# Patient Record
Sex: FEMALE | Race: OTHER | NOT HISPANIC OR LATINO
[De-identification: names, ages, dates, MRNs, and addresses within clinical notes are randomized per-mention and may not be internally consistent; named-entity substitution may affect disease eponyms.]

---

## 2015-07-17 RX ORDER — DULOXETINE HYDROCHLORIDE 30 MG/1
220 CAPSULE, DELAYED RELEASE ORAL
Qty: 0 | Refills: 0 | DISCHARGE
Start: 2015-07-17

## 2017-02-06 ENCOUNTER — APPOINTMENT (OUTPATIENT)
Dept: PULMONOLOGY | Facility: CLINIC | Age: 32
End: 2017-02-06

## 2017-02-07 ENCOUNTER — RESULT CHARGE (OUTPATIENT)
Age: 32
End: 2017-02-07

## 2017-02-08 ENCOUNTER — APPOINTMENT (OUTPATIENT)
Dept: FAMILY MEDICINE | Facility: CLINIC | Age: 32
End: 2017-02-08

## 2017-02-08 ENCOUNTER — NON-APPOINTMENT (OUTPATIENT)
Age: 32
End: 2017-02-08

## 2017-02-08 VITALS
BODY MASS INDEX: 26.14 KG/M2 | HEART RATE: 90 BPM | DIASTOLIC BLOOD PRESSURE: 72 MMHG | HEIGHT: 72 IN | SYSTOLIC BLOOD PRESSURE: 95 MMHG | WEIGHT: 193 LBS | OXYGEN SATURATION: 98 % | TEMPERATURE: 97.6 F

## 2017-02-08 DIAGNOSIS — I77.810 THORACIC AORTIC ECTASIA: ICD-10-CM

## 2017-02-08 LAB — INR PPP: 2.7 RATIO

## 2017-02-10 LAB
25(OH)D3 SERPL-MCNC: 12.8 NG/ML
ALBUMIN SERPL ELPH-MCNC: 4.5 G/DL
ALP BLD-CCNC: 116 U/L
ALT SERPL-CCNC: 16 U/L
ANION GAP SERPL CALC-SCNC: 18 MMOL/L
APPEARANCE: ABNORMAL
AST SERPL-CCNC: 35 U/L
BACTERIA: ABNORMAL
BASOPHILS # BLD AUTO: 0.03 K/UL
BASOPHILS NFR BLD AUTO: 0.2 %
BILIRUB SERPL-MCNC: 0.6 MG/DL
BILIRUBIN URINE: NEGATIVE
BLOOD URINE: NEGATIVE
BUN SERPL-MCNC: 10 MG/DL
CALCIUM SERPL-MCNC: 9.5 MG/DL
CHLORIDE SERPL-SCNC: 100 MMOL/L
CHOLEST SERPL-MCNC: 203 MG/DL
CHOLEST/HDLC SERPL: 5.1 RATIO
CO2 SERPL-SCNC: 21 MMOL/L
COLOR: ABNORMAL
CREAT SERPL-MCNC: 0.74 MG/DL
CREAT SPEC-SCNC: 330 MG/DL
EOSINOPHIL # BLD AUTO: 0.17 K/UL
EOSINOPHIL NFR BLD AUTO: 1.2 %
GGT SERPL-CCNC: 52 U/L
GLUCOSE QUALITATIVE U: NORMAL MG/DL
GLUCOSE SERPL-MCNC: 195 MG/DL
HBA1C MFR BLD HPLC: 9.8 %
HCT VFR BLD CALC: 43.2 %
HDLC SERPL-MCNC: 40 MG/DL
HGB BLD-MCNC: 13.7 G/DL
HYALINE CASTS: 0 /LPF
IMM GRANULOCYTES NFR BLD AUTO: 0.5 %
KETONES URINE: NEGATIVE
LDLC SERPL CALC-MCNC: 107 MG/DL
LEUKOCYTE ESTERASE URINE: ABNORMAL
LYMPHOCYTES # BLD AUTO: 2.04 K/UL
LYMPHOCYTES NFR BLD AUTO: 14.9 %
MAN DIFF?: NORMAL
MCHC RBC-ENTMCNC: 27.3 PG
MCHC RBC-ENTMCNC: 31.7 GM/DL
MCV RBC AUTO: 86.2 FL
MICROALBUMIN 24H UR DL<=1MG/L-MCNC: 2.4 MG/DL
MICROALBUMIN/CREAT 24H UR-RTO: 7 UG/MG
MICROSCOPIC-UA: NORMAL
MONOCYTES # BLD AUTO: 0.76 K/UL
MONOCYTES NFR BLD AUTO: 5.5 %
NEUTROPHILS # BLD AUTO: 10.63 K/UL
NEUTROPHILS NFR BLD AUTO: 77.7 %
NITRITE URINE: NEGATIVE
PH URINE: 5.5
PLATELET # BLD AUTO: 361 K/UL
POTASSIUM SERPL-SCNC: 4 MMOL/L
PROT SERPL-MCNC: 7.8 G/DL
PROTEIN URINE: ABNORMAL MG/DL
RBC # BLD: 5.01 M/UL
RBC # FLD: 15 %
RED BLOOD CELLS URINE: 3 /HPF
SODIUM SERPL-SCNC: 139 MMOL/L
SPECIFIC GRAVITY URINE: 1.03
SQUAMOUS EPITHELIAL CELLS: 14 /HPF
T3 SERPL-MCNC: 106 NG/DL
T4 SERPL-MCNC: 8.7 UG/DL
TRIGL SERPL-MCNC: 280 MG/DL
TSH SERPL-ACNC: 2.56 UIU/ML
URATE SERPL-MCNC: 5.2 MG/DL
URINE COMMENTS: NORMAL
UROBILINOGEN URINE: NORMAL MG/DL
WBC # FLD AUTO: 13.7 K/UL
WHITE BLOOD CELLS URINE: 6 /HPF

## 2017-02-21 ENCOUNTER — OUTPATIENT (OUTPATIENT)
Dept: OUTPATIENT SERVICES | Facility: HOSPITAL | Age: 32
LOS: 1 days | End: 2017-02-21
Payer: COMMERCIAL

## 2017-02-21 ENCOUNTER — MEDICATION RENEWAL (OUTPATIENT)
Age: 32
End: 2017-02-21

## 2017-02-21 DIAGNOSIS — M54.5 LOW BACK PAIN: ICD-10-CM

## 2017-02-21 DIAGNOSIS — M54.2 CERVICALGIA: ICD-10-CM

## 2017-02-21 DIAGNOSIS — Z98.1 ARTHRODESIS STATUS: Chronic | ICD-10-CM

## 2017-02-21 DIAGNOSIS — Z95.4 PRESENCE OF OTHER HEART-VALVE REPLACEMENT: Chronic | ICD-10-CM

## 2017-02-21 DIAGNOSIS — Z96.1 PRESENCE OF INTRAOCULAR LENS: Chronic | ICD-10-CM

## 2017-02-21 DIAGNOSIS — Z51.89 ENCOUNTER FOR OTHER SPECIFIED AFTERCARE: ICD-10-CM

## 2017-02-22 ENCOUNTER — RX RENEWAL (OUTPATIENT)
Age: 32
End: 2017-02-22

## 2017-03-08 ENCOUNTER — LABORATORY RESULT (OUTPATIENT)
Age: 32
End: 2017-03-08

## 2017-03-08 ENCOUNTER — APPOINTMENT (OUTPATIENT)
Dept: FAMILY MEDICINE | Facility: CLINIC | Age: 32
End: 2017-03-08

## 2017-03-08 VITALS
DIASTOLIC BLOOD PRESSURE: 70 MMHG | BODY MASS INDEX: 26.55 KG/M2 | HEART RATE: 100 BPM | HEIGHT: 72 IN | WEIGHT: 196 LBS | SYSTOLIC BLOOD PRESSURE: 110 MMHG

## 2017-03-08 VITALS — SYSTOLIC BLOOD PRESSURE: 92 MMHG | DIASTOLIC BLOOD PRESSURE: 65 MMHG | HEART RATE: 90 BPM

## 2017-03-08 DIAGNOSIS — Z87.898 PERSONAL HISTORY OF OTHER SPECIFIED CONDITIONS: ICD-10-CM

## 2017-03-08 DIAGNOSIS — R82.71 BACTERIURIA: ICD-10-CM

## 2017-03-09 ENCOUNTER — MEDICATION RENEWAL (OUTPATIENT)
Age: 32
End: 2017-03-09

## 2017-03-09 LAB
25(OH)D3 SERPL-MCNC: 15.8 NG/ML
APPEARANCE: CLEAR
BACTERIA: NEGATIVE
BASOPHILS # BLD AUTO: 0.38 K/UL
BASOPHILS NFR BLD AUTO: 2.8 %
BILIRUBIN URINE: NEGATIVE
BLOOD URINE: ABNORMAL
COLOR: YELLOW
EOSINOPHIL # BLD AUTO: 0 K/UL
EOSINOPHIL NFR BLD AUTO: 0 %
FERRITIN SERPL-MCNC: 191.8 NG/ML
FOLATE SERPL-MCNC: 8.6 NG/ML
GLUCOSE QUALITATIVE U: NORMAL MG/DL
HCT VFR BLD CALC: 39.4 %
HGB BLD-MCNC: 12.7 G/DL
HYALINE CASTS: 1 /LPF
IRON SATN MFR SERPL: 18 %
IRON SERPL-MCNC: 53 UG/DL
KETONES URINE: ABNORMAL
LEUKOCYTE ESTERASE URINE: NEGATIVE
LYMPHOCYTES # BLD AUTO: 3 K/UL
LYMPHOCYTES NFR BLD AUTO: 22.2 %
MAN DIFF?: NORMAL
MCHC RBC-ENTMCNC: 27.5 PG
MCHC RBC-ENTMCNC: 32.2 GM/DL
MCV RBC AUTO: 85.5 FL
MICROSCOPIC-UA: NORMAL
MONOCYTES # BLD AUTO: 0.38 K/UL
MONOCYTES NFR BLD AUTO: 2.8 %
NEUTROPHILS # BLD AUTO: 9.37 K/UL
NEUTROPHILS NFR BLD AUTO: 69.4 %
NITRITE URINE: NEGATIVE
PH URINE: 5.5
PLATELET # BLD AUTO: 341 K/UL
PROTEIN URINE: NEGATIVE MG/DL
RBC # BLD: 4.61 M/UL
RBC # FLD: 15.5 %
RED BLOOD CELLS URINE: 5 /HPF
SPECIFIC GRAVITY URINE: 1.03
SQUAMOUS EPITHELIAL CELLS: 4 /HPF
TIBC SERPL-MCNC: 288 UG/DL
TRANSFERRIN SERPL-MCNC: 227 MG/DL
UIBC SERPL-MCNC: 235 UG/DL
UROBILINOGEN URINE: NORMAL MG/DL
VIT B12 SERPL-MCNC: 291 PG/ML
WBC # FLD AUTO: 13.5 K/UL
WHITE BLOOD CELLS URINE: 1 /HPF

## 2017-03-10 LAB
BACTERIA UR CULT: NORMAL
HBA1C MFR BLD HPLC: 9.5 %

## 2017-03-17 ENCOUNTER — APPOINTMENT (OUTPATIENT)
Dept: FAMILY MEDICINE | Facility: CLINIC | Age: 32
End: 2017-03-17

## 2017-03-17 VITALS — DIASTOLIC BLOOD PRESSURE: 70 MMHG | SYSTOLIC BLOOD PRESSURE: 100 MMHG | HEIGHT: 72 IN | HEART RATE: 90 BPM

## 2017-03-21 LAB — INR PPP: 4 RATIO

## 2017-03-24 ENCOUNTER — APPOINTMENT (OUTPATIENT)
Dept: FAMILY MEDICINE | Facility: CLINIC | Age: 32
End: 2017-03-24

## 2017-03-24 VITALS
WEIGHT: 195 LBS | SYSTOLIC BLOOD PRESSURE: 108 MMHG | BODY MASS INDEX: 26.41 KG/M2 | DIASTOLIC BLOOD PRESSURE: 60 MMHG | HEIGHT: 72 IN

## 2017-03-31 ENCOUNTER — APPOINTMENT (OUTPATIENT)
Dept: FAMILY MEDICINE | Facility: CLINIC | Age: 32
End: 2017-03-31

## 2017-03-31 VITALS
SYSTOLIC BLOOD PRESSURE: 110 MMHG | BODY MASS INDEX: 26.41 KG/M2 | HEIGHT: 72 IN | DIASTOLIC BLOOD PRESSURE: 60 MMHG | WEIGHT: 195 LBS

## 2017-04-07 ENCOUNTER — APPOINTMENT (OUTPATIENT)
Dept: FAMILY MEDICINE | Facility: CLINIC | Age: 32
End: 2017-04-07

## 2017-04-07 LAB — INR PPP: 3.4 RATIO

## 2017-04-14 ENCOUNTER — APPOINTMENT (OUTPATIENT)
Dept: FAMILY MEDICINE | Facility: CLINIC | Age: 32
End: 2017-04-14

## 2017-04-14 VITALS
HEART RATE: 96 BPM | WEIGHT: 197 LBS | OXYGEN SATURATION: 98 % | BODY MASS INDEX: 26.68 KG/M2 | HEIGHT: 72 IN | DIASTOLIC BLOOD PRESSURE: 72 MMHG | SYSTOLIC BLOOD PRESSURE: 114 MMHG

## 2017-04-14 LAB
INR PPP: 1.7 RATIO
INR PPP: 2.7 RATIO
INR PPP: 3 RATIO

## 2017-04-20 ENCOUNTER — APPOINTMENT (OUTPATIENT)
Dept: PULMONOLOGY | Facility: CLINIC | Age: 32
End: 2017-04-20

## 2017-04-20 VITALS
HEART RATE: 107 BPM | DIASTOLIC BLOOD PRESSURE: 66 MMHG | WEIGHT: 193 LBS | SYSTOLIC BLOOD PRESSURE: 118 MMHG | OXYGEN SATURATION: 99 % | BODY MASS INDEX: 24.78 KG/M2

## 2017-04-20 VITALS — RESPIRATION RATE: 16 BRPM

## 2017-04-20 RX ORDER — DULOXETINE HYDROCHLORIDE 30 MG/1
30 CAPSULE, DELAYED RELEASE PELLETS ORAL
Qty: 30 | Refills: 0 | Status: DISCONTINUED | COMMUNITY
Start: 2016-05-18 | End: 2017-04-20

## 2017-04-20 RX ORDER — WARFARIN 1 MG/1
1 TABLET ORAL
Qty: 20 | Refills: 0 | Status: DISCONTINUED | COMMUNITY
Start: 2017-03-09 | End: 2017-04-20

## 2017-04-20 RX ORDER — GABAPENTIN 400 MG/1
400 CAPSULE ORAL
Qty: 90 | Refills: 0 | Status: DISCONTINUED | COMMUNITY
Start: 2017-01-25 | End: 2017-04-20

## 2017-04-20 RX ORDER — TRAZODONE HYDROCHLORIDE 50 MG/1
50 TABLET ORAL
Qty: 30 | Refills: 0 | Status: DISCONTINUED | COMMUNITY
Start: 2017-02-27 | End: 2017-04-20

## 2017-04-28 ENCOUNTER — APPOINTMENT (OUTPATIENT)
Dept: FAMILY MEDICINE | Facility: CLINIC | Age: 32
End: 2017-04-28

## 2017-04-28 VITALS
HEART RATE: 101 BPM | DIASTOLIC BLOOD PRESSURE: 68 MMHG | HEIGHT: 72 IN | BODY MASS INDEX: 27.09 KG/M2 | SYSTOLIC BLOOD PRESSURE: 110 MMHG | WEIGHT: 200 LBS

## 2017-04-28 LAB — INR PPP: 2.9 RATIO

## 2017-05-19 ENCOUNTER — APPOINTMENT (OUTPATIENT)
Dept: FAMILY MEDICINE | Facility: CLINIC | Age: 32
End: 2017-05-19

## 2017-06-06 ENCOUNTER — APPOINTMENT (OUTPATIENT)
Dept: FAMILY MEDICINE | Facility: CLINIC | Age: 32
End: 2017-06-06

## 2017-06-14 ENCOUNTER — RX RENEWAL (OUTPATIENT)
Age: 32
End: 2017-06-14

## 2017-06-14 ENCOUNTER — APPOINTMENT (OUTPATIENT)
Dept: FAMILY MEDICINE | Facility: CLINIC | Age: 32
End: 2017-06-14

## 2017-06-14 VITALS
HEIGHT: 72 IN | SYSTOLIC BLOOD PRESSURE: 110 MMHG | DIASTOLIC BLOOD PRESSURE: 70 MMHG | BODY MASS INDEX: 26.55 KG/M2 | HEART RATE: 106 BPM | WEIGHT: 196 LBS

## 2017-06-15 LAB
25(OH)D3 SERPL-MCNC: 39.9 NG/ML
ALBUMIN SERPL ELPH-MCNC: 4.2 G/DL
ALP BLD-CCNC: 95 U/L
ALT SERPL-CCNC: 18 U/L
ANION GAP SERPL CALC-SCNC: 17 MMOL/L
AST SERPL-CCNC: 30 U/L
BASOPHILS # BLD AUTO: 0.02 K/UL
BASOPHILS NFR BLD AUTO: 0.1 %
BILIRUB SERPL-MCNC: 0.3 MG/DL
BUN SERPL-MCNC: 12 MG/DL
CALCIUM SERPL-MCNC: 9.4 MG/DL
CHLORIDE SERPL-SCNC: 102 MMOL/L
CHOLEST SERPL-MCNC: 167 MG/DL
CHOLEST/HDLC SERPL: 4.4 RATIO
CO2 SERPL-SCNC: 17 MMOL/L
CREAT SERPL-MCNC: 0.69 MG/DL
EOSINOPHIL # BLD AUTO: 0.12 K/UL
EOSINOPHIL NFR BLD AUTO: 0.8 %
GGT SERPL-CCNC: 42 U/L
GLUCOSE SERPL-MCNC: 199 MG/DL
HBA1C MFR BLD HPLC: 7.8 %
HCT VFR BLD CALC: 40 %
HDLC SERPL-MCNC: 38 MG/DL
HGB BLD-MCNC: 12.7 G/DL
IMM GRANULOCYTES NFR BLD AUTO: 0.6 %
LDLC SERPL CALC-MCNC: 90 MG/DL
LYMPHOCYTES # BLD AUTO: 2.23 K/UL
LYMPHOCYTES NFR BLD AUTO: 14.4 %
MAN DIFF?: NORMAL
MCHC RBC-ENTMCNC: 27.4 PG
MCHC RBC-ENTMCNC: 31.8 GM/DL
MCV RBC AUTO: 86.2 FL
MONOCYTES # BLD AUTO: 0.85 K/UL
MONOCYTES NFR BLD AUTO: 5.5 %
NEUTROPHILS # BLD AUTO: 12.15 K/UL
NEUTROPHILS NFR BLD AUTO: 78.6 %
PLATELET # BLD AUTO: 376 K/UL
POTASSIUM SERPL-SCNC: 3.9 MMOL/L
PROT SERPL-MCNC: 7.4 G/DL
RBC # BLD: 4.64 M/UL
RBC # FLD: 15.9 %
SODIUM SERPL-SCNC: 136 MMOL/L
T3 SERPL-MCNC: 140 NG/DL
T4 SERPL-MCNC: 9 UG/DL
TRIGL SERPL-MCNC: 194 MG/DL
TSH SERPL-ACNC: 5.86 UIU/ML
URATE SERPL-MCNC: 6.8 MG/DL
WBC # FLD AUTO: 15.46 K/UL

## 2017-06-20 ENCOUNTER — OUTPATIENT (OUTPATIENT)
Dept: OUTPATIENT SERVICES | Facility: HOSPITAL | Age: 32
LOS: 1 days | End: 2017-06-20
Payer: MEDICAID

## 2017-06-20 DIAGNOSIS — G47.33 OBSTRUCTIVE SLEEP APNEA (ADULT) (PEDIATRIC): ICD-10-CM

## 2017-06-20 DIAGNOSIS — Z98.1 ARTHRODESIS STATUS: Chronic | ICD-10-CM

## 2017-06-20 DIAGNOSIS — Z96.1 PRESENCE OF INTRAOCULAR LENS: Chronic | ICD-10-CM

## 2017-06-20 DIAGNOSIS — Z95.4 PRESENCE OF OTHER HEART-VALVE REPLACEMENT: Chronic | ICD-10-CM

## 2017-06-20 PROCEDURE — 95806 SLEEP STUDY UNATT&RESP EFFT: CPT

## 2017-06-20 PROCEDURE — 95806 SLEEP STUDY UNATT&RESP EFFT: CPT | Mod: 26

## 2017-07-17 ENCOUNTER — APPOINTMENT (OUTPATIENT)
Dept: FAMILY MEDICINE | Facility: CLINIC | Age: 32
End: 2017-07-17

## 2017-07-31 PROCEDURE — 97140 MANUAL THERAPY 1/> REGIONS: CPT

## 2017-07-31 PROCEDURE — 97010 HOT OR COLD PACKS THERAPY: CPT

## 2017-07-31 PROCEDURE — 97110 THERAPEUTIC EXERCISES: CPT

## 2017-08-04 ENCOUNTER — INPATIENT (INPATIENT)
Facility: HOSPITAL | Age: 32
LOS: 0 days | Discharge: HOME | DRG: 552 | End: 2017-08-05
Attending: SURGERY | Admitting: SURGERY
Payer: COMMERCIAL

## 2017-08-04 ENCOUNTER — OUTPATIENT (OUTPATIENT)
Dept: OUTPATIENT SERVICES | Facility: HOSPITAL | Age: 32
LOS: 1 days | End: 2017-08-04
Payer: COMMERCIAL

## 2017-08-04 VITALS — HEIGHT: 72 IN | WEIGHT: 214.95 LBS

## 2017-08-04 DIAGNOSIS — Z96.1 PRESENCE OF INTRAOCULAR LENS: Chronic | ICD-10-CM

## 2017-08-04 DIAGNOSIS — Z95.4 PRESENCE OF OTHER HEART-VALVE REPLACEMENT: Chronic | ICD-10-CM

## 2017-08-04 DIAGNOSIS — Z98.1 ARTHRODESIS STATUS: Chronic | ICD-10-CM

## 2017-08-04 DIAGNOSIS — M54.2 CERVICALGIA: ICD-10-CM

## 2017-08-04 LAB
ALBUMIN SERPL ELPH-MCNC: 4.3 G/DL — SIGNIFICANT CHANGE UP (ref 3.3–5.2)
ALP SERPL-CCNC: 96 U/L — SIGNIFICANT CHANGE UP (ref 40–120)
ALT FLD-CCNC: 14 U/L — SIGNIFICANT CHANGE UP
AMYLASE P1 CFR SERPL: 83 U/L — SIGNIFICANT CHANGE UP (ref 36–128)
ANION GAP SERPL CALC-SCNC: 16 MMOL/L — SIGNIFICANT CHANGE UP (ref 5–17)
APTT BLD: 70.7 SEC — HIGH (ref 27.5–37.4)
AST SERPL-CCNC: 27 U/L — SIGNIFICANT CHANGE UP
BASOPHILS # BLD AUTO: 0 K/UL — SIGNIFICANT CHANGE UP (ref 0–0.2)
BASOPHILS NFR BLD AUTO: 0.1 % — SIGNIFICANT CHANGE UP (ref 0–2)
BILIRUB SERPL-MCNC: 0.4 MG/DL — SIGNIFICANT CHANGE UP (ref 0.4–2)
BUN SERPL-MCNC: 14 MG/DL — SIGNIFICANT CHANGE UP (ref 8–20)
CALCIUM SERPL-MCNC: 9.4 MG/DL — SIGNIFICANT CHANGE UP (ref 8.6–10.2)
CHLORIDE SERPL-SCNC: 100 MMOL/L — SIGNIFICANT CHANGE UP (ref 98–107)
CO2 SERPL-SCNC: 21 MMOL/L — LOW (ref 22–29)
CREAT SERPL-MCNC: 0.56 MG/DL — SIGNIFICANT CHANGE UP (ref 0.5–1.3)
EOSINOPHIL # BLD AUTO: 0.2 K/UL — SIGNIFICANT CHANGE UP (ref 0–0.5)
EOSINOPHIL NFR BLD AUTO: 1.1 % — SIGNIFICANT CHANGE UP (ref 0–6)
ETHANOL SERPL-MCNC: <10 MG/DL — SIGNIFICANT CHANGE UP
GLUCOSE SERPL-MCNC: 194 MG/DL — HIGH (ref 70–115)
HCT VFR BLD CALC: 40.5 % — SIGNIFICANT CHANGE UP (ref 37–47)
HGB BLD-MCNC: 13.3 G/DL — SIGNIFICANT CHANGE UP (ref 12–16)
INR BLD: 2.73 RATIO — HIGH (ref 0.88–1.16)
LIDOCAIN IGE QN: 36 U/L — SIGNIFICANT CHANGE UP (ref 22–51)
LYMPHOCYTES # BLD AUTO: 15.3 % — LOW (ref 20–55)
LYMPHOCYTES # BLD AUTO: 2 K/UL — SIGNIFICANT CHANGE UP (ref 1–4.8)
MCHC RBC-ENTMCNC: 27.3 PG — SIGNIFICANT CHANGE UP (ref 27–31)
MCHC RBC-ENTMCNC: 32.8 G/DL — SIGNIFICANT CHANGE UP (ref 32–36)
MCV RBC AUTO: 83 FL — SIGNIFICANT CHANGE UP (ref 81–99)
MONOCYTES # BLD AUTO: 0.9 K/UL — HIGH (ref 0–0.8)
MONOCYTES NFR BLD AUTO: 6.4 % — SIGNIFICANT CHANGE UP (ref 3–10)
NEUTROPHILS # BLD AUTO: 10.2 K/UL — HIGH (ref 1.8–8)
NEUTROPHILS NFR BLD AUTO: 76.5 % — HIGH (ref 37–73)
PLATELET # BLD AUTO: 355 K/UL — SIGNIFICANT CHANGE UP (ref 150–400)
POTASSIUM SERPL-MCNC: 4.6 MMOL/L — SIGNIFICANT CHANGE UP (ref 3.5–5.3)
POTASSIUM SERPL-SCNC: 4.6 MMOL/L — SIGNIFICANT CHANGE UP (ref 3.5–5.3)
PROT SERPL-MCNC: 8.1 G/DL — SIGNIFICANT CHANGE UP (ref 6.6–8.7)
PROTHROM AB SERPL-ACNC: 30.7 SEC — HIGH (ref 9.8–12.7)
RBC # BLD: 4.88 M/UL — SIGNIFICANT CHANGE UP (ref 4.4–5.2)
RBC # FLD: 15.5 % — SIGNIFICANT CHANGE UP (ref 11–15.6)
SODIUM SERPL-SCNC: 137 MMOL/L — SIGNIFICANT CHANGE UP (ref 135–145)
WBC # BLD: 13.4 K/UL — HIGH (ref 4.8–10.8)
WBC # FLD AUTO: 13.4 K/UL — HIGH (ref 4.8–10.8)

## 2017-08-04 PROCEDURE — 71010: CPT | Mod: 26

## 2017-08-04 PROCEDURE — 99285 EMERGENCY DEPT VISIT HI MDM: CPT

## 2017-08-04 PROCEDURE — 93010 ELECTROCARDIOGRAM REPORT: CPT

## 2017-08-04 PROCEDURE — 70450 CT HEAD/BRAIN W/O DYE: CPT | Mod: 26

## 2017-08-04 PROCEDURE — 72125 CT NECK SPINE W/O DYE: CPT | Mod: 26

## 2017-08-04 RX ORDER — FENTANYL CITRATE 50 UG/ML
25 INJECTION INTRAVENOUS ONCE
Qty: 0 | Refills: 0 | Status: DISCONTINUED | OUTPATIENT
Start: 2017-08-04 | End: 2017-08-04

## 2017-08-04 RX ORDER — SODIUM CHLORIDE 9 MG/ML
1000 INJECTION INTRAMUSCULAR; INTRAVENOUS; SUBCUTANEOUS ONCE
Qty: 0 | Refills: 0 | Status: COMPLETED | OUTPATIENT
Start: 2017-08-04 | End: 2017-08-04

## 2017-08-04 RX ORDER — KETOROLAC TROMETHAMINE 30 MG/ML
15 SYRINGE (ML) INJECTION ONCE
Qty: 0 | Refills: 0 | Status: DISCONTINUED | OUTPATIENT
Start: 2017-08-04 | End: 2017-08-04

## 2017-08-04 RX ORDER — HYDROMORPHONE HYDROCHLORIDE 2 MG/ML
0.5 INJECTION INTRAMUSCULAR; INTRAVENOUS; SUBCUTANEOUS ONCE
Qty: 0 | Refills: 0 | Status: DISCONTINUED | OUTPATIENT
Start: 2017-08-04 | End: 2017-08-04

## 2017-08-04 RX ORDER — ENOXAPARIN SODIUM 100 MG/ML
40 INJECTION SUBCUTANEOUS EVERY 12 HOURS
Qty: 0 | Refills: 0 | Status: DISCONTINUED | OUTPATIENT
Start: 2017-08-04 | End: 2017-08-05

## 2017-08-04 RX ORDER — HYDROMORPHONE HYDROCHLORIDE 2 MG/ML
0.5 INJECTION INTRAMUSCULAR; INTRAVENOUS; SUBCUTANEOUS EVERY 4 HOURS
Qty: 0 | Refills: 0 | Status: DISCONTINUED | OUTPATIENT
Start: 2017-08-04 | End: 2017-08-05

## 2017-08-04 RX ORDER — ACETAMINOPHEN 500 MG
650 TABLET ORAL EVERY 6 HOURS
Qty: 0 | Refills: 0 | Status: DISCONTINUED | OUTPATIENT
Start: 2017-08-04 | End: 2017-08-05

## 2017-08-04 RX ORDER — OXYCODONE AND ACETAMINOPHEN 5; 325 MG/1; MG/1
1 TABLET ORAL EVERY 6 HOURS
Qty: 0 | Refills: 0 | Status: DISCONTINUED | OUTPATIENT
Start: 2017-08-04 | End: 2017-08-05

## 2017-08-04 RX ADMIN — FENTANYL CITRATE 25 MICROGRAM(S): 50 INJECTION INTRAVENOUS at 15:55

## 2017-08-04 RX ADMIN — Medication 15 MILLIGRAM(S): at 16:58

## 2017-08-04 RX ADMIN — SODIUM CHLORIDE 1000 MILLILITER(S): 9 INJECTION INTRAMUSCULAR; INTRAVENOUS; SUBCUTANEOUS at 15:57

## 2017-08-04 RX ADMIN — HYDROMORPHONE HYDROCHLORIDE 0.5 MILLIGRAM(S): 2 INJECTION INTRAMUSCULAR; INTRAVENOUS; SUBCUTANEOUS at 18:27

## 2017-08-04 RX ADMIN — HYDROMORPHONE HYDROCHLORIDE 0.5 MILLIGRAM(S): 2 INJECTION INTRAMUSCULAR; INTRAVENOUS; SUBCUTANEOUS at 20:27

## 2017-08-04 RX ADMIN — HYDROMORPHONE HYDROCHLORIDE 0.5 MILLIGRAM(S): 2 INJECTION INTRAMUSCULAR; INTRAVENOUS; SUBCUTANEOUS at 18:35

## 2017-08-04 RX ADMIN — FENTANYL CITRATE 25 MICROGRAM(S): 50 INJECTION INTRAVENOUS at 15:53

## 2017-08-04 RX ADMIN — Medication 15 MILLIGRAM(S): at 17:30

## 2017-08-04 RX ADMIN — HYDROMORPHONE HYDROCHLORIDE 0.5 MILLIGRAM(S): 2 INJECTION INTRAMUSCULAR; INTRAVENOUS; SUBCUTANEOUS at 23:03

## 2017-08-04 RX ADMIN — HYDROMORPHONE HYDROCHLORIDE 0.5 MILLIGRAM(S): 2 INJECTION INTRAMUSCULAR; INTRAVENOUS; SUBCUTANEOUS at 23:33
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
## 2017-08-04 NOTE — H&P ADULT - PMH
Anxiety    Depression    Diabetes    Dilated aortic root    Fibromyalgia    Marfan syndrome    Mitral valve prolapse    S/P CABG (coronary artery bypass graft)
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
## 2017-08-04 NOTE — H&P ADULT - NSHPPHYSICALEXAM_GEN_ALL_CORE
Secondary Survey  Constitutional: middle age female in no acute distress  HEENT: No blood from nares or ears. No maxillary or mandibular crepitus  Pulm: Clear to auscultation bilaterally  CV:S1/S2  Chest: No chest tenderness  Abd: Soft, nontender, nondistended  Pelvic: Stable  Vascular: Radial Pulse 2+, bilateral  Extremities: Abrasion to medial aspect of right elbow  Neuro: Moves all extremities. Strength 5/5. Sensation intact

## 2017-08-05 ENCOUNTER — TRANSCRIPTION ENCOUNTER (OUTPATIENT)
Age: 32
End: 2017-08-05

## 2017-08-05 VITALS
RESPIRATION RATE: 18 BRPM | HEART RATE: 84 BPM | DIASTOLIC BLOOD PRESSURE: 78 MMHG | OXYGEN SATURATION: 98 % | SYSTOLIC BLOOD PRESSURE: 121 MMHG | TEMPERATURE: 98 F

## 2017-08-05 LAB
APPEARANCE UR: CLEAR — SIGNIFICANT CHANGE UP
BACTERIA # UR AUTO: ABNORMAL
BILIRUB UR-MCNC: ABNORMAL
COLOR SPEC: YELLOW — SIGNIFICANT CHANGE UP
COMMENT - URINE: SIGNIFICANT CHANGE UP
DIFF PNL FLD: NEGATIVE — SIGNIFICANT CHANGE UP
EPI CELLS # UR: SIGNIFICANT CHANGE UP
GLUCOSE UR QL: 50 MG/DL
KETONES UR-MCNC: ABNORMAL
LEUKOCYTE ESTERASE UR-ACNC: ABNORMAL
NITRITE UR-MCNC: NEGATIVE — SIGNIFICANT CHANGE UP
PH UR: 6 — SIGNIFICANT CHANGE UP (ref 5–8)
PROT UR-MCNC: 30 MG/DL
RBC CASTS # UR COMP ASSIST: ABNORMAL /HPF (ref 0–4)
SP GR SPEC: 1.02 — SIGNIFICANT CHANGE UP (ref 1.01–1.02)
UROBILINOGEN FLD QL: NEGATIVE MG/DL — SIGNIFICANT CHANGE UP
WBC UR QL: SIGNIFICANT CHANGE UP

## 2017-08-05 PROCEDURE — 96376 TX/PRO/DX INJ SAME DRUG ADON: CPT

## 2017-08-05 PROCEDURE — 85027 COMPLETE CBC AUTOMATED: CPT

## 2017-08-05 PROCEDURE — 96374 THER/PROPH/DIAG INJ IV PUSH: CPT

## 2017-08-05 PROCEDURE — 80053 COMPREHEN METABOLIC PANEL: CPT

## 2017-08-05 PROCEDURE — 72125 CT NECK SPINE W/O DYE: CPT

## 2017-08-05 PROCEDURE — 83605 ASSAY OF LACTIC ACID: CPT

## 2017-08-05 PROCEDURE — 70450 CT HEAD/BRAIN W/O DYE: CPT

## 2017-08-05 PROCEDURE — 81001 URINALYSIS AUTO W/SCOPE: CPT

## 2017-08-05 PROCEDURE — 80307 DRUG TEST PRSMV CHEM ANLYZR: CPT

## 2017-08-05 PROCEDURE — 85610 PROTHROMBIN TIME: CPT

## 2017-08-05 PROCEDURE — 93005 ELECTROCARDIOGRAM TRACING: CPT

## 2017-08-05 PROCEDURE — 71045 X-RAY EXAM CHEST 1 VIEW: CPT

## 2017-08-05 PROCEDURE — 82150 ASSAY OF AMYLASE: CPT

## 2017-08-05 PROCEDURE — 85730 THROMBOPLASTIN TIME PARTIAL: CPT

## 2017-08-05 PROCEDURE — 83690 ASSAY OF LIPASE: CPT

## 2017-08-05 PROCEDURE — 36415 COLL VENOUS BLD VENIPUNCTURE: CPT

## 2017-08-05 PROCEDURE — 96375 TX/PRO/DX INJ NEW DRUG ADDON: CPT

## 2017-08-05 PROCEDURE — 99284 EMERGENCY DEPT VISIT MOD MDM: CPT | Mod: 25

## 2017-08-05 RX ORDER — INSULIN GLARGINE 100 [IU]/ML
24 INJECTION, SOLUTION SUBCUTANEOUS EVERY MORNING
Qty: 0 | Refills: 0 | Status: DISCONTINUED | OUTPATIENT
Start: 2017-08-05 | End: 2017-08-05

## 2017-08-05 RX ORDER — METOPROLOL TARTRATE 50 MG
25 TABLET ORAL
Qty: 0 | Refills: 0 | Status: DISCONTINUED | OUTPATIENT
Start: 2017-08-05 | End: 2017-08-05

## 2017-08-05 RX ORDER — DEXTROSE 50 % IN WATER 50 %
1 SYRINGE (ML) INTRAVENOUS ONCE
Qty: 0 | Refills: 0 | Status: DISCONTINUED | OUTPATIENT
Start: 2017-08-05 | End: 2017-08-05

## 2017-08-05 RX ORDER — OXYCODONE HYDROCHLORIDE 5 MG/1
10 TABLET ORAL EVERY 4 HOURS
Qty: 0 | Refills: 0 | Status: DISCONTINUED | OUTPATIENT
Start: 2017-08-05 | End: 2017-08-05

## 2017-08-05 RX ORDER — DEXTROSE 50 % IN WATER 50 %
25 SYRINGE (ML) INTRAVENOUS ONCE
Qty: 0 | Refills: 0 | Status: DISCONTINUED | OUTPATIENT
Start: 2017-08-05 | End: 2017-08-05

## 2017-08-05 RX ORDER — OXYCODONE HYDROCHLORIDE 5 MG/1
1 TABLET ORAL
Qty: 0 | Refills: 0 | DISCHARGE
Start: 2017-08-05

## 2017-08-05 RX ORDER — INSULIN LISPRO 100/ML
8 VIAL (ML) SUBCUTANEOUS
Qty: 0 | Refills: 0 | Status: DISCONTINUED | OUTPATIENT
Start: 2017-08-05 | End: 2017-08-05

## 2017-08-05 RX ORDER — SODIUM CHLORIDE 9 MG/ML
1000 INJECTION, SOLUTION INTRAVENOUS
Qty: 0 | Refills: 0 | Status: DISCONTINUED | OUTPATIENT
Start: 2017-08-05 | End: 2017-08-05

## 2017-08-05 RX ORDER — GLUCAGON INJECTION, SOLUTION 0.5 MG/.1ML
1 INJECTION, SOLUTION SUBCUTANEOUS ONCE
Qty: 0 | Refills: 0 | Status: DISCONTINUED | OUTPATIENT
Start: 2017-08-05 | End: 2017-08-05

## 2017-08-05 RX ORDER — DEXTROSE 50 % IN WATER 50 %
12.5 SYRINGE (ML) INTRAVENOUS ONCE
Qty: 0 | Refills: 0 | Status: DISCONTINUED | OUTPATIENT
Start: 2017-08-05 | End: 2017-08-05

## 2017-08-05 RX ORDER — DULOXETINE HYDROCHLORIDE 30 MG/1
120 CAPSULE, DELAYED RELEASE ORAL DAILY
Qty: 0 | Refills: 0 | Status: DISCONTINUED | OUTPATIENT
Start: 2017-08-05 | End: 2017-08-05

## 2017-08-05 RX ORDER — ATORVASTATIN CALCIUM 80 MG/1
10 TABLET, FILM COATED ORAL AT BEDTIME
Qty: 0 | Refills: 0 | Status: DISCONTINUED | OUTPATIENT
Start: 2017-08-05 | End: 2017-08-05

## 2017-08-05 RX ADMIN — DULOXETINE HYDROCHLORIDE 120 MILLIGRAM(S): 30 CAPSULE, DELAYED RELEASE ORAL at 11:59

## 2017-08-05 RX ADMIN — Medication 15 MILLIGRAM(S): at 05:48

## 2017-08-05 RX ADMIN — Medication 650 MILLIGRAM(S): at 04:47

## 2017-08-05 RX ADMIN — OXYCODONE HYDROCHLORIDE 10 MILLIGRAM(S): 5 TABLET ORAL at 11:15

## 2017-08-05 RX ADMIN — Medication 25 MILLIGRAM(S): at 05:48

## 2017-08-05 RX ADMIN — Medication 650 MILLIGRAM(S): at 05:17

## 2017-08-05 RX ADMIN — OXYCODONE HYDROCHLORIDE 10 MILLIGRAM(S): 5 TABLET ORAL at 06:31

## 2017-08-05 RX ADMIN — OXYCODONE HYDROCHLORIDE 10 MILLIGRAM(S): 5 TABLET ORAL at 02:10

## 2017-08-05 RX ADMIN — OXYCODONE HYDROCHLORIDE 10 MILLIGRAM(S): 5 TABLET ORAL at 10:33

## 2017-08-05 RX ADMIN — Medication 650 MILLIGRAM(S): at 15:00

## 2017-08-05 RX ADMIN — Medication 650 MILLIGRAM(S): at 15:24

## 2017-08-05 RX ADMIN — OXYCODONE HYDROCHLORIDE 10 MILLIGRAM(S): 5 TABLET ORAL at 02:40

## 2017-08-05 RX ADMIN — INSULIN GLARGINE 24 UNIT(S): 100 INJECTION, SOLUTION SUBCUTANEOUS at 07:31

## 2017-08-05 RX ADMIN — OXYCODONE AND ACETAMINOPHEN 1 TABLET(S): 5; 325 TABLET ORAL at 00:01

## 2017-08-05 RX ADMIN — OXYCODONE AND ACETAMINOPHEN 1 TABLET(S): 5; 325 TABLET ORAL at 00:31

## 2017-08-05 NOTE — DISCHARGE NOTE ADULT - PROVIDER TOKENS
FREE:[LAST:[ACS],FIRST:[ACS],PHONE:[phone]],FAX:[(   )    -],ADDRESS:[[facility] [place] [address] [place] [address]]]

## 2017-08-05 NOTE — DISCHARGE NOTE ADULT - PLAN OF CARE
Return to normal activity Follow up: Please call and make an appointment with [phone] for 2 weeks after discharge. Also, please call and make an appointment with your primary care physician as per your usual schedule.   Activity: Limit activity until follow up appointment. Continue wearing c-collar  Diet: May continue regular diet.  Medications: Please take all home medications as prescribed by your primary care doctor. Pain medication has been prescribed for you. Please, take it as it has been prescribed, do not drive or operate heavy machinery while taking narcotics.   If confusion, altered mental status, fever, chest pain, shortness of breath, new or worsening neck pain, vomiting, change or worsening of medical status, please come back to the emergency room, and in case of emergency call [phone]. Follow up: Please call and make an appointment with [phone] for 2 weeks after discharge. Also, please call and make an appointment with your primary care physician as per your usual schedule.   Activity: Limit activity until follow up appointment. Continue wearing c-collar until f/u appointment with spine specialist, Dr. Marvin, of [facility]. f/u next week.  Diet: May continue regular diet.  Medications: Please take all home medications as prescribed by your primary care doctor. Pain medication has been prescribed for you. Please, take it as it has been prescribed, do not drive or operate heavy machinery while taking narcotics.   If confusion, altered mental status, fever, chest pain, shortness of breath, new or worsening neck pain, vomiting, change or worsening of medical status, please come back to the emergency room, and in case of emergency call [phone].

## 2017-08-05 NOTE — DISCHARGE NOTE ADULT - ADDITIONAL INSTRUCTIONS
do not drive or operate machinery while taking narcotics. Do not drive or operate machinery while taking narcotics.  F/u with Dr. Marvin, of [facility] [phone]) in one week to remove c-collar

## 2017-08-05 NOTE — DISCHARGE NOTE ADULT - CARE PLAN
Goal:	Return to normal activity  Instructions for follow-up, activity and diet:	Follow up: Please call and make an appointment with [phone] for 2 weeks after discharge. Also, please call and make an appointment with your primary care physician as per your usual schedule.   Activity: Limit activity until follow up appointment. Continue wearing c-collar  Diet: May continue regular diet.  Medications: Please take all home medications as prescribed by your primary care doctor. Pain medication has been prescribed for you. Please, take it as it has been prescribed, do not drive or operate heavy machinery while taking narcotics.   If confusion, altered mental status, fever, chest pain, shortness of breath, new or worsening neck pain, vomiting, change or worsening of medical status, please come back to the emergency room, and in case of emergency call [phone]. Principal Discharge DX:	Strain of neck muscle, initial encounter  Goal:	Return to normal activity  Instructions for follow-up, activity and diet:	Follow up: Please call and make an appointment with [phone] for 2 weeks after discharge. Also, please call and make an appointment with your primary care physician as per your usual schedule.   Activity: Limit activity until follow up appointment. Continue wearing c-collar  Diet: May continue regular diet.  Medications: Please take all home medications as prescribed by your primary care doctor. Pain medication has been prescribed for you. Please, take it as it has been prescribed, do not drive or operate heavy machinery while taking narcotics.   If confusion, altered mental status, fever, chest pain, shortness of breath, new or worsening neck pain, vomiting, change or worsening of medical status, please come back to the emergency room, and in case of emergency call [phone]. Principal Discharge DX:	Strain of neck muscle, initial encounter  Goal:	Return to normal activity  Instructions for follow-up, activity and diet:	Follow up: Please call and make an appointment with [phone] for 2 weeks after discharge. Also, please call and make an appointment with your primary care physician as per your usual schedule.   Activity: Limit activity until follow up appointment. Continue wearing c-collar  Diet: May continue regular diet.  Medications: Please take all home medications as prescribed by your primary care doctor. Pain medication has been prescribed for you. Please, take it as it has been prescribed, do not drive or operate heavy machinery while taking narcotics.   If confusion, altered mental status, fever, chest pain, shortness of breath, new or worsening neck pain, vomiting, change or worsening of medical status, please come back to the emergency room, and in case of emergency call [phone]. Principal Discharge DX:	Strain of neck muscle, initial encounter  Goal:	Return to normal activity  Instructions for follow-up, activity and diet:	Follow up: Please call and make an appointment with [phone] for 2 weeks after discharge. Also, please call and make an appointment with your primary care physician as per your usual schedule.   Activity: Limit activity until follow up appointment. Continue wearing c-collar until f/u appointment with spine specialist, Dr. Marvin, of [facility]". f/u next week.  Diet: May continue regular diet.  Medications: Please take all home medications as prescribed by your primary care doctor. Pain medication has been prescribed for you. Please, take it as it has been prescribed, do not drive or operate heavy machinery while taking narcotics.   If confusion, altered mental status, fever, chest pain, shortness of breath, new or worsening neck pain, vomiting, change or worsening of medical status, please come back to the emergency room, and in case of emergency call [phone].

## 2017-08-05 NOTE — DISCHARGE NOTE ADULT - MEDICATION SUMMARY - MEDICATIONS TO TAKE
I will START or STAY ON the medications listed below when I get home from the hospital:    ibuprofen 400 mg oral tablet  -- 1 tab(s) by mouth 3 times a day, As needed, msk pain  -- Indication: For pain    methadone 10 mg oral tablet  -- 1 tab(s) by mouth 3 times a day  -- Indication: For as per pmd    oxyCODONE 10 mg oral tablet  -- 1 tab(s) by mouth every 4 hours, As needed, Moderate Pain (4 - 6)  -- Indication: For as per pmd    Coumadin  -- 12 milligram(s) by mouth once a day  -- Indication: For as per pmd    DULoxetine 60 mg oral delayed release capsule  -- 1 cap(s) by mouth once a day  -- Indication: For as per pmd    metFORMIN 1000 mg oral tablet  -- 1 tab(s) by mouth 2 times a day  -- Indication: For as per pmd    ALPRAZolam 1 mg oral tablet  -- 1 tab(s) by mouth 3 times a day, As needed, anxiety  -- Indication: For as per pmd    busPIRone 15 mg oral tablet  -- 1 tab(s) by mouth every 12 hours  -- Indication: For as per pmd    metoprolol tartrate 25 mg oral tablet  -- 1 tab(s) by mouth 2 times a day  -- Indication: For as per pmd    Advair Diskus  --  inhaled   -- Indication: For as per pmd

## 2017-08-05 NOTE — DISCHARGE NOTE ADULT - CARE PROVIDER_API CALL
ACS, ACS  [address] [facility], [address] [place] [id number]  Phone: [phone]  Fax: (   )    -
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
## 2017-08-05 NOTE — DISCHARGE NOTE ADULT - PATIENT PORTAL LINK FT
“You can access the FollowHealth Patient Portal, offered by [facility], by registering with the following website: [email or web address]”
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
## 2017-08-08 ENCOUNTER — APPOINTMENT (OUTPATIENT)
Dept: FAMILY MEDICINE | Facility: CLINIC | Age: 32
End: 2017-08-08
Payer: COMMERCIAL

## 2017-08-08 VITALS
HEIGHT: 72 IN | BODY MASS INDEX: 26.82 KG/M2 | DIASTOLIC BLOOD PRESSURE: 70 MMHG | SYSTOLIC BLOOD PRESSURE: 114 MMHG | WEIGHT: 198 LBS

## 2017-08-08 DIAGNOSIS — M79.89 PAIN IN RIGHT FOREARM: ICD-10-CM

## 2017-08-08 DIAGNOSIS — R51 HEADACHE: ICD-10-CM

## 2017-08-08 DIAGNOSIS — M25.521 PAIN IN RIGHT ELBOW: ICD-10-CM

## 2017-08-08 DIAGNOSIS — M25.421 PAIN IN RIGHT ELBOW: ICD-10-CM

## 2017-08-08 DIAGNOSIS — M79.631 PAIN IN RIGHT FOREARM: ICD-10-CM

## 2017-08-08 PROCEDURE — 99204 OFFICE O/P NEW MOD 45 MIN: CPT

## 2017-08-08 RX ORDER — AMOXICILLIN 500 MG/1
500 CAPSULE ORAL
Qty: 28 | Refills: 0 | Status: COMPLETED | COMMUNITY
Start: 2017-07-27

## 2017-08-09 ENCOUNTER — APPOINTMENT (OUTPATIENT)
Dept: FAMILY MEDICINE | Facility: CLINIC | Age: 32
End: 2017-08-09
Payer: COMMERCIAL

## 2017-08-09 VITALS
BODY MASS INDEX: 26.82 KG/M2 | HEIGHT: 72 IN | SYSTOLIC BLOOD PRESSURE: 110 MMHG | DIASTOLIC BLOOD PRESSURE: 68 MMHG | WEIGHT: 198 LBS

## 2017-08-09 DIAGNOSIS — T14.8 OTHER INJURY OF UNSPECIFIED BODY REGION: ICD-10-CM

## 2017-08-09 PROCEDURE — 85610 PROTHROMBIN TIME: CPT | Mod: QW

## 2017-08-09 PROCEDURE — 99214 OFFICE O/P EST MOD 30 MIN: CPT | Mod: 25

## 2017-08-17 ENCOUNTER — APPOINTMENT (OUTPATIENT)
Dept: FAMILY MEDICINE | Facility: CLINIC | Age: 32
End: 2017-08-17

## 2017-08-17 DIAGNOSIS — M54.5 LOW BACK PAIN: ICD-10-CM

## 2017-08-17 DIAGNOSIS — M54.2 CERVICALGIA: ICD-10-CM

## 2017-08-24 PROCEDURE — 97110 THERAPEUTIC EXERCISES: CPT

## 2017-08-24 PROCEDURE — 97140 MANUAL THERAPY 1/> REGIONS: CPT

## 2017-08-24 PROCEDURE — 97010 HOT OR COLD PACKS THERAPY: CPT

## 2017-09-11 ENCOUNTER — OUTPATIENT (OUTPATIENT)
Dept: OUTPATIENT SERVICES | Facility: HOSPITAL | Age: 32
LOS: 1 days | End: 2017-09-11
Payer: COMMERCIAL

## 2017-09-11 DIAGNOSIS — M54.12 RADICULOPATHY, CERVICAL REGION: ICD-10-CM

## 2017-09-11 DIAGNOSIS — M54.15 RADICULOPATHY, THORACOLUMBAR REGION: ICD-10-CM

## 2017-09-11 DIAGNOSIS — S23.9XXA SPRAIN OF UNSPECIFIED PARTS OF THORAX, INITIAL ENCOUNTER: ICD-10-CM

## 2017-09-11 DIAGNOSIS — M50.222 OTHER CERVICAL DISC DISPLACEMENT AT C5-C6 LEVEL: ICD-10-CM

## 2017-09-11 DIAGNOSIS — Z51.89 ENCOUNTER FOR OTHER SPECIFIED AFTERCARE: ICD-10-CM

## 2017-09-11 DIAGNOSIS — Z96.1 PRESENCE OF INTRAOCULAR LENS: Chronic | ICD-10-CM

## 2017-09-11 DIAGNOSIS — Z95.4 PRESENCE OF OTHER HEART-VALVE REPLACEMENT: Chronic | ICD-10-CM

## 2017-09-11 DIAGNOSIS — M51.26 OTHER INTERVERTEBRAL DISC DISPLACEMENT, LUMBAR REGION: ICD-10-CM

## 2017-09-11 DIAGNOSIS — S33.5XXA SPRAIN OF LIGAMENTS OF LUMBAR SPINE, INITIAL ENCOUNTER: ICD-10-CM

## 2017-09-11 DIAGNOSIS — Z98.1 ARTHRODESIS STATUS: Chronic | ICD-10-CM

## 2017-09-19 ENCOUNTER — APPOINTMENT (OUTPATIENT)
Dept: FAMILY MEDICINE | Facility: CLINIC | Age: 32
End: 2017-09-19
Payer: MEDICAID

## 2017-09-19 VITALS
OXYGEN SATURATION: 99 % | DIASTOLIC BLOOD PRESSURE: 70 MMHG | SYSTOLIC BLOOD PRESSURE: 108 MMHG | HEIGHT: 72 IN | WEIGHT: 198 LBS | HEART RATE: 100 BPM | BODY MASS INDEX: 26.82 KG/M2

## 2017-09-19 DIAGNOSIS — Z87.09 PERSONAL HISTORY OF OTHER DISEASES OF THE RESPIRATORY SYSTEM: ICD-10-CM

## 2017-09-19 PROCEDURE — 99214 OFFICE O/P EST MOD 30 MIN: CPT | Mod: 25

## 2017-09-19 PROCEDURE — 36415 COLL VENOUS BLD VENIPUNCTURE: CPT

## 2017-09-19 RX ORDER — DICLOFENAC SODIUM 16.05 MG/ML
1.5 SOLUTION TOPICAL
Qty: 150 | Refills: 0 | Status: COMPLETED | COMMUNITY
Start: 2016-12-14 | End: 2017-09-19

## 2017-09-19 RX ORDER — TRAMADOL HYDROCHLORIDE 50 MG/1
50 TABLET, COATED ORAL
Qty: 60 | Refills: 0 | Status: COMPLETED | COMMUNITY
Start: 2017-05-09 | End: 2017-09-19

## 2017-09-19 RX ORDER — FLUTICASONE PROPIONATE 50 UG/1
50 SPRAY, METERED NASAL DAILY
Qty: 1 | Refills: 3 | Status: COMPLETED | COMMUNITY
Start: 2017-04-07 | End: 2017-09-19

## 2017-09-20 LAB
ALBUMIN SERPL ELPH-MCNC: 4.2 G/DL
ALP BLD-CCNC: 90 U/L
ALT SERPL-CCNC: 19 U/L
ANION GAP SERPL CALC-SCNC: 16 MMOL/L
AST SERPL-CCNC: 32 U/L
BASOPHILS # BLD AUTO: 0.03 K/UL
BASOPHILS NFR BLD AUTO: 0.2 %
BILIRUB SERPL-MCNC: 0.3 MG/DL
BUN SERPL-MCNC: 14 MG/DL
CALCIUM SERPL-MCNC: 9.8 MG/DL
CHLORIDE SERPL-SCNC: 103 MMOL/L
CO2 SERPL-SCNC: 22 MMOL/L
CREAT SERPL-MCNC: 0.75 MG/DL
EOSINOPHIL # BLD AUTO: 0.16 K/UL
EOSINOPHIL NFR BLD AUTO: 1.1 %
GLUCOSE SERPL-MCNC: 154 MG/DL
HCT VFR BLD CALC: 37.6 %
HGB BLD-MCNC: 11.9 G/DL
IMM GRANULOCYTES NFR BLD AUTO: 0.3 %
LYMPHOCYTES # BLD AUTO: 2.31 K/UL
LYMPHOCYTES NFR BLD AUTO: 16 %
MAN DIFF?: NORMAL
MCHC RBC-ENTMCNC: 26.2 PG
MCHC RBC-ENTMCNC: 31.6 GM/DL
MCV RBC AUTO: 82.8 FL
MONOCYTES # BLD AUTO: 0.8 K/UL
MONOCYTES NFR BLD AUTO: 5.5 %
NEUTROPHILS # BLD AUTO: 11.12 K/UL
NEUTROPHILS NFR BLD AUTO: 76.9 %
PLATELET # BLD AUTO: 424 K/UL
POTASSIUM SERPL-SCNC: 4.6 MMOL/L
PROT SERPL-MCNC: 7 G/DL
RBC # BLD: 4.54 M/UL
RBC # FLD: 16.1 %
SODIUM SERPL-SCNC: 141 MMOL/L
WBC # FLD AUTO: 14.47 K/UL

## 2017-09-27 ENCOUNTER — APPOINTMENT (OUTPATIENT)
Dept: FAMILY MEDICINE | Facility: CLINIC | Age: 32
End: 2017-09-27
Payer: MEDICAID

## 2017-09-27 VITALS
HEIGHT: 72 IN | OXYGEN SATURATION: 100 % | BODY MASS INDEX: 26.41 KG/M2 | WEIGHT: 195 LBS | HEART RATE: 98 BPM | SYSTOLIC BLOOD PRESSURE: 110 MMHG | DIASTOLIC BLOOD PRESSURE: 70 MMHG | TEMPERATURE: 98 F

## 2017-09-27 LAB — INR PPP: 2.8 RATIO

## 2017-09-27 PROCEDURE — 99214 OFFICE O/P EST MOD 30 MIN: CPT | Mod: 25

## 2017-09-27 PROCEDURE — 85610 PROTHROMBIN TIME: CPT | Mod: QW

## 2017-09-27 RX ORDER — AZITHROMYCIN 250 MG/1
250 TABLET, FILM COATED ORAL
Qty: 1 | Refills: 0 | Status: COMPLETED | COMMUNITY
Start: 2017-09-19 | End: 2017-09-27

## 2017-10-03 ENCOUNTER — FORM ENCOUNTER (OUTPATIENT)
Age: 32
End: 2017-10-03

## 2017-10-04 ENCOUNTER — APPOINTMENT (OUTPATIENT)
Dept: FAMILY MEDICINE | Facility: CLINIC | Age: 32
End: 2017-10-04
Payer: MEDICAID

## 2017-10-04 ENCOUNTER — OUTPATIENT (OUTPATIENT)
Dept: OUTPATIENT SERVICES | Facility: HOSPITAL | Age: 32
LOS: 1 days | End: 2017-10-04
Payer: COMMERCIAL

## 2017-10-04 ENCOUNTER — MEDICATION RENEWAL (OUTPATIENT)
Age: 32
End: 2017-10-04

## 2017-10-04 ENCOUNTER — APPOINTMENT (OUTPATIENT)
Dept: RADIOLOGY | Facility: CLINIC | Age: 32
End: 2017-10-04
Payer: COMMERCIAL

## 2017-10-04 VITALS
HEIGHT: 72 IN | HEART RATE: 98 BPM | SYSTOLIC BLOOD PRESSURE: 112 MMHG | TEMPERATURE: 97.7 F | BODY MASS INDEX: 26.55 KG/M2 | DIASTOLIC BLOOD PRESSURE: 70 MMHG | WEIGHT: 196 LBS | OXYGEN SATURATION: 98 %

## 2017-10-04 DIAGNOSIS — Z98.1 ARTHRODESIS STATUS: Chronic | ICD-10-CM

## 2017-10-04 DIAGNOSIS — Z95.4 PRESENCE OF OTHER HEART-VALVE REPLACEMENT: Chronic | ICD-10-CM

## 2017-10-04 DIAGNOSIS — R05 COUGH: ICD-10-CM

## 2017-10-04 DIAGNOSIS — Z00.8 ENCOUNTER FOR OTHER GENERAL EXAMINATION: ICD-10-CM

## 2017-10-04 DIAGNOSIS — Z96.1 PRESENCE OF INTRAOCULAR LENS: Chronic | ICD-10-CM

## 2017-10-04 PROBLEM — Z00.00 ENCOUNTER FOR PREVENTIVE HEALTH EXAMINATION: Noted: 2017-10-04

## 2017-10-04 PROCEDURE — 71020: CPT | Mod: 26

## 2017-10-04 PROCEDURE — 36415 COLL VENOUS BLD VENIPUNCTURE: CPT

## 2017-10-04 PROCEDURE — 99214 OFFICE O/P EST MOD 30 MIN: CPT | Mod: 25

## 2017-10-04 PROCEDURE — 85610 PROTHROMBIN TIME: CPT | Mod: QW

## 2017-10-04 PROCEDURE — 71046 X-RAY EXAM CHEST 2 VIEWS: CPT

## 2017-10-05 LAB
ALBUMIN SERPL ELPH-MCNC: 4.1 G/DL
ALP BLD-CCNC: 84 U/L
ALT SERPL-CCNC: 11 U/L
ANION GAP SERPL CALC-SCNC: 15 MMOL/L
AST SERPL-CCNC: 16 U/L
BASOPHILS # BLD AUTO: 0.03 K/UL
BASOPHILS NFR BLD AUTO: 0.2 %
BILIRUB SERPL-MCNC: 0.3 MG/DL
BUN SERPL-MCNC: 9 MG/DL
CALCIUM SERPL-MCNC: 8.9 MG/DL
CHLORIDE SERPL-SCNC: 104 MMOL/L
CO2 SERPL-SCNC: 20 MMOL/L
CREAT SERPL-MCNC: 0.64 MG/DL
EOSINOPHIL # BLD AUTO: 0.11 K/UL
EOSINOPHIL NFR BLD AUTO: 0.7 %
GLUCOSE SERPL-MCNC: 186 MG/DL
HCT VFR BLD CALC: 38.3 %
HGB BLD-MCNC: 11.9 G/DL
IMM GRANULOCYTES NFR BLD AUTO: 0.4 %
LYMPHOCYTES # BLD AUTO: 2.36 K/UL
LYMPHOCYTES NFR BLD AUTO: 14.9 %
MAN DIFF?: NORMAL
MCHC RBC-ENTMCNC: 25.9 PG
MCHC RBC-ENTMCNC: 31.1 GM/DL
MCV RBC AUTO: 83.4 FL
MONOCYTES # BLD AUTO: 1.01 K/UL
MONOCYTES NFR BLD AUTO: 6.4 %
NEUTROPHILS # BLD AUTO: 12.28 K/UL
NEUTROPHILS NFR BLD AUTO: 77.4 %
PLATELET # BLD AUTO: 365 K/UL
POTASSIUM SERPL-SCNC: 4.3 MMOL/L
PROT SERPL-MCNC: 7.1 G/DL
RBC # BLD: 4.59 M/UL
RBC # FLD: 16.3 %
SODIUM SERPL-SCNC: 139 MMOL/L
WBC # FLD AUTO: 15.86 K/UL

## 2017-10-10 ENCOUNTER — APPOINTMENT (OUTPATIENT)
Dept: FAMILY MEDICINE | Facility: CLINIC | Age: 32
End: 2017-10-10
Payer: MEDICAID

## 2017-10-10 VITALS
WEIGHT: 193 LBS | SYSTOLIC BLOOD PRESSURE: 110 MMHG | OXYGEN SATURATION: 99 % | DIASTOLIC BLOOD PRESSURE: 70 MMHG | HEIGHT: 72 IN | BODY MASS INDEX: 26.14 KG/M2 | HEART RATE: 106 BPM

## 2017-10-10 LAB — INR PPP: 2.6 RATIO

## 2017-10-10 PROCEDURE — 85610 PROTHROMBIN TIME: CPT | Mod: QW

## 2017-10-10 PROCEDURE — 99214 OFFICE O/P EST MOD 30 MIN: CPT | Mod: 25

## 2017-10-10 RX ORDER — METHYLPREDNISOLONE 4 MG/1
4 TABLET ORAL
Qty: 21 | Refills: 0 | Status: COMPLETED | COMMUNITY
Start: 2017-10-04 | End: 2017-10-10

## 2017-10-10 RX ORDER — AMOXICILLIN 875 MG/1
875 TABLET, FILM COATED ORAL
Qty: 14 | Refills: 0 | Status: COMPLETED | COMMUNITY
Start: 2017-09-27 | End: 2017-10-10

## 2017-10-19 ENCOUNTER — MEDICATION RENEWAL (OUTPATIENT)
Age: 32
End: 2017-10-19

## 2017-11-20 ENCOUNTER — APPOINTMENT (OUTPATIENT)
Dept: FAMILY MEDICINE | Facility: CLINIC | Age: 32
End: 2017-11-20
Payer: MEDICAID

## 2017-11-20 VITALS
WEIGHT: 198 LBS | HEART RATE: 103 BPM | HEIGHT: 72 IN | SYSTOLIC BLOOD PRESSURE: 110 MMHG | DIASTOLIC BLOOD PRESSURE: 70 MMHG | BODY MASS INDEX: 26.82 KG/M2 | OXYGEN SATURATION: 100 %

## 2017-11-20 LAB
INR PPP: 2.2 RATIO
INR PPP: 2.8 RATIO
INR PPP: 3.5 RATIO

## 2017-11-20 PROCEDURE — 99212 OFFICE O/P EST SF 10 MIN: CPT | Mod: 25

## 2017-11-20 PROCEDURE — 85610 PROTHROMBIN TIME: CPT | Mod: QW

## 2017-11-20 RX ORDER — AZELASTINE HYDROCHLORIDE 205.5 UG/1
0.15 SPRAY, METERED NASAL DAILY
Qty: 1 | Refills: 3 | Status: DISCONTINUED | COMMUNITY
Start: 2017-09-27 | End: 2017-11-20

## 2017-11-21 LAB — INR PPP: 3.8 RATIO

## 2017-11-27 ENCOUNTER — APPOINTMENT (OUTPATIENT)
Dept: FAMILY MEDICINE | Facility: CLINIC | Age: 32
End: 2017-11-27
Payer: MEDICAID

## 2017-11-27 VITALS
SYSTOLIC BLOOD PRESSURE: 118 MMHG | HEIGHT: 72 IN | OXYGEN SATURATION: 99 % | HEART RATE: 121 BPM | DIASTOLIC BLOOD PRESSURE: 66 MMHG | TEMPERATURE: 97.7 F | BODY MASS INDEX: 26.68 KG/M2 | WEIGHT: 197 LBS

## 2017-11-27 LAB — INR PPP: 2.9 RATIO

## 2017-11-27 PROCEDURE — 85610 PROTHROMBIN TIME: CPT | Mod: QW

## 2017-11-27 PROCEDURE — 99212 OFFICE O/P EST SF 10 MIN: CPT | Mod: 25

## 2017-11-29 ENCOUNTER — RX RENEWAL (OUTPATIENT)
Age: 32
End: 2017-11-29

## 2018-01-22 ENCOUNTER — RX RENEWAL (OUTPATIENT)
Age: 33
End: 2018-01-22

## 2018-01-28 ENCOUNTER — RX RENEWAL (OUTPATIENT)
Age: 33
End: 2018-01-28

## 2018-01-31 ENCOUNTER — APPOINTMENT (OUTPATIENT)
Dept: PULMONOLOGY | Facility: CLINIC | Age: 33
End: 2018-01-31
Payer: MEDICAID

## 2018-01-31 VITALS — DIASTOLIC BLOOD PRESSURE: 70 MMHG | SYSTOLIC BLOOD PRESSURE: 120 MMHG | OXYGEN SATURATION: 98 % | HEART RATE: 84 BPM

## 2018-01-31 VITALS — RESPIRATION RATE: 16 BRPM

## 2018-01-31 PROCEDURE — 99214 OFFICE O/P EST MOD 30 MIN: CPT

## 2018-02-11 ENCOUNTER — RX RENEWAL (OUTPATIENT)
Age: 33
End: 2018-02-11

## 2018-02-13 ENCOUNTER — APPOINTMENT (OUTPATIENT)
Dept: FAMILY MEDICINE | Facility: CLINIC | Age: 33
End: 2018-02-13
Payer: MEDICAID

## 2018-02-13 VITALS
HEART RATE: 90 BPM | WEIGHT: 197 LBS | TEMPERATURE: 97.9 F | HEIGHT: 72 IN | DIASTOLIC BLOOD PRESSURE: 68 MMHG | OXYGEN SATURATION: 99 % | SYSTOLIC BLOOD PRESSURE: 118 MMHG | BODY MASS INDEX: 26.68 KG/M2

## 2018-02-13 DIAGNOSIS — R68.89 OTHER GENERAL SYMPTOMS AND SIGNS: ICD-10-CM

## 2018-02-13 LAB — INR PPP: 3.3

## 2018-02-13 PROCEDURE — 85610 PROTHROMBIN TIME: CPT | Mod: QW

## 2018-02-13 PROCEDURE — 99214 OFFICE O/P EST MOD 30 MIN: CPT | Mod: 25

## 2018-02-20 ENCOUNTER — MEDICATION RENEWAL (OUTPATIENT)
Age: 33
End: 2018-02-20

## 2018-02-23 ENCOUNTER — OTHER (OUTPATIENT)
Age: 33
End: 2018-02-23

## 2018-02-23 DIAGNOSIS — G89.29 OTHER CHRONIC PAIN: ICD-10-CM

## 2018-02-27 ENCOUNTER — APPOINTMENT (OUTPATIENT)
Dept: FAMILY MEDICINE | Facility: CLINIC | Age: 33
End: 2018-02-27
Payer: MEDICAID

## 2018-02-27 ENCOUNTER — LABORATORY RESULT (OUTPATIENT)
Age: 33
End: 2018-02-27

## 2018-02-27 VITALS
WEIGHT: 197 LBS | OXYGEN SATURATION: 99 % | BODY MASS INDEX: 26.68 KG/M2 | SYSTOLIC BLOOD PRESSURE: 118 MMHG | HEART RATE: 118 BPM | HEIGHT: 72 IN | DIASTOLIC BLOOD PRESSURE: 72 MMHG

## 2018-02-27 VITALS — DIASTOLIC BLOOD PRESSURE: 65 MMHG | SYSTOLIC BLOOD PRESSURE: 112 MMHG | HEART RATE: 108 BPM

## 2018-02-27 PROCEDURE — 96127 BRIEF EMOTIONAL/BEHAV ASSMT: CPT

## 2018-02-27 PROCEDURE — 36415 COLL VENOUS BLD VENIPUNCTURE: CPT

## 2018-02-27 PROCEDURE — 99214 OFFICE O/P EST MOD 30 MIN: CPT | Mod: 25

## 2018-02-27 PROCEDURE — 85610 PROTHROMBIN TIME: CPT | Mod: QW

## 2018-02-27 RX ORDER — METOPROLOL TARTRATE 25 MG/1
25 TABLET, FILM COATED ORAL DAILY
Qty: 90 | Refills: 0 | Status: COMPLETED | COMMUNITY
Start: 2017-10-19 | End: 2018-02-27

## 2018-02-28 ENCOUNTER — OUTPATIENT (OUTPATIENT)
Dept: OUTPATIENT SERVICES | Facility: HOSPITAL | Age: 33
LOS: 1 days | End: 2018-02-28
Payer: COMMERCIAL

## 2018-02-28 DIAGNOSIS — Z96.1 PRESENCE OF INTRAOCULAR LENS: Chronic | ICD-10-CM

## 2018-02-28 DIAGNOSIS — Z98.1 ARTHRODESIS STATUS: Chronic | ICD-10-CM

## 2018-02-28 DIAGNOSIS — Z95.4 PRESENCE OF OTHER HEART-VALVE REPLACEMENT: Chronic | ICD-10-CM

## 2018-02-28 LAB
25(OH)D3 SERPL-MCNC: 59.9 NG/ML
ALBUMIN SERPL ELPH-MCNC: 4.2 G/DL
ALP BLD-CCNC: 85 U/L
ALT SERPL-CCNC: 13 U/L
ANION GAP SERPL CALC-SCNC: 17 MMOL/L
APPEARANCE: ABNORMAL
AST SERPL-CCNC: 23 U/L
BACTERIA: NEGATIVE
BASOPHILS # BLD AUTO: 0.03 K/UL
BASOPHILS NFR BLD AUTO: 0.2 %
BILIRUB SERPL-MCNC: 0.4 MG/DL
BILIRUBIN URINE: NEGATIVE
BLOOD URINE: NEGATIVE
BUN SERPL-MCNC: 8 MG/DL
CALCIUM SERPL-MCNC: 9.8 MG/DL
CHLORIDE SERPL-SCNC: 102 MMOL/L
CHOLEST SERPL-MCNC: 176 MG/DL
CHOLEST/HDLC SERPL: 3.8 RATIO
CK SERPL-CCNC: 87 U/L
CO2 SERPL-SCNC: 20 MMOL/L
COLOR: ABNORMAL
CREAT SERPL-MCNC: 0.71 MG/DL
EOSINOPHIL # BLD AUTO: 0.09 K/UL
EOSINOPHIL NFR BLD AUTO: 0.6 %
GGT SERPL-CCNC: 34 U/L
GLUCOSE QUALITATIVE U: NEGATIVE MG/DL
GLUCOSE SERPL-MCNC: 178 MG/DL
HBA1C MFR BLD HPLC: 8.2 %
HCT VFR BLD CALC: 38.3 %
HDLC SERPL-MCNC: 46 MG/DL
HGB BLD-MCNC: 11.7 G/DL
HYALINE CASTS: 3 /LPF
IMM GRANULOCYTES NFR BLD AUTO: 0.5 %
KETONES URINE: NEGATIVE
LDLC SERPL CALC-MCNC: 109 MG/DL
LEUKOCYTE ESTERASE URINE: NEGATIVE
LYMPHOCYTES # BLD AUTO: 2.26 K/UL
LYMPHOCYTES NFR BLD AUTO: 15 %
MAN DIFF?: NORMAL
MCHC RBC-ENTMCNC: 25.8 PG
MCHC RBC-ENTMCNC: 30.5 GM/DL
MCV RBC AUTO: 84.5 FL
MICROSCOPIC-UA: NORMAL
MONOCYTES # BLD AUTO: 0.76 K/UL
MONOCYTES NFR BLD AUTO: 5 %
NEUTROPHILS # BLD AUTO: 11.87 K/UL
NEUTROPHILS NFR BLD AUTO: 78.7 %
NITRITE URINE: NEGATIVE
PH URINE: 5.5
PLATELET # BLD AUTO: 387 K/UL
POTASSIUM SERPL-SCNC: 4.4 MMOL/L
PROT SERPL-MCNC: 7.3 G/DL
PROTEIN URINE: ABNORMAL MG/DL
RBC # BLD: 4.53 M/UL
RBC # FLD: 17.4 %
RED BLOOD CELLS URINE: 5 /HPF
SODIUM SERPL-SCNC: 139 MMOL/L
SPECIFIC GRAVITY URINE: 1.03
SQUAMOUS EPITHELIAL CELLS: 4 /HPF
T3 SERPL-MCNC: 121 NG/DL
T4 SERPL-MCNC: 10.9 UG/DL
TRIGL SERPL-MCNC: 107 MG/DL
TSH SERPL-ACNC: 2.36 UIU/ML
URATE SERPL-MCNC: 5 MG/DL
UROBILINOGEN URINE: NEGATIVE MG/DL
WBC # FLD AUTO: 15.09 K/UL
WHITE BLOOD CELLS URINE: 4 /HPF

## 2018-03-02 LAB — INR PPP: 2.6 RATIO

## 2018-03-12 PROCEDURE — 97110 THERAPEUTIC EXERCISES: CPT

## 2018-03-12 PROCEDURE — 97163 PT EVAL HIGH COMPLEX 45 MIN: CPT

## 2018-03-12 PROCEDURE — 97140 MANUAL THERAPY 1/> REGIONS: CPT

## 2018-03-12 PROCEDURE — 97010 HOT OR COLD PACKS THERAPY: CPT

## 2018-03-27 ENCOUNTER — APPOINTMENT (OUTPATIENT)
Dept: FAMILY MEDICINE | Facility: CLINIC | Age: 33
End: 2018-03-27
Payer: MEDICAID

## 2018-03-27 VITALS
OXYGEN SATURATION: 99 % | HEART RATE: 99 BPM | SYSTOLIC BLOOD PRESSURE: 110 MMHG | DIASTOLIC BLOOD PRESSURE: 70 MMHG | WEIGHT: 197 LBS | HEIGHT: 72 IN | BODY MASS INDEX: 26.68 KG/M2

## 2018-03-27 LAB
INR PPP: 2.6 RATIO
POCT-PROTHROMBIN TIME: 31.6 SECS

## 2018-03-27 PROCEDURE — 36415 COLL VENOUS BLD VENIPUNCTURE: CPT

## 2018-03-27 PROCEDURE — 85610 PROTHROMBIN TIME: CPT | Mod: QW

## 2018-03-27 PROCEDURE — 99214 OFFICE O/P EST MOD 30 MIN: CPT | Mod: 25

## 2018-03-28 ENCOUNTER — OUTPATIENT (OUTPATIENT)
Dept: OUTPATIENT SERVICES | Facility: HOSPITAL | Age: 33
LOS: 1 days | End: 2018-03-28
Payer: COMMERCIAL

## 2018-03-28 DIAGNOSIS — Z95.4 PRESENCE OF OTHER HEART-VALVE REPLACEMENT: Chronic | ICD-10-CM

## 2018-03-28 DIAGNOSIS — M51.26 OTHER INTERVERTEBRAL DISC DISPLACEMENT, LUMBAR REGION: ICD-10-CM

## 2018-03-28 DIAGNOSIS — Z96.1 PRESENCE OF INTRAOCULAR LENS: Chronic | ICD-10-CM

## 2018-03-28 DIAGNOSIS — S23.9XXA SPRAIN OF UNSPECIFIED PARTS OF THORAX, INITIAL ENCOUNTER: ICD-10-CM

## 2018-03-28 DIAGNOSIS — Z98.1 ARTHRODESIS STATUS: Chronic | ICD-10-CM

## 2018-03-28 DIAGNOSIS — M54.15 RADICULOPATHY, THORACOLUMBAR REGION: ICD-10-CM

## 2018-03-28 DIAGNOSIS — M50.222 OTHER CERVICAL DISC DISPLACEMENT AT C5-C6 LEVEL: ICD-10-CM

## 2018-03-28 DIAGNOSIS — M54.12 RADICULOPATHY, CERVICAL REGION: ICD-10-CM

## 2018-03-28 DIAGNOSIS — S33.5XXA SPRAIN OF LIGAMENTS OF LUMBAR SPINE, INITIAL ENCOUNTER: ICD-10-CM

## 2018-03-29 ENCOUNTER — RESULT REVIEW (OUTPATIENT)
Age: 33
End: 2018-03-29

## 2018-03-29 LAB
INR PPP: 2.42 RATIO
PT BLD: 27.9 SEC

## 2018-04-05 ENCOUNTER — APPOINTMENT (OUTPATIENT)
Dept: FAMILY MEDICINE | Facility: CLINIC | Age: 33
End: 2018-04-05
Payer: MEDICAID

## 2018-04-05 VITALS
WEIGHT: 197 LBS | DIASTOLIC BLOOD PRESSURE: 70 MMHG | OXYGEN SATURATION: 98 % | HEIGHT: 72 IN | SYSTOLIC BLOOD PRESSURE: 108 MMHG | BODY MASS INDEX: 26.68 KG/M2 | HEART RATE: 97 BPM

## 2018-04-05 PROCEDURE — 36415 COLL VENOUS BLD VENIPUNCTURE: CPT

## 2018-04-05 PROCEDURE — 99214 OFFICE O/P EST MOD 30 MIN: CPT | Mod: 25

## 2018-04-05 PROCEDURE — 85610 PROTHROMBIN TIME: CPT | Mod: QW

## 2018-04-06 ENCOUNTER — APPOINTMENT (OUTPATIENT)
Dept: FAMILY MEDICINE | Facility: CLINIC | Age: 33
End: 2018-04-06

## 2018-04-06 ENCOUNTER — OTHER (OUTPATIENT)
Age: 33
End: 2018-04-06

## 2018-04-06 LAB
INR PPP: 2.27 RATIO
PT BLD: 26.1 SEC

## 2018-04-24 ENCOUNTER — APPOINTMENT (OUTPATIENT)
Dept: FAMILY MEDICINE | Facility: CLINIC | Age: 33
End: 2018-04-24
Payer: MEDICAID

## 2018-04-24 VITALS
SYSTOLIC BLOOD PRESSURE: 108 MMHG | BODY MASS INDEX: 26.41 KG/M2 | HEART RATE: 99 BPM | WEIGHT: 195 LBS | DIASTOLIC BLOOD PRESSURE: 68 MMHG | OXYGEN SATURATION: 97 % | HEIGHT: 72 IN

## 2018-04-24 DIAGNOSIS — H65.90 UNSPECIFIED NONSUPPURATIVE OTITIS MEDIA, UNSPECIFIED EAR: ICD-10-CM

## 2018-04-24 PROCEDURE — 99214 OFFICE O/P EST MOD 30 MIN: CPT | Mod: 25

## 2018-04-24 PROCEDURE — 85610 PROTHROMBIN TIME: CPT | Mod: QW

## 2018-04-30 ENCOUNTER — RX RENEWAL (OUTPATIENT)
Age: 33
End: 2018-04-30

## 2018-04-30 ENCOUNTER — OUTPATIENT (OUTPATIENT)
Dept: OUTPATIENT SERVICES | Facility: HOSPITAL | Age: 33
LOS: 1 days | End: 2018-04-30
Payer: MEDICAID

## 2018-04-30 DIAGNOSIS — Z95.4 PRESENCE OF OTHER HEART-VALVE REPLACEMENT: Chronic | ICD-10-CM

## 2018-04-30 DIAGNOSIS — G47.33 OBSTRUCTIVE SLEEP APNEA (ADULT) (PEDIATRIC): ICD-10-CM

## 2018-04-30 DIAGNOSIS — Z96.1 PRESENCE OF INTRAOCULAR LENS: Chronic | ICD-10-CM

## 2018-04-30 DIAGNOSIS — Z98.1 ARTHRODESIS STATUS: Chronic | ICD-10-CM

## 2018-04-30 PROCEDURE — 95810 POLYSOM 6/> YRS 4/> PARAM: CPT

## 2018-04-30 PROCEDURE — 95810 POLYSOM 6/> YRS 4/> PARAM: CPT | Mod: 26

## 2018-05-04 ENCOUNTER — RX RENEWAL (OUTPATIENT)
Age: 33
End: 2018-05-04

## 2018-05-08 LAB
INR PPP: 2.3 RATIO
INR PPP: 2.8 RATIO

## 2018-05-14 ENCOUNTER — RX RENEWAL (OUTPATIENT)
Age: 33
End: 2018-05-14

## 2018-05-15 ENCOUNTER — APPOINTMENT (OUTPATIENT)
Dept: PULMONOLOGY | Facility: CLINIC | Age: 33
End: 2018-05-15
Payer: MEDICAID

## 2018-05-15 VITALS
DIASTOLIC BLOOD PRESSURE: 58 MMHG | HEIGHT: 72 IN | OXYGEN SATURATION: 97 % | HEART RATE: 109 BPM | WEIGHT: 194 LBS | BODY MASS INDEX: 26.28 KG/M2 | SYSTOLIC BLOOD PRESSURE: 110 MMHG

## 2018-05-15 VITALS — RESPIRATION RATE: 16 BRPM

## 2018-05-15 PROCEDURE — 99214 OFFICE O/P EST MOD 30 MIN: CPT

## 2018-05-29 ENCOUNTER — RX RENEWAL (OUTPATIENT)
Age: 33
End: 2018-05-29

## 2018-06-01 ENCOUNTER — TRANSCRIPTION ENCOUNTER (OUTPATIENT)
Age: 33
End: 2018-06-01

## 2018-06-26 ENCOUNTER — OTHER (OUTPATIENT)
Age: 33
End: 2018-06-26

## 2018-07-03 ENCOUNTER — MEDICATION RENEWAL (OUTPATIENT)
Age: 33
End: 2018-07-03

## 2018-07-09 ENCOUNTER — MEDICATION RENEWAL (OUTPATIENT)
Age: 33
End: 2018-07-09

## 2018-07-18 ENCOUNTER — RESULT CHARGE (OUTPATIENT)
Age: 33
End: 2018-07-18

## 2018-07-19 ENCOUNTER — APPOINTMENT (OUTPATIENT)
Dept: FAMILY MEDICINE | Facility: CLINIC | Age: 33
End: 2018-07-19
Payer: MEDICAID

## 2018-07-19 VITALS
WEIGHT: 191 LBS | TEMPERATURE: 97.7 F | HEIGHT: 72 IN | OXYGEN SATURATION: 98 % | DIASTOLIC BLOOD PRESSURE: 64 MMHG | HEART RATE: 103 BPM | BODY MASS INDEX: 25.87 KG/M2 | SYSTOLIC BLOOD PRESSURE: 110 MMHG

## 2018-07-19 VITALS — HEART RATE: 84 BPM

## 2018-07-19 LAB
BILIRUB UR QL STRIP: NORMAL
GLUCOSE UR-MCNC: NEGATIVE
HCG UR QL: 0.2 EU/DL
HGB UR QL STRIP.AUTO: NORMAL
KETONES UR-MCNC: NEGATIVE
LEUKOCYTE ESTERASE UR QL STRIP: NORMAL
NITRITE UR QL STRIP: POSITIVE
PH UR STRIP: 5.5
PROT UR STRIP-MCNC: 30
SP GR UR STRIP: 1.02

## 2018-07-19 PROCEDURE — 99214 OFFICE O/P EST MOD 30 MIN: CPT | Mod: 25

## 2018-07-19 PROCEDURE — 85610 PROTHROMBIN TIME: CPT | Mod: QW

## 2018-07-19 RX ORDER — BUDESONIDE AND FORMOTEROL FUMARATE DIHYDRATE 160; 4.5 UG/1; UG/1
160-4.5 AEROSOL RESPIRATORY (INHALATION) TWICE DAILY
Qty: 10.2 | Refills: 5 | Status: COMPLETED | COMMUNITY
Start: 2018-02-20 | End: 2018-07-19

## 2018-07-19 RX ORDER — ASPIRIN ENTERIC COATED TABLETS 81 MG 81 MG/1
81 TABLET, DELAYED RELEASE ORAL
Qty: 90 | Refills: 0 | Status: COMPLETED | COMMUNITY
Start: 2017-09-19 | End: 2018-07-19

## 2018-07-19 RX ORDER — BUDESONIDE AND FORMOTEROL FUMARATE DIHYDRATE 160; 4.5 UG/1; UG/1
160-4.5 AEROSOL RESPIRATORY (INHALATION) TWICE DAILY
Qty: 3 | Refills: 3 | Status: COMPLETED | COMMUNITY
Start: 2018-07-09 | End: 2018-07-19

## 2018-07-19 NOTE — REVIEW OF SYSTEMS
[Fever] : no fever [Chills] : chills [Fatigue] : fatigue [Diarrhea] : diarrhea [Dysuria] : dysuria [Frequency] : frequency [Negative] : Heme/Lymph [FreeTextEntry2] : see HPI

## 2018-07-19 NOTE — HISTORY OF PRESENT ILLNESS
[FreeTextEntry8] : chills/diarrhea/urinary freq/ UTI \par \par 3 day hx of urinary frequency, +burning, + chills, however, not occurring during void  denies urgency.  +mild malodorous urine  \par +dark urine  denies fever    no back pain +pelvic pressure \par \par \par LMP: 7/19/18 \par \par \par pt states Home  INR check was 3.5 3 days ago.  Pt states held coumadin 1 day (on her own) and resumed 9mg qd thereafter.   \par \par

## 2018-07-19 NOTE — ASSESSMENT
[FreeTextEntry1] : INR=1.8 \par \par increase warfarin 9mg today to 11mg then resume 9mg qd \par \par start abx course \par \par repeat INR at home in 4 days, pt instructed to call office c result\par \par aggressive hydration!!!!!\par \par see med orders\par \par check urine studies

## 2018-07-19 NOTE — PHYSICAL EXAM
[No Acute Distress] : no acute distress [Well Nourished] : well nourished [Well Developed] : well developed [EOMI] : extraocular movements intact [Normal Outer Ear/Nose] : the outer ears and nose were normal in appearance [Supple] : supple [No Respiratory Distress] : no respiratory distress  [Clear to Auscultation] : lungs were clear to auscultation bilaterally [No Accessory Muscle Use] : no accessory muscle use [Normal Rate] : normal rate  [Regular Rhythm] : with a regular rhythm [Normal S1, S2] : normal S1 and S2 [No Edema] : there was no peripheral edema [Non-distended] : non-distended [No CVA Tenderness] : no CVA  tenderness [Grossly Normal Strength/Tone] : grossly normal strength/tone [Normal Gait] : normal gait [Coordination Grossly Intact] : coordination grossly intact [No Focal Deficits] : no focal deficits [Normal Affect] : the affect was normal [Normal Mood] : the mood was normal [Normal Insight/Judgement] : insight and judgment were intact [de-identified] : +murmur

## 2018-07-23 LAB — BACTERIA UR CULT: ABNORMAL

## 2018-07-24 LAB — INR PPP: 1.8 RATIO

## 2018-07-31 ENCOUNTER — OTHER (OUTPATIENT)
Age: 33
End: 2018-07-31

## 2018-08-07 ENCOUNTER — APPOINTMENT (OUTPATIENT)
Dept: FAMILY MEDICINE | Facility: CLINIC | Age: 33
End: 2018-08-07
Payer: MEDICAID

## 2018-08-07 VITALS — SYSTOLIC BLOOD PRESSURE: 92 MMHG | DIASTOLIC BLOOD PRESSURE: 60 MMHG | HEART RATE: 102 BPM

## 2018-08-07 VITALS
HEART RATE: 110 BPM | WEIGHT: 187 LBS | BODY MASS INDEX: 25.33 KG/M2 | HEIGHT: 72 IN | OXYGEN SATURATION: 99 % | SYSTOLIC BLOOD PRESSURE: 108 MMHG | DIASTOLIC BLOOD PRESSURE: 70 MMHG

## 2018-08-07 DIAGNOSIS — Z87.440 PERSONAL HISTORY OF URINARY (TRACT) INFECTIONS: ICD-10-CM

## 2018-08-07 PROCEDURE — 99214 OFFICE O/P EST MOD 30 MIN: CPT | Mod: 25

## 2018-08-07 PROCEDURE — 85610 PROTHROMBIN TIME: CPT | Mod: QW

## 2018-08-07 NOTE — HISTORY OF PRESENT ILLNESS
[FreeTextEntry1] : 2 week f/u x UTI repeat urine, INR check x Valvular heart disease [de-identified] : 32 yo female presents to office for f/u on +ve E. coli UTI on 7/18/18.  Denies urinary discomfort today.  no fever chronic unchanged STABLE chills.  STABLE sweats.   denies malodorous urine no cloudy urine   no new back pain  +ve usual pre-menstrual pelvic cramping today.

## 2018-08-07 NOTE — ASSESSMENT
[FreeTextEntry1] : INR=1.7   \par pt instructed to take Warfarin  11mg today 11mg tomorrow and then resume usual 9mg qd dosing \par \par Repeat INR on 8/10/18  \par pt will call office c results   \par \par see lab orders \par \par f/u 1 week  for full FBW (appt scheduled 8/15/18)

## 2018-08-07 NOTE — PHYSICAL EXAM
[No Acute Distress] : no acute distress [Well Nourished] : well nourished [Well Developed] : well developed [EOMI] : extraocular movements intact [Normal Outer Ear/Nose] : the outer ears and nose were normal in appearance [Supple] : supple [No Respiratory Distress] : no respiratory distress  [Clear to Auscultation] : lungs were clear to auscultation bilaterally [No Accessory Muscle Use] : no accessory muscle use [Normal Rate] : normal rate  [Regular Rhythm] : with a regular rhythm [Normal S1, S2] : normal S1 and S2 [No Edema] : there was no peripheral edema [Non-distended] : non-distended [No CVA Tenderness] : no CVA  tenderness [Grossly Normal Strength/Tone] : grossly normal strength/tone [Normal Gait] : normal gait [Coordination Grossly Intact] : coordination grossly intact [No Focal Deficits] : no focal deficits [Normal Affect] : the affect was normal [Normal Mood] : the mood was normal [Normal Insight/Judgement] : insight and judgment were intact [de-identified] : +murmur

## 2018-08-09 LAB
APPEARANCE: CLEAR
BACTERIA: NEGATIVE
BILIRUBIN URINE: NEGATIVE
BLOOD URINE: NEGATIVE
CALCIUM OXALATE CRYSTALS: ABNORMAL
COLOR: YELLOW
GLUCOSE QUALITATIVE U: NEGATIVE MG/DL
HYALINE CASTS: 0 /LPF
KETONES URINE: NEGATIVE
LEUKOCYTE ESTERASE URINE: ABNORMAL
MICROSCOPIC-UA: NORMAL
NITRITE URINE: NEGATIVE
PH URINE: 5.5
PROTEIN URINE: NEGATIVE MG/DL
RED BLOOD CELLS URINE: 1 /HPF
SPECIFIC GRAVITY URINE: 1.02
SQUAMOUS EPITHELIAL CELLS: 16 /HPF
UROBILINOGEN URINE: NEGATIVE MG/DL
WHITE BLOOD CELLS URINE: 5 /HPF

## 2018-08-12 ENCOUNTER — FORM ENCOUNTER (OUTPATIENT)
Age: 33
End: 2018-08-12

## 2018-08-13 ENCOUNTER — APPOINTMENT (OUTPATIENT)
Dept: RADIOLOGY | Facility: CLINIC | Age: 33
End: 2018-08-13
Payer: MEDICAID

## 2018-08-13 ENCOUNTER — OUTPATIENT (OUTPATIENT)
Dept: OUTPATIENT SERVICES | Facility: HOSPITAL | Age: 33
LOS: 1 days | End: 2018-08-13
Payer: COMMERCIAL

## 2018-08-13 ENCOUNTER — APPOINTMENT (OUTPATIENT)
Dept: FAMILY MEDICINE | Facility: CLINIC | Age: 33
End: 2018-08-13
Payer: MEDICAID

## 2018-08-13 VITALS
WEIGHT: 187 LBS | BODY MASS INDEX: 25.33 KG/M2 | SYSTOLIC BLOOD PRESSURE: 90 MMHG | HEART RATE: 114 BPM | HEIGHT: 72 IN | DIASTOLIC BLOOD PRESSURE: 60 MMHG | OXYGEN SATURATION: 99 %

## 2018-08-13 DIAGNOSIS — Z00.8 ENCOUNTER FOR OTHER GENERAL EXAMINATION: ICD-10-CM

## 2018-08-13 DIAGNOSIS — R06.02 SHORTNESS OF BREATH: ICD-10-CM

## 2018-08-13 DIAGNOSIS — Z95.4 PRESENCE OF OTHER HEART-VALVE REPLACEMENT: Chronic | ICD-10-CM

## 2018-08-13 DIAGNOSIS — Z96.1 PRESENCE OF INTRAOCULAR LENS: Chronic | ICD-10-CM

## 2018-08-13 DIAGNOSIS — R52 PAIN, UNSPECIFIED: ICD-10-CM

## 2018-08-13 DIAGNOSIS — Z98.1 ARTHRODESIS STATUS: Chronic | ICD-10-CM

## 2018-08-13 DIAGNOSIS — R09.89 OTHER SPECIFIED SYMPTOMS AND SIGNS INVOLVING THE CIRCULATORY AND RESPIRATORY SYSTEMS: ICD-10-CM

## 2018-08-13 LAB — BACTERIA UR CULT: NORMAL

## 2018-08-13 PROCEDURE — 71046 X-RAY EXAM CHEST 2 VIEWS: CPT | Mod: 26

## 2018-08-13 PROCEDURE — 36415 COLL VENOUS BLD VENIPUNCTURE: CPT

## 2018-08-13 PROCEDURE — 99214 OFFICE O/P EST MOD 30 MIN: CPT | Mod: 25

## 2018-08-13 PROCEDURE — 71046 X-RAY EXAM CHEST 2 VIEWS: CPT

## 2018-08-13 PROCEDURE — 85610 PROTHROMBIN TIME: CPT | Mod: QW

## 2018-08-13 RX ORDER — CIPROFLOXACIN HYDROCHLORIDE 500 MG/1
500 TABLET, FILM COATED ORAL TWICE DAILY
Qty: 14 | Refills: 0 | Status: DISCONTINUED | COMMUNITY
Start: 2018-07-19 | End: 2018-08-13

## 2018-08-13 NOTE — ASSESSMENT
[FreeTextEntry1] : INR=1.6 \par warfarin 13mg tonight 12mg tomorrow  repeat 8/15/18 in office \par \par see lab orders \par \par see radiology orders \par \par f/u 24 hrs c phone call, if no change recommend abx start (Z-oswald x 1) \par

## 2018-08-13 NOTE — PHYSICAL EXAM
[Well Nourished] : well nourished [Well Developed] : well developed [EOMI] : extraocular movements intact [Normal Outer Ear/Nose] : the outer ears and nose were normal in appearance [Normal Oropharynx] : the oropharynx was normal [No JVD] : no jugular venous distention [Supple] : supple [No Lymphadenopathy] : no lymphadenopathy [No Respiratory Distress] : no respiratory distress  [Clear to Auscultation] : lungs were clear to auscultation bilaterally [No Accessory Muscle Use] : no accessory muscle use [No Edema] : there was no peripheral edema [No CVA Tenderness] : no CVA  tenderness [No Rash] : no rash [Coordination Grossly Intact] : coordination grossly intact [No Focal Deficits] : no focal deficits [Normal Affect] : the affect was normal [Normal Mood] : the mood was normal [Normal Insight/Judgement] : insight and judgment were intact [de-identified] : mild distress secondary to illness  [de-identified] : +murmur

## 2018-08-13 NOTE — REVIEW OF SYSTEMS
[Chills] : chills [Fatigue] : fatigue [Night Sweats] : night sweats [Nasal Discharge] : nasal discharge [Shortness Of Breath] : shortness of breath [Muscle Weakness] : muscle weakness [Muscle Pain] : muscle pain [Negative] : Heme/Lymph [FreeTextEntry9] : see HPI

## 2018-08-13 NOTE — HISTORY OF PRESENT ILLNESS
[FreeTextEntry8] : pt c/o full body pain/ increased fatigue \par INR 8/12/18- 1.6\par \par \par 32 yo female presents c 3 day hx of fatigue, generalized body aches and weakness.  Denies f +chills/sweats  +nausea no v/D \par no sore throat no ear pain +white rhinorrhea no cough \par \par mild SOB c chest tightness   \par \par no urinary complaints

## 2018-08-14 ENCOUNTER — MEDICATION RENEWAL (OUTPATIENT)
Age: 33
End: 2018-08-14

## 2018-08-14 LAB
ALBUMIN SERPL ELPH-MCNC: 4.6 G/DL
ALP BLD-CCNC: 97 U/L
ALT SERPL-CCNC: 15 U/L
ANION GAP SERPL CALC-SCNC: 14 MMOL/L
AST SERPL-CCNC: 23 U/L
B19V IGG SER QL IA: 0.7 INDEX
B19V IGG+IGM SER-IMP: NEGATIVE
B19V IGG+IGM SER-IMP: NORMAL
B19V IGM FLD-ACNC: 0.1 INDEX
B19V IGM SER-ACNC: NEGATIVE
BASOPHILS # BLD AUTO: 0.03 K/UL
BASOPHILS NFR BLD AUTO: 0.2 %
BILIRUB SERPL-MCNC: 0.5 MG/DL
BUN SERPL-MCNC: 17 MG/DL
CALCIUM SERPL-MCNC: 10 MG/DL
CHLORIDE SERPL-SCNC: 103 MMOL/L
CK SERPL-CCNC: 87 U/L
CO2 SERPL-SCNC: 22 MMOL/L
CREAT SERPL-MCNC: 0.7 MG/DL
EOSINOPHIL # BLD AUTO: 0.12 K/UL
EOSINOPHIL NFR BLD AUTO: 0.8 %
GLUCOSE SERPL-MCNC: 296 MG/DL
HCT VFR BLD CALC: 39.5 %
HGB BLD-MCNC: 12.1 G/DL
IMM GRANULOCYTES NFR BLD AUTO: 0.4 %
INR PPP: 1.35 RATIO
LYMPHOCYTES # BLD AUTO: 2 K/UL
LYMPHOCYTES NFR BLD AUTO: 12.7 %
MAN DIFF?: NORMAL
MCHC RBC-ENTMCNC: 24.2 PG
MCHC RBC-ENTMCNC: 30.6 GM/DL
MCV RBC AUTO: 79 FL
MONOCYTES # BLD AUTO: 0.79 K/UL
MONOCYTES NFR BLD AUTO: 5 %
NEUTROPHILS # BLD AUTO: 12.72 K/UL
NEUTROPHILS NFR BLD AUTO: 80.9 %
PLATELET # BLD AUTO: 379 K/UL
POTASSIUM SERPL-SCNC: 4.5 MMOL/L
PROT SERPL-MCNC: 7.2 G/DL
PT BLD: 15.3 SEC
RBC # BLD: 5 M/UL
RBC # FLD: 18.2 %
SODIUM SERPL-SCNC: 139 MMOL/L
WBC # FLD AUTO: 15.72 K/UL

## 2018-08-15 ENCOUNTER — APPOINTMENT (OUTPATIENT)
Dept: FAMILY MEDICINE | Facility: CLINIC | Age: 33
End: 2018-08-15
Payer: MEDICAID

## 2018-08-15 VITALS
HEIGHT: 72 IN | BODY MASS INDEX: 25.33 KG/M2 | HEART RATE: 120 BPM | WEIGHT: 187 LBS | SYSTOLIC BLOOD PRESSURE: 120 MMHG | DIASTOLIC BLOOD PRESSURE: 78 MMHG | OXYGEN SATURATION: 100 %

## 2018-08-15 VITALS — HEART RATE: 96 BPM

## 2018-08-15 DIAGNOSIS — Z87.09 PERSONAL HISTORY OF OTHER DISEASES OF THE RESPIRATORY SYSTEM: ICD-10-CM

## 2018-08-15 PROCEDURE — 99214 OFFICE O/P EST MOD 30 MIN: CPT | Mod: 25

## 2018-08-15 PROCEDURE — 85610 PROTHROMBIN TIME: CPT | Mod: QW

## 2018-08-15 RX ORDER — SAW PALMETTO 160 MG
500 CAPSULE ORAL
Qty: 60 | Refills: 0 | Status: ACTIVE | COMMUNITY
Start: 2018-07-02

## 2018-08-15 NOTE — ASSESSMENT
[FreeTextEntry1] : INR=1.4 \par pt STRONGLY instructed to start Lovenox effective IMMEDIATELY \par warfarin 13mg tonight and tomorrow \par \par pt to call c home INR reading on 8/17/18 9 am \par \par pt strongly advised to start abx course.  Given the fact pt is feeling mildly improved recommend Azithromycin 250mg daily x 5 days \par \par CLOSE MONITORING

## 2018-08-15 NOTE — HISTORY OF PRESENT ILLNESS
[FreeTextEntry1] : follow up on fatigue/valvular heart disease/SOB [de-identified] : 34 yo female c hx of Valvular Heart Disease, recent fatigue and SOB presents to office for f/u.  Pt states feeling better today c decreased fatigue, decreased body aches and joint pains.    Denies f/c/s  \par \par pt has NOT started rx'ed abx (Z-oswald x 1 take as directed) and rx'ed Lovenox 80mg SQ q12h secondary to subtherapeutic INR.

## 2018-08-15 NOTE — PHYSICAL EXAM
[No Acute Distress] : no acute distress [Well Nourished] : well nourished [Well Developed] : well developed [EOMI] : extraocular movements intact [Normal Outer Ear/Nose] : the outer ears and nose were normal in appearance [No JVD] : no jugular venous distention [Supple] : supple [No Lymphadenopathy] : no lymphadenopathy [No Respiratory Distress] : no respiratory distress  [Clear to Auscultation] : lungs were clear to auscultation bilaterally [No Accessory Muscle Use] : no accessory muscle use [Normal Rate] : normal rate  [Regular Rhythm] : with a regular rhythm [Normal S1, S2] : normal S1 and S2 [No Edema] : there was no peripheral edema [Non-distended] : non-distended [No CVA Tenderness] : no CVA  tenderness [Grossly Normal Strength/Tone] : grossly normal strength/tone [No Rash] : no rash [Normal Gait] : normal gait [Coordination Grossly Intact] : coordination grossly intact [No Focal Deficits] : no focal deficits [Normal Affect] : the affect was normal [Normal Mood] : the mood was normal [de-identified] : +murmur

## 2018-08-16 ENCOUNTER — TRANSCRIPTION ENCOUNTER (OUTPATIENT)
Age: 33
End: 2018-08-16

## 2018-08-16 LAB

## 2018-08-17 ENCOUNTER — OTHER (OUTPATIENT)
Age: 33
End: 2018-08-17

## 2018-08-22 LAB — INR PPP: 1.7 RATIO

## 2018-08-24 ENCOUNTER — OTHER (OUTPATIENT)
Age: 33
End: 2018-08-24

## 2018-08-24 PROCEDURE — 97010 HOT OR COLD PACKS THERAPY: CPT

## 2018-08-24 PROCEDURE — 97110 THERAPEUTIC EXERCISES: CPT

## 2018-08-24 PROCEDURE — 97140 MANUAL THERAPY 1/> REGIONS: CPT

## 2018-09-04 ENCOUNTER — OUTPATIENT (OUTPATIENT)
Dept: OUTPATIENT SERVICES | Facility: HOSPITAL | Age: 33
LOS: 1 days | End: 2018-09-04
Payer: COMMERCIAL

## 2018-09-04 DIAGNOSIS — M54.15 RADICULOPATHY, THORACOLUMBAR REGION: ICD-10-CM

## 2018-09-04 DIAGNOSIS — Z51.89 ENCOUNTER FOR OTHER SPECIFIED AFTERCARE: ICD-10-CM

## 2018-09-04 DIAGNOSIS — Z95.4 PRESENCE OF OTHER HEART-VALVE REPLACEMENT: Chronic | ICD-10-CM

## 2018-09-04 DIAGNOSIS — M54.12 RADICULOPATHY, CERVICAL REGION: ICD-10-CM

## 2018-09-04 DIAGNOSIS — M50.222 OTHER CERVICAL DISC DISPLACEMENT AT C5-C6 LEVEL: ICD-10-CM

## 2018-09-04 DIAGNOSIS — Z96.1 PRESENCE OF INTRAOCULAR LENS: Chronic | ICD-10-CM

## 2018-09-04 DIAGNOSIS — Z98.1 ARTHRODESIS STATUS: Chronic | ICD-10-CM

## 2018-09-04 DIAGNOSIS — S23.9XXA SPRAIN OF UNSPECIFIED PARTS OF THORAX, INITIAL ENCOUNTER: ICD-10-CM

## 2018-09-04 DIAGNOSIS — S33.5XXA SPRAIN OF LIGAMENTS OF LUMBAR SPINE, INITIAL ENCOUNTER: ICD-10-CM

## 2018-09-04 DIAGNOSIS — M51.26 OTHER INTERVERTEBRAL DISC DISPLACEMENT, LUMBAR REGION: ICD-10-CM

## 2018-10-16 ENCOUNTER — APPOINTMENT (OUTPATIENT)
Dept: FAMILY MEDICINE | Facility: CLINIC | Age: 33
End: 2018-10-16
Payer: MEDICAID

## 2018-10-16 VITALS
OXYGEN SATURATION: 99 % | BODY MASS INDEX: 25.73 KG/M2 | SYSTOLIC BLOOD PRESSURE: 124 MMHG | HEART RATE: 115 BPM | WEIGHT: 190 LBS | HEIGHT: 72 IN | TEMPERATURE: 98.2 F | DIASTOLIC BLOOD PRESSURE: 68 MMHG

## 2018-10-16 VITALS — HEART RATE: 102 BPM

## 2018-10-16 DIAGNOSIS — Z86.69 PERSONAL HISTORY OF OTHER DISEASES OF THE NERVOUS SYSTEM AND SENSE ORGANS: ICD-10-CM

## 2018-10-16 DIAGNOSIS — J30.2 OTHER SEASONAL ALLERGIC RHINITIS: ICD-10-CM

## 2018-10-16 PROCEDURE — 99214 OFFICE O/P EST MOD 30 MIN: CPT | Mod: 25

## 2018-10-16 PROCEDURE — 85610 PROTHROMBIN TIME: CPT | Mod: QW

## 2018-10-16 RX ORDER — AZITHROMYCIN 250 MG/1
250 TABLET, FILM COATED ORAL
Qty: 1 | Refills: 0 | Status: COMPLETED | COMMUNITY
Start: 2018-08-14 | End: 2018-10-16

## 2018-10-16 RX ORDER — CHLORHEXIDINE GLUCONATE 4 %
325 (65 FE) LIQUID (ML) TOPICAL
Qty: 60 | Refills: 0 | Status: COMPLETED | COMMUNITY
Start: 2018-07-02 | End: 2018-10-16

## 2018-10-16 RX ORDER — CLONAZEPAM 2 MG/1
2 TABLET ORAL
Qty: 30 | Refills: 0 | Status: COMPLETED | COMMUNITY
Start: 2017-01-25 | End: 2018-10-16

## 2018-10-16 RX ORDER — ENOXAPARIN SODIUM 100 MG/ML
80 INJECTION SUBCUTANEOUS
Qty: 10 | Refills: 0 | Status: COMPLETED | COMMUNITY
Start: 2018-08-14 | End: 2018-10-16

## 2018-10-16 NOTE — ASSESSMENT
[FreeTextEntry1] : see med orders \par \par cont current meds \par \par f/u 1 week \par \par take 1/2 of 9mg warfarin tablet today secondary to abx start  \par \par f/u 6 days  \par

## 2018-10-16 NOTE — HISTORY OF PRESENT ILLNESS
[FreeTextEntry8] : Patient here for possible URI \par \par 3 day hx of B/L ear pain, pressure, and popping.  +sore throat (improving) no f/c +sweats no N/V/D no abd pain +decreased appetite \par +clear rhinorrhea  +cough c clear/white phlegm.   no body aches \par \par no rashes \par \par pt self medicated c both Flonase and Astelin nasal sprays.  Pt reports improvement c regards to PND

## 2018-10-16 NOTE — PHYSICAL EXAM
[Well Nourished] : well nourished [Well Developed] : well developed [EOMI] : extraocular movements intact [No JVD] : no jugular venous distention [Supple] : supple [Normal Rate] : normal rate  [Normal S1, S2] : normal S1 and S2 [No Edema] : there was no peripheral edema [Non-distended] : non-distended [Normal Posterior Cervical Nodes] : no posterior cervical lymphadenopathy [Normal Anterior Cervical Nodes] : no anterior cervical lymphadenopathy [No CVA Tenderness] : no CVA  tenderness [Grossly Normal Strength/Tone] : grossly normal strength/tone [No Rash] : no rash [Normal Gait] : normal gait [Coordination Grossly Intact] : coordination grossly intact [No Focal Deficits] : no focal deficits [Normal Affect] : the affect was normal [Normal Mood] : the mood was normal [Normal Insight/Judgement] : insight and judgment were intact [de-identified] : +PND post pharynx, mild erythema post pharynx,   +erythematous TMs B/L  [de-identified] : +nodes (mild) B/L  [de-identified] : +coarse BS B/L  [de-identified] : +murmur

## 2018-10-22 ENCOUNTER — APPOINTMENT (OUTPATIENT)
Dept: FAMILY MEDICINE | Facility: CLINIC | Age: 33
End: 2018-10-22
Payer: MEDICAID

## 2018-10-22 VITALS
SYSTOLIC BLOOD PRESSURE: 114 MMHG | TEMPERATURE: 97.9 F | WEIGHT: 189 LBS | OXYGEN SATURATION: 100 % | HEIGHT: 72 IN | BODY MASS INDEX: 25.6 KG/M2 | DIASTOLIC BLOOD PRESSURE: 64 MMHG | HEART RATE: 100 BPM

## 2018-10-22 LAB — INR PPP: 2.6 RATIO

## 2018-10-22 PROCEDURE — 99214 OFFICE O/P EST MOD 30 MIN: CPT | Mod: 25

## 2018-10-22 PROCEDURE — 85610 PROTHROMBIN TIME: CPT | Mod: QW

## 2018-10-22 NOTE — PHYSICAL EXAM
[No Acute Distress] : no acute distress [Well Nourished] : well nourished [Well Developed] : well developed [Well-Appearing] : well-appearing [EOMI] : extraocular movements intact [No JVD] : no jugular venous distention [Supple] : supple [No Lymphadenopathy] : no lymphadenopathy [No Respiratory Distress] : no respiratory distress  [Clear to Auscultation] : lungs were clear to auscultation bilaterally [No Accessory Muscle Use] : no accessory muscle use [Normal Rate] : normal rate  [Regular Rhythm] : with a regular rhythm [Normal S1, S2] : normal S1 and S2 [No Edema] : there was no peripheral edema [Non-distended] : non-distended [No CVA Tenderness] : no CVA  tenderness [Grossly Normal Strength/Tone] : grossly normal strength/tone [No Rash] : no rash [Normal Gait] : normal gait [Coordination Grossly Intact] : coordination grossly intact [No Focal Deficits] : no focal deficits [Normal Affect] : the affect was normal [Normal Mood] : the mood was normal [Normal Insight/Judgement] : insight and judgment were intact [de-identified] : +PND post pharynx w/o erythema

## 2018-10-22 NOTE — HISTORY OF PRESENT ILLNESS
[FreeTextEntry1] : follow up on cough \par - also follow up on hx of valvular heart disease  [de-identified] : 32 yo female for f/u on cough and Valvular Heart Disease.  pt states feeling improved today, however, cough is persistent c white \par phlegm  no fever +chills  no new sweats +ve improved appetite +ve improved sore throat

## 2018-11-20 ENCOUNTER — RX RENEWAL (OUTPATIENT)
Age: 33
End: 2018-11-20

## 2018-12-16 ENCOUNTER — MOBILE ON CALL (OUTPATIENT)
Age: 33
End: 2018-12-16

## 2019-01-04 ENCOUNTER — RX RENEWAL (OUTPATIENT)
Age: 34
End: 2019-01-04

## 2019-01-11 ENCOUNTER — MOBILE ON CALL (OUTPATIENT)
Age: 34
End: 2019-01-11

## 2019-01-16 ENCOUNTER — EMERGENCY (EMERGENCY)
Facility: HOSPITAL | Age: 34
LOS: 1 days | Discharge: HOME | End: 2019-01-16
Attending: EMERGENCY MEDICINE
Payer: COMMERCIAL

## 2019-01-16 ENCOUNTER — MEDICATION RENEWAL (OUTPATIENT)
Age: 34
End: 2019-01-16

## 2019-01-16 VITALS
OXYGEN SATURATION: 100 % | SYSTOLIC BLOOD PRESSURE: 116 MMHG | DIASTOLIC BLOOD PRESSURE: 69 MMHG | RESPIRATION RATE: 20 BRPM | HEART RATE: 92 BPM

## 2019-01-16 VITALS — WEIGHT: 184.97 LBS | HEIGHT: 72 IN

## 2019-01-16 DIAGNOSIS — Z96.1 PRESENCE OF INTRAOCULAR LENS: Chronic | ICD-10-CM

## 2019-01-16 DIAGNOSIS — I71.2 THORACIC AORTIC ANEURYSM, WITHOUT RUPTURE: ICD-10-CM

## 2019-01-16 DIAGNOSIS — Z98.1 ARTHRODESIS STATUS: Chronic | ICD-10-CM

## 2019-01-16 DIAGNOSIS — Z95.4 PRESENCE OF OTHER HEART-VALVE REPLACEMENT: Chronic | ICD-10-CM

## 2019-01-16 LAB
ALBUMIN SERPL ELPH-MCNC: 3.9 G/DL — SIGNIFICANT CHANGE UP (ref 3.3–5.2)
ALP SERPL-CCNC: 96 U/L — SIGNIFICANT CHANGE UP (ref 40–120)
ALT FLD-CCNC: 14 U/L — SIGNIFICANT CHANGE UP
ANION GAP SERPL CALC-SCNC: 13 MMOL/L — SIGNIFICANT CHANGE UP (ref 5–17)
APTT BLD: 56.1 SEC — HIGH (ref 27.5–36.3)
AST SERPL-CCNC: 21 U/L — SIGNIFICANT CHANGE UP
BASOPHILS # BLD AUTO: 0 K/UL — SIGNIFICANT CHANGE UP (ref 0–0.2)
BASOPHILS NFR BLD AUTO: 0.3 % — SIGNIFICANT CHANGE UP (ref 0–2)
BILIRUB SERPL-MCNC: 0.4 MG/DL — SIGNIFICANT CHANGE UP (ref 0.4–2)
BUN SERPL-MCNC: 8 MG/DL — SIGNIFICANT CHANGE UP (ref 8–20)
CALCIUM SERPL-MCNC: 9.1 MG/DL — SIGNIFICANT CHANGE UP (ref 8.6–10.2)
CHLORIDE SERPL-SCNC: 103 MMOL/L — SIGNIFICANT CHANGE UP (ref 98–107)
CK SERPL-CCNC: 95 U/L — SIGNIFICANT CHANGE UP (ref 25–170)
CO2 SERPL-SCNC: 23 MMOL/L — SIGNIFICANT CHANGE UP (ref 22–29)
CREAT SERPL-MCNC: 0.6 MG/DL — SIGNIFICANT CHANGE UP (ref 0.5–1.3)
EOSINOPHIL # BLD AUTO: 0.2 K/UL — SIGNIFICANT CHANGE UP (ref 0–0.5)
EOSINOPHIL NFR BLD AUTO: 1.2 % — SIGNIFICANT CHANGE UP (ref 0–6)
GLUCOSE SERPL-MCNC: 156 MG/DL — HIGH (ref 70–115)
HCT VFR BLD CALC: 36.1 % — LOW (ref 37–47)
HGB BLD-MCNC: 11.2 G/DL — LOW (ref 12–16)
INR BLD: 3.01 RATIO — HIGH (ref 0.88–1.16)
LYMPHOCYTES # BLD AUTO: 1.6 K/UL — SIGNIFICANT CHANGE UP (ref 1–4.8)
LYMPHOCYTES # BLD AUTO: 12 % — LOW (ref 20–55)
MCHC RBC-ENTMCNC: 24.3 PG — LOW (ref 27–31)
MCHC RBC-ENTMCNC: 31 G/DL — LOW (ref 32–36)
MCV RBC AUTO: 78.3 FL — LOW (ref 81–99)
MONOCYTES # BLD AUTO: 1.2 K/UL — HIGH (ref 0–0.8)
MONOCYTES NFR BLD AUTO: 9 % — SIGNIFICANT CHANGE UP (ref 3–10)
NEUTROPHILS # BLD AUTO: 10.5 K/UL — HIGH (ref 1.8–8)
NEUTROPHILS NFR BLD AUTO: 76.8 % — HIGH (ref 37–73)
PLATELET # BLD AUTO: 345 K/UL — SIGNIFICANT CHANGE UP (ref 150–400)
POTASSIUM SERPL-MCNC: 4 MMOL/L — SIGNIFICANT CHANGE UP (ref 3.5–5.3)
POTASSIUM SERPL-SCNC: 4 MMOL/L — SIGNIFICANT CHANGE UP (ref 3.5–5.3)
PROT SERPL-MCNC: 7.2 G/DL — SIGNIFICANT CHANGE UP (ref 6.6–8.7)
PROTHROM AB SERPL-ACNC: 35.8 SEC — HIGH (ref 10–12.9)
RBC # BLD: 4.61 M/UL — SIGNIFICANT CHANGE UP (ref 4.4–5.2)
RBC # FLD: 18.1 % — HIGH (ref 11–15.6)
SODIUM SERPL-SCNC: 139 MMOL/L — SIGNIFICANT CHANGE UP (ref 135–145)
TROPONIN T SERPL-MCNC: <0.01 NG/ML — SIGNIFICANT CHANGE UP (ref 0–0.06)
WBC # BLD: 13.7 K/UL — HIGH (ref 4.8–10.8)
WBC # FLD AUTO: 13.7 K/UL — HIGH (ref 4.8–10.8)

## 2019-01-16 PROCEDURE — 85730 THROMBOPLASTIN TIME PARTIAL: CPT

## 2019-01-16 PROCEDURE — 84702 CHORIONIC GONADOTROPIN TEST: CPT

## 2019-01-16 PROCEDURE — 84484 ASSAY OF TROPONIN QUANT: CPT

## 2019-01-16 PROCEDURE — 36415 COLL VENOUS BLD VENIPUNCTURE: CPT

## 2019-01-16 PROCEDURE — 99285 EMERGENCY DEPT VISIT HI MDM: CPT

## 2019-01-16 PROCEDURE — 71275 CT ANGIOGRAPHY CHEST: CPT

## 2019-01-16 PROCEDURE — 94640 AIRWAY INHALATION TREATMENT: CPT

## 2019-01-16 PROCEDURE — 99221 1ST HOSP IP/OBS SF/LOW 40: CPT

## 2019-01-16 PROCEDURE — 74174 CTA ABD&PLVS W/CONTRAST: CPT

## 2019-01-16 PROCEDURE — 71275 CT ANGIOGRAPHY CHEST: CPT | Mod: 26

## 2019-01-16 PROCEDURE — 82550 ASSAY OF CK (CPK): CPT

## 2019-01-16 PROCEDURE — 85027 COMPLETE CBC AUTOMATED: CPT

## 2019-01-16 PROCEDURE — 93010 ELECTROCARDIOGRAM REPORT: CPT

## 2019-01-16 PROCEDURE — 80053 COMPREHEN METABOLIC PANEL: CPT

## 2019-01-16 PROCEDURE — 74174 CTA ABD&PLVS W/CONTRAST: CPT | Mod: 26

## 2019-01-16 PROCEDURE — 71046 X-RAY EXAM CHEST 2 VIEWS: CPT | Mod: 26

## 2019-01-16 PROCEDURE — 99284 EMERGENCY DEPT VISIT MOD MDM: CPT | Mod: 25

## 2019-01-16 PROCEDURE — 93005 ELECTROCARDIOGRAM TRACING: CPT

## 2019-01-16 PROCEDURE — 85610 PROTHROMBIN TIME: CPT

## 2019-01-16 PROCEDURE — 71046 X-RAY EXAM CHEST 2 VIEWS: CPT

## 2019-01-16 RX ORDER — ALBUTEROL 90 UG/1
2.5 AEROSOL, METERED ORAL ONCE
Qty: 0 | Refills: 0 | Status: COMPLETED | OUTPATIENT
Start: 2019-01-16 | End: 2019-01-16

## 2019-01-16 RX ORDER — SODIUM CHLORIDE 9 MG/ML
1000 INJECTION INTRAMUSCULAR; INTRAVENOUS; SUBCUTANEOUS ONCE
Qty: 0 | Refills: 0 | Status: COMPLETED | OUTPATIENT
Start: 2019-01-16 | End: 2019-01-16

## 2019-01-16 RX ADMIN — ALBUTEROL 2.5 MILLIGRAM(S): 90 AEROSOL, METERED ORAL at 21:08

## 2019-01-16 RX ADMIN — SODIUM CHLORIDE 4000 MILLILITER(S): 9 INJECTION INTRAMUSCULAR; INTRAVENOUS; SUBCUTANEOUS at 16:22

## 2019-01-16 NOTE — ED PROVIDER NOTE - MEDICAL DECISION MAKING DETAILS
patient with pleuritic CP in setting of URI/sinusitits. posisbly pericarditis/pleurisy. PE on differential but no risk factors but cannot PERC due to tachycardia- patient on coumadin so if thereaputic no concerned. with thoracic aneurysm with h/o expansion, pain is atypical and patient hemodynamically stable but will get CTA to r/o dissection. cards consult if needed. no ACS risk factors.

## 2019-01-16 NOTE — CONSULT NOTE ADULT - PROBLEM SELECTOR RECOMMENDATION 9
Pt states her thoracic aneurysm was noted to be 1.7 cm approximately 5 yrs ago and is now 2.7-2.9 cm  Case d/w Dr. Dudley, he will see pt in office.  Contact information given to patient.

## 2019-01-16 NOTE — ED ADULT TRIAGE NOTE - CHIEF COMPLAINT QUOTE
I have had two valve replacement surgery, I have congestion and chest pain and shortness of breath since last night.

## 2019-01-16 NOTE — ED PROVIDER NOTE - PHYSICAL EXAMINATION
Gen: NAD, AOx3  Head: NCAT  HEENT: EOMI, oral mucosa moist, normal conjunctiva, neck supple  Lung: CTAB, no respiratory distress  CV: rrr, no murmur, Normal perfusion  Abd: soft, NTND  MSK: No edema, no visible deformities  Neuro: No focal neurologic deficits  Skin: No rash   Psych: normal affect

## 2019-01-16 NOTE — ED STATDOCS - PROGRESS NOTE DETAILS
34 y/o F pt wit hx of marfan syndrome PSHX valve replacements x2 presents to ED c/o constant CP and SOB since late last night. The pain onset from rest and has been constant. There are no aggravating factors or relieving factors. Also has nasal congestion. Denies fever. Will be transferred to [facility] for further evaluation by another provider. 34 y/o F pt wit hx of marfan syndrome PSHX valve replacements x2 presents to ED c/o constant CP and SOB since late last night. The pain onset from rest and has been constant. There are no aggravating factors or relieving factors. Also has nasal congestion. Denies fever. Will be transferred to [facility] for further evaluation by another provider.  Gen: NAD, marfanoid syndrome, CV: RRR Pul: CTA b/l Abd: Soft, non-distended, non-tender Neuro: no focal deficits

## 2019-01-16 NOTE — ED PROVIDER NOTE - CARE PLAN
Principal Discharge DX:	Pleuritic chest pain  Assessment and plan of treatment:	1. Return to ED for worsening, progressive or any other concerning symptoms   2. Follow up with your primary care doctor in 2-3days   3. Take Tylenol up to 650 mg every 6 hours as needed for pain.   4. Follow up with Dr. Dudley, cardiothoracic surgery   5. Use your inhaler as needed  Secondary Diagnosis:	Thoracic aortic aneurysm without rupture
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
## 2019-01-16 NOTE — ED PROVIDER NOTE - NS ED ROS FT
ROS: +CP/SOB. +cough. no fever. +nausea +diarrhea no v/c. no abd pain. no rash. no bleeding. no urinary complaints. no weakness. no vision changes. no HA. no neck/back pain. no extremity swelling/deformity. No change in mental status.

## 2019-01-16 NOTE — ED PROVIDER NOTE - OBJECTIVE STATEMENT
34yo F with PMH- marfans, DM, afib? coming in with 'sinus infection' x2 days, recurrent 2-3x/years, +sneeze, rhinorrhea, ear pain, sinus pain. no fever. +nausae +1 episode loose stool nonbloody. no abd pain. here due to CP- midsternal, nonradiating, no diaphoresis, slightly exertional, worse lying flat, 'feels like dog sitting on her' 'cant get breathe in' +pleuritic constant x8hrs. last year had partial pneumothorax no chest tube. on coumadin for AV/MV replacement 2 years ago. no recent travel/recent abx 32yo F with PMH- marfans, DM, afib? coming in with 'sinus infection' x2 days, recurrent 2-3x/years, +sneeze, rhinorrhea, ear pain, sinus pain. no fever. +nausae +1 episode loose stool nonbloody. no abd pain. here due to CP- midsternal, nonradiating, no diaphoresis, slightly exertional, not worse when lying flat, 'feels like dog sitting on her' 'cant get breathe in' +pleuritic constant x8hrs. last year had partial pneumothorax no chest tube. on coumadin for AV/MV replacement 2 years ago. no recent travel/recent abx. states has h/o thoracic aneursym due for echo b/c has expanding in past

## 2019-01-16 NOTE — CONSULT NOTE ADULT - SUBJECTIVE AND OBJECTIVE BOX
Subjective:  33 year old female BIBA with complaints of chest pain.  Pt states it felt like "someone was standing on her chest".    Past Medical History:  No pertinent family history in first degree relatives  MEWS Score  S/P CABG (coronary artery bypass graft)  Mitral valve prolapse  Dilated aortic root  Diabetes  Anxiety  Depression  Fibromyalgia  Marfan syndrome  Pleuritic chest pain  H/O artificial lens replacement  H/O spinal fusion  H/O aortic valve replacement  CHEST PAIN AND DIFF BREATH  90+  Thoracic aortic aneurysm without rupture        MEDICATIONS:    Pravastatin 20 mg q day  Coumadin 9 mg q day  Cymbalta  220 mg q day  Buspar 15 mg BID  Methadone 10mg 3xday  Oxycodone  10-20 mg q 4-6 hours  Tradjenta 5 mg q day  Metoprolol 25 mg BID  Metformin 1000mg BID  Dulera Inhaler prm       Height (cm): 187.96 (01-16 @ 13:02)  Weight (kg): 83.9 (01-16 @ 13:02)  BMI (kg/m2): 23.7 (01-16 @ 13:02)  BSA (m2): 2.1 (01-16 @ 13:02)  Daily Height in cm: 187.96 (16 Jan 2019 13:02)    Daily                                 11.2   13.7  )-----------( 345      ( 16 Jan 2019 16:21 )             36.1   01-16    139  |  103  |  8.0  ----------------------------<  156<H>  4.0   |  23.0  |  0.60    Ca    9.1      16 Jan 2019 16:21    TPro  7.2  /  Alb  3.9  /  TBili  0.4  /  DBili  x   /  AST  21  /  ALT  14  /  AlkPhos  96  01-16    CARDIAC MARKERS ( 16 Jan 2019 16:21 )  x     / <0.01 ng/mL / 95 U/L / x     / x        PT/INR - ( 16 Jan 2019 16:21 )   PT: 35.8 sec;   INR: 3.01 ratio         PTT - ( 16 Jan 2019 16:21 )  PTT:56.1 sec      Objective:  T(C): 36.7 (01-16-19 @ 13:15), Max: 36.7 (01-16-19 @ 13:15)  HR: 92 (01-16-19 @ 21:09) (92 - 117)  BP: 116/69 (01-16-19 @ 21:09) (101/63 - 118/75)  RR: 20 (01-16-19 @ 21:09) (18 - 20)  SpO2: 100% (01-16-19 @ 21:09) (100% - 100%)  Wt(kg): --CAPILLARY BLOOD GLUCOSE      Physical Exam:  Neuro: A0X3, non focal  Pulm: CTA b/l Unlabored  CV: s1/s2   reg  Abd: soft nt/nd  Ext: warm, no edema, well perfused        < from: CT Angio Chest w/ IV Cont (01.16.19 @ 18:52) >    [The thoracic and abdominal aorta show no dissection. Aneurysmal   dilatation of the posterior LEFT subclavian artery proximal descending   thoracic aorta measuring 2.9 cm compared to the immediate the descending   thoracic aorta measuring 2 cm and the transverse aorta which measures 2.7   cm..]   The retroperitoneal vessels show atherosclerotic calcified plaques   throughout  nondilated abdominal aorta and iliac arteries.   [The origins of the brachiocephalic vessels and mesenteric arteries are   patent.] The rest of the mediastinal vessels are normal.    IMPRESSION:    No evidence of aortic dissection.   2.9 cm aneurysmal dilatation of the proximal thoracic aorta.  Diffuse fatty infiltration of liver without focal mass.  Spinal fusion fixation system of the lower cervical, thoracic and lumbar   spine with a moderate multifactorial central canal stenosis@L4-5 disc   level.    < end of copied text >  < from: CT Angio Chest w/ IV Cont (01.16.19 @ 18:52) >

## 2019-01-16 NOTE — ED PROVIDER NOTE - PLAN OF CARE
1. Return to ED for worsening, progressive or any other concerning symptoms   2. Follow up with your primary care doctor in 2-3days   3. Take Tylenol up to 650 mg every 6 hours as needed for pain.   4. Follow up with Dr. Dudley, cardiothoracic surgery   5. Use your inhaler as needed

## 2019-01-16 NOTE — ED PROVIDER NOTE - PROGRESS NOTE DETAILS
/66 rt arm   -76 lt arm   -Areli DO feeling much better, seen by CT-Sx due to rapid increase in size want to see in office. patient felt better with albuterol tx, no PE or dissection, no pericardial effusion possible pleurisy will recommend albuterol/tylenol and outpt Follow up -Areli SAN

## 2019-01-18 ENCOUNTER — RESULT CHARGE (OUTPATIENT)
Age: 34
End: 2019-01-18

## 2019-01-18 ENCOUNTER — APPOINTMENT (OUTPATIENT)
Dept: FAMILY MEDICINE | Facility: CLINIC | Age: 34
End: 2019-01-18
Payer: MEDICAID

## 2019-01-18 VITALS
HEART RATE: 104 BPM | WEIGHT: 187 LBS | DIASTOLIC BLOOD PRESSURE: 62 MMHG | HEIGHT: 72 IN | OXYGEN SATURATION: 98 % | SYSTOLIC BLOOD PRESSURE: 116 MMHG | BODY MASS INDEX: 25.33 KG/M2

## 2019-01-18 VITALS — DIASTOLIC BLOOD PRESSURE: 64 MMHG | SYSTOLIC BLOOD PRESSURE: 102 MMHG

## 2019-01-18 DIAGNOSIS — J40 BRONCHITIS, NOT SPECIFIED AS ACUTE OR CHRONIC: ICD-10-CM

## 2019-01-18 DIAGNOSIS — J32.9 CHRONIC SINUSITIS, UNSPECIFIED: ICD-10-CM

## 2019-01-18 LAB — INR PPP: 2.5 RATIO

## 2019-01-18 PROCEDURE — 85610 PROTHROMBIN TIME: CPT | Mod: QW

## 2019-01-18 PROCEDURE — 99214 OFFICE O/P EST MOD 30 MIN: CPT | Mod: 25

## 2019-01-18 NOTE — ASSESSMENT
[FreeTextEntry1] : cont current meds \par \par cont abx  course \par \par see referral \par strict management of BP \par \par INR=2.5 \par repeat 1 week cont Warfarin 9mg qd \par \par f/u 1 week for INR Check and repeat CBC c anemia panel

## 2019-01-18 NOTE — PHYSICAL EXAM
[No Acute Distress] : no acute distress [Well Nourished] : well nourished [Well Developed] : well developed [Well-Appearing] : well-appearing [EOMI] : extraocular movements intact [No JVD] : no jugular venous distention [Supple] : supple [No Lymphadenopathy] : no lymphadenopathy [No Respiratory Distress] : no respiratory distress  [Clear to Auscultation] : lungs were clear to auscultation bilaterally [No Accessory Muscle Use] : no accessory muscle use [Normal Rate] : normal rate  [Regular Rhythm] : with a regular rhythm [Normal S1, S2] : normal S1 and S2 [No Edema] : there was no peripheral edema [Non-distended] : non-distended [No CVA Tenderness] : no CVA  tenderness [Grossly Normal Strength/Tone] : grossly normal strength/tone [No Rash] : no rash [Normal Gait] : normal gait [Coordination Grossly Intact] : coordination grossly intact [No Focal Deficits] : no focal deficits [Normal Affect] : the affect was normal [Normal Mood] : the mood was normal [Normal Insight/Judgement] : insight and judgment were intact

## 2019-01-18 NOTE — HISTORY OF PRESENT ILLNESS
[Post-hospitalization from ___ Hospital] : Post-hospitalization from [unfilled] Hospital [FreeTextEntry2] : Patient was seen in ER for congestion and chest pain \par  32 yo female s/p SS ED eval on 1/16/19 secondary to chest tightness and L sided CP.   pt transported to ED by a friend\par and d/c'ed same day. \par CTA performed: NEGATIVE for PE \par CXR: NAD \par INR=3.1 at ED visit \par \par CTA: 2.9 cm aneurysmal dilatation of the proximal thoracic aorta

## 2019-01-23 ENCOUNTER — MEDICATION RENEWAL (OUTPATIENT)
Age: 34
End: 2019-01-23

## 2019-01-23 RX ORDER — LINAGLIPTIN 5 MG/1
5 TABLET, FILM COATED ORAL DAILY
Qty: 90 | Refills: 3 | Status: DISCONTINUED | COMMUNITY
Start: 2017-02-08 | End: 2019-01-23

## 2019-01-25 ENCOUNTER — APPOINTMENT (OUTPATIENT)
Dept: FAMILY MEDICINE | Facility: CLINIC | Age: 34
End: 2019-01-25
Payer: MEDICAID

## 2019-01-25 VITALS
WEIGHT: 189 LBS | HEIGHT: 72 IN | SYSTOLIC BLOOD PRESSURE: 100 MMHG | HEART RATE: 114 BPM | BODY MASS INDEX: 25.6 KG/M2 | DIASTOLIC BLOOD PRESSURE: 70 MMHG | OXYGEN SATURATION: 97 %

## 2019-01-25 DIAGNOSIS — I71.2 THORACIC AORTIC ANEURYSM, W/OUT RUPTURE: ICD-10-CM

## 2019-01-25 LAB — INR PPP: 2.3 RATIO

## 2019-01-25 PROCEDURE — 85610 PROTHROMBIN TIME: CPT | Mod: QW

## 2019-01-25 PROCEDURE — 99214 OFFICE O/P EST MOD 30 MIN: CPT | Mod: 25

## 2019-01-25 PROCEDURE — 36415 COLL VENOUS BLD VENIPUNCTURE: CPT

## 2019-01-25 RX ORDER — AMOXICILLIN 875 MG/1
875 TABLET, FILM COATED ORAL
Qty: 14 | Refills: 0 | Status: COMPLETED | COMMUNITY
Start: 2018-10-16 | End: 2019-01-25

## 2019-01-25 NOTE — HISTORY OF PRESENT ILLNESS
[FreeTextEntry1] : Patient here for 1 week follow up \par INR- 2.3 [de-identified] : 34 yo female c Hx of Valvular Heart Disease/Replacement presents to office for INR check.  pt reports compliance c daily Warfarin regimen.  Pt states feels well today c sole c/o fatigue.  States symptoms associated c bronchitis/sinusitis have improved\par

## 2019-01-25 NOTE — ASSESSMENT
[FreeTextEntry1] : INR=2.1 \par Increase Warfarin to 11mg qd x 2 days and then return to routine 9m qd dosing \par repeat INR check 1 week \par \par \par see lab orders \par \par CT surgery consult scheduled 2/6/19

## 2019-01-28 ENCOUNTER — TRANSCRIPTION ENCOUNTER (OUTPATIENT)
Age: 34
End: 2019-01-28

## 2019-01-28 LAB
25(OH)D3 SERPL-MCNC: 38.6 NG/ML
ALBUMIN SERPL ELPH-MCNC: 4.4 G/DL
ALP BLD-CCNC: 93 U/L
ALT SERPL-CCNC: 17 U/L
ANION GAP SERPL CALC-SCNC: 14 MMOL/L
AST SERPL-CCNC: 29 U/L
BASOPHILS # BLD AUTO: 0.01 K/UL
BASOPHILS NFR BLD AUTO: 0.1 %
BILIRUB SERPL-MCNC: 0.4 MG/DL
BUN SERPL-MCNC: 12 MG/DL
CALCIUM SERPL-MCNC: 9.6 MG/DL
CHLORIDE SERPL-SCNC: 100 MMOL/L
CHOLEST SERPL-MCNC: 209 MG/DL
CHOLEST/HDLC SERPL: 5.5 RATIO
CK SERPL-CCNC: 146 U/L
CO2 SERPL-SCNC: 22 MMOL/L
CREAT SERPL-MCNC: 0.7 MG/DL
EOSINOPHIL # BLD AUTO: 0.12 K/UL
EOSINOPHIL NFR BLD AUTO: 0.9 %
FERRITIN SERPL-MCNC: 62 NG/ML
FOLATE SERPL-MCNC: 11.3 NG/ML
GGT SERPL-CCNC: 39 U/L
GLUCOSE SERPL-MCNC: 209 MG/DL
HBA1C MFR BLD HPLC: 8.6 %
HCT VFR BLD CALC: 37.4 %
HDLC SERPL-MCNC: 38 MG/DL
HGB BLD-MCNC: 11.5 G/DL
IMM GRANULOCYTES NFR BLD AUTO: 0.8 %
IRON SATN MFR SERPL: 14 %
IRON SERPL-MCNC: 49 UG/DL
LDLC SERPL CALC-MCNC: 141 MG/DL
LYMPHOCYTES # BLD AUTO: 1.78 K/UL
LYMPHOCYTES NFR BLD AUTO: 12.6 %
MAN DIFF?: NORMAL
MCHC RBC-ENTMCNC: 24.5 PG
MCHC RBC-ENTMCNC: 30.7 GM/DL
MCV RBC AUTO: 79.7 FL
MONOCYTES # BLD AUTO: 0.82 K/UL
MONOCYTES NFR BLD AUTO: 5.8 %
NEUTROPHILS # BLD AUTO: 11.27 K/UL
NEUTROPHILS NFR BLD AUTO: 79.8 %
PLATELET # BLD AUTO: 394 K/UL
POTASSIUM SERPL-SCNC: 4.3 MMOL/L
PROT SERPL-MCNC: 7.2 G/DL
RBC # BLD: 4.69 M/UL
RBC # FLD: 17.8 %
SODIUM SERPL-SCNC: 136 MMOL/L
T3 SERPL-MCNC: 129 NG/DL
T4 SERPL-MCNC: 9.6 UG/DL
TIBC SERPL-MCNC: 344 UG/DL
TRANSFERRIN SERPL-MCNC: 263 MG/DL
TRIGL SERPL-MCNC: 152 MG/DL
TSH SERPL-ACNC: 1.83 UIU/ML
UIBC SERPL-MCNC: 295 UG/DL
VIT B12 SERPL-MCNC: 176 PG/ML
WBC # FLD AUTO: 14.11 K/UL

## 2019-02-04 ENCOUNTER — RX RENEWAL (OUTPATIENT)
Age: 34
End: 2019-02-04

## 2019-02-06 ENCOUNTER — APPOINTMENT (OUTPATIENT)
Dept: CARDIOTHORACIC SURGERY | Facility: CLINIC | Age: 34
End: 2019-02-06
Payer: MEDICAID

## 2019-02-06 VITALS
HEART RATE: 112 BPM | HEIGHT: 72 IN | BODY MASS INDEX: 24.65 KG/M2 | RESPIRATION RATE: 16 BRPM | OXYGEN SATURATION: 100 % | DIASTOLIC BLOOD PRESSURE: 69 MMHG | SYSTOLIC BLOOD PRESSURE: 105 MMHG | WEIGHT: 182 LBS

## 2019-02-06 PROCEDURE — 99213 OFFICE O/P EST LOW 20 MIN: CPT

## 2019-02-06 NOTE — CONSULT LETTER
[Dear  ___] : Dear  [unfilled], [Consult Letter:] : I had the pleasure of evaluating your patient, [unfilled]. [Consult Closing:] : Thank you very much for allowing me to participate in the care of this patient.  If you have any questions, please do not hesitate to contact me. [Sincerely,] : Sincerely, [FreeTextEntry2] : Werner Greene MD [FreeTextEntry3] : Casimiro Dudley MD\par Chief, Cardiovascular Surgery at [facility]\par System Director of Surgical Heart Failure\par Professor, Cardiothoracic Surgery, [employer]\par

## 2019-02-06 NOTE — PHYSICAL EXAM
[Sclera] : the sclera and conjunctiva were normal [Extraocular Movements] : extraocular movements were intact [Neck Appearance] : the appearance of the neck was normal [] : no respiratory distress [Apical Impulse] : the apical impulse was normal [Heart Sounds] : normal S1 and S2 [Murmurs] : no murmurs [Examination Of The Chest] : the chest was normal in appearance [Abnormal Walk] : normal gait [No Focal Deficits] : no focal deficits [Oriented To Time, Place, And Person] : oriented to person, place, and time

## 2019-02-06 NOTE — ASSESSMENT
[FreeTextEntry1] : Very pleasant 33-year-old lady with Marfan's disease. The patient had surgery with Dr. bev Roa back in 2013. At that time she had replacement of the mitral valve with a mechanical prostheses, and replacement of the aortic valve and the root of the aorta with a mechanical prosthesis as well. She is here for followup of her ascending aorta. On a recent CT scan she has an aorta that measures around 2.9 cm. The root of the aorta and arrested the aorta appears to be normal in size without any other abnormalities. On echocardiogram or her bowels are working well. In summary she is a Marfan's disease patient who will need followup of her ascending aorta. I have asked her to see us again in one year with a repeat CT scan of the aorta. She also will need followup with her cardiologist with echocardiograms. Thank you for the opportunity to participate in the care of your patient.

## 2019-02-06 NOTE — REVIEW OF SYSTEMS
[Feeling Tired] : feeling tired [SOB on Exertion] : shortness of breath during exertion [Easy Bruising] : a tendency for easy bruising [Negative] : Endocrine

## 2019-02-06 NOTE — HISTORY OF PRESENT ILLNESS
[FreeTextEntry1] : This is a 33 year old female with known Marfans who had a recent hospital stay for chest pain.  A CTA of the chest revealed a 2.9 cm aneurysmal dilation of the proximal thoracic aorta. Patient has PATTON with increased fatigue.

## 2019-02-18 ENCOUNTER — RX RENEWAL (OUTPATIENT)
Age: 34
End: 2019-02-18

## 2019-02-25 ENCOUNTER — MEDICATION RENEWAL (OUTPATIENT)
Age: 34
End: 2019-02-25

## 2019-03-04 PROCEDURE — 97140 MANUAL THERAPY 1/> REGIONS: CPT

## 2019-03-04 PROCEDURE — 97110 THERAPEUTIC EXERCISES: CPT

## 2019-03-04 PROCEDURE — 97010 HOT OR COLD PACKS THERAPY: CPT

## 2019-03-11 ENCOUNTER — TRANSCRIPTION ENCOUNTER (OUTPATIENT)
Age: 34
End: 2019-03-11

## 2019-03-11 ENCOUNTER — APPOINTMENT (OUTPATIENT)
Dept: FAMILY MEDICINE | Facility: CLINIC | Age: 34
End: 2019-03-11

## 2019-03-11 LAB
INR PPP: 3.71 RATIO
PT BLD: 44 SEC

## 2019-03-12 ENCOUNTER — OUTPATIENT (OUTPATIENT)
Dept: OUTPATIENT SERVICES | Facility: HOSPITAL | Age: 34
LOS: 1 days | End: 2019-03-12
Payer: COMMERCIAL

## 2019-03-12 DIAGNOSIS — M54.12 RADICULOPATHY, CERVICAL REGION: ICD-10-CM

## 2019-03-12 DIAGNOSIS — M51.26 OTHER INTERVERTEBRAL DISC DISPLACEMENT, LUMBAR REGION: ICD-10-CM

## 2019-03-12 DIAGNOSIS — S33.5XXA SPRAIN OF LIGAMENTS OF LUMBAR SPINE, INITIAL ENCOUNTER: ICD-10-CM

## 2019-03-12 DIAGNOSIS — Z98.1 ARTHRODESIS STATUS: Chronic | ICD-10-CM

## 2019-03-12 DIAGNOSIS — M54.15 RADICULOPATHY, THORACOLUMBAR REGION: ICD-10-CM

## 2019-03-12 DIAGNOSIS — M50.222 OTHER CERVICAL DISC DISPLACEMENT AT C5-C6 LEVEL: ICD-10-CM

## 2019-03-12 DIAGNOSIS — Z96.1 PRESENCE OF INTRAOCULAR LENS: Chronic | ICD-10-CM

## 2019-03-12 DIAGNOSIS — Z95.4 PRESENCE OF OTHER HEART-VALVE REPLACEMENT: Chronic | ICD-10-CM

## 2019-03-12 DIAGNOSIS — S23.9XXA SPRAIN OF UNSPECIFIED PARTS OF THORAX, INITIAL ENCOUNTER: ICD-10-CM

## 2019-03-15 ENCOUNTER — TRANSCRIPTION ENCOUNTER (OUTPATIENT)
Age: 34
End: 2019-03-15

## 2019-03-15 ENCOUNTER — MEDICATION RENEWAL (OUTPATIENT)
Age: 34
End: 2019-03-15

## 2019-03-19 ENCOUNTER — RX RENEWAL (OUTPATIENT)
Age: 34
End: 2019-03-19

## 2019-03-20 ENCOUNTER — TRANSCRIPTION ENCOUNTER (OUTPATIENT)
Age: 34
End: 2019-03-20

## 2019-03-28 ENCOUNTER — TRANSCRIPTION ENCOUNTER (OUTPATIENT)
Age: 34
End: 2019-03-28

## 2019-04-10 ENCOUNTER — RECORD ABSTRACTING (OUTPATIENT)
Age: 34
End: 2019-04-10

## 2019-04-10 DIAGNOSIS — Z92.89 PERSONAL HISTORY OF OTHER MEDICAL TREATMENT: ICD-10-CM

## 2019-04-10 DIAGNOSIS — F43.22 ADJUSTMENT DISORDER WITH ANXIETY: ICD-10-CM

## 2019-04-12 ENCOUNTER — APPOINTMENT (OUTPATIENT)
Dept: OBGYN | Facility: CLINIC | Age: 34
End: 2019-04-12
Payer: MEDICAID

## 2019-04-12 VITALS
BODY MASS INDEX: 26.01 KG/M2 | HEIGHT: 72 IN | WEIGHT: 192 LBS | SYSTOLIC BLOOD PRESSURE: 110 MMHG | DIASTOLIC BLOOD PRESSURE: 60 MMHG

## 2019-04-12 DIAGNOSIS — Z11.3 ENCOUNTER FOR SCREENING FOR INFECTIONS WITH A PREDOMINANTLY SEXUAL MODE OF TRANSMISSION: ICD-10-CM

## 2019-04-12 DIAGNOSIS — Z87.440 PERSONAL HISTORY OF URINARY (TRACT) INFECTIONS: ICD-10-CM

## 2019-04-12 DIAGNOSIS — Z78.9 OTHER SPECIFIED HEALTH STATUS: ICD-10-CM

## 2019-04-12 DIAGNOSIS — Z87.42 PERSONAL HISTORY OF OTHER DISEASES OF THE FEMALE GENITAL TRACT: ICD-10-CM

## 2019-04-12 DIAGNOSIS — R23.2 FLUSHING: ICD-10-CM

## 2019-04-12 LAB
BILIRUB UR QL STRIP: NORMAL
COLLECTION METHOD: NORMAL
GLUCOSE BLDC GLUCOMTR-MCNC: 190
GLUCOSE UR-MCNC: >=1000
HCG UR QL: 0.2 EU/DL
HCG UR QL: NEGATIVE
HGB UR QL STRIP.AUTO: ABNORMAL
KETONES UR-MCNC: NORMAL
LEUKOCYTE ESTERASE UR QL STRIP: NORMAL
NITRITE UR QL STRIP: ABNORMAL
PH UR STRIP: 5.5
PROT UR STRIP-MCNC: 30
QUALITY CONTROL: YES
SP GR UR STRIP: >=1.03

## 2019-04-12 PROCEDURE — 81003 URINALYSIS AUTO W/O SCOPE: CPT | Mod: QW

## 2019-04-12 PROCEDURE — 82962 GLUCOSE BLOOD TEST: CPT

## 2019-04-12 PROCEDURE — 99395 PREV VISIT EST AGE 18-39: CPT

## 2019-04-12 PROCEDURE — 81025 URINE PREGNANCY TEST: CPT

## 2019-04-12 PROCEDURE — 99213 OFFICE O/P EST LOW 20 MIN: CPT | Mod: 25

## 2019-04-12 RX ORDER — WARFARIN 1 MG/1
1 TABLET ORAL
Qty: 30 | Refills: 3 | Status: DISCONTINUED | COMMUNITY
Start: 2018-07-03 | End: 2019-04-12

## 2019-04-12 RX ORDER — AZITHROMYCIN 250 MG/1
250 TABLET, FILM COATED ORAL
Qty: 1 | Refills: 0 | Status: DISCONTINUED | COMMUNITY
Start: 2019-03-15 | End: 2019-04-12

## 2019-04-12 RX ORDER — SITAGLIPTIN 50 MG/1
50 TABLET, FILM COATED ORAL DAILY
Qty: 90 | Refills: 1 | Status: DISCONTINUED | COMMUNITY
Start: 2019-01-23 | End: 2019-04-12

## 2019-04-12 NOTE — HISTORY OF PRESENT ILLNESS
[Last Mammogram ___] : Last Mammogram was [unfilled] [Last Pap ___] : Last cervical pap smear was [unfilled] [Definite:  ___ (Date)] : the last menstrual period was [unfilled] [Menarche Age: ____] : age at menarche was [unfilled] [Sexually Active] : is sexually active [Contraception] : uses contraception [Condoms] : uses condoms [Male ___] : [unfilled] male [de-identified] : BREAST U/S-03/10/2018 BR2

## 2019-04-12 NOTE — COUNSELING
[Contraception] : contraception [Safe Sexual Practices] : safe sexual practices [Breast Self Exam] : breast self exam

## 2019-04-12 NOTE — PHYSICAL EXAM
[Awake] : awake [Alert] : alert [Labia Majora] : labia major [Discharge] : a  ~M vaginal discharge was present [Normal] : clitoris [Labia Minora] : labia minora [Moderate] : moderate [White] : white [Thin] : thin [Uterine Adnexae] : were not tender and not enlarged [Acute Distress] : no acute distress [Thyroid Nodule] : no thyroid nodule [LAD] : no lymphadenopathy [Goiter] : no goiter [Mass] : no breast mass [Tender] : no tenderness [Nipple Discharge] : no nipple discharge [Axillary LAD] : no axillary lymphadenopathy

## 2019-04-12 NOTE — PHYSICAL EXAM
[Awake] : awake [Alert] : alert [Labia Majora] : labia major [Normal] : clitoris [Labia Minora] : labia minora [Discharge] : a  ~M vaginal discharge was present [Moderate] : moderate [White] : white [Uterine Adnexae] : were not tender and not enlarged [Thin] : thin [Acute Distress] : no acute distress [Thyroid Nodule] : no thyroid nodule [LAD] : no lymphadenopathy [Goiter] : no goiter [Mass] : no breast mass [Tender] : no tenderness [Nipple Discharge] : no nipple discharge [Axillary LAD] : no axillary lymphadenopathy

## 2019-04-12 NOTE — REVIEW OF SYSTEMS
[Nl] : Integumentary [Chills] : chills [Feeling Tired] : feeling tired [Recent Wt Gain ___ Lbs] : recent [unfilled] ~Ulb weight gain [Heart Rate Is Fast] : fast heart rate [Palpitations] : palpitations [Abdominal Pain] : abdominal pain [Headache] : headache

## 2019-04-12 NOTE — HISTORY OF PRESENT ILLNESS
[Last Mammogram ___] : Last Mammogram was [unfilled] [Last Pap ___] : Last cervical pap smear was [unfilled] [Definite:  ___ (Date)] : the last menstrual period was [unfilled] [Menarche Age: ____] : age at menarche was [unfilled] [Sexually Active] : is sexually active [Contraception] : uses contraception [Condoms] : uses condoms [Male ___] : [unfilled] male [de-identified] : BREAST U/S-03/10/2018 BR2

## 2019-04-12 NOTE — COUNSELING
[Safe Sexual Practices] : safe sexual practices [Breast Self Exam] : breast self exam [Contraception] : contraception

## 2019-04-12 NOTE — PHYSICAL EXAM
[Awake] : awake [Alert] : alert [Labia Majora] : labia major [Normal] : clitoris [Discharge] : a  ~M vaginal discharge was present [Labia Minora] : labia minora [Moderate] : moderate [White] : white [Thin] : thin [Uterine Adnexae] : were not tender and not enlarged [Acute Distress] : no acute distress [Thyroid Nodule] : no thyroid nodule [LAD] : no lymphadenopathy [Mass] : no breast mass [Tender] : no tenderness [Goiter] : no goiter [Axillary LAD] : no axillary lymphadenopathy [Nipple Discharge] : no nipple discharge

## 2019-04-12 NOTE — HISTORY OF PRESENT ILLNESS
[Last Mammogram ___] : Last Mammogram was [unfilled] [Last Pap ___] : Last cervical pap smear was [unfilled] [Definite:  ___ (Date)] : the last menstrual period was [unfilled] [Menarche Age: ____] : age at menarche was [unfilled] [Sexually Active] : is sexually active [Contraception] : uses contraception [Condoms] : uses condoms [Male ___] : [unfilled] male [de-identified] : BREAST U/S-03/10/2018 BR2

## 2019-04-12 NOTE — COUNSELING
[Contraception] : contraception [Breast Self Exam] : breast self exam [Safe Sexual Practices] : safe sexual practices

## 2019-04-13 LAB — CYTOLOGY CVX/VAG DOC THIN PREP: NORMAL

## 2019-04-15 ENCOUNTER — APPOINTMENT (OUTPATIENT)
Dept: PULMONOLOGY | Facility: CLINIC | Age: 34
End: 2019-04-15

## 2019-04-15 ENCOUNTER — RX RENEWAL (OUTPATIENT)
Age: 34
End: 2019-04-15

## 2019-04-15 LAB
C TRACH RRNA SPEC QL NAA+PROBE: NOT DETECTED
CANDIDA VAG CYTO: NOT DETECTED
G VAGINALIS+PREV SP MTYP VAG QL MICRO: DETECTED
HPV HIGH+LOW RISK DNA PNL CVX: NOT DETECTED
N GONORRHOEA RRNA SPEC QL NAA+PROBE: NOT DETECTED
SOURCE AMPLIFICATION: NORMAL
SOURCE TP AMPLIFICATION: NORMAL
T VAGINALIS RRNA SPEC QL NAA+PROBE: NOT DETECTED
T VAGINALIS VAG QL WET PREP: NOT DETECTED

## 2019-04-15 RX ORDER — DULOXETINE HYDROCHLORIDE 30 MG/1
CAPSULE, DELAYED RELEASE ORAL DAILY
Refills: 0 | Status: DISCONTINUED | COMMUNITY
End: 2019-04-15

## 2019-04-15 RX ORDER — METRONIDAZOLE 7.5 MG/G
0.75 GEL VAGINAL
Qty: 1 | Refills: 0 | Status: DISCONTINUED | COMMUNITY
Start: 2019-04-15 | End: 2019-04-15

## 2019-04-16 ENCOUNTER — RESULT REVIEW (OUTPATIENT)
Age: 34
End: 2019-04-16

## 2019-04-16 LAB — BACTERIA UR CULT: ABNORMAL

## 2019-04-17 ENCOUNTER — RESULT REVIEW (OUTPATIENT)
Age: 34
End: 2019-04-17

## 2019-04-17 LAB — CYTOLOGY CVX/VAG DOC THIN PREP: NORMAL

## 2019-04-18 ENCOUNTER — TRANSCRIPTION ENCOUNTER (OUTPATIENT)
Age: 34
End: 2019-04-18

## 2019-04-19 PROCEDURE — 97010 HOT OR COLD PACKS THERAPY: CPT

## 2019-04-19 PROCEDURE — 97140 MANUAL THERAPY 1/> REGIONS: CPT

## 2019-04-19 PROCEDURE — 97110 THERAPEUTIC EXERCISES: CPT

## 2019-04-24 ENCOUNTER — APPOINTMENT (OUTPATIENT)
Dept: OBGYN | Facility: CLINIC | Age: 34
End: 2019-04-24
Payer: MEDICAID

## 2019-04-24 VITALS
BODY MASS INDEX: 26.01 KG/M2 | SYSTOLIC BLOOD PRESSURE: 110 MMHG | DIASTOLIC BLOOD PRESSURE: 80 MMHG | HEIGHT: 72 IN | WEIGHT: 192 LBS

## 2019-04-24 DIAGNOSIS — Z87.42 PERSONAL HISTORY OF OTHER DISEASES OF THE FEMALE GENITAL TRACT: ICD-10-CM

## 2019-04-24 LAB
BILIRUB UR QL STRIP: NORMAL
CLARITY UR: CLEAR
COLLECTION METHOD: NORMAL
GLUCOSE UR-MCNC: NORMAL
HCG UR QL: 0.2 EU/DL
HGB UR QL STRIP.AUTO: NORMAL
KETONES UR-MCNC: NORMAL
LEUKOCYTE ESTERASE UR QL STRIP: NORMAL
NITRITE UR QL STRIP: NORMAL
PH UR STRIP: 6
PROT UR STRIP-MCNC: NORMAL
SP GR UR STRIP: 1.01

## 2019-04-24 PROCEDURE — 81003 URINALYSIS AUTO W/O SCOPE: CPT | Mod: QW

## 2019-04-24 PROCEDURE — 99213 OFFICE O/P EST LOW 20 MIN: CPT

## 2019-04-24 PROCEDURE — 81025 URINE PREGNANCY TEST: CPT

## 2019-04-24 RX ORDER — LIDOCAINE 5 G/100G
5 OINTMENT TOPICAL
Qty: 50 | Refills: 0 | Status: DISCONTINUED | COMMUNITY
Start: 2016-11-21 | End: 2019-04-24

## 2019-04-24 RX ORDER — OXYCODONE 10 MG/1
10 TABLET ORAL
Refills: 0 | Status: DISCONTINUED | COMMUNITY
Start: 2017-02-08 | End: 2019-04-24

## 2019-04-24 RX ORDER — METRONIDAZOLE 7.5 MG/G
0.75 GEL VAGINAL
Qty: 1 | Refills: 0 | Status: DISCONTINUED | COMMUNITY
Start: 2019-04-15 | End: 2019-04-24

## 2019-04-24 RX ORDER — BUSPIRONE HYDROCHLORIDE 15 MG/1
15 TABLET ORAL
Qty: 60 | Refills: 0 | Status: DISCONTINUED | COMMUNITY
Start: 2017-01-25 | End: 2019-04-24

## 2019-04-24 RX ORDER — FLUTICASONE PROPIONATE 50 UG/1
50 SPRAY, METERED NASAL DAILY
Qty: 1 | Refills: 5 | Status: DISCONTINUED | COMMUNITY
Start: 2018-10-16 | End: 2019-04-24

## 2019-04-24 RX ORDER — AZELASTINE HYDROCHLORIDE 205.5 UG/1
0.15 SPRAY, METERED NASAL DAILY
Qty: 1 | Refills: 2 | Status: DISCONTINUED | COMMUNITY
Start: 2018-10-16 | End: 2019-04-24

## 2019-04-24 RX ORDER — CLONAZEPAM 0.5 MG/1
0.5 TABLET ORAL
Refills: 0 | Status: DISCONTINUED | COMMUNITY
Start: 2017-01-25 | End: 2019-04-24

## 2019-04-24 RX ORDER — TRAMADOL HYDROCHLORIDE 100 MG/1
100 CAPSULE ORAL
Qty: 30 | Refills: 0 | Status: DISCONTINUED | COMMUNITY
Start: 2017-09-19 | End: 2019-04-24

## 2019-04-24 RX ORDER — NITROFURANTOIN (MONOHYDRATE/MACROCRYSTALS) 25; 75 MG/1; MG/1
100 CAPSULE ORAL
Qty: 6 | Refills: 0 | Status: DISCONTINUED | COMMUNITY
Start: 2019-04-16 | End: 2019-04-24

## 2019-04-24 RX ORDER — CLONAZEPAM 1 MG/1
1 TABLET ORAL
Qty: 30 | Refills: 0 | Status: DISCONTINUED | COMMUNITY
Start: 2017-06-05 | End: 2019-04-24

## 2019-04-24 RX ORDER — ALBUTEROL SULFATE 1.25 MG/3ML
1.25 SOLUTION RESPIRATORY (INHALATION) 3 TIMES DAILY
Refills: 3 | Status: DISCONTINUED | COMMUNITY
Start: 2017-02-08 | End: 2019-04-24

## 2019-04-24 RX ORDER — DULOXETINE HYDROCHLORIDE 60 MG/1
60 CAPSULE, DELAYED RELEASE PELLETS ORAL
Qty: 28 | Refills: 0 | Status: DISCONTINUED | COMMUNITY
Start: 2017-01-13 | End: 2019-04-24

## 2019-04-24 NOTE — HISTORY OF PRESENT ILLNESS
[Good] : being in good health [Healthy Diet] : a healthy diet [Regular Exercise] : regular exercise [Reproductive Age] : is of reproductive age [Last Pap ___] : Last cervical pap smear was [unfilled] [Definite:  ___ (Date)] : the last menstrual period was [unfilled] [Menarche Age: ____] : age at menarche was [unfilled] [Sexually Active] : is sexually active [Monogamous] : is monogamous [Contraception] : uses contraception [Male ___] : [unfilled] male [Condoms] : uses condoms [No] : no [Weight Concerns] : no concerns with her weight [Pregnancy History] : denies prior pregnancies

## 2019-04-24 NOTE — PHYSICAL EXAM
[Labia Majora] : labia major [Labia Minora] : labia minora [Normal] : clitoris [Discharge] : a  ~M vaginal discharge was present [Mucoid] : mucoid [Moderate] : moderate

## 2019-04-26 LAB
CANDIDA VAG CYTO: DETECTED
G VAGINALIS+PREV SP MTYP VAG QL MICRO: NOT DETECTED
T VAGINALIS VAG QL WET PREP: NOT DETECTED

## 2019-05-02 ENCOUNTER — TRANSCRIPTION ENCOUNTER (OUTPATIENT)
Age: 34
End: 2019-05-02

## 2019-05-07 ENCOUNTER — RX RENEWAL (OUTPATIENT)
Age: 34
End: 2019-05-07

## 2019-05-08 ENCOUNTER — TRANSCRIPTION ENCOUNTER (OUTPATIENT)
Age: 34
End: 2019-05-08

## 2019-05-13 ENCOUNTER — TRANSCRIPTION ENCOUNTER (OUTPATIENT)
Age: 34
End: 2019-05-13

## 2019-05-13 ENCOUNTER — RESULT CHARGE (OUTPATIENT)
Age: 34
End: 2019-05-13

## 2019-05-13 ENCOUNTER — RECORD ABSTRACTING (OUTPATIENT)
Age: 34
End: 2019-05-13

## 2019-05-13 ENCOUNTER — APPOINTMENT (OUTPATIENT)
Dept: OBGYN | Facility: CLINIC | Age: 34
End: 2019-05-13
Payer: MEDICAID

## 2019-05-13 ENCOUNTER — RX RENEWAL (OUTPATIENT)
Age: 34
End: 2019-05-13

## 2019-05-13 VITALS
HEIGHT: 72 IN | WEIGHT: 192 LBS | DIASTOLIC BLOOD PRESSURE: 70 MMHG | BODY MASS INDEX: 26.01 KG/M2 | SYSTOLIC BLOOD PRESSURE: 110 MMHG

## 2019-05-13 DIAGNOSIS — Z30.09 ENCOUNTER FOR OTHER GENERAL COUNSELING AND ADVICE ON CONTRACEPTION: ICD-10-CM

## 2019-05-13 LAB
BILIRUB UR QL STRIP: ABNORMAL
CYTOLOGY CVX/VAG DOC THIN PREP: NORMAL
GLUCOSE BLDC GLUCOMTR-MCNC: NORMAL
GLUCOSE UR-MCNC: ABNORMAL
HCG UR QL: 0.2 EU/DL
HCG UR QL: NEGATIVE
HGB UR QL STRIP.AUTO: NORMAL
KETONES UR-MCNC: ABNORMAL
LEUKOCYTE ESTERASE UR QL STRIP: NORMAL
NITRITE UR QL STRIP: NORMAL
PH UR STRIP: 5.5
PROT UR STRIP-MCNC: ABNORMAL
QUALITY CONTROL: YES
SP GR UR STRIP: 1.02

## 2019-05-13 PROCEDURE — 81003 URINALYSIS AUTO W/O SCOPE: CPT | Mod: QW

## 2019-05-13 PROCEDURE — 82962 GLUCOSE BLOOD TEST: CPT | Mod: QW

## 2019-05-13 PROCEDURE — 81025 URINE PREGNANCY TEST: CPT

## 2019-05-13 PROCEDURE — 99213 OFFICE O/P EST LOW 20 MIN: CPT

## 2019-05-13 RX ORDER — LEVONORGESTREL 1.5 MG/1
1.5 TABLET ORAL
Qty: 1 | Refills: 5 | Status: DISCONTINUED | COMMUNITY
Start: 2019-05-13 | End: 2019-05-13

## 2019-05-13 NOTE — HISTORY OF PRESENT ILLNESS
[Last Pap ___] : Last cervical pap smear was [unfilled] [Male ___] : [unfilled] male [Sexually Active] : is sexually active

## 2019-05-14 NOTE — DISCUSSION/SUMMARY
[FreeTextEntry1] : We discussed the plan b- as it is a progestin hormone, there may be potential interaction with the warfarin. We discussed the risks/benefits of the plan b.  The potential risk for sub therapeutic levels of warfarin after taking plan b were discussed and pt was advised against use at this time as the risk seems to outweigh the benefit.  We discussed plan B does not definitively prevent pregnancy.\par \par

## 2019-05-21 ENCOUNTER — RX RENEWAL (OUTPATIENT)
Age: 34
End: 2019-05-21

## 2019-05-22 ENCOUNTER — OTHER (OUTPATIENT)
Age: 34
End: 2019-05-22

## 2019-05-22 ENCOUNTER — TRANSCRIPTION ENCOUNTER (OUTPATIENT)
Age: 34
End: 2019-05-22

## 2019-05-23 ENCOUNTER — APPOINTMENT (OUTPATIENT)
Dept: PULMONOLOGY | Facility: CLINIC | Age: 34
End: 2019-05-23

## 2019-05-24 ENCOUNTER — TRANSCRIPTION ENCOUNTER (OUTPATIENT)
Age: 34
End: 2019-05-24

## 2019-05-28 ENCOUNTER — TRANSCRIPTION ENCOUNTER (OUTPATIENT)
Age: 34
End: 2019-05-28

## 2019-05-31 ENCOUNTER — TRANSCRIPTION ENCOUNTER (OUTPATIENT)
Age: 34
End: 2019-05-31

## 2019-06-06 ENCOUNTER — TRANSCRIPTION ENCOUNTER (OUTPATIENT)
Age: 34
End: 2019-06-06

## 2019-06-07 ENCOUNTER — TRANSCRIPTION ENCOUNTER (OUTPATIENT)
Age: 34
End: 2019-06-07

## 2019-06-14 ENCOUNTER — OTHER (OUTPATIENT)
Age: 34
End: 2019-06-14

## 2019-06-17 ENCOUNTER — APPOINTMENT (OUTPATIENT)
Dept: FAMILY MEDICINE | Facility: CLINIC | Age: 34
End: 2019-06-17
Payer: MEDICAID

## 2019-06-17 VITALS
BODY MASS INDEX: 26.41 KG/M2 | OXYGEN SATURATION: 100 % | HEIGHT: 72 IN | WEIGHT: 195 LBS | SYSTOLIC BLOOD PRESSURE: 98 MMHG | DIASTOLIC BLOOD PRESSURE: 62 MMHG | HEART RATE: 105 BPM

## 2019-06-17 PROCEDURE — 99214 OFFICE O/P EST MOD 30 MIN: CPT | Mod: 25

## 2019-06-17 PROCEDURE — 85610 PROTHROMBIN TIME: CPT | Mod: QW

## 2019-06-17 NOTE — PHYSICAL EXAM
[No Acute Distress] : no acute distress [Well Nourished] : well nourished [Well Developed] : well developed [Well-Appearing] : well-appearing [EOMI] : extraocular movements intact [Normal Outer Ear/Nose] : the outer ears and nose were normal in appearance [No JVD] : no jugular venous distention [No Respiratory Distress] : no respiratory distress  [Clear to Auscultation] : lungs were clear to auscultation bilaterally [No Accessory Muscle Use] : no accessory muscle use [Regular Rhythm] : with a regular rhythm [Normal S1, S2] : normal S1 and S2 [No Edema] : there was no peripheral edema [Non-distended] : non-distended [No CVA Tenderness] : no CVA  tenderness [Grossly Normal Strength/Tone] : grossly normal strength/tone [No Rash] : no rash [Normal Gait] : normal gait [Coordination Grossly Intact] : coordination grossly intact [No Focal Deficits] : no focal deficits [Normal Affect] : the affect was normal [Normal Mood] : the mood was normal [Normal Insight/Judgement] : insight and judgment were intact [de-identified] : +murmur

## 2019-06-17 NOTE — HISTORY OF PRESENT ILLNESS
[FreeTextEntry1] : f/u -BW / INR = 3.8  / pt c/o tiredness /no complains [de-identified] : 35 yo female presents to office for f/u on Valvular Heart Disease.  INR today =3.8   pt denies bev bleeding at this time\par GI//Resp.  \par negative fast today \par pt reports compliance c daily Pravastatin 20mg hs \par pt NEVER started previously advised B12 1000mcg daily and increase of Januvia 50mg to 100mg daily

## 2019-06-18 LAB — INR PPP: 3.8 RATIO

## 2019-06-25 PROCEDURE — 97010 HOT OR COLD PACKS THERAPY: CPT

## 2019-06-25 PROCEDURE — 97140 MANUAL THERAPY 1/> REGIONS: CPT

## 2019-06-25 PROCEDURE — 97110 THERAPEUTIC EXERCISES: CPT

## 2019-07-03 ENCOUNTER — RX RENEWAL (OUTPATIENT)
Age: 34
End: 2019-07-03

## 2019-07-15 ENCOUNTER — APPOINTMENT (OUTPATIENT)
Dept: FAMILY MEDICINE | Facility: CLINIC | Age: 34
End: 2019-07-15
Payer: MEDICAID

## 2019-07-15 VITALS
HEART RATE: 101 BPM | WEIGHT: 190 LBS | OXYGEN SATURATION: 96 % | HEIGHT: 72 IN | DIASTOLIC BLOOD PRESSURE: 62 MMHG | BODY MASS INDEX: 25.73 KG/M2 | SYSTOLIC BLOOD PRESSURE: 94 MMHG | TEMPERATURE: 97.6 F

## 2019-07-15 VITALS — SYSTOLIC BLOOD PRESSURE: 92 MMHG | DIASTOLIC BLOOD PRESSURE: 65 MMHG

## 2019-07-15 PROCEDURE — 36415 COLL VENOUS BLD VENIPUNCTURE: CPT

## 2019-07-15 PROCEDURE — 99214 OFFICE O/P EST MOD 30 MIN: CPT | Mod: 25

## 2019-07-15 NOTE — PHYSICAL EXAM
[No Acute Distress] : no acute distress [Well Nourished] : well nourished [Well Developed] : well developed [Well-Appearing] : well-appearing [EOMI] : extraocular movements intact [Normal Outer Ear/Nose] : the outer ears and nose were normal in appearance [No JVD] : no jugular venous distention [No Respiratory Distress] : no respiratory distress  [No Accessory Muscle Use] : no accessory muscle use [Clear to Auscultation] : lungs were clear to auscultation bilaterally [Regular Rhythm] : with a regular rhythm [Normal S1, S2] : normal S1 and S2 [No Edema] : there was no peripheral edema [No Extremity Clubbing/Cyanosis] : no extremity clubbing/cyanosis [Non-distended] : non-distended [No CVA Tenderness] : no CVA  tenderness [Grossly Normal Strength/Tone] : grossly normal strength/tone [Coordination Grossly Intact] : coordination grossly intact [No Focal Deficits] : no focal deficits [Normal Affect] : the affect was normal [Normal Mood] : the mood was normal [Normal Insight/Judgement] : insight and judgment were intact [de-identified] : +murmur

## 2019-07-15 NOTE — HISTORY OF PRESENT ILLNESS
[FreeTextEntry1] : pt is here to get blood drawn [de-identified] : 35 yo female presents to office for f/u on Marfans/Valvular Heart Disease/Vit D def./DM2/HLD  +FAST no CP, chronic unchanged STABLE SOB,  STABLE unchanged dizziness upon rising too quickly no palpitations \par no change in bowel habits +BM daily no bloody/black stools\par no urinary complaints  \par \par Last INR check 1M

## 2019-07-16 ENCOUNTER — TRANSCRIPTION ENCOUNTER (OUTPATIENT)
Age: 34
End: 2019-07-16

## 2019-07-16 LAB
25(OH)D3 SERPL-MCNC: 41.2 NG/ML
ALBUMIN SERPL ELPH-MCNC: 4.2 G/DL
ALP BLD-CCNC: 93 U/L
ALT SERPL-CCNC: 19 U/L
ANION GAP SERPL CALC-SCNC: 14 MMOL/L
AST SERPL-CCNC: 26 U/L
BASOPHILS # BLD AUTO: 0.06 K/UL
BASOPHILS NFR BLD AUTO: 0.3 %
BILIRUB SERPL-MCNC: 0.3 MG/DL
BUN SERPL-MCNC: 15 MG/DL
CALCIUM SERPL-MCNC: 9.6 MG/DL
CHLORIDE SERPL-SCNC: 102 MMOL/L
CHOLEST SERPL-MCNC: 176 MG/DL
CHOLEST/HDLC SERPL: 4.3 RATIO
CK SERPL-CCNC: 89 U/L
CO2 SERPL-SCNC: 20 MMOL/L
CREAT SERPL-MCNC: 0.62 MG/DL
EOSINOPHIL # BLD AUTO: 0.19 K/UL
EOSINOPHIL NFR BLD AUTO: 1.1 %
ESTIMATED AVERAGE GLUCOSE: 235 MG/DL
GLUCOSE SERPL-MCNC: 195 MG/DL
HBA1C MFR BLD HPLC: 9.8 %
HCT VFR BLD CALC: 39.9 %
HDLC SERPL-MCNC: 41 MG/DL
HGB BLD-MCNC: 12 G/DL
IMM GRANULOCYTES NFR BLD AUTO: 1 %
INR PPP: 3.08 RATIO
LDLC SERPL CALC-MCNC: 109 MG/DL
LYMPHOCYTES # BLD AUTO: 2.37 K/UL
LYMPHOCYTES NFR BLD AUTO: 13.6 %
MAN DIFF?: NORMAL
MCHC RBC-ENTMCNC: 25.4 PG
MCHC RBC-ENTMCNC: 30.1 GM/DL
MCV RBC AUTO: 84.4 FL
MONOCYTES # BLD AUTO: 0.92 K/UL
MONOCYTES NFR BLD AUTO: 5.3 %
NEUTROPHILS # BLD AUTO: 13.74 K/UL
NEUTROPHILS NFR BLD AUTO: 78.7 %
PLATELET # BLD AUTO: 374 K/UL
POTASSIUM SERPL-SCNC: 4.6 MMOL/L
PROT SERPL-MCNC: 7 G/DL
PT BLD: 36 SEC
RBC # BLD: 4.73 M/UL
RBC # FLD: 17.6 %
SODIUM SERPL-SCNC: 136 MMOL/L
TRIGL SERPL-MCNC: 129 MG/DL
TSH SERPL-ACNC: 4.11 UIU/ML
VIT B12 SERPL-MCNC: 240 PG/ML
WBC # FLD AUTO: 17.45 K/UL

## 2019-07-17 ENCOUNTER — OTHER (OUTPATIENT)
Age: 34
End: 2019-07-17

## 2019-07-23 ENCOUNTER — APPOINTMENT (OUTPATIENT)
Dept: FAMILY MEDICINE | Facility: CLINIC | Age: 34
End: 2019-07-23
Payer: MEDICAID

## 2019-07-23 VITALS
DIASTOLIC BLOOD PRESSURE: 62 MMHG | SYSTOLIC BLOOD PRESSURE: 98 MMHG | BODY MASS INDEX: 25.73 KG/M2 | HEART RATE: 100 BPM | TEMPERATURE: 97.8 F | HEIGHT: 72 IN | WEIGHT: 190 LBS | OXYGEN SATURATION: 95 %

## 2019-07-23 PROCEDURE — 99214 OFFICE O/P EST MOD 30 MIN: CPT

## 2019-07-23 NOTE — HISTORY OF PRESENT ILLNESS
[FreeTextEntry1] : f/u x elevated WBC/A1C [de-identified] : 33 yo female for f/u on elevated WBC count/DM2/B12 def and Valvular Heart Disease.   pt admits to increased intake of natural sugars \par (fruits) this past summer.

## 2019-07-23 NOTE — ASSESSMENT
[FreeTextEntry1] : CLOSE monitoring,  repeat CBC in 1M \par \par start SL B12 1000mcg qd repeat levels in 1M if no significant change will start B12 injections \par \par cont Metformin 1000mg bid con Januvia 100mg qd  ADD Amaryl 2mg qd in am c breakfast \par \par INR stable \par \par f/u 1M \par \par see lab orders \par \par LDL currently 109 NOT at a good however, pt on 1st generation statin repeat FLP in 3M, if not < 90 will change to newer generation statin

## 2019-07-29 ENCOUNTER — OTHER (OUTPATIENT)
Age: 34
End: 2019-07-29

## 2019-07-29 ENCOUNTER — TRANSCRIPTION ENCOUNTER (OUTPATIENT)
Age: 34
End: 2019-07-29

## 2019-08-04 ENCOUNTER — RX RENEWAL (OUTPATIENT)
Age: 34
End: 2019-08-04

## 2019-08-23 ENCOUNTER — APPOINTMENT (OUTPATIENT)
Dept: FAMILY MEDICINE | Facility: CLINIC | Age: 34
End: 2019-08-23
Payer: MEDICAID

## 2019-08-23 VITALS
HEART RATE: 100 BPM | SYSTOLIC BLOOD PRESSURE: 100 MMHG | HEIGHT: 72 IN | OXYGEN SATURATION: 97 % | WEIGHT: 193 LBS | BODY MASS INDEX: 26.14 KG/M2 | DIASTOLIC BLOOD PRESSURE: 60 MMHG

## 2019-08-23 VITALS — SYSTOLIC BLOOD PRESSURE: 82 MMHG | DIASTOLIC BLOOD PRESSURE: 60 MMHG

## 2019-08-23 LAB
INR PPP: 2.97 RATIO
PT BLD: 34.7 SEC

## 2019-08-23 PROCEDURE — 99214 OFFICE O/P EST MOD 30 MIN: CPT | Mod: 25

## 2019-08-23 PROCEDURE — 36415 COLL VENOUS BLD VENIPUNCTURE: CPT

## 2019-08-23 NOTE — HISTORY OF PRESENT ILLNESS
[FreeTextEntry1] : follow up on Leukocytosis/ vit b12 def [de-identified] : 2 day hx of chills/sweats  +runny nose c clear rhinorrhea no sore throat no cough no N/V/D no rashes no urinary complaints \par \par +ve compliance c daily SL B12 1000mcg qd  pt states still fatigued \par compliant c ALL diabetic meds

## 2019-08-23 NOTE — PHYSICAL EXAM
[No Acute Distress] : no acute distress [Well Nourished] : well nourished [Well Developed] : well developed [Well-Appearing] : well-appearing [EOMI] : extraocular movements intact [Normal Outer Ear/Nose] : the outer ears and nose were normal in appearance [No JVD] : no jugular venous distention [No Respiratory Distress] : no respiratory distress  [No Accessory Muscle Use] : no accessory muscle use [Regular Rhythm] : with a regular rhythm [Clear to Auscultation] : lungs were clear to auscultation bilaterally [Normal S1, S2] : normal S1 and S2 [No Edema] : there was no peripheral edema [No Extremity Clubbing/Cyanosis] : no extremity clubbing/cyanosis [Non-distended] : non-distended [No CVA Tenderness] : no CVA  tenderness [Grossly Normal Strength/Tone] : grossly normal strength/tone [Coordination Grossly Intact] : coordination grossly intact [No Focal Deficits] : no focal deficits [Normal Affect] : the affect was normal [Normal Mood] : the mood was normal [Normal Insight/Judgement] : insight and judgment were intact [de-identified] : +murmur

## 2019-08-26 ENCOUNTER — TRANSCRIPTION ENCOUNTER (OUTPATIENT)
Age: 34
End: 2019-08-26

## 2019-08-26 LAB
BASOPHILS # BLD AUTO: 0.04 K/UL
BASOPHILS NFR BLD AUTO: 0.3 %
EOSINOPHIL # BLD AUTO: 0.19 K/UL
EOSINOPHIL NFR BLD AUTO: 1.3 %
HCT VFR BLD CALC: 38.3 %
HGB BLD-MCNC: 11.8 G/DL
IMM GRANULOCYTES NFR BLD AUTO: 0.7 %
LYMPHOCYTES # BLD AUTO: 2.13 K/UL
LYMPHOCYTES NFR BLD AUTO: 14.4 %
MAN DIFF?: NORMAL
MCHC RBC-ENTMCNC: 25.5 PG
MCHC RBC-ENTMCNC: 30.8 GM/DL
MCV RBC AUTO: 82.7 FL
MONOCYTES # BLD AUTO: 0.79 K/UL
MONOCYTES NFR BLD AUTO: 5.3 %
NEUTROPHILS # BLD AUTO: 11.55 K/UL
NEUTROPHILS NFR BLD AUTO: 78 %
PLATELET # BLD AUTO: 345 K/UL
RBC # BLD: 4.63 M/UL
RBC # FLD: 17.2 %
VIT B12 SERPL-MCNC: 183 PG/ML
WBC # FLD AUTO: 14.8 K/UL

## 2019-08-28 ENCOUNTER — TRANSCRIPTION ENCOUNTER (OUTPATIENT)
Age: 34
End: 2019-08-28

## 2019-08-30 ENCOUNTER — RX RENEWAL (OUTPATIENT)
Age: 34
End: 2019-08-30

## 2019-08-30 ENCOUNTER — TRANSCRIPTION ENCOUNTER (OUTPATIENT)
Age: 34
End: 2019-08-30

## 2019-09-12 ENCOUNTER — MEDICATION RENEWAL (OUTPATIENT)
Age: 34
End: 2019-09-12

## 2019-09-12 ENCOUNTER — TRANSCRIPTION ENCOUNTER (OUTPATIENT)
Age: 34
End: 2019-09-12

## 2019-10-09 ENCOUNTER — TRANSCRIPTION ENCOUNTER (OUTPATIENT)
Age: 34
End: 2019-10-09

## 2019-10-21 ENCOUNTER — APPOINTMENT (OUTPATIENT)
Dept: FAMILY MEDICINE | Facility: CLINIC | Age: 34
End: 2019-10-21
Payer: MEDICAID

## 2019-10-21 ENCOUNTER — MED ADMIN CHARGE (OUTPATIENT)
Age: 34
End: 2019-10-21

## 2019-10-21 VITALS
WEIGHT: 195 LBS | BODY MASS INDEX: 26.41 KG/M2 | HEART RATE: 91 BPM | DIASTOLIC BLOOD PRESSURE: 66 MMHG | SYSTOLIC BLOOD PRESSURE: 100 MMHG | OXYGEN SATURATION: 99 % | TEMPERATURE: 97.7 F | HEIGHT: 72 IN

## 2019-10-21 DIAGNOSIS — Z23 ENCOUNTER FOR IMMUNIZATION: ICD-10-CM

## 2019-10-21 LAB — INR PPP: 3.6 RATIO

## 2019-10-21 PROCEDURE — G0008: CPT

## 2019-10-21 PROCEDURE — 99214 OFFICE O/P EST MOD 30 MIN: CPT | Mod: 25

## 2019-10-21 PROCEDURE — 85610 PROTHROMBIN TIME: CPT | Mod: QW

## 2019-10-21 PROCEDURE — 90674 CCIIV4 VAC NO PRSV 0.5 ML IM: CPT

## 2019-10-21 PROCEDURE — 36415 COLL VENOUS BLD VENIPUNCTURE: CPT

## 2019-10-21 NOTE — PHYSICAL EXAM
[No Acute Distress] : no acute distress [Well Nourished] : well nourished [Well Developed] : well developed [Well-Appearing] : well-appearing [EOMI] : extraocular movements intact [Normal Outer Ear/Nose] : the outer ears and nose were normal in appearance [No JVD] : no jugular venous distention [No Respiratory Distress] : no respiratory distress  [No Accessory Muscle Use] : no accessory muscle use [Regular Rhythm] : with a regular rhythm [Clear to Auscultation] : lungs were clear to auscultation bilaterally [Normal S1, S2] : normal S1 and S2 [No Edema] : there was no peripheral edema [No Extremity Clubbing/Cyanosis] : no extremity clubbing/cyanosis [Non-distended] : non-distended [Grossly Normal Strength/Tone] : grossly normal strength/tone [No CVA Tenderness] : no CVA  tenderness [Coordination Grossly Intact] : coordination grossly intact [No Focal Deficits] : no focal deficits [Normal Affect] : the affect was normal [Normal Mood] : the mood was normal [de-identified] : +murmur  [Normal Insight/Judgement] : insight and judgment were intact

## 2019-10-22 ENCOUNTER — TRANSCRIPTION ENCOUNTER (OUTPATIENT)
Age: 34
End: 2019-10-22

## 2019-10-22 LAB
ALBUMIN SERPL ELPH-MCNC: 4.3 G/DL
ALP BLD-CCNC: 89 U/L
ALT SERPL-CCNC: 18 U/L
ANION GAP SERPL CALC-SCNC: 12 MMOL/L
AST SERPL-CCNC: 32 U/L
BASOPHILS # BLD AUTO: 0.06 K/UL
BASOPHILS NFR BLD AUTO: 0.5 %
BILIRUB SERPL-MCNC: 0.2 MG/DL
BUN SERPL-MCNC: 12 MG/DL
CALCIUM SERPL-MCNC: 9.2 MG/DL
CHLORIDE SERPL-SCNC: 104 MMOL/L
CHOLEST SERPL-MCNC: 182 MG/DL
CHOLEST/HDLC SERPL: 4.7 RATIO
CK SERPL-CCNC: 122 U/L
CO2 SERPL-SCNC: 24 MMOL/L
CREAT SERPL-MCNC: 0.74 MG/DL
EOSINOPHIL # BLD AUTO: 0.13 K/UL
EOSINOPHIL NFR BLD AUTO: 1.1 %
ESTIMATED AVERAGE GLUCOSE: 183 MG/DL
GGT SERPL-CCNC: 41 U/L
GLUCOSE SERPL-MCNC: 164 MG/DL
HBA1C MFR BLD HPLC: 8 %
HCT VFR BLD CALC: 39.1 %
HDLC SERPL-MCNC: 39 MG/DL
HGB BLD-MCNC: 11.4 G/DL
IMM GRANULOCYTES NFR BLD AUTO: 0.8 %
INR PPP: 3.37 RATIO
LDLC SERPL CALC-MCNC: 104 MG/DL
LYMPHOCYTES # BLD AUTO: 1.92 K/UL
LYMPHOCYTES NFR BLD AUTO: 16.3 %
MAN DIFF?: NORMAL
MCHC RBC-ENTMCNC: 25.1 PG
MCHC RBC-ENTMCNC: 29.2 GM/DL
MCV RBC AUTO: 85.9 FL
MONOCYTES # BLD AUTO: 0.73 K/UL
MONOCYTES NFR BLD AUTO: 6.2 %
NEUTROPHILS # BLD AUTO: 8.85 K/UL
NEUTROPHILS NFR BLD AUTO: 75.1 %
PLATELET # BLD AUTO: 378 K/UL
POTASSIUM SERPL-SCNC: 4.4 MMOL/L
PROT SERPL-MCNC: 6.9 G/DL
PT BLD: 39.9 SEC
RBC # BLD: 4.55 M/UL
RBC # FLD: 17 %
SODIUM SERPL-SCNC: 140 MMOL/L
TRIGL SERPL-MCNC: 193 MG/DL
VIT B12 SERPL-MCNC: 183 PG/ML
WBC # FLD AUTO: 11.79 K/UL

## 2019-10-25 ENCOUNTER — MEDICATION RENEWAL (OUTPATIENT)
Age: 34
End: 2019-10-25

## 2019-10-25 ENCOUNTER — TRANSCRIPTION ENCOUNTER (OUTPATIENT)
Age: 34
End: 2019-10-25

## 2019-10-29 ENCOUNTER — RX RENEWAL (OUTPATIENT)
Age: 34
End: 2019-10-29

## 2019-10-31 ENCOUNTER — TRANSCRIPTION ENCOUNTER (OUTPATIENT)
Age: 34
End: 2019-10-31

## 2019-10-31 ENCOUNTER — MEDICATION RENEWAL (OUTPATIENT)
Age: 34
End: 2019-10-31

## 2019-11-05 ENCOUNTER — RX RENEWAL (OUTPATIENT)
Age: 34
End: 2019-11-05

## 2019-11-05 RX ORDER — MOMETASONE FUROATE AND FORMOTEROL FUMARATE DIHYDRATE 200; 5 UG/1; UG/1
200-5 AEROSOL RESPIRATORY (INHALATION)
Qty: 3 | Refills: 3 | Status: DISCONTINUED | COMMUNITY
Start: 1900-01-01 | End: 2019-11-05

## 2019-11-06 ENCOUNTER — TRANSCRIPTION ENCOUNTER (OUTPATIENT)
Age: 34
End: 2019-11-06

## 2019-11-06 ENCOUNTER — RX RENEWAL (OUTPATIENT)
Age: 34
End: 2019-11-06

## 2019-11-08 ENCOUNTER — APPOINTMENT (OUTPATIENT)
Dept: FAMILY MEDICINE | Facility: CLINIC | Age: 34
End: 2019-11-08
Payer: MEDICAID

## 2019-11-08 PROCEDURE — 96372 THER/PROPH/DIAG INJ SC/IM: CPT

## 2019-11-08 RX ORDER — CYANOCOBALAMIN 1000 UG/ML
1000 INJECTION INTRAMUSCULAR; SUBCUTANEOUS
Qty: 0 | Refills: 0 | Status: COMPLETED | OUTPATIENT
Start: 2019-11-08

## 2019-11-08 RX ADMIN — CYANOCOBALAMIN 1 MCG/ML: 1000 INJECTION INTRAMUSCULAR; SUBCUTANEOUS at 00:00

## 2019-11-15 ENCOUNTER — APPOINTMENT (OUTPATIENT)
Dept: FAMILY MEDICINE | Facility: CLINIC | Age: 34
End: 2019-11-15
Payer: MEDICAID

## 2019-11-15 ENCOUNTER — MED ADMIN CHARGE (OUTPATIENT)
Age: 34
End: 2019-11-15

## 2019-11-15 PROCEDURE — 96372 THER/PROPH/DIAG INJ SC/IM: CPT

## 2019-11-15 RX ORDER — CYANOCOBALAMIN 1000 UG/ML
1000 INJECTION INTRAMUSCULAR; SUBCUTANEOUS
Qty: 0 | Refills: 0 | Status: COMPLETED | OUTPATIENT
Start: 2019-11-15

## 2019-11-15 RX ADMIN — CYANOCOBALAMIN 0 MCG/ML: 1000 INJECTION, SOLUTION INTRAMUSCULAR; SUBCUTANEOUS at 00:00

## 2019-11-22 ENCOUNTER — APPOINTMENT (OUTPATIENT)
Dept: FAMILY MEDICINE | Facility: CLINIC | Age: 34
End: 2019-11-22
Payer: MEDICAID

## 2019-11-22 PROCEDURE — 96372 THER/PROPH/DIAG INJ SC/IM: CPT

## 2019-11-22 RX ORDER — CYANOCOBALAMIN 1000 UG/ML
1000 INJECTION INTRAMUSCULAR; SUBCUTANEOUS
Qty: 0 | Refills: 0 | Status: COMPLETED | OUTPATIENT
Start: 2019-11-22

## 2019-11-22 RX ADMIN — CYANOCOBALAMIN 0 MCG/ML: 1000 INJECTION, SOLUTION INTRAMUSCULAR; SUBCUTANEOUS at 00:00

## 2019-12-02 ENCOUNTER — APPOINTMENT (OUTPATIENT)
Dept: FAMILY MEDICINE | Facility: CLINIC | Age: 34
End: 2019-12-02
Payer: MEDICAID

## 2019-12-02 PROCEDURE — 96372 THER/PROPH/DIAG INJ SC/IM: CPT

## 2019-12-02 RX ORDER — CYANOCOBALAMIN 1000 UG/ML
1000 INJECTION INTRAMUSCULAR; SUBCUTANEOUS
Qty: 0 | Refills: 0 | Status: COMPLETED | OUTPATIENT
Start: 2019-12-02

## 2019-12-02 RX ADMIN — CYANOCOBALAMIN 0 MCG/ML: 1000 INJECTION, SOLUTION INTRAMUSCULAR; SUBCUTANEOUS at 00:00

## 2019-12-09 ENCOUNTER — RESULT CHARGE (OUTPATIENT)
Age: 34
End: 2019-12-09

## 2019-12-09 ENCOUNTER — APPOINTMENT (OUTPATIENT)
Dept: FAMILY MEDICINE | Facility: CLINIC | Age: 34
End: 2019-12-09
Payer: MEDICAID

## 2019-12-09 VITALS
OXYGEN SATURATION: 99 % | SYSTOLIC BLOOD PRESSURE: 104 MMHG | DIASTOLIC BLOOD PRESSURE: 68 MMHG | BODY MASS INDEX: 26.82 KG/M2 | WEIGHT: 198 LBS | HEIGHT: 72 IN | HEART RATE: 106 BPM

## 2019-12-09 DIAGNOSIS — L65.9 NONSCARRING HAIR LOSS, UNSPECIFIED: ICD-10-CM

## 2019-12-09 DIAGNOSIS — L60.3 NAIL DYSTROPHY: ICD-10-CM

## 2019-12-09 LAB — INR PPP: 3.6 RATIO

## 2019-12-09 PROCEDURE — 36415 COLL VENOUS BLD VENIPUNCTURE: CPT

## 2019-12-09 PROCEDURE — 99214 OFFICE O/P EST MOD 30 MIN: CPT | Mod: 25

## 2019-12-09 PROCEDURE — 85610 PROTHROMBIN TIME: CPT | Mod: QW

## 2019-12-09 RX ORDER — LURASIDONE HYDROCHLORIDE 20 MG/1
20 TABLET, FILM COATED ORAL
Qty: 30 | Refills: 0 | Status: COMPLETED | COMMUNITY
Start: 2019-04-25 | End: 2019-12-09

## 2019-12-09 NOTE — PHYSICAL EXAM
[No Acute Distress] : no acute distress [Well Nourished] : well nourished [Well Developed] : well developed [Well-Appearing] : well-appearing [EOMI] : extraocular movements intact [Normal Outer Ear/Nose] : the outer ears and nose were normal in appearance [No JVD] : no jugular venous distention [No Respiratory Distress] : no respiratory distress  [No Accessory Muscle Use] : no accessory muscle use [Clear to Auscultation] : lungs were clear to auscultation bilaterally [Regular Rhythm] : with a regular rhythm [Normal S1, S2] : normal S1 and S2 [No Edema] : there was no peripheral edema [No Extremity Clubbing/Cyanosis] : no extremity clubbing/cyanosis [Non-distended] : non-distended [Grossly Normal Strength/Tone] : grossly normal strength/tone [No CVA Tenderness] : no CVA  tenderness [Coordination Grossly Intact] : coordination grossly intact [No Focal Deficits] : no focal deficits [Normal Affect] : the affect was normal [Normal Mood] : the mood was normal [Normal Insight/Judgement] : insight and judgment were intact [de-identified] : +murmur

## 2019-12-09 NOTE — ASSESSMENT
[FreeTextEntry1] : INR=3.6 \par cont current Warfarin regimen, however, repeat home INR check in 1 week to ensure stability \par \par see lab orders  \par \par f/u pending lab results

## 2019-12-09 NOTE — HISTORY OF PRESENT ILLNESS
[FreeTextEntry1] : f/u x vascular disease \par  INR: 3.6 [de-identified] : 35 yo female c Hx of Valvular Heart Disease and B12 def. for f/u.   pt currently off Latuda 20mg qd as per Psychiatry.  pt reports no change in mood since d/c.  " I feel fine"   \par \par no CP/SOB   unchanged dizziness    no N/V/D +BM qd NL no bloody/black stools \par no urinary complaints \par \par s/p Cardio consult c Dr. Greene 1 week ago, no change in medication regimen f/u 3M for  2D-Echo

## 2019-12-11 LAB
BASOPHILS # BLD AUTO: 0.05 K/UL
BASOPHILS NFR BLD AUTO: 0.3 %
EOSINOPHIL # BLD AUTO: 0.23 K/UL
EOSINOPHIL NFR BLD AUTO: 1.4 %
FERRITIN SERPL-MCNC: 35 NG/ML
FOLATE SERPL-MCNC: 19.7 NG/ML
HCT VFR BLD CALC: 38.3 %
HGB BLD-MCNC: 11.5 G/DL
IMM GRANULOCYTES NFR BLD AUTO: 0.9 %
IRON SATN MFR SERPL: 11 %
IRON SERPL-MCNC: 39 UG/DL
LYMPHOCYTES # BLD AUTO: 2.25 K/UL
LYMPHOCYTES NFR BLD AUTO: 13.9 %
MAN DIFF?: NORMAL
MCHC RBC-ENTMCNC: 25.6 PG
MCHC RBC-ENTMCNC: 30 GM/DL
MCV RBC AUTO: 85.1 FL
MONOCYTES # BLD AUTO: 0.88 K/UL
MONOCYTES NFR BLD AUTO: 5.4 %
NEUTROPHILS # BLD AUTO: 12.63 K/UL
NEUTROPHILS NFR BLD AUTO: 78.1 %
PLATELET # BLD AUTO: 368 K/UL
RBC # BLD: 4.5 M/UL
RBC # FLD: 16.9 %
T3 SERPL-MCNC: 109 NG/DL
T4 SERPL-MCNC: 8.3 UG/DL
TIBC SERPL-MCNC: 346 UG/DL
TRANSFERRIN SERPL-MCNC: 286 MG/DL
TSH SERPL-ACNC: 4.31 UIU/ML
UIBC SERPL-MCNC: 307 UG/DL
VIT B12 SERPL-MCNC: 482 PG/ML
WBC # FLD AUTO: 16.19 K/UL

## 2019-12-12 ENCOUNTER — TRANSCRIPTION ENCOUNTER (OUTPATIENT)
Age: 34
End: 2019-12-12

## 2019-12-12 ENCOUNTER — MEDICATION RENEWAL (OUTPATIENT)
Age: 34
End: 2019-12-12

## 2019-12-13 ENCOUNTER — APPOINTMENT (OUTPATIENT)
Dept: FAMILY MEDICINE | Facility: CLINIC | Age: 34
End: 2019-12-13
Payer: MEDICAID

## 2019-12-13 LAB
HGB A MFR BLD: 97.8 %
HGB A2 MFR BLD: 2.2 %
HGB FRACT BLD-IMP: NORMAL

## 2019-12-13 PROCEDURE — 96372 THER/PROPH/DIAG INJ SC/IM: CPT

## 2019-12-13 RX ORDER — CYANOCOBALAMIN 1000 UG/ML
1000 INJECTION INTRAMUSCULAR; SUBCUTANEOUS
Qty: 0 | Refills: 0 | Status: COMPLETED | OUTPATIENT
Start: 2019-12-13

## 2019-12-18 ENCOUNTER — MED ADMIN CHARGE (OUTPATIENT)
Age: 34
End: 2019-12-18

## 2019-12-18 RX ORDER — CYANOCOBALAMIN 1000 UG/ML
1000 INJECTION INTRAMUSCULAR; SUBCUTANEOUS
Qty: 0 | Refills: 0 | Status: COMPLETED | OUTPATIENT
Start: 2019-12-17

## 2019-12-20 ENCOUNTER — APPOINTMENT (OUTPATIENT)
Dept: FAMILY MEDICINE | Facility: CLINIC | Age: 34
End: 2019-12-20
Payer: MEDICAID

## 2019-12-20 ENCOUNTER — OTHER (OUTPATIENT)
Age: 34
End: 2019-12-20

## 2019-12-20 PROCEDURE — 96372 THER/PROPH/DIAG INJ SC/IM: CPT

## 2019-12-20 RX ORDER — CYANOCOBALAMIN 1000 UG/ML
1000 INJECTION INTRAMUSCULAR; SUBCUTANEOUS
Qty: 0 | Refills: 0 | Status: COMPLETED | OUTPATIENT
Start: 2019-12-20

## 2019-12-27 ENCOUNTER — TRANSCRIPTION ENCOUNTER (OUTPATIENT)
Age: 34
End: 2019-12-27

## 2019-12-30 ENCOUNTER — TRANSCRIPTION ENCOUNTER (OUTPATIENT)
Age: 34
End: 2019-12-30

## 2020-01-09 ENCOUNTER — RX RENEWAL (OUTPATIENT)
Age: 35
End: 2020-01-09

## 2020-01-13 ENCOUNTER — TRANSCRIPTION ENCOUNTER (OUTPATIENT)
Age: 35
End: 2020-01-13

## 2020-01-13 DIAGNOSIS — M47.816 SPONDYLOSIS W/OUT MYELOPATHY OR RADICULOPATHY, LUMBAR REGION: ICD-10-CM

## 2020-01-14 ENCOUNTER — APPOINTMENT (OUTPATIENT)
Dept: ORTHOPEDIC SURGERY | Facility: CLINIC | Age: 35
End: 2020-01-14
Payer: MEDICAID

## 2020-01-14 VITALS
SYSTOLIC BLOOD PRESSURE: 126 MMHG | HEART RATE: 115 BPM | BODY MASS INDEX: 26.82 KG/M2 | DIASTOLIC BLOOD PRESSURE: 76 MMHG | HEIGHT: 72 IN | WEIGHT: 198 LBS

## 2020-01-14 DIAGNOSIS — Z98.1 ARTHRODESIS STATUS: ICD-10-CM

## 2020-01-14 DIAGNOSIS — G89.4 CHRONIC PAIN SYNDROME: ICD-10-CM

## 2020-01-14 DIAGNOSIS — M41.9 SCOLIOSIS, UNSPECIFIED: ICD-10-CM

## 2020-01-14 DIAGNOSIS — Z87.39 PERSONAL HISTORY OF OTHER DISEASES OF THE MUSCULOSKELETAL SYSTEM AND CONNECTIVE TISSUE: ICD-10-CM

## 2020-01-14 DIAGNOSIS — Z87.09 PERSONAL HISTORY OF OTHER DISEASES OF THE RESPIRATORY SYSTEM: ICD-10-CM

## 2020-01-14 DIAGNOSIS — R26.89 OTHER ABNORMALITIES OF GAIT AND MOBILITY: ICD-10-CM

## 2020-01-14 DIAGNOSIS — M41.129 ADOLESCENT IDIOPATHIC SCOLIOSIS, SITE UNSPECIFIED: ICD-10-CM

## 2020-01-14 DIAGNOSIS — Z86.59 PERSONAL HISTORY OF OTHER MENTAL AND BEHAVIORAL DISORDERS: ICD-10-CM

## 2020-01-14 DIAGNOSIS — M47.812 SPONDYLOSIS W/OUT MYELOPATHY OR RADICULOPATHY, CERVICAL REGION: ICD-10-CM

## 2020-01-14 PROCEDURE — 99204 OFFICE O/P NEW MOD 45 MIN: CPT

## 2020-01-14 PROCEDURE — 72040 X-RAY EXAM NECK SPINE 2-3 VW: CPT

## 2020-01-14 PROCEDURE — 72080 X-RAY EXAM THORACOLMB 2/> VW: CPT | Mod: 59

## 2020-01-14 PROCEDURE — 72100 X-RAY EXAM L-S SPINE 2/3 VWS: CPT

## 2020-01-14 NOTE — ADDENDUM
[FreeTextEntry1] : Documented by Verena Quiroz acting as a scribe for Dr. Navneet Chamberlain. 01/14/2020\par \par All medical record entries made by the Scribe were at my, Dr. Navneet Chamberlain, direction and personally dictated by me on 01/14/2020. I have reviewed the chart and agree that the record accurately reflects my personal performance of the history, physical exam, assessment and plan. I have also personally directed, reviewed, and agreed with the chart.
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
## 2020-01-14 NOTE — DISCUSSION/SUMMARY
[de-identified] : I have recommended that the pt continue with a conservative treatment plan. Pt has been referred to physical therapy for decreased pain modalities, core strengthening modalities and physical modalities. A cervical MRI has been ordered secondary to persistent pain and is medically necessary to determine a further treatment plan which will be injection therapy versus spinal surgery. The pt will follow up once MRI has been obtained.\par  If MRI shows non operative indication, pt will be referred to physiatry. Also consider referral to neurologist if cervical MRI is not concordant with her balance issue.\par \par Complex pt based upon hx of asthma, diabetes, chronic anticoagulation use, heart disease, HLD, Marfan Syndrome. Patient with multiple medical comorbidity increasing the risk of perioperative and postoperative complications as well as diminished spine outcomes as per the current medical literature. These include but are not limited to obesity, anxiety/depression, cardiac illness, kidney disease, peripheral vascular disease, history of cancer, COPD, dysmetabolic syndrome including but not limited to diabetes, hyperlipidemia, hypertension. Patient is being referred back to primary care provider for medical optimization, as well as other appropriate specialists as needed for optimization prior to spine surgery.

## 2020-01-14 NOTE — REVIEW OF SYSTEMS
[Joint Stiffness] : joint stiffness [Joint Pain] : joint pain [Cough] : cough [Wheezing] : wheezing [Constipation] : constipation [Heartburn] : heartburn [Abnormal Vaginal Bleeding] : abnormal vaginal bleeding [Headache] : headache [Anxiety] : anxiety [Dizziness] : dizziness [Feeling Weak] : feeling weak [Depression] : depression [Easy Bleeding] : a tendency for easy bleeding [Muscle Weakness] : muscle weakness [Easy Bruising] : a tendency for easy bruising

## 2020-01-14 NOTE — PHYSICAL EXAM
[de-identified] : CONSTITUTIONAL: Patient is a very pleasant individual who is well-nourished and appears stated age.\par PSYCHIATRIC: Alert and oriented times three and in no apparent distress, and participates with orthopedic evaluation well.\par HEAD: Atraumatic and nonsyndromic in appearance.\par EENT: No thyromegaly, EOMI.\par RESPIRATORY: Respiratory rate is regular, not dyspneic on examination.\par LYMPHATICS: There is no cervical or axillary lymphadenopathy.\par INTEGUMENTARY: Skin is clean, dry, and intact about the bilateral upper and lower extremities and cervical and lumbar spine. Well helaed incision site from the base of the neck to lower lumbar area. \par VASCULAR: There is brisk capillary refill about the bilateral upper and lower extremities and radial pulses are 2/4.\par NEUROLOGIC: Negative L'hirmitte, negative Spurling’s sign. There are is hyperreflexia on the RLE. There is no decrease in sensation of the bilateral upper and lower extremities on Wartenberg pinwheel/manual examination. Deep tendon reflexes are well-maintained at +2/4 of the bilateral upper and lower extremities and are symmetric.\par MUSCULOSKELETAL: There is no visible muscular atrophy. Manual motor strength is well maintained in the bilateral upper and lower extremities. Cervical and lumbar range of motion is well maintained. Normal secondary orthopaedic exam of bilateral shoulders, elbows and hands. Elbow flexion and extension, wrist extension, finger flexion and abduction are well maintained. Negative tension sign and straight leg raise bilaterally. Pt ambulates in a non-myelopathic manner. Quad extension, ankle dorsiflexion, EHL, plantar flexion, and ankle eversion are well preserved. Normal secondary orthopaedic exam of bilateral hips, greater trochanteric area, knees and ankles.\par Pain with cervical extension on the right.  [de-identified] : Xray of the cervical spine taken today shows cervical spondylosis notably at C4-C5 and C5-C6\par \par Xray of the lumbar spine taken today shows s/p scoliosis surgery with L3-L4 lateral fusion as well as posterior fusion.

## 2020-01-20 ENCOUNTER — APPOINTMENT (OUTPATIENT)
Dept: FAMILY MEDICINE | Facility: CLINIC | Age: 35
End: 2020-01-20
Payer: MEDICAID

## 2020-01-20 VITALS
WEIGHT: 193 LBS | BODY MASS INDEX: 26.14 KG/M2 | HEART RATE: 107 BPM | SYSTOLIC BLOOD PRESSURE: 100 MMHG | DIASTOLIC BLOOD PRESSURE: 80 MMHG | HEIGHT: 72 IN | OXYGEN SATURATION: 99 %

## 2020-01-20 DIAGNOSIS — L71.0 PERIORAL DERMATITIS: ICD-10-CM

## 2020-01-20 PROCEDURE — 99214 OFFICE O/P EST MOD 30 MIN: CPT

## 2020-01-20 NOTE — PHYSICAL EXAM
[No Acute Distress] : no acute distress [Well Nourished] : well nourished [Well Developed] : well developed [Normal Outer Ear/Nose] : the outer ears and nose were normal in appearance [EOMI] : extraocular movements intact [No Accessory Muscle Use] : no accessory muscle use [No Respiratory Distress] : no respiratory distress  [No JVD] : no jugular venous distention [Clear to Auscultation] : lungs were clear to auscultation bilaterally [Regular Rhythm] : with a regular rhythm [Normal S1, S2] : normal S1 and S2 [No Edema] : there was no peripheral edema [Grossly Normal Strength/Tone] : grossly normal strength/tone [Coordination Grossly Intact] : coordination grossly intact [No CVA Tenderness] : no CVA  tenderness [Normal Affect] : the affect was normal [Normal Gait] : normal gait [No Focal Deficits] : no focal deficits [Normal Insight/Judgement] : insight and judgment were intact [Normal Mood] : the mood was normal [de-identified] : +ve hyperpigmented scaly perioral macular rash  [de-identified] : +murmur

## 2020-01-20 NOTE — HISTORY OF PRESENT ILLNESS
[FreeTextEntry8] : Pt here due to rash around mouth/under neath chin x2weeks \par \par as above,   denies dysphagia, dyspnea, lingual/labial edema.   +ve pruritis \par \par pt also reports increased Home INR reading of 4.4   2 days ago,  Reports increased dietary intake of Vit K since \par
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
## 2020-01-24 ENCOUNTER — TRANSCRIPTION ENCOUNTER (OUTPATIENT)
Age: 35
End: 2020-01-24

## 2020-01-28 ENCOUNTER — TRANSCRIPTION ENCOUNTER (OUTPATIENT)
Age: 35
End: 2020-01-28

## 2020-02-05 ENCOUNTER — RESULT CHARGE (OUTPATIENT)
Age: 35
End: 2020-02-05

## 2020-02-05 ENCOUNTER — APPOINTMENT (OUTPATIENT)
Dept: FAMILY MEDICINE | Facility: CLINIC | Age: 35
End: 2020-02-05
Payer: MEDICAID

## 2020-02-05 VITALS
OXYGEN SATURATION: 97 % | HEIGHT: 72 IN | WEIGHT: 193 LBS | BODY MASS INDEX: 26.14 KG/M2 | SYSTOLIC BLOOD PRESSURE: 108 MMHG | HEART RATE: 97 BPM | DIASTOLIC BLOOD PRESSURE: 60 MMHG

## 2020-02-05 DIAGNOSIS — R42 DIZZINESS AND GIDDINESS: ICD-10-CM

## 2020-02-05 PROCEDURE — 99214 OFFICE O/P EST MOD 30 MIN: CPT | Mod: 25

## 2020-02-05 PROCEDURE — 85610 PROTHROMBIN TIME: CPT | Mod: QW

## 2020-02-05 PROCEDURE — 36415 COLL VENOUS BLD VENIPUNCTURE: CPT

## 2020-02-05 NOTE — ASSESSMENT
[FreeTextEntry1] : see lab orders \par pending lab results  will likely start Fe supplementation c Vit C \par \par Trial of Meclizine 12.5mg q8h/hs prn and appropriate positional change education given   \par \par INR=2.0 \par increase Warfarin from 9mg qd to 12mg qd x 1 day then resume usual 9mg repeat INR 1 week

## 2020-02-05 NOTE — PHYSICAL EXAM
[No Acute Distress] : no acute distress [Well Nourished] : well nourished [Well Developed] : well developed [EOMI] : extraocular movements intact [Normal Outer Ear/Nose] : the outer ears and nose were normal in appearance [No JVD] : no jugular venous distention [No Respiratory Distress] : no respiratory distress  [No Accessory Muscle Use] : no accessory muscle use [Clear to Auscultation] : lungs were clear to auscultation bilaterally [Normal S1, S2] : normal S1 and S2 [Regular Rhythm] : with a regular rhythm [No Edema] : there was no peripheral edema [Grossly Normal Strength/Tone] : grossly normal strength/tone [No CVA Tenderness] : no CVA  tenderness [Coordination Grossly Intact] : coordination grossly intact [No Focal Deficits] : no focal deficits [Normal Mood] : the mood was normal [Normal Gait] : normal gait [Normal Affect] : the affect was normal [de-identified] : +murmur  [Normal Insight/Judgement] : insight and judgment were intact [de-identified] : +ve hyperpigmented scaly perioral macular rash

## 2020-02-06 ENCOUNTER — TRANSCRIPTION ENCOUNTER (OUTPATIENT)
Age: 35
End: 2020-02-06

## 2020-02-06 LAB
ALBUMIN SERPL ELPH-MCNC: 4.2 G/DL
ALP BLD-CCNC: 93 U/L
ALT SERPL-CCNC: 18 U/L
ANION GAP SERPL CALC-SCNC: 18 MMOL/L
AST SERPL-CCNC: 39 U/L
BASOPHILS # BLD AUTO: 0.07 K/UL
BASOPHILS NFR BLD AUTO: 0.5 %
BILIRUB SERPL-MCNC: 0.3 MG/DL
BUN SERPL-MCNC: 15 MG/DL
CALCIUM SERPL-MCNC: 9.6 MG/DL
CHLORIDE SERPL-SCNC: 105 MMOL/L
CO2 SERPL-SCNC: 16 MMOL/L
CREAT SERPL-MCNC: 0.69 MG/DL
EOSINOPHIL # BLD AUTO: 0.21 K/UL
EOSINOPHIL NFR BLD AUTO: 1.4 %
FERRITIN SERPL-MCNC: 44 NG/ML
FOLATE SERPL-MCNC: >20 NG/ML
GLUCOSE SERPL-MCNC: 167 MG/DL
HCT VFR BLD CALC: 38 %
HGB BLD-MCNC: 11.6 G/DL
IMM GRANULOCYTES NFR BLD AUTO: 0.9 %
IRON SATN MFR SERPL: 11 %
IRON SERPL-MCNC: 42 UG/DL
LYMPHOCYTES # BLD AUTO: 2.32 K/UL
LYMPHOCYTES NFR BLD AUTO: 15.5 %
MAN DIFF?: NORMAL
MCHC RBC-ENTMCNC: 25.7 PG
MCHC RBC-ENTMCNC: 30.5 GM/DL
MCV RBC AUTO: 84.1 FL
MONOCYTES # BLD AUTO: 0.73 K/UL
MONOCYTES NFR BLD AUTO: 4.9 %
NEUTROPHILS # BLD AUTO: 11.48 K/UL
NEUTROPHILS NFR BLD AUTO: 76.8 %
PLATELET # BLD AUTO: 370 K/UL
POTASSIUM SERPL-SCNC: 4.6 MMOL/L
PROT SERPL-MCNC: 7 G/DL
RBC # BLD: 4.52 M/UL
RBC # FLD: 17.2 %
SODIUM SERPL-SCNC: 139 MMOL/L
TIBC SERPL-MCNC: 371 UG/DL
TRANSFERRIN SERPL-MCNC: 278 MG/DL
UIBC SERPL-MCNC: 329 UG/DL
VIT B12 SERPL-MCNC: 374 PG/ML
WBC # FLD AUTO: 14.94 K/UL

## 2020-02-07 ENCOUNTER — MED ADMIN CHARGE (OUTPATIENT)
Age: 35
End: 2020-02-07

## 2020-02-07 RX ORDER — CYANOCOBALAMIN 1000 UG/ML
1000 INJECTION INTRAMUSCULAR; SUBCUTANEOUS
Qty: 0 | Refills: 0 | Status: COMPLETED | OUTPATIENT
Start: 2020-02-05

## 2020-02-10 ENCOUNTER — TRANSCRIPTION ENCOUNTER (OUTPATIENT)
Age: 35
End: 2020-02-10

## 2020-02-17 ENCOUNTER — MED ADMIN CHARGE (OUTPATIENT)
Age: 35
End: 2020-02-17

## 2020-02-17 ENCOUNTER — APPOINTMENT (OUTPATIENT)
Dept: FAMILY MEDICINE | Facility: CLINIC | Age: 35
End: 2020-02-17
Payer: MEDICAID

## 2020-02-17 PROCEDURE — 96372 THER/PROPH/DIAG INJ SC/IM: CPT

## 2020-02-17 RX ADMIN — CYANOCOBALAMIN 0 MCG/ML: 1000 INJECTION, SOLUTION INTRAMUSCULAR; SUBCUTANEOUS at 00:00

## 2020-02-18 ENCOUNTER — RX RENEWAL (OUTPATIENT)
Age: 35
End: 2020-02-18

## 2020-02-18 RX ORDER — CYANOCOBALAMIN 1000 UG/ML
1000 INJECTION INTRAMUSCULAR; SUBCUTANEOUS
Qty: 0 | Refills: 0 | Status: COMPLETED | OUTPATIENT
Start: 2020-02-17

## 2020-02-19 ENCOUNTER — TRANSCRIPTION ENCOUNTER (OUTPATIENT)
Age: 35
End: 2020-02-19

## 2020-02-24 ENCOUNTER — TRANSCRIPTION ENCOUNTER (OUTPATIENT)
Age: 35
End: 2020-02-24

## 2020-02-25 ENCOUNTER — TRANSCRIPTION ENCOUNTER (OUTPATIENT)
Age: 35
End: 2020-02-25

## 2020-03-05 ENCOUNTER — MED ADMIN CHARGE (OUTPATIENT)
Age: 35
End: 2020-03-05

## 2020-03-05 ENCOUNTER — APPOINTMENT (OUTPATIENT)
Dept: FAMILY MEDICINE | Facility: CLINIC | Age: 35
End: 2020-03-05
Payer: MEDICAID

## 2020-03-05 PROCEDURE — 96372 THER/PROPH/DIAG INJ SC/IM: CPT

## 2020-03-05 RX ORDER — CYANOCOBALAMIN 1000 UG/ML
1000 INJECTION INTRAMUSCULAR; SUBCUTANEOUS
Qty: 0 | Refills: 0 | Status: COMPLETED | OUTPATIENT
Start: 2020-03-04

## 2020-03-13 ENCOUNTER — TRANSCRIPTION ENCOUNTER (OUTPATIENT)
Age: 35
End: 2020-03-13

## 2020-03-13 ENCOUNTER — RX RENEWAL (OUTPATIENT)
Age: 35
End: 2020-03-13

## 2020-03-18 ENCOUNTER — TRANSCRIPTION ENCOUNTER (OUTPATIENT)
Age: 35
End: 2020-03-18

## 2020-03-19 ENCOUNTER — APPOINTMENT (OUTPATIENT)
Dept: FAMILY MEDICINE | Facility: CLINIC | Age: 35
End: 2020-03-19

## 2020-03-20 ENCOUNTER — TRANSCRIPTION ENCOUNTER (OUTPATIENT)
Age: 35
End: 2020-03-20

## 2020-03-24 ENCOUNTER — TRANSCRIPTION ENCOUNTER (OUTPATIENT)
Age: 35
End: 2020-03-24

## 2020-03-26 ENCOUNTER — TRANSCRIPTION ENCOUNTER (OUTPATIENT)
Age: 35
End: 2020-03-26

## 2020-03-30 ENCOUNTER — RX RENEWAL (OUTPATIENT)
Age: 35
End: 2020-03-30

## 2020-03-31 ENCOUNTER — TRANSCRIPTION ENCOUNTER (OUTPATIENT)
Age: 35
End: 2020-03-31

## 2020-04-08 ENCOUNTER — TRANSCRIPTION ENCOUNTER (OUTPATIENT)
Age: 35
End: 2020-04-08

## 2020-04-13 ENCOUNTER — RX RENEWAL (OUTPATIENT)
Age: 35
End: 2020-04-13

## 2020-04-30 ENCOUNTER — INPATIENT (INPATIENT)
Facility: HOSPITAL | Age: 35
LOS: 7 days | Discharge: HOME | DRG: 299 | End: 2020-05-08
Attending: GENERAL ACUTE CARE HOSPITAL | Admitting: INTERNAL MEDICINE
Payer: MEDICAID

## 2020-04-30 VITALS
SYSTOLIC BLOOD PRESSURE: 120 MMHG | OXYGEN SATURATION: 100 % | DIASTOLIC BLOOD PRESSURE: 79 MMHG | TEMPERATURE: 99 F | HEART RATE: 105 BPM | RESPIRATION RATE: 18 BRPM

## 2020-04-30 DIAGNOSIS — Z95.4 PRESENCE OF OTHER HEART-VALVE REPLACEMENT: Chronic | ICD-10-CM

## 2020-04-30 DIAGNOSIS — I77.71 DISSECTION OF CAROTID ARTERY: ICD-10-CM

## 2020-04-30 DIAGNOSIS — Z96.1 PRESENCE OF INTRAOCULAR LENS: Chronic | ICD-10-CM

## 2020-04-30 DIAGNOSIS — Z98.1 ARTHRODESIS STATUS: Chronic | ICD-10-CM

## 2020-04-30 LAB
ALBUMIN SERPL ELPH-MCNC: 4 G/DL — SIGNIFICANT CHANGE UP (ref 3.3–5.2)
ALP SERPL-CCNC: 101 U/L — SIGNIFICANT CHANGE UP (ref 40–120)
ALT FLD-CCNC: 18 U/L — SIGNIFICANT CHANGE UP
ANION GAP SERPL CALC-SCNC: 16 MMOL/L — SIGNIFICANT CHANGE UP (ref 5–17)
APTT BLD: 54.7 SEC — HIGH (ref 27.5–36.3)
AST SERPL-CCNC: 29 U/L — SIGNIFICANT CHANGE UP
BASOPHILS # BLD AUTO: 0.06 K/UL — SIGNIFICANT CHANGE UP (ref 0–0.2)
BASOPHILS NFR BLD AUTO: 0.3 % — SIGNIFICANT CHANGE UP (ref 0–2)
BILIRUB SERPL-MCNC: 0.3 MG/DL — LOW (ref 0.4–2)
BUN SERPL-MCNC: 11 MG/DL — SIGNIFICANT CHANGE UP (ref 8–20)
CALCIUM SERPL-MCNC: 9.6 MG/DL — SIGNIFICANT CHANGE UP (ref 8.6–10.2)
CHLORIDE SERPL-SCNC: 102 MMOL/L — SIGNIFICANT CHANGE UP (ref 98–107)
CO2 SERPL-SCNC: 22 MMOL/L — SIGNIFICANT CHANGE UP (ref 22–29)
CREAT SERPL-MCNC: 0.67 MG/DL — SIGNIFICANT CHANGE UP (ref 0.5–1.3)
EOSINOPHIL # BLD AUTO: 0.22 K/UL — SIGNIFICANT CHANGE UP (ref 0–0.5)
EOSINOPHIL NFR BLD AUTO: 1.1 % — SIGNIFICANT CHANGE UP (ref 0–6)
ETHANOL SERPL-MCNC: <10 MG/DL — SIGNIFICANT CHANGE UP
GLUCOSE SERPL-MCNC: 136 MG/DL — HIGH (ref 70–99)
HCT VFR BLD CALC: 40.4 % — SIGNIFICANT CHANGE UP (ref 34.5–45)
HGB BLD-MCNC: 12.4 G/DL — SIGNIFICANT CHANGE UP (ref 11.5–15.5)
IMM GRANULOCYTES NFR BLD AUTO: 0.9 % — SIGNIFICANT CHANGE UP (ref 0–1.5)
INR BLD: 1.72 RATIO — HIGH (ref 0.88–1.16)
LYMPHOCYTES # BLD AUTO: 13.2 % — SIGNIFICANT CHANGE UP (ref 13–44)
LYMPHOCYTES # BLD AUTO: 2.62 K/UL — SIGNIFICANT CHANGE UP (ref 1–3.3)
MCHC RBC-ENTMCNC: 24.7 PG — LOW (ref 27–34)
MCHC RBC-ENTMCNC: 30.7 GM/DL — LOW (ref 32–36)
MCV RBC AUTO: 80.5 FL — SIGNIFICANT CHANGE UP (ref 80–100)
MONOCYTES # BLD AUTO: 1.18 K/UL — HIGH (ref 0–0.9)
MONOCYTES NFR BLD AUTO: 6 % — SIGNIFICANT CHANGE UP (ref 2–14)
NEUTROPHILS # BLD AUTO: 15.55 K/UL — HIGH (ref 1.8–7.4)
NEUTROPHILS NFR BLD AUTO: 78.5 % — HIGH (ref 43–77)
PLATELET # BLD AUTO: 349 K/UL — SIGNIFICANT CHANGE UP (ref 150–400)
POTASSIUM SERPL-MCNC: 4.2 MMOL/L — SIGNIFICANT CHANGE UP (ref 3.5–5.3)
POTASSIUM SERPL-SCNC: 4.2 MMOL/L — SIGNIFICANT CHANGE UP (ref 3.5–5.3)
PROT SERPL-MCNC: 7.7 G/DL — SIGNIFICANT CHANGE UP (ref 6.6–8.7)
PROTHROM AB SERPL-ACNC: 19.8 SEC — HIGH (ref 10–12.9)
RBC # BLD: 5.02 M/UL — SIGNIFICANT CHANGE UP (ref 3.8–5.2)
RBC # FLD: 17.7 % — HIGH (ref 10.3–14.5)
SODIUM SERPL-SCNC: 140 MMOL/L — SIGNIFICANT CHANGE UP (ref 135–145)
TROPONIN T SERPL-MCNC: <0.01 NG/ML — SIGNIFICANT CHANGE UP (ref 0–0.06)
WBC # BLD: 19.81 K/UL — HIGH (ref 3.8–10.5)
WBC # FLD AUTO: 19.81 K/UL — HIGH (ref 3.8–10.5)

## 2020-04-30 PROCEDURE — 71275 CT ANGIOGRAPHY CHEST: CPT | Mod: 26

## 2020-04-30 PROCEDURE — 70498 CT ANGIOGRAPHY NECK: CPT | Mod: 26

## 2020-04-30 PROCEDURE — 71045 X-RAY EXAM CHEST 1 VIEW: CPT | Mod: 26

## 2020-04-30 PROCEDURE — 99291 CRITICAL CARE FIRST HOUR: CPT

## 2020-04-30 PROCEDURE — 0042T: CPT

## 2020-04-30 PROCEDURE — 70496 CT ANGIOGRAPHY HEAD: CPT | Mod: 26

## 2020-04-30 RX ORDER — HEPARIN SODIUM 5000 [USP'U]/ML
7000 INJECTION INTRAVENOUS; SUBCUTANEOUS EVERY 6 HOURS
Refills: 0 | Status: DISCONTINUED | OUTPATIENT
Start: 2020-04-30 | End: 2020-05-08

## 2020-04-30 RX ORDER — HEPARIN SODIUM 5000 [USP'U]/ML
7000 INJECTION INTRAVENOUS; SUBCUTANEOUS ONCE
Refills: 0 | Status: COMPLETED | OUTPATIENT
Start: 2020-04-30 | End: 2020-04-30

## 2020-04-30 RX ORDER — HEPARIN SODIUM 5000 [USP'U]/ML
INJECTION INTRAVENOUS; SUBCUTANEOUS
Qty: 25000 | Refills: 0 | Status: DISCONTINUED | OUTPATIENT
Start: 2020-04-30 | End: 2020-05-01

## 2020-04-30 RX ORDER — ASPIRIN/CALCIUM CARB/MAGNESIUM 324 MG
162 TABLET ORAL ONCE
Refills: 0 | Status: COMPLETED | OUTPATIENT
Start: 2020-04-30 | End: 2020-04-30

## 2020-04-30 RX ORDER — HEPARIN SODIUM 5000 [USP'U]/ML
3500 INJECTION INTRAVENOUS; SUBCUTANEOUS EVERY 6 HOURS
Refills: 0 | Status: DISCONTINUED | OUTPATIENT
Start: 2020-04-30 | End: 2020-05-08

## 2020-04-30 RX ADMIN — Medication 162 MILLIGRAM(S): at 22:36

## 2020-04-30 RX ADMIN — HEPARIN SODIUM 1600 UNIT(S)/HR: 5000 INJECTION INTRAVENOUS; SUBCUTANEOUS at 22:33

## 2020-04-30 RX ADMIN — HEPARIN SODIUM 5000 UNIT(S): 5000 INJECTION INTRAVENOUS; SUBCUTANEOUS at 22:32

## 2020-04-30 NOTE — CHART NOTE - NSCHARTNOTEFT_GEN_A_CORE
Code stroke called by Dr Faulkner  Discussed with Dr Faulkner  34 yo woman with h/o Marfan's syndrome  had a few minutes of confusion at about 1900 today.  Completely resolved  has NIHSS=0  Did not recommend alteplase due to mild, non disabling symptoms which have completely resolved.    Please call on call neurology group for consult in morning if needed.    Thank you for allowing me to participate in the care of your patient    Yao De La Torre MD, PhD   [id number]

## 2020-04-30 NOTE — ED ADULT NURSE NOTE - OBJECTIVE STATEMENT
pt presents to the ed a&ox4 c/o confusion and unable to read and recognize words. Pts symptoms resolved PTA, no s/s of current acute distress. No s/s of weakness or facial droop. Seen and eval by MD Faulkner, pt to CT scan.
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
## 2020-04-30 NOTE — ED PROVIDER NOTE - PROGRESS NOTE DETAILS
Given the significant and immediate threats to this patient based on initial presentation, the benefits of emergency contrast-enhanced CT imaging without obtaining GFR/creatinine serum level results greatly outweigh the potential risk of harm due to contrast-induced nephropathy.

## 2020-04-30 NOTE — CONSULT NOTE ADULT - ASSESSMENT
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 Morin

## 2020-04-30 NOTE — ED ADULT TRIAGE NOTE - CHIEF COMPLAINT QUOTE
Pt. complaining of sudden onset confusion that began approx 1 hour ago and has resolved since arrival to ED.  Pt. states "I was in the kitchen washing dishes and I couldn't remember what I was doing and why and then I was texting and couldn't make sense of it."  MD Faulkner called to bedside and code stroke called on pt.

## 2020-04-30 NOTE — CONSULT NOTE ADULT - SUBJECTIVE AND OBJECTIVE BOX
Vascular Surgery Consult Note    Patient is a 35y old  Female who presents with a chief complaint of dizziness and dysarthria     HPI: 35 year old female with PMH significant for Marfan syndrome, Mitral and Aortic valve replacement presented to [facility] ED after experiencing dizziness, confusion and dysarthria around 7PM this evening. Patient states that she felt dizzy and confused while washing dishes while at home. She subsequently had difficulty reading and writing text messages and experienced dysarthria when talking on the phone with her friend who called the ambulance. Patient checked her blood glucose which was 124. Once in the ED,      Patient denies fevers/chills, denies lightheadedness/dizziness, denies SOB/chest pain, denies nausea/vomiting, denies constipation/diarrhea.  ***    ROS: 10-system review is otherwise negative except HPI above.      PAST MEDICAL & SURGICAL HISTORY:  S/P CABG (coronary artery bypass graft)  Mitral valve prolapse  Dilated aortic root  Diabetes  Anxiety  Depression  Fibromyalgia  Marfan syndrome  H/O artificial lens replacement  H/O spinal fusion: 2005  H/O aortic valve replacement: 2013    FAMILY HISTORY:  No pertinent family history in first degree relatives    [] Family history not pertinent as reviewed with the patient and family    SOCIAL HISTORY:  ***    ALLERGIES: No Known Allergies      HOME MEDICATIONS: ***    CURRENT MEDICATIONS  MEDICATIONS (STANDING):   MEDICATIONS (PRN):  --------------------------------------------------------------------------------------------    Vitals:   T(C): 37.1 (04-30-20 @ 20:25), Max: 37.1 (04-30-20 @ 20:18)  HR: 105 (04-30-20 @ 20:25) (105 - 105)  BP: 120/79 (04-30-20 @ 20:25) (120/79 - 120/79)  RR: 18 (04-30-20 @ 20:25) (18 - 18)  SpO2: 100% (04-30-20 @ 20:25) (100% - 100%)  CAPILLARY BLOOD GLUCOSE  153 (30 Apr 2020 20:57)      POCT Blood Glucose.: 153 mg/dL (30 Apr 2020 20:22)    CAPILLARY BLOOD GLUCOSE  153 (30 Apr 2020 20:57)      POCT Blood Glucose.: 153 mg/dL (30 Apr 2020 20:22)      Height (cm): 172.72 (04-30 @ 20:21)  Weight (kg): 87.3 (04-30 @ 20:30)  BMI (kg/m2): 29.3 (04-30 @ 20:30)  BSA (m2): 2.01 (04-30 @ 20:30)    PHYSICAL EXAM: ***  General: NAD, Lying in bed comfortably  Neuro: A+Ox3  HEENT: NC/AT, EOMI  Neck: Soft, supple  Cardio: RRR, nml S1/S2  Resp: Good effort, CTA b/l  Thorax: No chest wall tenderness  Breast: No lesions/masses, no drainage  GI/Abd: Soft, NT/ND, no rebound/guarding, no masses palpated  Vascular: All 4 extremities warm.  Skin: Intact, no breakdown  Lymphatic/Nodes: No palpable lymphadenopathy  Musculoskeletal: All 4 extremities moving spontaneously, no limitations  --------------------------------------------------------------------------------------------    LABS  CBC (04-30 @ 20:45)                              12.4                           19.81<H>  )----------------(  349        78.5<H>% Neutrophils, 13.2  % Lymphocytes, ANC: 15.55<H>                              40.4      BMP (04-30 @ 20:45)             140     |  102     |  11.0  		Ca++ --      Ca 9.6                ---------------------------------( 136<H>		Mg --                 4.2     |  22.0    |  0.67  			Ph --        LFTs (04-30 @ 20:45)      TPro 7.7 / Alb 4.0 / TBili 0.3<L> / DBili -- / AST 29 / ALT 18 / AlkPhos 101    Coags (04-30 @ 20:45)  aPTT 54.7<H> / INR 1.72<H> / PT 19.8<H>          --------------------------------------------------------------------------------------------    MICROBIOLOGY      --------------------------------------------------------------------------------------------    IMAGING  ***    ASSESSMENT: Patient is a 35y old f with ****    PLAN:  ***  -   -   -   -   - Patient seen/examined with attending.  - Plan to be discussed with Attending,  Vascular Surgery Consult Note    Patient is a 35y old  Female who presents with a chief complaint of dizziness and dysarthria     HPI: 35 year old female with PMH significant for Marfan syndrome, Mitral and Aortic valve replacement presented to [facility] ED after experiencing dizziness, confusion and dysarthria around 7PM this evening. Patient states that she felt dizzy and confused while washing dishes while at home. She subsequently had difficulty reading and writing text messages and experienced dysarthria when talking on the phone with her friend who called the ambulance. Patient checked her blood glucose which was 124. 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 Vascular Surgery immediately consulted.     Patient states that she had missed several days of her Metoprolol and Warfarin but states having taken her medications today. Patient also not currently taking ASA which is part of her home regimen. When patient evaluated at bedside, currently complaining of right-sided neck pain and headache.     Patient denies fevers/chills, denies, denies SOB/chest pain, denies nausea/vomiting, denies constipation/diarrhea.     ROS: 10-system review is otherwise negative except HPI above.      PAST MEDICAL & SURGICAL HISTORY:  S/P CABG (coronary artery bypass graft)  Mitral valve prolapse  Dilated aortic root  Diabetes  Anxiety  Depression  Fibromyalgia  Marfan syndrome  H/O artificial lens replacement  H/O spinal fusion: 2005  H/O aortic valve replacement: 2013    FAMILY HISTORY:  No pertinent family history in first degree relatives    [] Family history not pertinent as reviewed with the patient and family    SOCIAL HISTORY:  denies Tobacco use, occasional EtOH use, denies illicit drug use     ALLERGIES: No Known Allergies      CURRENT MEDICATIONS  MEDICATIONS (STANDING):   MEDICATIONS (PRN):  --------------------------------------------------------------------------------------------    Vitals:   T(C): 37.1 (04-30-20 @ 20:25), Max: 37.1 (04-30-20 @ 20:18)  HR: 105 (04-30-20 @ 20:25) (105 - 105)  BP: 120/79 (04-30-20 @ 20:25) (120/79 - 120/79)  RR: 18 (04-30-20 @ 20:25) (18 - 18)  SpO2: 100% (04-30-20 @ 20:25) (100% - 100%)  CAPILLARY BLOOD GLUCOSE  153 (30 Apr 2020 20:57)      POCT Blood Glucose.: 153 mg/dL (30 Apr 2020 20:22)    CAPILLARY BLOOD GLUCOSE  153 (30 Apr 2020 20:57)      POCT Blood Glucose.: 153 mg/dL (30 Apr 2020 20:22)      Height (cm): 172.72 (04-30 @ 20:21)  Weight (kg): 87.3 (04-30 @ 20:30)  BMI (kg/m2): 29.3 (04-30 @ 20:30)  BSA (m2): 2.01 (04-30 @ 20:30)    PHYSICAL EXAM:   General: NAD, Lying in bed comfortably  Neuro: A+Ox3, no dysarthria noted   HEENT: NC/AT, EOMI  Neck: Soft, supple, no bruits nor thrills appreciated upon auscultation of B/L carotid arteries   Cardio: RRR, nml S1/S2  Resp: Good effort, CTA b/l  Thorax: No chest wall tenderness  GI/Abd: Soft, NT/ND, no rebound/guarding, no masses palpated  Vascular: All 4 extremities warm.  Skin: Intact, no breakdown  Lymphatic/Nodes: No palpable lymphadenopathy  Musculoskeletal: All 4 extremities moving spontaneously, no limitations  --------------------------------------------------------------------------------------------    LABS  CBC (04-30 @ 20:45)                              12.4                           19.81<H>  )----------------(  349        78.5<H>% Neutrophils, 13.2  % Lymphocytes, ANC: 15.55<H>                              40.4      BMP (04-30 @ 20:45)             140     |  102     |  11.0  		Ca++ --      Ca 9.6                ---------------------------------( 136<H>		Mg --                 4.2     |  22.0    |  0.67  			Ph --        LFTs (04-30 @ 20:45)      TPro 7.7 / Alb 4.0 / TBili 0.3<L> / DBili -- / AST 29 / ALT 18 / AlkPhos 101    Coags (04-30 @ 20:45)  aPTT 54.7<H> / INR 1.72<H> / PT 19.8<H>          --------------------------------------------------------------------------------------------    MICROBIOLOGY      --------------------------------------------------------------------------------------------    IMAGING    < from: CT Angio Neck w/ IV Cont (04.30.20 @ 21:14) >  IMPRESSION:     CT angiography neck:  1.  Intimal flap in the right cervical ICA suggesting dissection. Slightly beaded appearance of the right ICA in the region of the dissection, raising the possibility of fibromuscular dysplasia.  2.  No hemodynamically significant stenosis of the bilateral cervical ICAs using NASCET criteria.  Patent vertebral arteries.     CT angiography brain: No large vessel occlusion. No evidence of aneurysm.    Brain CT without contrast:   No evidence of intracranial hemorrhage or mass effect.    CT perfusion:  No evidence of perfusion abnormality.    < end of copied text >

## 2020-04-30 NOTE — STROKE CODE NOTE - CANCEL STROKE NOTE REASON
pt NIHSS 0 not a TPA candidate stroke code called due to symptoms within 6 hours although on arrival resolved

## 2020-04-30 NOTE — ED ADULT NURSE REASSESSMENT NOTE - NS ED NURSE REASSESS COMMENT FT1
pt care assumed from Critical Care RN at 2120, charting as noted. Pt in no apparent distress at this time, airway patent breathing spontaneous and nonlabored. Pt A&Ox3 resting in stretcher. Pt denies any numbness, tingling, confusion, blurry vision or weakness at this time. pt c/o dizziness and confusion 1hr PTA but states it has resolved at this time. Pt states she was confused with doing dishes and could not properly form words to text message her friend. pt placed on cardiac monitor. pt awaiting CT results.

## 2020-05-01 DIAGNOSIS — F32.9 MAJOR DEPRESSIVE DISORDER, SINGLE EPISODE, UNSPECIFIED: ICD-10-CM

## 2020-05-01 DIAGNOSIS — I77.71 DISSECTION OF CAROTID ARTERY: ICD-10-CM

## 2020-05-01 LAB
APTT BLD: 143.4 SEC — CRITICAL HIGH (ref 27.5–36.3)
APTT BLD: >200 SEC — CRITICAL HIGH (ref 27.5–36.3)
APTT BLD: >200 SEC — CRITICAL HIGH (ref 27.5–36.3)
BLD GP AB SCN SERPL QL: SIGNIFICANT CHANGE UP
GLUCOSE BLDC GLUCOMTR-MCNC: 179 MG/DL — HIGH (ref 70–99)
GLUCOSE BLDC GLUCOMTR-MCNC: 193 MG/DL — HIGH (ref 70–99)
GLUCOSE BLDC GLUCOMTR-MCNC: 237 MG/DL — HIGH (ref 70–99)
GLUCOSE BLDC GLUCOMTR-MCNC: 244 MG/DL — HIGH (ref 70–99)
HCT VFR BLD CALC: 29.3 % — LOW (ref 34.5–45)
HCT VFR BLD CALC: 36.3 % — SIGNIFICANT CHANGE UP (ref 34.5–45)
HGB BLD-MCNC: 11.5 G/DL — SIGNIFICANT CHANGE UP (ref 11.5–15.5)
HGB BLD-MCNC: 9.1 G/DL — LOW (ref 11.5–15.5)
MCHC RBC-ENTMCNC: 25.1 PG — LOW (ref 27–34)
MCHC RBC-ENTMCNC: 25.3 PG — LOW (ref 27–34)
MCHC RBC-ENTMCNC: 31.1 GM/DL — LOW (ref 32–36)
MCHC RBC-ENTMCNC: 31.7 GM/DL — LOW (ref 32–36)
MCV RBC AUTO: 79.8 FL — LOW (ref 80–100)
MCV RBC AUTO: 80.9 FL — SIGNIFICANT CHANGE UP (ref 80–100)
PLATELET # BLD AUTO: 168 K/UL — SIGNIFICANT CHANGE UP (ref 150–400)
PLATELET # BLD AUTO: 288 K/UL — SIGNIFICANT CHANGE UP (ref 150–400)
RBC # BLD: 3.62 M/UL — LOW (ref 3.8–5.2)
RBC # BLD: 4.55 M/UL — SIGNIFICANT CHANGE UP (ref 3.8–5.2)
RBC # FLD: 17.6 % — HIGH (ref 10.3–14.5)
RBC # FLD: 17.8 % — HIGH (ref 10.3–14.5)
SARS-COV-2 RNA SPEC QL NAA+PROBE: SIGNIFICANT CHANGE UP
WBC # BLD: 14.06 K/UL — HIGH (ref 3.8–10.5)
WBC # BLD: 15.05 K/UL — HIGH (ref 3.8–10.5)
WBC # FLD AUTO: 14.06 K/UL — HIGH (ref 3.8–10.5)
WBC # FLD AUTO: 15.05 K/UL — HIGH (ref 3.8–10.5)

## 2020-05-01 PROCEDURE — 99233 SBSQ HOSP IP/OBS HIGH 50: CPT | Mod: GC

## 2020-05-01 PROCEDURE — 90792 PSYCH DIAG EVAL W/MED SRVCS: CPT

## 2020-05-01 PROCEDURE — 99231 SBSQ HOSP IP/OBS SF/LOW 25: CPT | Mod: GC

## 2020-05-01 PROCEDURE — 93306 TTE W/DOPPLER COMPLETE: CPT | Mod: 26

## 2020-05-01 PROCEDURE — 93010 ELECTROCARDIOGRAM REPORT: CPT

## 2020-05-01 PROCEDURE — 99223 1ST HOSP IP/OBS HIGH 75: CPT

## 2020-05-01 RX ORDER — VENLAFAXINE HCL 75 MG
60 CAPSULE, EXT RELEASE 24 HR ORAL
Refills: 0 | Status: DISCONTINUED | OUTPATIENT
Start: 2020-05-01 | End: 2020-05-01

## 2020-05-01 RX ORDER — CLONAZEPAM 1 MG
1 TABLET ORAL
Refills: 0 | Status: DISCONTINUED | OUTPATIENT
Start: 2020-05-01 | End: 2020-05-04

## 2020-05-01 RX ORDER — DULOXETINE HYDROCHLORIDE 30 MG/1
90 CAPSULE, DELAYED RELEASE ORAL
Refills: 0 | Status: DISCONTINUED | OUTPATIENT
Start: 2020-05-01 | End: 2020-05-08

## 2020-05-01 RX ORDER — ASPIRIN/CALCIUM CARB/MAGNESIUM 324 MG
81 TABLET ORAL DAILY
Refills: 0 | Status: DISCONTINUED | OUTPATIENT
Start: 2020-05-01 | End: 2020-05-08

## 2020-05-01 RX ORDER — WARFARIN SODIUM 2.5 MG/1
6 TABLET ORAL ONCE
Refills: 0 | Status: COMPLETED | OUTPATIENT
Start: 2020-05-01 | End: 2020-05-01

## 2020-05-01 RX ORDER — METOPROLOL TARTRATE 50 MG
25 TABLET ORAL
Refills: 0 | Status: DISCONTINUED | OUTPATIENT
Start: 2020-05-01 | End: 2020-05-03

## 2020-05-01 RX ORDER — OXYCODONE HYDROCHLORIDE 5 MG/1
10 TABLET ORAL EVERY 4 HOURS
Refills: 0 | Status: DISCONTINUED | OUTPATIENT
Start: 2020-05-01 | End: 2020-05-07

## 2020-05-01 RX ORDER — BUDESONIDE AND FORMOTEROL FUMARATE DIHYDRATE 160; 4.5 UG/1; UG/1
2 AEROSOL RESPIRATORY (INHALATION)
Refills: 0 | Status: DISCONTINUED | OUTPATIENT
Start: 2020-05-01 | End: 2020-05-08

## 2020-05-01 RX ORDER — CLONAZEPAM 1 MG
0.5 TABLET ORAL DAILY
Refills: 0 | Status: DISCONTINUED | OUTPATIENT
Start: 2020-05-01 | End: 2020-05-04

## 2020-05-01 RX ORDER — MONTELUKAST 4 MG/1
10 TABLET, CHEWABLE ORAL DAILY
Refills: 0 | Status: DISCONTINUED | OUTPATIENT
Start: 2020-05-01 | End: 2020-05-08

## 2020-05-01 RX ORDER — HEPARIN SODIUM 5000 [USP'U]/ML
700 INJECTION INTRAVENOUS; SUBCUTANEOUS
Qty: 25000 | Refills: 0 | Status: DISCONTINUED | OUTPATIENT
Start: 2020-05-01 | End: 2020-05-04

## 2020-05-01 RX ORDER — INSULIN LISPRO 100/ML
VIAL (ML) SUBCUTANEOUS
Refills: 0 | Status: DISCONTINUED | OUTPATIENT
Start: 2020-05-01 | End: 2020-05-08

## 2020-05-01 RX ORDER — DULOXETINE HYDROCHLORIDE 30 MG/1
20 CAPSULE, DELAYED RELEASE ORAL DAILY
Refills: 0 | Status: DISCONTINUED | OUTPATIENT
Start: 2020-05-01 | End: 2020-05-01

## 2020-05-01 RX ORDER — VENLAFAXINE HCL 75 MG
30 CAPSULE, EXT RELEASE 24 HR ORAL AT BEDTIME
Refills: 0 | Status: DISCONTINUED | OUTPATIENT
Start: 2020-05-01 | End: 2020-05-01

## 2020-05-01 RX ORDER — METOPROLOL TARTRATE 50 MG
25 TABLET ORAL
Refills: 0 | Status: DISCONTINUED | OUTPATIENT
Start: 2020-05-01 | End: 2020-05-01

## 2020-05-01 RX ORDER — HEPARIN SODIUM 5000 [USP'U]/ML
1000 INJECTION INTRAVENOUS; SUBCUTANEOUS
Qty: 25000 | Refills: 0 | Status: DISCONTINUED | OUTPATIENT
Start: 2020-05-01 | End: 2020-05-01

## 2020-05-01 RX ORDER — ACETAMINOPHEN 500 MG
650 TABLET ORAL EVERY 6 HOURS
Refills: 0 | Status: DISCONTINUED | OUTPATIENT
Start: 2020-05-01 | End: 2020-05-08

## 2020-05-01 RX ORDER — METHADONE HYDROCHLORIDE 40 MG/1
10 TABLET ORAL THREE TIMES A DAY
Refills: 0 | Status: COMPLETED | OUTPATIENT
Start: 2020-05-01 | End: 2020-05-08

## 2020-05-01 RX ORDER — ARIPIPRAZOLE 15 MG/1
5 TABLET ORAL DAILY
Refills: 0 | Status: DISCONTINUED | OUTPATIENT
Start: 2020-05-01 | End: 2020-05-01

## 2020-05-01 RX ORDER — CHLORHEXIDINE GLUCONATE 213 G/1000ML
1 SOLUTION TOPICAL
Refills: 0 | Status: DISCONTINUED | OUTPATIENT
Start: 2020-05-01 | End: 2020-05-08

## 2020-05-01 RX ORDER — LEVOTHYROXINE SODIUM 125 MCG
25 TABLET ORAL DAILY
Refills: 0 | Status: DISCONTINUED | OUTPATIENT
Start: 2020-05-01 | End: 2020-05-08

## 2020-05-01 RX ORDER — VENLAFAXINE HCL 75 MG
150 CAPSULE, EXT RELEASE 24 HR ORAL DAILY
Refills: 0 | Status: DISCONTINUED | OUTPATIENT
Start: 2020-05-01 | End: 2020-05-01

## 2020-05-01 RX ADMIN — HEPARIN SODIUM 0 UNIT(S)/HR: 5000 INJECTION INTRAVENOUS; SUBCUTANEOUS at 07:08

## 2020-05-01 RX ADMIN — Medication 15 MILLIGRAM(S): at 16:38

## 2020-05-01 RX ADMIN — Medication 150 MILLIGRAM(S): at 09:38

## 2020-05-01 RX ADMIN — METHADONE HYDROCHLORIDE 10 MILLIGRAM(S): 40 TABLET ORAL at 21:29

## 2020-05-01 RX ADMIN — Medication 1 TABLET(S): at 09:38

## 2020-05-01 RX ADMIN — Medication 25 MICROGRAM(S): at 05:33

## 2020-05-01 RX ADMIN — Medication 25 MILLIGRAM(S): at 16:38

## 2020-05-01 RX ADMIN — Medication 81 MILLIGRAM(S): at 09:38

## 2020-05-01 RX ADMIN — METHADONE HYDROCHLORIDE 10 MILLIGRAM(S): 40 TABLET ORAL at 13:06

## 2020-05-01 RX ADMIN — MONTELUKAST 10 MILLIGRAM(S): 4 TABLET, CHEWABLE ORAL at 09:38

## 2020-05-01 RX ADMIN — Medication 4: at 16:49

## 2020-05-01 RX ADMIN — HEPARIN SODIUM 1300 UNIT(S)/HR: 5000 INJECTION INTRAVENOUS; SUBCUTANEOUS at 08:13

## 2020-05-01 RX ADMIN — DULOXETINE HYDROCHLORIDE 20 MILLIGRAM(S): 30 CAPSULE, DELAYED RELEASE ORAL at 09:38

## 2020-05-01 RX ADMIN — Medication 25 MILLIGRAM(S): at 05:34

## 2020-05-01 RX ADMIN — CHLORHEXIDINE GLUCONATE 1 APPLICATION(S): 213 SOLUTION TOPICAL at 05:39

## 2020-05-01 RX ADMIN — OXYCODONE HYDROCHLORIDE 10 MILLIGRAM(S): 5 TABLET ORAL at 21:32

## 2020-05-01 RX ADMIN — HEPARIN SODIUM 700 UNIT(S)/HR: 5000 INJECTION INTRAVENOUS; SUBCUTANEOUS at 22:26

## 2020-05-01 RX ADMIN — Medication 2: at 11:17

## 2020-05-01 RX ADMIN — Medication 15 MILLIGRAM(S): at 21:29

## 2020-05-01 RX ADMIN — OXYCODONE HYDROCHLORIDE 10 MILLIGRAM(S): 5 TABLET ORAL at 04:17

## 2020-05-01 RX ADMIN — Medication 15 MILLIGRAM(S): at 05:34

## 2020-05-01 RX ADMIN — METHADONE HYDROCHLORIDE 10 MILLIGRAM(S): 40 TABLET ORAL at 05:33

## 2020-05-01 RX ADMIN — Medication 2: at 08:03

## 2020-05-01 RX ADMIN — WARFARIN SODIUM 6 MILLIGRAM(S): 2.5 TABLET ORAL at 22:07

## 2020-05-01 NOTE — BEHAVIORAL HEALTH ASSESSMENT NOTE - NSBHCHARTREVIEWVS_PSY_A_CORE FT
ICU Vital Signs Last 24 Hrs  T(C): 36.9 (01 May 2020 12:18), Max: 37.1 (30 Apr 2020 20:18)  T(F): 98.4 (01 May 2020 12:18), Max: 98.8 (30 Apr 2020 20:18)  HR: 98 (01 May 2020 16:00) (89 - 105)  BP: 98/58 (01 May 2020 16:00) (98/58 - 130/78)  BP(mean): 68 (01 May 2020 16:00) (68 - 87)  ABP: --  ABP(mean): --  RR: 13 (01 May 2020 16:00) (13 - 20)  SpO2: 100% (01 May 2020 16:00) (100% - 100%)

## 2020-05-01 NOTE — PROGRESS NOTE ADULT - SUBJECTIVE AND OBJECTIVE BOX
The patient is a 35 year old female with a  past medical history of Marfan's Syndrome who is admitted with a right internal carotid artery dissection        Patient seen and examine at bedside. Patient is being downgraded from ICU care to medicine service. At this time patient denies any complaints. Patient admits to being under a lot stress at home and believes her symptoms were attributed to her family stressing her out. The patient admits to having a stovall w/ depression and recently has gotten worst being home.       Vital Signs Last 24 Hrs  T(C): 36.9 (01 May 2020 12:18), Max: 37.1 (30 Apr 2020 20:18)  T(F): 98.4 (01 May 2020 12:18), Max: 98.8 (30 Apr 2020 20:18)  HR: 91 (01 May 2020 11:00) (89 - 105)  BP: 100/58 (01 May 2020 11:00) (100/58 - 130/78)  BP(mean): 68 (01 May 2020 11:00) (68 - 87)  RR: 15 (01 May 2020 11:00) (14 - 20)  SpO2: 100% (01 May 2020 11:00) (100% - 100%)      General: Adult female, sitting upright in bed, NAD  HEENT: NC/AT, PERRLA, EOMI  Neck: thyroid normal, no nodules, no lymphadenopathy, no JVD  Lungs: CTA bilaterally, no rhonchi, no wheezes  Cardio: S1S2+, RRR, no murmurs  Abdominal: soft, NT, ND, BS+  Back: no CVA tenderness, no ulcers visualized in the back  Extremities: no edema,   Neuro: AAOx4, CN 2-12 grossly intact.  sensation intact in all extremities, 5/5 strength       MEDICATIONS  (STANDING):  aspirin enteric coated 81 milliGRAM(s) Oral daily  budesonide  80 MICROgram(s)/formoterol 4.5 MICROgram(s) Inhaler 2 Puff(s) Inhalation two times a day  busPIRone 15 milliGRAM(s) Oral two times a day  chlorhexidine 2% Cloths 1 Application(s) Topical <User Schedule>  DULoxetine 20 milliGRAM(s) Oral daily  heparin  Infusion. 1000 Unit(s)/Hr (10 mL/Hr) IV Continuous <Continuous>  insulin lispro (HumaLOG) corrective regimen sliding scale   SubCutaneous three times a day before meals  levothyroxine 25 MICROGram(s) Oral daily  methadone    Tablet 10 milliGRAM(s) Oral three times a day  metoprolol succinate ER 25 milliGRAM(s) Oral two times a day  montelukast 10 milliGRAM(s) Oral daily  multivitamin 1 Tablet(s) Oral daily  venlafaxine XR. 150 milliGRAM(s) Oral daily  warfarin 6 milliGRAM(s) Oral once    MEDICATIONS  (PRN):  acetaminophen   Tablet .. 650 milliGRAM(s) Oral every 6 hours PRN Temp greater or equal to 38.5C (101.3F), Mild Pain (1 - 3)  heparin   Injectable 7000 Unit(s) IV Push every 6 hours PRN For aPTT less than 40  heparin   Injectable 3500 Unit(s) IV Push every 6 hours PRN For aPTT between 40 - 57  oxyCODONE    IR 10 milliGRAM(s) Oral every 4 hours PRN Moderate Pain (4 - 6)
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
## 2020-05-01 NOTE — H&P ADULT - NSICDXPASTMEDICALHX_GEN_ALL_CORE_FT
PAST MEDICAL HISTORY:  Anxiety     Depression     Diabetes     Dilated aortic root     Fibromyalgia     Marfan syndrome     Mitral valve prolapse
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
## 2020-05-01 NOTE — BEHAVIORAL HEALTH ASSESSMENT NOTE - RISK ASSESSMENT
Moderate Acute Suicide Risk RF chronic risk of self harm due to past suicide attempt, limited supports, multiple past psych admission, isolation  PF dog, no recent psych admissions or suicide attempt, in treatment and therapy, denies active SI, plan or intent, Contracted to safety plan
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
## 2020-05-01 NOTE — PHYSICAL THERAPY INITIAL EVALUATION ADULT - GAIT DEVIATIONS NOTED, PT EVAL
wide PEBBLES, decreased foot clearance bilaterally, increased lateral sway, trial RW for next session for improved independence

## 2020-05-01 NOTE — PROGRESS NOTE ADULT - ATTENDING COMMENTS
Patient seen and examined at the bedside. I was physically present for key portions of the evaluation and management services provided. Agree with the above history, physical, assessment, and plan with the necessary amendments/elaborations below:    clinically stable. dissection in setting of Marfans, asymptomatic. per vasc sx no intervention. bp controlled, actually mildly hypotensive but asymptomatic and maintains good MAP. bridging to coumadin with hep drip, goal inr 2.5-3.5 given mech valves. can dc once therapeutic x48hrs or if covered, can consider dc w/ 1wk worth of wt based lovenox bid to bridge @ home and have repeat iNR done in 3days - patient states she has used lovenox before and is comfortable with that.
As above, authored by me.
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
## 2020-05-01 NOTE — BEHAVIORAL HEALTH ASSESSMENT NOTE - NICOTINE
Have Your Skin Lesions Been Treated?: not been treated Is This A New Presentation, Or A Follow-Up?: Skin Lesions How Severe Is Your Skin Lesion?: mild Additional History: I None known

## 2020-05-01 NOTE — H&P ADULT - ASSESSMENT
36 yo female with hx of Marfan syndrome, mechanical AVR and MVR on warfarin, aortic aneurysm, hypothyroidism, DM, TBI, presents with right internal carotid artery dissection and resolving stroke symptoms.      Plan:  # carotid dissection  - heparin drip while INR is subtherapeutic   - MR brain in AM  - neurochecks while in ICU    # mechanical AVR and MVR  - target INR 2.5-3.5 once warfarin is restarted    # hypothyroidism  - restart synthroid    # DM  - insulin coverage while in the hospital, resume metformin on discharge    # chronic pain  - continue home pain meds (methadone and oxycodone) 36 yo female with hx of Marfan syndrome, mechanical AVR and MVR on warfarin, aortic aneurysm, hypothyroidism, DM, TBI, presents with right internal carotid artery dissection and resolving stroke symptoms.      Plan:  # carotid dissection  - heparin drip while INR is subtherapeutic   - MR brain in AM  - neurochecks while in ICU    # mechanical AVR and MVR  - target INR 2.5-3.5 once warfarin is restarted    # hypothyroidism  - restart synthroid    # DM  - insulin coverage while in the hospital, resume metformin on discharge    # chronic pain  - continue home pain meds
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
## 2020-05-01 NOTE — BEHAVIORAL HEALTH ASSESSMENT NOTE - NSBHMEDSOTHERFT_PSY_A_CORE
Clonopin 0.5 qd, Clonopin 1 mg noon, Cymbalta 90 mg qd, Buspar 15 mg tid,  Januvia 100 mg qd, Dulera 200-5mcg inhaler 2 puffs am and 2 pm, Levothyroxine 0.025 qd, Warfarin 1 mg, qd, Tramadol 50 bid, Latuda 20 mg with dinner, Methadone 10 mg tid?, Oxycodone 10 mg 6x day, Metformin 1000 mg q12,

## 2020-05-01 NOTE — PROGRESS NOTE ADULT - ASSESSMENT
The patient is a 35 year old female with a  past medical history of Marfan's Syndrome who is admitted with a right internal carotid artery dissection.  The patient is to undergo an MRI/MRA, anticoagulation with Heparin drip.  Vascular surgery input appreciated.

## 2020-05-01 NOTE — H&P ADULT - NSHPPHYSICALEXAM_GEN_ALL_CORE
alert and oriented, NAD  polite and cooperative  no respiratory distress, not on oxygen   abd soft, nontender  no focal motor or sensory deficits noted

## 2020-05-01 NOTE — H&P ADULT - NSHPLABSRESULTS_GEN_ALL_CORE
04-30    140  |  102  |  11.0  ----------------------------<  136<H>  4.2   |  22.0  |  0.67    Ca    9.6      30 Apr 2020 20:45    TPro  7.7  /  Alb  4.0  /  TBili  0.3<L>  /  DBili  x   /  AST  29  /  ALT  18  /  AlkPhos  101  04-30                        12.4   19.81 )-----------( 349      ( 30 Apr 2020 20:45 )             40.4

## 2020-05-01 NOTE — BEHAVIORAL HEALTH ASSESSMENT NOTE - NSBHCONSULTMEDS_PSY_A_CORE FT
Recommend increasing her Cymbalta to usual dose noted of Dr First Gonzalez of 90 mg qd. in addition to other current psych meds:  Latuda 20 mg 6pm  Buspar 15 mg tid  Clonopin 0.5 mgqd and Clonopin 1 mg at 12 noon
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
## 2020-05-01 NOTE — H&P ADULT - NSICDXPASTSURGICALHX_GEN_ALL_CORE_FT
PAST SURGICAL HISTORY:  H/O aortic valve replacement 2013    H/O artificial lens replacement     H/O spinal fusion 2005

## 2020-05-01 NOTE — PHYSICAL THERAPY INITIAL EVALUATION ADULT - GENERAL OBSERVATIONS, REHAB EVAL
Pt ok'd to be seen by JOS Rai. Pt received semi-huntley in bed, +CM, Heparin drip, pulse ox, in NAD. Pt agreeable to PT session

## 2020-05-01 NOTE — BEHAVIORAL HEALTH ASSESSMENT NOTE - NSBHCHARTREVIEWLAB_PSY_A_CORE FT
Comprehensive Metabolic Panel (04.30.20 @ 20:45)    Sodium, Serum: 140 mmol/L    Potassium, Serum: 4.2 mmol/L    Chloride, Serum: 102 mmol/L    Carbon Dioxide, Serum: 22.0 mmol/L    Anion Gap, Serum: 16 mmol/L    Blood Urea Nitrogen, Serum: 11.0 mg/dL    Creatinine, Serum: 0.67 mg/dL    Glucose, Serum: 136: Reference Range for Glucose has been amended as of 1/21/2020 mg/dL    Calcium, Total Serum: 9.6 mg/dL    Protein Total, Serum: 7.7 g/dL    Albumin, Serum: 4.0 g/dL    Bilirubin Total, Serum: 0.3 mg/dL    Alkaline Phosphatase, Serum: 101 U/L    Aspartate Aminotransferase (AST/SGOT): 29 U/L    Alanine Aminotransferase (ALT/SGPT): 18 U/L    eGFR if Non : 112: Interpretative comment  The units for eGFR are mL/min/1.73M2 (normalized body surface area). The  eGFR is calculated from a serum creatinine using the CKD-EPI equation.  Other variables required for calculation are race, age and sex. Among  patients with chronic kidney disease (CKD), the eGFR is useful in  determining the stage of disease according to KDOQI CKD classification.  All eGFR results are reported numerically with the following  interpretation.          GFR                    With                 Without     (ml/min/1.73 m2)    Kidney Damage       Kidney Damage        >= 90                    Stage 1                     Normal        60-89                    Stage 2                     Decreased GFR        30-59     Stage 3                     Stage 3        15-29                    Stage 4                     Stage 4        < 15                      Stage 5                     Stage 5  Each stage of CKD assumes that the associated GFR level has been in  effect for at least 3 months. Determination of stages one and two (with  eGFR > 59 ml/min/m2) requires estimation of kidney damage for at least 3  months as defined by structural or functional abnormalities.  Limitations: All estimates of GFR will be less accurate for patients at  extremes of muscle mass (including but not limited to frail elderly,  critically ill, or cancer patients), those with unusual diets, and those  with conditions associated with reduced secretion or extrarenal  elimination of creatinine. The eGFR equation is not recommended for use  in patients with unstable creatinine levels. mL/min/1.73M2    eGFR if African American: 130 mL/min/1.73M2

## 2020-05-01 NOTE — CHART NOTE - NSCHARTNOTEFT_GEN_A_CORE
pt with R internal carotid dissection  Pt has no focal deficits, VS remain stable, BP under control    Vital Signs Last 24 Hrs  T(C): 36.9 (01 May 2020 12:18), Max: 37.1 (30 Apr 2020 20:18)  T(F): 98.4 (01 May 2020 12:18), Max: 98.8 (30 Apr 2020 20:18)  HR: 98 (01 May 2020 16:00) (89 - 105)  BP: 98/58 (01 May 2020 16:00) (98/58 - 130/78)  BP(mean): 68 (01 May 2020 16:00) (68 - 87)  RR: 13 (01 May 2020 16:00) (13 - 20)  SpO2: 100% (01 May 2020 16:00) (100% - 100%)    Pt being transitioned to coumadin  To be downgraded to floor  Pt to f/u with vascular surgery as OP with Dr. Humphries

## 2020-05-01 NOTE — PROGRESS NOTE ADULT - ASSESSMENT
36 yo female with hx of Marfan syndrome, mechanical AVR and MVR on warfarin, aortic aneurysm, hypothyroidism, DM, TBI, presents with right internal carotid artery dissection and resolving stroke symptoms.         Right carotid dissection   - heparin drip while INR is subtherapeutic   - MR brain in AM  - Neuro check q6  - vascular consult appreciated     mechanical AVR and MVR  - target INR 2.5-3.5,   - Coumadin restarted 6mg , Repeat INR pending for AM    Hypothyroidism   - c/w home dose synthroid    Depression/anxiety  - c/w home medication  - psych consult    DM  - insulin sliding scale coverage    chronic pain  - continue home pain meds     Preventive measures  - DVT- currently on Heparin gtt
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
## 2020-05-02 LAB
A1C WITH ESTIMATED AVERAGE GLUCOSE RESULT: 8.4 % — HIGH (ref 4–5.6)
ANION GAP SERPL CALC-SCNC: 15 MMOL/L — SIGNIFICANT CHANGE UP (ref 5–17)
APTT BLD: 66.3 SEC — HIGH (ref 27.5–36.3)
APTT BLD: 67.8 SEC — HIGH (ref 27.5–36.3)
APTT BLD: 73.3 SEC — HIGH (ref 27.5–36.3)
BUN SERPL-MCNC: 11 MG/DL — SIGNIFICANT CHANGE UP (ref 8–20)
CALCIUM SERPL-MCNC: 9.3 MG/DL — SIGNIFICANT CHANGE UP (ref 8.6–10.2)
CHLORIDE SERPL-SCNC: 95 MMOL/L — LOW (ref 98–107)
CHOLEST SERPL-MCNC: 205 MG/DL — HIGH (ref 110–199)
CO2 SERPL-SCNC: 24 MMOL/L — SIGNIFICANT CHANGE UP (ref 22–29)
CREAT SERPL-MCNC: 0.62 MG/DL — SIGNIFICANT CHANGE UP (ref 0.5–1.3)
ESTIMATED AVERAGE GLUCOSE: 194 MG/DL — HIGH (ref 68–114)
GLUCOSE BLDC GLUCOMTR-MCNC: 244 MG/DL — HIGH (ref 70–99)
GLUCOSE BLDC GLUCOMTR-MCNC: 287 MG/DL — HIGH (ref 70–99)
GLUCOSE BLDC GLUCOMTR-MCNC: 328 MG/DL — HIGH (ref 70–99)
GLUCOSE BLDC GLUCOMTR-MCNC: 374 MG/DL — HIGH (ref 70–99)
GLUCOSE SERPL-MCNC: 219 MG/DL — HIGH (ref 70–99)
HCT VFR BLD CALC: 33.9 % — LOW (ref 34.5–45)
HCT VFR BLD CALC: 36.1 % — SIGNIFICANT CHANGE UP (ref 34.5–45)
HDLC SERPL-MCNC: 23 MG/DL — LOW
HGB BLD-MCNC: 10.9 G/DL — LOW (ref 11.5–15.5)
HGB BLD-MCNC: 11.3 G/DL — LOW (ref 11.5–15.5)
INR BLD: 1.43 RATIO — HIGH (ref 0.88–1.16)
LIPID PNL WITH DIRECT LDL SERPL: SIGNIFICANT CHANGE UP MG/DL
MAGNESIUM SERPL-MCNC: 1.8 MG/DL — SIGNIFICANT CHANGE UP (ref 1.6–2.6)
MCHC RBC-ENTMCNC: 25.6 PG — LOW (ref 27–34)
MCHC RBC-ENTMCNC: 26.1 PG — LOW (ref 27–34)
MCHC RBC-ENTMCNC: 31.3 GM/DL — LOW (ref 32–36)
MCHC RBC-ENTMCNC: 32.2 GM/DL — SIGNIFICANT CHANGE UP (ref 32–36)
MCV RBC AUTO: 81.1 FL — SIGNIFICANT CHANGE UP (ref 80–100)
MCV RBC AUTO: 81.7 FL — SIGNIFICANT CHANGE UP (ref 80–100)
PHOSPHATE SERPL-MCNC: 4 MG/DL — SIGNIFICANT CHANGE UP (ref 2.4–4.7)
PLATELET # BLD AUTO: 294 K/UL — SIGNIFICANT CHANGE UP (ref 150–400)
PLATELET # BLD AUTO: 307 K/UL — SIGNIFICANT CHANGE UP (ref 150–400)
POTASSIUM SERPL-MCNC: 3.7 MMOL/L — SIGNIFICANT CHANGE UP (ref 3.5–5.3)
POTASSIUM SERPL-SCNC: 3.7 MMOL/L — SIGNIFICANT CHANGE UP (ref 3.5–5.3)
PROTHROM AB SERPL-ACNC: 16.3 SEC — HIGH (ref 10–12.9)
RBC # BLD: 4.18 M/UL — SIGNIFICANT CHANGE UP (ref 3.8–5.2)
RBC # BLD: 4.42 M/UL — SIGNIFICANT CHANGE UP (ref 3.8–5.2)
RBC # FLD: 17.2 % — HIGH (ref 10.3–14.5)
RBC # FLD: 17.3 % — HIGH (ref 10.3–14.5)
SODIUM SERPL-SCNC: 134 MMOL/L — LOW (ref 135–145)
TOTAL CHOLESTEROL/HDL RATIO MEASUREMENT: 9 RATIO — HIGH (ref 3.3–7.1)
TRIGL SERPL-MCNC: 1166 MG/DL — HIGH (ref 10–200)
WBC # BLD: 14.31 K/UL — HIGH (ref 3.8–10.5)
WBC # BLD: 14.69 K/UL — HIGH (ref 3.8–10.5)
WBC # FLD AUTO: 14.31 K/UL — HIGH (ref 3.8–10.5)
WBC # FLD AUTO: 14.69 K/UL — HIGH (ref 3.8–10.5)

## 2020-05-02 PROCEDURE — 99233 SBSQ HOSP IP/OBS HIGH 50: CPT

## 2020-05-02 RX ORDER — INSULIN GLARGINE 100 [IU]/ML
10 INJECTION, SOLUTION SUBCUTANEOUS AT BEDTIME
Refills: 0 | Status: DISCONTINUED | OUTPATIENT
Start: 2020-05-02 | End: 2020-05-03

## 2020-05-02 RX ORDER — WARFARIN SODIUM 2.5 MG/1
7.5 TABLET ORAL ONCE
Refills: 0 | Status: COMPLETED | OUTPATIENT
Start: 2020-05-02 | End: 2020-05-02

## 2020-05-02 RX ORDER — INSULIN LISPRO 100/ML
3 VIAL (ML) SUBCUTANEOUS ONCE
Refills: 0 | Status: COMPLETED | OUTPATIENT
Start: 2020-05-02 | End: 2020-05-02

## 2020-05-02 RX ORDER — LURASIDONE HYDROCHLORIDE 40 MG/1
20 TABLET ORAL DAILY
Refills: 0 | Status: DISCONTINUED | OUTPATIENT
Start: 2020-05-02 | End: 2020-05-08

## 2020-05-02 RX ADMIN — Medication 15 MILLIGRAM(S): at 21:38

## 2020-05-02 RX ADMIN — Medication 8: at 17:29

## 2020-05-02 RX ADMIN — Medication 81 MILLIGRAM(S): at 12:01

## 2020-05-02 RX ADMIN — OXYCODONE HYDROCHLORIDE 10 MILLIGRAM(S): 5 TABLET ORAL at 21:47

## 2020-05-02 RX ADMIN — Medication 1 TABLET(S): at 12:01

## 2020-05-02 RX ADMIN — Medication 0.5 MILLIGRAM(S): at 12:01

## 2020-05-02 RX ADMIN — Medication 6: at 12:02

## 2020-05-02 RX ADMIN — HEPARIN SODIUM 700 UNIT(S)/HR: 5000 INJECTION INTRAVENOUS; SUBCUTANEOUS at 06:26

## 2020-05-02 RX ADMIN — METHADONE HYDROCHLORIDE 10 MILLIGRAM(S): 40 TABLET ORAL at 21:47

## 2020-05-02 RX ADMIN — METHADONE HYDROCHLORIDE 10 MILLIGRAM(S): 40 TABLET ORAL at 05:32

## 2020-05-02 RX ADMIN — INSULIN GLARGINE 10 UNIT(S): 100 INJECTION, SOLUTION SUBCUTANEOUS at 21:37

## 2020-05-02 RX ADMIN — Medication 1 MILLIGRAM(S): at 12:01

## 2020-05-02 RX ADMIN — OXYCODONE HYDROCHLORIDE 10 MILLIGRAM(S): 5 TABLET ORAL at 05:32

## 2020-05-02 RX ADMIN — Medication 4: at 08:49

## 2020-05-02 RX ADMIN — Medication 3 UNIT(S): at 21:39

## 2020-05-02 RX ADMIN — Medication 25 MILLIGRAM(S): at 17:31

## 2020-05-02 RX ADMIN — Medication 15 MILLIGRAM(S): at 05:32

## 2020-05-02 RX ADMIN — MONTELUKAST 10 MILLIGRAM(S): 4 TABLET, CHEWABLE ORAL at 12:01

## 2020-05-02 RX ADMIN — DULOXETINE HYDROCHLORIDE 90 MILLIGRAM(S): 30 CAPSULE, DELAYED RELEASE ORAL at 12:01

## 2020-05-02 RX ADMIN — LURASIDONE HYDROCHLORIDE 20 MILLIGRAM(S): 40 TABLET ORAL at 17:29

## 2020-05-02 RX ADMIN — CHLORHEXIDINE GLUCONATE 1 APPLICATION(S): 213 SOLUTION TOPICAL at 05:32

## 2020-05-02 RX ADMIN — WARFARIN SODIUM 7.5 MILLIGRAM(S): 2.5 TABLET ORAL at 21:52

## 2020-05-02 RX ADMIN — BUDESONIDE AND FORMOTEROL FUMARATE DIHYDRATE 2 PUFF(S): 160; 4.5 AEROSOL RESPIRATORY (INHALATION) at 21:25

## 2020-05-02 RX ADMIN — Medication 15 MILLIGRAM(S): at 15:42

## 2020-05-02 RX ADMIN — Medication 25 MICROGRAM(S): at 05:32

## 2020-05-02 RX ADMIN — METHADONE HYDROCHLORIDE 10 MILLIGRAM(S): 40 TABLET ORAL at 15:42

## 2020-05-02 NOTE — PROGRESS NOTE ADULT - SUBJECTIVE AND OBJECTIVE BOX
CHIEF COMPLAINT/INTERVAL HISTORY:    Patient is a 35y old  Female who presents with a chief complaint of right carotid artery dissection (01 May 2020 15:02)    SUBJECTIVE & OBJECTIVE: Pt seen and examined at bedside. No overnight events. Patient denies any active symptoms today. INR remains subtherapeutic; increase dose. MRI deferred since rods are not compatible.    ROS: No chest pain, palpitations, SOB, light headedness, dizziness, headache, nausea/vomiting, fevers/chills, abdominal pain, dysuria or increased urinary frequency.    ICU Vital Signs Last 24 Hrs  T(C): 36.6 (02 May 2020 13:00), Max: 36.7 (02 May 2020 07:00)  T(F): 97.9 (02 May 2020 13:00), Max: 98.1 (02 May 2020 07:00)  HR: 112 (02 May 2020 13:00) (90 - 112)  BP: 121/79 (02 May 2020 13:00) (96/62 - 121/79)  BP(mean): 69 (01 May 2020 19:45) (69 - 69)  RR: 18 (02 May 2020 13:00) (16 - 18)  SpO2: 97% (02 May 2020 13:00) (95% - 98%)    MEDICATIONS  (STANDING):  aspirin enteric coated 81 milliGRAM(s) Oral daily  budesonide  80 MICROgram(s)/formoterol 4.5 MICROgram(s) Inhaler 2 Puff(s) Inhalation two times a day  busPIRone 15 milliGRAM(s) Oral every 8 hours  chlorhexidine 2% Cloths 1 Application(s) Topical <User Schedule>  clonazePAM  Tablet 0.5 milliGRAM(s) Oral daily  clonazePAM  Tablet 1 milliGRAM(s) Oral <User Schedule>  DULoxetine 90 milliGRAM(s) Oral <User Schedule>  heparin  Infusion. 700 Unit(s)/Hr (7 mL/Hr) IV Continuous <Continuous>  insulin lispro (HumaLOG) corrective regimen sliding scale   SubCutaneous three times a day before meals  levothyroxine 25 MICROGram(s) Oral daily  lurasidone 20 milliGRAM(s) Oral daily  methadone    Tablet 10 milliGRAM(s) Oral three times a day  metoprolol succinate ER 25 milliGRAM(s) Oral two times a day  montelukast 10 milliGRAM(s) Oral daily  multivitamin 1 Tablet(s) Oral daily  warfarin 7.5 milliGRAM(s) Oral once    MEDICATIONS  (PRN):  acetaminophen   Tablet .. 650 milliGRAM(s) Oral every 6 hours PRN Temp greater or equal to 38.5C (101.3F), Mild Pain (1 - 3)  heparin   Injectable 7000 Unit(s) IV Push every 6 hours PRN For aPTT less than 40  heparin   Injectable 3500 Unit(s) IV Push every 6 hours PRN For aPTT between 40 - 57  oxyCODONE    IR 10 milliGRAM(s) Oral every 4 hours PRN Moderate Pain (4 - 6)      LABS:                        11.3   14.31 )-----------( 307      ( 02 May 2020 06:11 )             36.1     05-02    134<L>  |  95<L>  |  11.0  ----------------------------<  219<H>  3.7   |  24.0  |  0.62    Ca    9.3      02 May 2020 06:11  Phos  4.0     05-02  Mg     1.8     05-02    TPro  7.7  /  Alb  4.0  /  TBili  0.3<L>  /  DBili  x   /  AST  29  /  ALT  18  /  AlkPhos  101  04-30    PT/INR - ( 02 May 2020 05:40 )   PT: 16.3 sec;   INR: 1.43 ratio         PTT - ( 02 May 2020 14:50 )  PTT:66.3 sec      CAPILLARY BLOOD GLUCOSE      POCT Blood Glucose.: 328 mg/dL (02 May 2020 16:34)  POCT Blood Glucose.: 287 mg/dL (02 May 2020 11:36)  POCT Blood Glucose.: 244 mg/dL (02 May 2020 08:01)  POCT Blood Glucose.: 237 mg/dL (01 May 2020 21:28)      RECENT CULTURES:      RADIOLOGY & ADDITIONAL TESTS:      PHYSICAL EXAM:    GENERAL: middle aged female, appears older than stated age, long extremities  HEAD:  Atraumatic, Normocephalic  EYES: EOMI, PERRLA, conjunctiva and sclera clear  ENMT: Moist mucous membranes  NECK: Supple, No JVD  NERVOUS SYSTEM:  Alert & Oriented X3, Motor Strength 5/5 B/L upper and lower extremities; DTRs 2+ intact and symmetric  CHEST/LUNG: Clear to auscultation bilaterally; No rales, rhonchi, wheezing, or rubs  HEART: Regular rate and rhythm; + S1/S2  ABDOMEN: Soft, Nontender, Nondistended; Bowel sounds present  EXTREMITIES:  no pedal edema

## 2020-05-02 NOTE — PROGRESS NOTE ADULT - ASSESSMENT
36 yo female with hx of Marfan syndrome, mechanical AVR and MVR on warfarin, aortic aneurysm, hypothyroidism, DM, TBI, presents with right internal carotid artery dissection and resolving stroke symptoms. MRI deferred given rods.    Right carotid dissection   - CTA head/neck reviewed  - MRI deferred to due to spine rods  - continue heparin gtt + coumadin bridge  - MR brain deferred as discussed with previous provider  - patient is asymptomatic  - vascular consult appreciated; no surgical indication    Mechanical AVR and MVR  -INR 1.4; increase Coumadin to 7.5 mg and continue heparin gtt  -target INR 2.5-3.5,   -Repeat INR pending for AM    Hypothyroidism   - c/w home dose synthroid    Depression/anxiety  - c/w home medications; latuda added  - psych consult appreciated    DM  -hyperglycemia noted  -add bedtime lantus  -insulin sliding scale coverage    Chronic pain  -history of spinal surgery  - continue home pain meds; continue oxycodone as needed and methadone    DVT ppx- currently on Heparin gtt    Dispo - home once INR is therapeutic

## 2020-05-02 NOTE — CHART NOTE - NSCHARTNOTEFT_GEN_A_CORE
Called by RN for ; appears to be patient's baseline as per chart review. All other VSS. Patient without any acute complaints. Pt reports persistent back pain (chronic). Pt received oxycodone and methadone. Will monitor patient's pain after receiving medications.   EKG ordered to obtain patient's baseline. No acute ST elevations or T wave inversions appreciated. No KEG for comparison. Pending official read.   RN advised to call if any changes.  Will continue to monitor.

## 2020-05-02 NOTE — DIETITIAN INITIAL EVALUATION ADULT. - PERTINENT MEDS FT
MEDICATIONS  (STANDING):  aspirin enteric coated 81 milliGRAM(s) Oral daily  budesonide  80 MICROgram(s)/formoterol 4.5 MICROgram(s) Inhaler 2 Puff(s) Inhalation two times a day  busPIRone 15 milliGRAM(s) Oral every 8 hours  chlorhexidine 2% Cloths 1 Application(s) Topical <User Schedule>  clonazePAM  Tablet 0.5 milliGRAM(s) Oral daily  clonazePAM  Tablet 1 milliGRAM(s) Oral <User Schedule>  DULoxetine 90 milliGRAM(s) Oral <User Schedule>  heparin  Infusion. 700 Unit(s)/Hr (7 mL/Hr) IV Continuous <Continuous>  insulin lispro (HumaLOG) corrective regimen sliding scale   SubCutaneous three times a day before meals  levothyroxine 25 MICROGram(s) Oral daily  lurasidone 20 milliGRAM(s) Oral daily  methadone    Tablet 10 milliGRAM(s) Oral three times a day  metoprolol succinate ER 25 milliGRAM(s) Oral two times a day  montelukast 10 milliGRAM(s) Oral daily  multivitamin 1 Tablet(s) Oral daily    MEDICATIONS  (PRN):  acetaminophen   Tablet .. 650 milliGRAM(s) Oral every 6 hours PRN Temp greater or equal to 38.5C (101.3F), Mild Pain (1 - 3)  heparin   Injectable 7000 Unit(s) IV Push every 6 hours PRN For aPTT less than 40  heparin   Injectable 3500 Unit(s) IV Push every 6 hours PRN For aPTT between 40 - 57  oxyCODONE    IR 10 milliGRAM(s) Oral every 4 hours PRN Moderate Pain (4 - 6)

## 2020-05-02 NOTE — DIETITIAN INITIAL EVALUATION ADULT. - OTHER INFO
34 yo female with hx of Marfan syndrome, mechanical AVR and MVR on warfarin, aortic aneurysm, hypothyroidism, DM, TBI, presents with right internal carotid artery dissection and resolving stroke symptoms.   Pt denied any chewing/swallowing difficulty. Pt reports having a fair appetite and states goes through phases with appetite. Encouraged PO intake. Observed >75% breakfast consumed. Pt denied Glucerna at this time. Pt states UBW range as 185-195#. Pt denied any recent wt changes. Pt reports not consistently monitoring blood sugars at home. Brief verbal education on consistent vitamin K and plate method. Pt reports already having a good understanding and denied further diet education at this time. Aware elevated TG, cholesterol, glucose, HgbA1c 8.4 noted. Continue to monitor labs. No edema noted.

## 2020-05-02 NOTE — DIETITIAN INITIAL EVALUATION ADULT. - PERTINENT LABORATORY DATA
05-02 Na134 mmol/L<L> Glu 219 mg/dL<H> K+ 3.7 mmol/L Cr  0.62 mg/dL BUN 11.0 mg/dL Phos 4.0 mg/dL Alb n/a   PAB n/a

## 2020-05-03 LAB
APTT BLD: 66.8 SEC — HIGH (ref 27.5–36.3)
GLUCOSE BLDC GLUCOMTR-MCNC: 302 MG/DL — HIGH (ref 70–99)
GLUCOSE BLDC GLUCOMTR-MCNC: 326 MG/DL — HIGH (ref 70–99)
GLUCOSE BLDC GLUCOMTR-MCNC: 397 MG/DL — HIGH (ref 70–99)
GLUCOSE BLDC GLUCOMTR-MCNC: 425 MG/DL — HIGH (ref 70–99)
HCT VFR BLD CALC: 34.9 % — SIGNIFICANT CHANGE UP (ref 34.5–45)
HGB BLD-MCNC: 11.2 G/DL — LOW (ref 11.5–15.5)
INR BLD: 1.47 RATIO — HIGH (ref 0.88–1.16)
MCHC RBC-ENTMCNC: 26.2 PG — LOW (ref 27–34)
MCHC RBC-ENTMCNC: 32.1 GM/DL — SIGNIFICANT CHANGE UP (ref 32–36)
MCV RBC AUTO: 81.5 FL — SIGNIFICANT CHANGE UP (ref 80–100)
PLATELET # BLD AUTO: 290 K/UL — SIGNIFICANT CHANGE UP (ref 150–400)
PROCALCITONIN SERPL-MCNC: 0.14 NG/ML — HIGH (ref 0.02–0.1)
PROTHROM AB SERPL-ACNC: 16.8 SEC — HIGH (ref 10–12.9)
RBC # BLD: 4.28 M/UL — SIGNIFICANT CHANGE UP (ref 3.8–5.2)
RBC # FLD: 17.1 % — HIGH (ref 10.3–14.5)
TSH SERPL-MCNC: 3.49 UIU/ML — SIGNIFICANT CHANGE UP (ref 0.27–4.2)
WBC # BLD: 10.6 K/UL — HIGH (ref 3.8–10.5)
WBC # FLD AUTO: 10.6 K/UL — HIGH (ref 3.8–10.5)

## 2020-05-03 PROCEDURE — 93010 ELECTROCARDIOGRAM REPORT: CPT

## 2020-05-03 PROCEDURE — 99232 SBSQ HOSP IP/OBS MODERATE 35: CPT

## 2020-05-03 RX ORDER — INSULIN LISPRO 100/ML
5 VIAL (ML) SUBCUTANEOUS ONCE
Refills: 0 | Status: COMPLETED | OUTPATIENT
Start: 2020-05-03 | End: 2020-05-03

## 2020-05-03 RX ORDER — METOPROLOL TARTRATE 50 MG
25 TABLET ORAL
Refills: 0 | Status: DISCONTINUED | OUTPATIENT
Start: 2020-05-03 | End: 2020-05-08

## 2020-05-03 RX ORDER — WARFARIN SODIUM 2.5 MG/1
10 TABLET ORAL ONCE
Refills: 0 | Status: COMPLETED | OUTPATIENT
Start: 2020-05-03 | End: 2020-05-03

## 2020-05-03 RX ORDER — INSULIN GLARGINE 100 [IU]/ML
15 INJECTION, SOLUTION SUBCUTANEOUS AT BEDTIME
Refills: 0 | Status: DISCONTINUED | OUTPATIENT
Start: 2020-05-03 | End: 2020-05-04

## 2020-05-03 RX ADMIN — METHADONE HYDROCHLORIDE 10 MILLIGRAM(S): 40 TABLET ORAL at 21:34

## 2020-05-03 RX ADMIN — Medication 5 UNIT(S): at 21:28

## 2020-05-03 RX ADMIN — OXYCODONE HYDROCHLORIDE 10 MILLIGRAM(S): 5 TABLET ORAL at 23:39

## 2020-05-03 RX ADMIN — Medication 15 MILLIGRAM(S): at 05:29

## 2020-05-03 RX ADMIN — WARFARIN SODIUM 10 MILLIGRAM(S): 2.5 TABLET ORAL at 21:31

## 2020-05-03 RX ADMIN — MONTELUKAST 10 MILLIGRAM(S): 4 TABLET, CHEWABLE ORAL at 11:58

## 2020-05-03 RX ADMIN — METHADONE HYDROCHLORIDE 10 MILLIGRAM(S): 40 TABLET ORAL at 13:43

## 2020-05-03 RX ADMIN — Medication 81 MILLIGRAM(S): at 11:57

## 2020-05-03 RX ADMIN — HEPARIN SODIUM 700 UNIT(S)/HR: 5000 INJECTION INTRAVENOUS; SUBCUTANEOUS at 07:37

## 2020-05-03 RX ADMIN — Medication 1 MILLIGRAM(S): at 12:06

## 2020-05-03 RX ADMIN — Medication 8: at 08:56

## 2020-05-03 RX ADMIN — INSULIN GLARGINE 15 UNIT(S): 100 INJECTION, SOLUTION SUBCUTANEOUS at 21:27

## 2020-05-03 RX ADMIN — CHLORHEXIDINE GLUCONATE 1 APPLICATION(S): 213 SOLUTION TOPICAL at 05:29

## 2020-05-03 RX ADMIN — Medication 15 MILLIGRAM(S): at 13:43

## 2020-05-03 RX ADMIN — Medication 0.5 MILLIGRAM(S): at 12:06

## 2020-05-03 RX ADMIN — LURASIDONE HYDROCHLORIDE 20 MILLIGRAM(S): 40 TABLET ORAL at 11:58

## 2020-05-03 RX ADMIN — Medication 10: at 17:19

## 2020-05-03 RX ADMIN — Medication 25 MICROGRAM(S): at 05:29

## 2020-05-03 RX ADMIN — DULOXETINE HYDROCHLORIDE 90 MILLIGRAM(S): 30 CAPSULE, DELAYED RELEASE ORAL at 11:58

## 2020-05-03 RX ADMIN — Medication 15 MILLIGRAM(S): at 21:31

## 2020-05-03 RX ADMIN — BUDESONIDE AND FORMOTEROL FUMARATE DIHYDRATE 2 PUFF(S): 160; 4.5 AEROSOL RESPIRATORY (INHALATION) at 21:35

## 2020-05-03 RX ADMIN — Medication 8: at 11:55

## 2020-05-03 RX ADMIN — OXYCODONE HYDROCHLORIDE 10 MILLIGRAM(S): 5 TABLET ORAL at 08:56

## 2020-05-03 RX ADMIN — Medication 1 TABLET(S): at 11:57

## 2020-05-03 RX ADMIN — METHADONE HYDROCHLORIDE 10 MILLIGRAM(S): 40 TABLET ORAL at 05:32

## 2020-05-03 NOTE — PROGRESS NOTE ADULT - ASSESSMENT
34 yo female with hx of Marfan syndrome, mechanical AVR and MVR on warfarin, aortic aneurysm, hypothyroidism, DM, TBI, presents with right internal carotid artery dissection and resolving stroke symptoms. MRI deferred given rods.    Right carotid dissection   - CTA head/neck reviewed  - MRI brain ordered; discussed with MRI (pal[place]ios rods are in fact compatible)  - continue heparin gtt + coumadin bridge  - no neuro deficits on exam; d/c neurochecks  - vascular consult appreciated; no surgical indication    Mechanical AVR and MVR  -INR 1.4; increase Coumadin to 10 mg and continue heparin gtt  -target INR 2.5-3.5,   -Repeat INR pending for AM    Hypothyroidism   - c/w home dose synthroid    Depression/anxiety  - c/w home medications; mood stable  - psych consult appreciated    DM  -hyperglycemia noted  -increase lantus to 15 units at bedtime  -insulin sliding scale coverage    Chronic pain  -history of spinal surgery  - continue home pain meds; continue oxycodone as needed and methadone    DVT ppx- currently on Heparin gtt + coumadin    Dispo - home once INR is therapeutic    Attending Attestation:   Plan discussed with patient.

## 2020-05-03 NOTE — PROGRESS NOTE ADULT - NSHPATTENDINGPLANDISCUSS_GEN_ALL_CORE
patient
patient
the patient.  All imaging and results of lab/other studies reviewed by me. All questions answered to their satisfaction. At this time they agree with the current plan of therapy.

## 2020-05-03 NOTE — PROGRESS NOTE ADULT - SUBJECTIVE AND OBJECTIVE BOX
CHIEF COMPLAINT/INTERVAL HISTORY:    Patient is a 35y old  Female who presents with a chief complaint of right carotid artery dissection (02 May 2020 16:52)    SUBJECTIVE & OBJECTIVE: Pt seen and examined at bedside. Asymptomatic tachycardia overnight; resolved. Patient reports feeling better today. INR subtherapeutic; increase coumadin. Repeat coags in AM. MRI today.    ROS: No chest pain, palpitations, SOB, light headedness, dizziness, headache, nausea/vomiting, fevers/chills, abdominal pain, dysuria.    ICU Vital Signs Last 24 Hrs  T(C): 36.6 (03 May 2020 07:00), Max: 36.9 (03 May 2020 01:58)  T(F): 97.9 (03 May 2020 07:00), Max: 98.5 (03 May 2020 04:50)  HR: 97 (03 May 2020 10:00) (96 - 112)  BP: 102/62 (03 May 2020 10:00) (98/58 - 121/79)  RR: 18 (03 May 2020 07:00) (18 - 18)  SpO2: 100% (03 May 2020 07:00) (95% - 100%)    MEDICATIONS  (STANDING):  aspirin enteric coated 81 milliGRAM(s) Oral daily  budesonide  80 MICROgram(s)/formoterol 4.5 MICROgram(s) Inhaler 2 Puff(s) Inhalation two times a day  busPIRone 15 milliGRAM(s) Oral every 8 hours  chlorhexidine 2% Cloths 1 Application(s) Topical <User Schedule>  clonazePAM  Tablet 0.5 milliGRAM(s) Oral daily  clonazePAM  Tablet 1 milliGRAM(s) Oral <User Schedule>  DULoxetine 90 milliGRAM(s) Oral <User Schedule>  heparin  Infusion. 700 Unit(s)/Hr (7 mL/Hr) IV Continuous <Continuous>  insulin glargine Injectable (LANTUS) 10 Unit(s) SubCutaneous at bedtime  insulin lispro (HumaLOG) corrective regimen sliding scale   SubCutaneous three times a day before meals  levothyroxine 25 MICROGram(s) Oral daily  lurasidone 20 milliGRAM(s) Oral daily  methadone    Tablet 10 milliGRAM(s) Oral three times a day  metoprolol succinate ER 25 milliGRAM(s) Oral two times a day  montelukast 10 milliGRAM(s) Oral daily  multivitamin 1 Tablet(s) Oral daily  warfarin 10 milliGRAM(s) Oral once    MEDICATIONS  (PRN):  acetaminophen   Tablet .. 650 milliGRAM(s) Oral every 6 hours PRN Temp greater or equal to 38.5C (101.3F), Mild Pain (1 - 3)  heparin   Injectable 7000 Unit(s) IV Push every 6 hours PRN For aPTT less than 40  heparin   Injectable 3500 Unit(s) IV Push every 6 hours PRN For aPTT between 40 - 57  oxyCODONE    IR 10 milliGRAM(s) Oral every 4 hours PRN Moderate Pain (4 - 6)      LABS:                        11.2   10.60 )-----------( 290      ( 03 May 2020 06:46 )             34.9     05-02    134<L>  |  95<L>  |  11.0  ----------------------------<  219<H>  3.7   |  24.0  |  0.62    Ca    9.3      02 May 2020 06:11  Phos  4.0     05-02  Mg     1.8     05-02      PT/INR - ( 03 May 2020 06:46 )   PT: 16.8 sec;   INR: 1.47 ratio         PTT - ( 03 May 2020 06:46 )  PTT:66.8 sec      CAPILLARY BLOOD GLUCOSE      POCT Blood Glucose.: 326 mg/dL (03 May 2020 11:53)  POCT Blood Glucose.: 302 mg/dL (03 May 2020 08:28)  POCT Blood Glucose.: 374 mg/dL (02 May 2020 21:27)  POCT Blood Glucose.: 328 mg/dL (02 May 2020 16:34)    PHYSICAL EXAM:    GENERAL: middle aged female, appears older than stated age, long extremities  HEAD:  Atraumatic, Normocephalic  EYES: EOMI, PERRLA, conjunctiva and sclera clear  ENMT: Moist mucous membranes  NECK: Supple, No JVD  NERVOUS SYSTEM:  Alert & Oriented X3, Motor Strength 5/5 B/L upper and lower extremities; DTRs 2+ intact and symmetric  CHEST/LUNG: Clear to auscultation bilaterally; No rales, rhonchi, wheezing, or rubs  HEART: Regular rate and rhythm; + S1/S2  ABDOMEN: Soft, Nontender, Nondistended; Bowel sounds present  EXTREMITIES:  no pedal edema

## 2020-05-04 LAB
ACETONE SERPL-MCNC: NEGATIVE — SIGNIFICANT CHANGE UP
ANION GAP SERPL CALC-SCNC: 10 MMOL/L — SIGNIFICANT CHANGE UP (ref 5–17)
ANION GAP SERPL CALC-SCNC: 12 MMOL/L — SIGNIFICANT CHANGE UP (ref 5–17)
APPEARANCE UR: CLEAR — SIGNIFICANT CHANGE UP
APTT BLD: 71.6 SEC — HIGH (ref 27.5–36.3)
BACTERIA # UR AUTO: ABNORMAL
BILIRUB UR-MCNC: NEGATIVE — SIGNIFICANT CHANGE UP
BUN SERPL-MCNC: 12 MG/DL — SIGNIFICANT CHANGE UP (ref 8–20)
BUN SERPL-MCNC: 14 MG/DL — SIGNIFICANT CHANGE UP (ref 8–20)
CALCIUM SERPL-MCNC: 7.5 MG/DL — LOW (ref 8.6–10.2)
CALCIUM SERPL-MCNC: 8.7 MG/DL — SIGNIFICANT CHANGE UP (ref 8.6–10.2)
CHLORIDE SERPL-SCNC: 102 MMOL/L — SIGNIFICANT CHANGE UP (ref 98–107)
CHLORIDE SERPL-SCNC: 98 MMOL/L — SIGNIFICANT CHANGE UP (ref 98–107)
CHOLEST SERPL-MCNC: 153 MG/DL — SIGNIFICANT CHANGE UP (ref 110–199)
CO2 SERPL-SCNC: 18 MMOL/L — LOW (ref 22–29)
CO2 SERPL-SCNC: 21 MMOL/L — LOW (ref 22–29)
COLOR SPEC: YELLOW — SIGNIFICANT CHANGE UP
CREAT SERPL-MCNC: 0.44 MG/DL — LOW (ref 0.5–1.3)
CREAT SERPL-MCNC: 0.5 MG/DL — SIGNIFICANT CHANGE UP (ref 0.5–1.3)
DIFF PNL FLD: ABNORMAL
EPI CELLS # UR: SIGNIFICANT CHANGE UP
GLUCOSE BLDC GLUCOMTR-MCNC: 317 MG/DL — HIGH (ref 70–99)
GLUCOSE BLDC GLUCOMTR-MCNC: 344 MG/DL — HIGH (ref 70–99)
GLUCOSE BLDC GLUCOMTR-MCNC: 362 MG/DL — HIGH (ref 70–99)
GLUCOSE BLDC GLUCOMTR-MCNC: 405 MG/DL — HIGH (ref 70–99)
GLUCOSE BLDC GLUCOMTR-MCNC: 421 MG/DL — HIGH (ref 70–99)
GLUCOSE SERPL-MCNC: 257 MG/DL — HIGH (ref 70–99)
GLUCOSE SERPL-MCNC: 345 MG/DL — HIGH (ref 70–99)
GLUCOSE UR QL: 1000 MG/DL
HCT VFR BLD CALC: 31.7 % — LOW (ref 34.5–45)
HCT VFR BLD CALC: 32.9 % — LOW (ref 34.5–45)
HDLC SERPL-MCNC: 23 MG/DL — LOW
HGB BLD-MCNC: 10.3 G/DL — LOW (ref 11.5–15.5)
HGB BLD-MCNC: 9.5 G/DL — LOW (ref 11.5–15.5)
INR BLD: 1.58 RATIO — HIGH (ref 0.88–1.16)
KETONES UR-MCNC: NEGATIVE — SIGNIFICANT CHANGE UP
LEUKOCYTE ESTERASE UR-ACNC: ABNORMAL
LIPID PNL WITH DIRECT LDL SERPL: SIGNIFICANT CHANGE UP MG/DL
MAGNESIUM SERPL-MCNC: 1.9 MG/DL — SIGNIFICANT CHANGE UP (ref 1.6–2.6)
MCHC RBC-ENTMCNC: 25.1 PG — LOW (ref 27–34)
MCHC RBC-ENTMCNC: 25.4 PG — LOW (ref 27–34)
MCHC RBC-ENTMCNC: 30 GM/DL — LOW (ref 32–36)
MCHC RBC-ENTMCNC: 31.3 GM/DL — LOW (ref 32–36)
MCV RBC AUTO: 81.2 FL — SIGNIFICANT CHANGE UP (ref 80–100)
MCV RBC AUTO: 83.9 FL — SIGNIFICANT CHANGE UP (ref 80–100)
NITRITE UR-MCNC: NEGATIVE — SIGNIFICANT CHANGE UP
OSMOLALITY SERPL: 306 MOSMOL/KG — HIGH (ref 275–300)
OSMOLALITY UR: 646 MOSM/KG — SIGNIFICANT CHANGE UP (ref 300–1000)
PH UR: 6 — SIGNIFICANT CHANGE UP (ref 5–8)
PHOSPHATE SERPL-MCNC: 2.8 MG/DL — SIGNIFICANT CHANGE UP (ref 2.4–4.7)
PLATELET # BLD AUTO: 205 K/UL — SIGNIFICANT CHANGE UP (ref 150–400)
PLATELET # BLD AUTO: 261 K/UL — SIGNIFICANT CHANGE UP (ref 150–400)
POTASSIUM SERPL-MCNC: 4 MMOL/L — SIGNIFICANT CHANGE UP (ref 3.5–5.3)
POTASSIUM SERPL-MCNC: 4.1 MMOL/L — SIGNIFICANT CHANGE UP (ref 3.5–5.3)
POTASSIUM SERPL-SCNC: 4 MMOL/L — SIGNIFICANT CHANGE UP (ref 3.5–5.3)
POTASSIUM SERPL-SCNC: 4.1 MMOL/L — SIGNIFICANT CHANGE UP (ref 3.5–5.3)
PROT UR-MCNC: NEGATIVE MG/DL — SIGNIFICANT CHANGE UP
PROTHROM AB SERPL-ACNC: 18.1 SEC — HIGH (ref 10–12.9)
RBC # BLD: 3.78 M/UL — LOW (ref 3.8–5.2)
RBC # BLD: 4.05 M/UL — SIGNIFICANT CHANGE UP (ref 3.8–5.2)
RBC # FLD: 17 % — HIGH (ref 10.3–14.5)
RBC # FLD: 17.4 % — HIGH (ref 10.3–14.5)
RBC CASTS # UR COMP ASSIST: SIGNIFICANT CHANGE UP /HPF (ref 0–4)
SODIUM SERPL-SCNC: 128 MMOL/L — LOW (ref 135–145)
SODIUM SERPL-SCNC: 133 MMOL/L — LOW (ref 135–145)
SODIUM UR-SCNC: 34 MMOL/L — SIGNIFICANT CHANGE UP
SP GR SPEC: 1.01 — SIGNIFICANT CHANGE UP (ref 1.01–1.02)
TOTAL CHOLESTEROL/HDL RATIO MEASUREMENT: 7 RATIO — SIGNIFICANT CHANGE UP (ref 3.3–7.1)
TRIGL SERPL-MCNC: 744 MG/DL — HIGH (ref 10–200)
URATE SERPL-MCNC: 3 MG/DL — SIGNIFICANT CHANGE UP (ref 2.4–5.7)
UROBILINOGEN FLD QL: NEGATIVE MG/DL — SIGNIFICANT CHANGE UP
WBC # BLD: 11.26 K/UL — HIGH (ref 3.8–10.5)
WBC # BLD: 11.38 K/UL — HIGH (ref 3.8–10.5)
WBC # FLD AUTO: 11.26 K/UL — HIGH (ref 3.8–10.5)
WBC # FLD AUTO: 11.38 K/UL — HIGH (ref 3.8–10.5)
WBC UR QL: ABNORMAL

## 2020-05-04 PROCEDURE — 99233 SBSQ HOSP IP/OBS HIGH 50: CPT

## 2020-05-04 PROCEDURE — 70551 MRI BRAIN STEM W/O DYE: CPT | Mod: 26

## 2020-05-04 RX ORDER — HEPARIN SODIUM 5000 [USP'U]/ML
700 INJECTION INTRAVENOUS; SUBCUTANEOUS
Qty: 25000 | Refills: 0 | Status: DISCONTINUED | OUTPATIENT
Start: 2020-05-04 | End: 2020-05-08

## 2020-05-04 RX ORDER — WARFARIN SODIUM 2.5 MG/1
10 TABLET ORAL ONCE
Refills: 0 | Status: COMPLETED | OUTPATIENT
Start: 2020-05-04 | End: 2020-05-04

## 2020-05-04 RX ORDER — INSULIN LISPRO 100/ML
10 VIAL (ML) SUBCUTANEOUS ONCE
Refills: 0 | Status: COMPLETED | OUTPATIENT
Start: 2020-05-04 | End: 2020-05-04

## 2020-05-04 RX ORDER — INSULIN LISPRO 100/ML
6 VIAL (ML) SUBCUTANEOUS ONCE
Refills: 0 | Status: COMPLETED | OUTPATIENT
Start: 2020-05-04 | End: 2020-05-04

## 2020-05-04 RX ORDER — INSULIN GLARGINE 100 [IU]/ML
20 INJECTION, SOLUTION SUBCUTANEOUS AT BEDTIME
Refills: 0 | Status: DISCONTINUED | OUTPATIENT
Start: 2020-05-04 | End: 2020-05-06

## 2020-05-04 RX ORDER — FENOFIBRATE,MICRONIZED 130 MG
145 CAPSULE ORAL DAILY
Refills: 0 | Status: DISCONTINUED | OUTPATIENT
Start: 2020-05-04 | End: 2020-05-08

## 2020-05-04 RX ORDER — ATORVASTATIN CALCIUM 80 MG/1
40 TABLET, FILM COATED ORAL AT BEDTIME
Refills: 0 | Status: DISCONTINUED | OUTPATIENT
Start: 2020-05-04 | End: 2020-05-08

## 2020-05-04 RX ORDER — INSULIN LISPRO 100/ML
5 VIAL (ML) SUBCUTANEOUS
Refills: 0 | Status: DISCONTINUED | OUTPATIENT
Start: 2020-05-04 | End: 2020-05-06

## 2020-05-04 RX ORDER — GEMFIBROZIL 600 MG
600 TABLET ORAL
Refills: 0 | Status: DISCONTINUED | OUTPATIENT
Start: 2020-05-04 | End: 2020-05-04

## 2020-05-04 RX ORDER — SODIUM CHLORIDE 9 MG/ML
1000 INJECTION INTRAMUSCULAR; INTRAVENOUS; SUBCUTANEOUS
Refills: 0 | Status: COMPLETED | OUTPATIENT
Start: 2020-05-04 | End: 2020-05-04

## 2020-05-04 RX ORDER — CLONAZEPAM 1 MG
1 TABLET ORAL
Refills: 0 | Status: DISCONTINUED | OUTPATIENT
Start: 2020-05-04 | End: 2020-05-08

## 2020-05-04 RX ADMIN — Medication 15 MILLIGRAM(S): at 21:48

## 2020-05-04 RX ADMIN — MONTELUKAST 10 MILLIGRAM(S): 4 TABLET, CHEWABLE ORAL at 12:08

## 2020-05-04 RX ADMIN — Medication 15 MILLIGRAM(S): at 14:55

## 2020-05-04 RX ADMIN — LURASIDONE HYDROCHLORIDE 20 MILLIGRAM(S): 40 TABLET ORAL at 13:21

## 2020-05-04 RX ADMIN — Medication 12: at 12:02

## 2020-05-04 RX ADMIN — INSULIN GLARGINE 20 UNIT(S): 100 INJECTION, SOLUTION SUBCUTANEOUS at 21:47

## 2020-05-04 RX ADMIN — Medication 6 UNIT(S): at 21:47

## 2020-05-04 RX ADMIN — Medication 25 MILLIGRAM(S): at 05:00

## 2020-05-04 RX ADMIN — Medication 5 UNIT(S): at 18:36

## 2020-05-04 RX ADMIN — Medication 15 MILLIGRAM(S): at 05:00

## 2020-05-04 RX ADMIN — CHLORHEXIDINE GLUCONATE 1 APPLICATION(S): 213 SOLUTION TOPICAL at 05:00

## 2020-05-04 RX ADMIN — Medication 8: at 18:35

## 2020-05-04 RX ADMIN — SODIUM CHLORIDE 100 MILLILITER(S): 9 INJECTION INTRAMUSCULAR; INTRAVENOUS; SUBCUTANEOUS at 12:58

## 2020-05-04 RX ADMIN — DULOXETINE HYDROCHLORIDE 90 MILLIGRAM(S): 30 CAPSULE, DELAYED RELEASE ORAL at 12:08

## 2020-05-04 RX ADMIN — Medication 81 MILLIGRAM(S): at 12:09

## 2020-05-04 RX ADMIN — METHADONE HYDROCHLORIDE 10 MILLIGRAM(S): 40 TABLET ORAL at 05:04

## 2020-05-04 RX ADMIN — Medication 10: at 10:03

## 2020-05-04 RX ADMIN — ATORVASTATIN CALCIUM 40 MILLIGRAM(S): 80 TABLET, FILM COATED ORAL at 21:48

## 2020-05-04 RX ADMIN — WARFARIN SODIUM 10 MILLIGRAM(S): 2.5 TABLET ORAL at 21:48

## 2020-05-04 RX ADMIN — Medication 10 UNIT(S): at 13:45

## 2020-05-04 RX ADMIN — HEPARIN SODIUM 700 UNIT(S)/HR: 5000 INJECTION INTRAVENOUS; SUBCUTANEOUS at 07:32

## 2020-05-04 RX ADMIN — Medication 25 MICROGRAM(S): at 05:00

## 2020-05-04 RX ADMIN — METHADONE HYDROCHLORIDE 10 MILLIGRAM(S): 40 TABLET ORAL at 14:55

## 2020-05-04 RX ADMIN — Medication 1 TABLET(S): at 12:08

## 2020-05-04 RX ADMIN — METHADONE HYDROCHLORIDE 10 MILLIGRAM(S): 40 TABLET ORAL at 21:50

## 2020-05-04 RX ADMIN — Medication 145 MILLIGRAM(S): at 21:48

## 2020-05-04 NOTE — PROGRESS NOTE ADULT - SUBJECTIVE AND OBJECTIVE BOX
CHIEF COMPLAINT/INTERVAL HISTORY:    Patient is a 35y old  Female who presents with a chief complaint of right carotid artery dissection (03 May 2020 11:55)    SUBJECTIVE & OBJECTIVE: Pt seen and examined at bedside. No overnight events. Patient reports feeling tired today; additionally reports polydipsia and polyuria. Sugars elevated; heparin gtt changed to NS. Insulin adjusted. Labs pending. MRI confirmed small CVA; neuro consulted.    ROS: No chest pain, palpitations, SOB, light headedness, dizziness, headache, nausea/vomiting, fevers/chills, abdominal pain, dysuria or increased urinary frequency.    ICU Vital Signs Last 24 Hrs  T(C): 36.3 (04 May 2020 05:00), Max: 37.2 (03 May 2020 15:59)  T(F): 97.4 (04 May 2020 05:00), Max: 98.9 (03 May 2020 15:59)  HR: 94 (04 May 2020 09:01) (94 - 114)  BP: 93/62 (04 May 2020 09:01) (93/62 - 126/68)  RR: 18 (04 May 2020 09:01) (18 - 18)  SpO2: 98% (04 May 2020 09:01) (96% - 98%)    MEDICATIONS  (STANDING):  aspirin enteric coated 81 milliGRAM(s) Oral daily  atorvastatin 40 milliGRAM(s) Oral at bedtime  budesonide  80 MICROgram(s)/formoterol 4.5 MICROgram(s) Inhaler 2 Puff(s) Inhalation two times a day  busPIRone 15 milliGRAM(s) Oral every 8 hours  chlorhexidine 2% Cloths 1 Application(s) Topical <User Schedule>  DULoxetine 90 milliGRAM(s) Oral <User Schedule>  heparin  Infusion. 700 Unit(s)/Hr (7 mL/Hr) IV Continuous <Continuous>  insulin glargine Injectable (LANTUS) 20 Unit(s) SubCutaneous at bedtime  insulin lispro (HumaLOG) corrective regimen sliding scale   SubCutaneous three times a day before meals  insulin lispro Injectable (HumaLOG) 5 Unit(s) SubCutaneous three times a day before meals  levothyroxine 25 MICROGram(s) Oral daily  lurasidone 20 milliGRAM(s) Oral daily  methadone    Tablet 10 milliGRAM(s) Oral three times a day  metoprolol succinate ER 25 milliGRAM(s) Oral two times a day  montelukast 10 milliGRAM(s) Oral daily  multivitamin 1 Tablet(s) Oral daily  warfarin 10 milliGRAM(s) Oral once    MEDICATIONS  (PRN):  acetaminophen   Tablet .. 650 milliGRAM(s) Oral every 6 hours PRN Temp greater or equal to 38.5C (101.3F), Mild Pain (1 - 3)  clonazePAM  Tablet 1 milliGRAM(s) Oral <User Schedule> PRN anxiety  heparin   Injectable 7000 Unit(s) IV Push every 6 hours PRN For aPTT less than 40  heparin   Injectable 3500 Unit(s) IV Push every 6 hours PRN For aPTT between 40 - 57  oxyCODONE    IR 10 milliGRAM(s) Oral every 4 hours PRN Moderate Pain (4 - 6)      LABS:                        9.5    11.26 )-----------( 205      ( 04 May 2020 14:15 )             31.7       PT/INR - ( 04 May 2020 06:59 )   PT: 18.1 sec;   INR: 1.58 ratio         PTT - ( 04 May 2020 06:59 )  PTT:71.6 sec      CAPILLARY BLOOD GLUCOSE      POCT Blood Glucose.: 317 mg/dL (04 May 2020 13:32)  POCT Blood Glucose.: 405 mg/dL (04 May 2020 11:39)  POCT Blood Glucose.: 362 mg/dL (04 May 2020 08:14)  POCT Blood Glucose.: 425 mg/dL (03 May 2020 21:14)  POCT Blood Glucose.: 397 mg/dL (03 May 2020 17:17)      PHYSICAL EXAM:    GENERAL: middle aged female, appears older than stated age, long extremities  HEAD:  Atraumatic, Normocephalic  EYES: EOMI, PERRLA, conjunctiva and sclera clear  ENMT: Moist mucous membranes  NECK: Supple, No JVD  NERVOUS SYSTEM:  Alert & Oriented X3, Motor Strength 5/5 B/L upper and lower extremities   CHEST/LUNG: bilateral air entry  HEART: Regular rate and rhythm; + S1/S2  ABDOMEN: Soft, Nontender, Nondistended; Bowel sounds present  EXTREMITIES:  no pedal edema

## 2020-05-04 NOTE — PROGRESS NOTE ADULT - ASSESSMENT
Patient is a 35 year old female with hx of Marfan syndrome, mechanical AVR and MVR on warfarin, Aortic aneurysm, hypothyroidism, DM, TBI, presents with right internal carotid artery dissection and resolving stroke symptoms. Found to have a right carotid dissection. MRI confirmed tiny left frontal-cortical infarct.    Right carotid dissection   - CTA head/neck reviewed  CTA negative for aortic dissection  - continue heparin gtt + coumadin bridge  - no neuro deficits on exam  - vascular consult appreciated; no surgical indication    Acute left frontal cortical infarct  -MRI with tiny infarct  -already on ASA; start lipitor  -TTE with preserved EF  -CT brain stroke protocol and CTA head reviewed  -MRI also reviewed  -Neurochecks q 4hours  -Will consult cardio; unlikely cardio embolic  -Neuro consult  -PT evalation    Mechanical AVR and MVR  -INR 1.5; Coumadin increased to 10 mg and continue heparin gtt  -target INR 2.5-3.5    Aortic Aneurysm  -CT chest without aortic dissection  -TTE reviewed; preserved EF    Hypothyroidism   - c/w home dose synthroid    Depression/anxiety  - c/w home medications; mood stable  - psych consult appreciated    DM  -hyperglycemia noted; change heparin gtt to NS  -regular insulin 10 units stat now  -check stat labs and acetone  -add humalog and increase lantus   -insulin sliding scale coverage    Chronic pain  -history of spinal surgery  -continue home pain meds; continue oxycodone as needed and methadone    DVT ppx- currently on Heparin gtt + coumadin    Dispo - home once INR is therapeutic    Attending Attestation:   Plan discussed with patient, RN Patient is a 35 year old female with hx of Marfan syndrome, mechanical AVR and MVR on warfarin, Aortic aneurysm, hypothyroidism, DM, TBI, presents with right internal carotid artery dissection and resolving stroke symptoms. Found to have a right carotid dissection. MRI confirmed tiny left frontal-cortical infarct.    Right carotid dissection   - CTA head/neck reviewed  - CTA negative for aortic dissection  - continue heparin gtt + coumadin bridge  - no neuro deficits on exam  - vascular consult appreciated; no surgical indication    Acute left frontal cortical infarct  -MRI with tiny infarct; discussed with Dr. Carroll - incidental finding  -no neuro deficits on exam  -recommending to continue ASA; start lipitor  -TTE with preserved EF  -CT brain stroke protocol and CTA head reviewed  -MRI as above  -Neurochecks q 4hours  -Will consult cardio; unlikely cardio embolic source and likely incidental  -check hbA1c and lipid panel  -PT evaluation   -Discussed with Dr. Carroll - states no further work up needs to be done from a neuro perspective. Outpatient follow up.    Mechanical AVR and MVR  -INR 1.5; Coumadin increased to 10 mg and continue heparin gtt  -target INR 2.5-3.5    Aortic Aneurysm  -CT chest without aortic dissection  -TTE reviewed; preserved EF    Hypothyroidism   - c/w home dose synthroid    Depression/anxiety  - c/w home medications; mood stable  - psych consult appreciated    DM  -hyperglycemia noted; change heparin gtt to NS  -regular insulin 10 units stat now  -check stat labs and acetone  -add humalog and increase lantus   -insulin sliding scale coverage    Chronic pain  -history of spinal surgery  -continue home pain meds; continue oxycodone as needed and methadone    DVT ppx- currently on Heparin gtt + coumadin    Dispo - home once INR is therapeutic    Attending Attestation:   Plan discussed with patient, RN, Dr. Carroll Patient is a 35 year old female with hx of Marfan syndrome, mechanical AVR and MVR on warfarin, Aortic aneurysm, hypothyroidism, DM, TBI, presents with right internal carotid artery dissection and resolving stroke symptoms. Found to have a right carotid dissection. MRI confirmed tiny left frontal-cortical infarct.    Right carotid dissection   - CTA head/neck reviewed  - CTA negative for aortic dissection  - continue heparin gtt + coumadin bridge  - no neuro deficits on exam  - vascular consult appreciated; no surgical indication    Acute left frontal cortical infarct  -MRI with tiny infarct; discussed with Dr. Carroll - incidental finding  -no neuro deficits on exam  -recommending to continue ASA; start lipitor  -TTE with preserved EF  -CT brain stroke protocol and CTA head reviewed  -MRI as above  -Neurochecks q 4hours  -Cardio consulted - NPO after midnight for CECILY on 5/5  -HbA1c 8.4  -lipid panel reviewed; TG significantly elevated. Repeat lipid panel stat. Started on lipitor as above. Will add gemfibrozil.   -PT evaluation   -Discussed with Dr. Carroll - states no further work up needs to be done from a neuro perspective. Outpatient follow up.    Mechanical AVR and MVR  -INR 1.5; Coumadin increased to 10 mg and continue heparin gtt  -target INR 2.5-3.5    Aortic Aneurysm  -CT chest without aortic dissection  -TTE reviewed; preserved EF    Hypothyroidism   - c/w home dose synthroid    Depression/anxiety  - c/w home medications; mood stable  - psych consult appreciated    DM  -hyperglycemia noted; change heparin gtt to NS  -regular insulin 10 units stat now  -check stat labs and acetone  -add humalog and increase lantus   -insulin sliding scale coverage    Chronic pain  -history of spinal surgery  -continue home pain meds; continue oxycodone as needed and methadone    Hyponatremia  -corrected sodium for glucose is 132  -patient reports polydipsia  -check urine sodium/osm  -check TSH  -add free water restriction  -trial of judicious hydration with NS  -repeat BMP tonight  -monitor I/Os    DVT ppx- currently on Heparin gtt + coumadin    Dispo - home once INR is therapeutic    Attending Attestation:   Plan discussed with patient, RN, Dr. Carroll

## 2020-05-04 NOTE — CONSULT NOTE ADULT - SUBJECTIVE AND OBJECTIVE BOX
[facility], [facility]                                   [address] [place] [address]                                                      PHONE: [phone]                                                         FAX: [phone]  [email or web address]  ---------------------------------------------------------------------------------------------------------------------------------    Reason for Consult: CVA     HPI:  JUANY FELICIANO is an 35y Female with history of Marfan syndrome fibromyalgia DM s/p mechanical AVR/MVR with aortic root repair 12/2013 lasted NUC normal perfusion 2016 who presents with right sided weakness confusion and difficulties finding words.  CTA of the neck showed possible right dissection treated medical by vascular.  The Patient is feeling well and denies any chest pain orthopnea or PND.      PAST MEDICAL & SURGICAL HISTORY:  Mitral valve prolapse  Dilated aortic root  Diabetes  Anxiety  Depression  Fibromyalgia  Marfan syndrome  H/O artificial lens replacement  H/O spinal fusion: 2005  H/O aortic valve replacement: 2013      No Known Allergies      MEDICATIONS  (STANDING):  aspirin enteric coated 81 milliGRAM(s) Oral daily  atorvastatin 40 milliGRAM(s) Oral at bedtime  budesonide  80 MICROgram(s)/formoterol 4.5 MICROgram(s) Inhaler 2 Puff(s) Inhalation two times a day  busPIRone 15 milliGRAM(s) Oral every 8 hours  chlorhexidine 2% Cloths 1 Application(s) Topical <User Schedule>  DULoxetine 90 milliGRAM(s) Oral <User Schedule>  heparin  Infusion. 700 Unit(s)/Hr (7 mL/Hr) IV Continuous <Continuous>  insulin glargine Injectable (LANTUS) 20 Unit(s) SubCutaneous at bedtime  insulin lispro (HumaLOG) corrective regimen sliding scale   SubCutaneous three times a day before meals  insulin lispro Injectable (HumaLOG) 5 Unit(s) SubCutaneous three times a day before meals  levothyroxine 25 MICROGram(s) Oral daily  lurasidone 20 milliGRAM(s) Oral daily  methadone    Tablet 10 milliGRAM(s) Oral three times a day  metoprolol succinate ER 25 milliGRAM(s) Oral two times a day  montelukast 10 milliGRAM(s) Oral daily  multivitamin 1 Tablet(s) Oral daily  warfarin 10 milliGRAM(s) Oral once    MEDICATIONS  (PRN):  acetaminophen   Tablet .. 650 milliGRAM(s) Oral every 6 hours PRN Temp greater or equal to 38.5C (101.3F), Mild Pain (1 - 3)  clonazePAM  Tablet 1 milliGRAM(s) Oral <User Schedule> PRN anxiety  heparin   Injectable 7000 Unit(s) IV Push every 6 hours PRN For aPTT less than 40  heparin   Injectable 3500 Unit(s) IV Push every 6 hours PRN For aPTT between 40 - 57  oxyCODONE    IR 10 milliGRAM(s) Oral every 4 hours PRN Moderate Pain (4 - 6)      Social History:  Cigarettes:      none               Alchohol:         none         Illicit Drug Abuse:  none    FH negative for CAD    ROS: Negative other than as mentioned in HPI.    Vital Signs Last 24 Hrs  T(C): 36.3 (04 May 2020 05:00), Max: 37.2 (03 May 2020 15:59)  T(F): 97.4 (04 May 2020 05:00), Max: 98.9 (03 May 2020 15:59)  HR: 94 (04 May 2020 09:01) (94 - 114)  BP: 93/62 (04 May 2020 09:01) (93/62 - 126/68)  BP(mean): --  RR: 18 (04 May 2020 09:01) (18 - 18)  SpO2: 98% (04 May 2020 09:01) (96% - 98%)  ICU Vital Signs Last 24 Hrs  JUANY FELICIANO I&DAMON's Detail    03 May 2020 07:01  -  04 May 2020 07:00  --------------------------------------------------------  IN:    heparin  Infusion.: 84 mL  Total IN: 84 mL    OUT:  Total OUT: 0 mL    Total NET: 84 mL        I&O's Summary    03 May 2020 07:01  -  04 May 2020 07:00  --------------------------------------------------------  IN: 84 mL / OUT: 0 mL / NET: 84 mL      Drug Dosing Weight  JUANY FELICIANO      PHYSICAL EXAM:  General: Appears well developed, well nourished alert and cooperative.  HEENT: Head; normocephalic, atraumatic.  Eyes: Pupils reactive, cornea wnl.  Neck: Supple, no nodes adenopathy, no NVD or carotid bruit or thyromegaly.  CARDIOVASCULAR: Normal S1 and S2, 2/6 murmur, rub, gallop or lift.   LUNGS: No rales, rhonchi or wheeze. Normal breath sounds bilaterally.  ABDOMEN: Soft, nontender without mass or organomegaly. bowel sounds normoactive.  EXTREMITIES: No clubbing, cyanosis or edema. Distal pulses wnl.   SKIN: warm and dry with normal turgor.  NEURO: Alert/oriented x 3/normal motor exam. No pathologic reflexes.    PSYCH: normal affect.        LABS:                        9.5    11.26 )-----------( 205      ( 04 May 2020 14:15 )             31.7     05-04    128<L>  |  98  |  14.0  ----------------------------<  257<H>  4.1   |  18.0<L>  |  0.50    Ca    8.7      04 May 2020 14:15  Phos  2.8     05-04  Mg     1.9     05-04      JUANY FELICIANO      PT/INR - ( 04 May 2020 06:59 )   PT: 18.1 sec;   INR: 1.58 ratio         PTT - ( 04 May 2020 06:59 )  PTT:71.6 sec      RADIOLOGY & ADDITIONAL STUDIES:    INTERPRETATION OF TELEMETRY (personally reviewed):    ECG:  Diagnosis Line Sinus rhythm with 1st degree A-V block  Incomplete right bundle branch block      ECHO:  Summary:   1. Left ventricular ejection fraction, by visual estimation, is 60 to 65%.   2. Normal global left ventricular systolic function.   3. There is no evidence of pericardial effusion.   4. A mechanical valve is present in the mitral position.   5. Mechanical prosthesisin the aortic valve position.   6. Mitral valve mean gradient is 4.0 mmHg consistent with mild mitral stenosis.    MD Ignacia Electronically signed on 5/2/2020 at 11:24:59 AM       IMPRESSION:     CT angiography neck:  1.  Intimal flap in the right cervical ICA suggesting dissection. Slightly beaded appearance of the right ICA in the region of the dissection, raising the possibility of fibromuscular dysplasia.  2.  No hemodynamically significant stenosis of the bilateral cervical ICAs using NASCET criteria.  Patent vertebral arteries.     CT angiography brain: No large vessel occlusion. No evidence of aneurysm.    Brain CT without contrast:   No evidence of intracranial hemorrhage or mass effect.    CT perfusion:  No evidence of perfusion abnormality.    These findings were discussed with Dr. KARLY STONER [phone] at 4/30/2020 9:02 PM by Dr. Abdi Cope with read back confirmation.        IMPRESSION:      1)  tiny acute left frontal cortical infarct. Multiple white matter lesions in both hemispheres predominantly subcortical..  2)  no microhemorrhage or mass..                Assessment and Plan:  In summary, JUANY FELICIANO is an 35y Female with past medical history significant for history of Marfan syndrome fibromyalgia DM s/p mechanical AVR/MVR with aortic root repair 12/2013 lasted NUC normal perfusion 2016 who presents with right sided weakness confusion and difficulties finding words.  CTA of the neck showed possible right dissection treated medical by vascular.   MRI of the  Brain tiny left frontal cortical infarct with multiple white matter lesions in both hemispheres without micro-hemorrhage or mass.  TTE stable see above.  The patient's symptomatic were in setting of subtherapeutic INR     NPO after midnight for CECILY to evaluate AVR/MVR     INR goal 3 to 3.5 with ASA

## 2020-05-05 LAB
A1C WITH ESTIMATED AVERAGE GLUCOSE RESULT: 8.6 % — HIGH (ref 4–5.6)
ANION GAP SERPL CALC-SCNC: 14 MMOL/L — SIGNIFICANT CHANGE UP (ref 5–17)
APTT BLD: 58.6 SEC — HIGH (ref 27.5–36.3)
APTT BLD: 69.3 SEC — HIGH (ref 27.5–36.3)
BUN SERPL-MCNC: 16 MG/DL — SIGNIFICANT CHANGE UP (ref 8–20)
CALCIUM SERPL-MCNC: 8.9 MG/DL — SIGNIFICANT CHANGE UP (ref 8.6–10.2)
CHLORIDE SERPL-SCNC: 101 MMOL/L — SIGNIFICANT CHANGE UP (ref 98–107)
CO2 SERPL-SCNC: 21 MMOL/L — LOW (ref 22–29)
CREAT SERPL-MCNC: 0.48 MG/DL — LOW (ref 0.5–1.3)
ESTIMATED AVERAGE GLUCOSE: 200 MG/DL — HIGH (ref 68–114)
GLUCOSE BLDC GLUCOMTR-MCNC: 256 MG/DL — HIGH (ref 70–99)
GLUCOSE BLDC GLUCOMTR-MCNC: 284 MG/DL — HIGH (ref 70–99)
GLUCOSE BLDC GLUCOMTR-MCNC: 287 MG/DL — HIGH (ref 70–99)
GLUCOSE BLDC GLUCOMTR-MCNC: 327 MG/DL — HIGH (ref 70–99)
GLUCOSE BLDC GLUCOMTR-MCNC: 331 MG/DL — HIGH (ref 70–99)
GLUCOSE BLDC GLUCOMTR-MCNC: 360 MG/DL — HIGH (ref 70–99)
GLUCOSE BLDC GLUCOMTR-MCNC: 368 MG/DL — HIGH (ref 70–99)
GLUCOSE SERPL-MCNC: 312 MG/DL — HIGH (ref 70–99)
HCG UR QL: NEGATIVE — SIGNIFICANT CHANGE UP
HCT VFR BLD CALC: 30.8 % — LOW (ref 34.5–45)
HCT VFR BLD CALC: 33.7 % — LOW (ref 34.5–45)
HGB BLD-MCNC: 10.4 G/DL — LOW (ref 11.5–15.5)
HGB BLD-MCNC: 9.4 G/DL — LOW (ref 11.5–15.5)
INR BLD: 1.93 RATIO — HIGH (ref 0.88–1.16)
MAGNESIUM SERPL-MCNC: 2 MG/DL — SIGNIFICANT CHANGE UP (ref 1.8–2.6)
MCHC RBC-ENTMCNC: 25.1 PG — LOW (ref 27–34)
MCHC RBC-ENTMCNC: 25.4 PG — LOW (ref 27–34)
MCHC RBC-ENTMCNC: 30.5 GM/DL — LOW (ref 32–36)
MCHC RBC-ENTMCNC: 30.9 GM/DL — LOW (ref 32–36)
MCV RBC AUTO: 82.4 FL — SIGNIFICANT CHANGE UP (ref 80–100)
MCV RBC AUTO: 82.4 FL — SIGNIFICANT CHANGE UP (ref 80–100)
PHOSPHATE SERPL-MCNC: 2.8 MG/DL — SIGNIFICANT CHANGE UP (ref 2.4–4.7)
PLATELET # BLD AUTO: 241 K/UL — SIGNIFICANT CHANGE UP (ref 150–400)
PLATELET # BLD AUTO: 285 K/UL — SIGNIFICANT CHANGE UP (ref 150–400)
POTASSIUM SERPL-MCNC: 4.2 MMOL/L — SIGNIFICANT CHANGE UP (ref 3.5–5.3)
POTASSIUM SERPL-SCNC: 4.2 MMOL/L — SIGNIFICANT CHANGE UP (ref 3.5–5.3)
PROTHROM AB SERPL-ACNC: 22.3 SEC — HIGH (ref 10–12.9)
RBC # BLD: 3.74 M/UL — LOW (ref 3.8–5.2)
RBC # BLD: 4.09 M/UL — SIGNIFICANT CHANGE UP (ref 3.8–5.2)
RBC # FLD: 17.5 % — HIGH (ref 10.3–14.5)
RBC # FLD: 17.5 % — HIGH (ref 10.3–14.5)
SODIUM SERPL-SCNC: 135 MMOL/L — SIGNIFICANT CHANGE UP (ref 135–145)
TSH SERPL-MCNC: 4.63 UIU/ML — HIGH (ref 0.27–4.2)
WBC # BLD: 10.37 K/UL — SIGNIFICANT CHANGE UP (ref 3.8–10.5)
WBC # BLD: 10.47 K/UL — SIGNIFICANT CHANGE UP (ref 3.8–10.5)
WBC # FLD AUTO: 10.37 K/UL — SIGNIFICANT CHANGE UP (ref 3.8–10.5)
WBC # FLD AUTO: 10.47 K/UL — SIGNIFICANT CHANGE UP (ref 3.8–10.5)

## 2020-05-05 PROCEDURE — 99233 SBSQ HOSP IP/OBS HIGH 50: CPT

## 2020-05-05 RX ORDER — WARFARIN SODIUM 2.5 MG/1
10 TABLET ORAL ONCE
Refills: 0 | Status: COMPLETED | OUTPATIENT
Start: 2020-05-05 | End: 2020-05-05

## 2020-05-05 RX ADMIN — HEPARIN SODIUM 700 UNIT(S)/HR: 5000 INJECTION INTRAVENOUS; SUBCUTANEOUS at 17:33

## 2020-05-05 RX ADMIN — METHADONE HYDROCHLORIDE 10 MILLIGRAM(S): 40 TABLET ORAL at 13:05

## 2020-05-05 RX ADMIN — CHLORHEXIDINE GLUCONATE 1 APPLICATION(S): 213 SOLUTION TOPICAL at 05:05

## 2020-05-05 RX ADMIN — WARFARIN SODIUM 10 MILLIGRAM(S): 2.5 TABLET ORAL at 21:25

## 2020-05-05 RX ADMIN — Medication 81 MILLIGRAM(S): at 13:05

## 2020-05-05 RX ADMIN — Medication 5 UNIT(S): at 17:35

## 2020-05-05 RX ADMIN — Medication 25 MILLIGRAM(S): at 17:35

## 2020-05-05 RX ADMIN — DULOXETINE HYDROCHLORIDE 90 MILLIGRAM(S): 30 CAPSULE, DELAYED RELEASE ORAL at 13:05

## 2020-05-05 RX ADMIN — Medication 15 MILLIGRAM(S): at 05:05

## 2020-05-05 RX ADMIN — MONTELUKAST 10 MILLIGRAM(S): 4 TABLET, CHEWABLE ORAL at 17:35

## 2020-05-05 RX ADMIN — Medication 145 MILLIGRAM(S): at 13:05

## 2020-05-05 RX ADMIN — Medication 10: at 17:35

## 2020-05-05 RX ADMIN — METHADONE HYDROCHLORIDE 10 MILLIGRAM(S): 40 TABLET ORAL at 21:26

## 2020-05-05 RX ADMIN — ATORVASTATIN CALCIUM 40 MILLIGRAM(S): 80 TABLET, FILM COATED ORAL at 21:26

## 2020-05-05 RX ADMIN — HEPARIN SODIUM 700 UNIT(S)/HR: 5000 INJECTION INTRAVENOUS; SUBCUTANEOUS at 09:55

## 2020-05-05 RX ADMIN — LURASIDONE HYDROCHLORIDE 20 MILLIGRAM(S): 40 TABLET ORAL at 17:35

## 2020-05-05 RX ADMIN — Medication 15 MILLIGRAM(S): at 13:05

## 2020-05-05 RX ADMIN — INSULIN GLARGINE 20 UNIT(S): 100 INJECTION, SOLUTION SUBCUTANEOUS at 21:26

## 2020-05-05 RX ADMIN — Medication 15 MILLIGRAM(S): at 21:26

## 2020-05-05 RX ADMIN — Medication 10: at 05:54

## 2020-05-05 RX ADMIN — Medication 1 TABLET(S): at 13:05

## 2020-05-05 RX ADMIN — Medication 25 MICROGRAM(S): at 05:05

## 2020-05-05 RX ADMIN — METHADONE HYDROCHLORIDE 10 MILLIGRAM(S): 40 TABLET ORAL at 05:05

## 2020-05-05 NOTE — PROGRESS NOTE ADULT - SUBJECTIVE AND OBJECTIVE BOX
CHIEF COMPLAINT/INTERVAL HISTORY:    Patient is a 35y old  Female who presents with a chief complaint of right carotid artery dissection (05 May 2020 11:14)    SUBJECTIVE & OBJECTIVE: Pt seen and examined at bedside. No overnight events. Reports feeling tired. INR improving. CECILY negative for vegetation or thrombus.     ROS: No chest pain, palpitations, SOB, light headedness, dizziness, headache, nausea/vomiting, fevers/chills, abdominal pain, dysuria or increased urinary frequency.    ICU Vital Signs Last 24 Hrs  T(C): 36.7 (05 May 2020 10:25), Max: 36.9 (04 May 2020 20:09)  T(F): 98 (05 May 2020 10:25), Max: 98.4 (04 May 2020 20:09)  HR: 96 (05 May 2020 07:00) (93 - 109)  BP: 117/62 (05 May 2020 10:25) (97/73 - 117/62)  BP(mean): 1 (05 May 2020 10:25) (1 - 1)  RR: 18 (05 May 2020 10:25) (18 - 18)  SpO2: 98% (05 May 2020 10:25) (97% - 100%)    MEDICATIONS  (STANDING):  aspirin enteric coated 81 milliGRAM(s) Oral daily  atorvastatin 40 milliGRAM(s) Oral at bedtime  budesonide  80 MICROgram(s)/formoterol 4.5 MICROgram(s) Inhaler 2 Puff(s) Inhalation two times a day  busPIRone 15 milliGRAM(s) Oral every 8 hours  chlorhexidine 2% Cloths 1 Application(s) Topical <User Schedule>  DULoxetine 90 milliGRAM(s) Oral <User Schedule>  fenofibrate Tablet 145 milliGRAM(s) Oral daily  heparin  Infusion. 700 Unit(s)/Hr (7 mL/Hr) IV Continuous <Continuous>  insulin glargine Injectable (LANTUS) 20 Unit(s) SubCutaneous at bedtime  insulin lispro (HumaLOG) corrective regimen sliding scale   SubCutaneous three times a day before meals  insulin lispro Injectable (HumaLOG) 5 Unit(s) SubCutaneous three times a day before meals  levothyroxine 25 MICROGram(s) Oral daily  lurasidone 20 milliGRAM(s) Oral daily  methadone    Tablet 10 milliGRAM(s) Oral three times a day  metoprolol succinate ER 25 milliGRAM(s) Oral two times a day  montelukast 10 milliGRAM(s) Oral daily  multivitamin 1 Tablet(s) Oral daily  warfarin 10 milliGRAM(s) Oral once    MEDICATIONS  (PRN):  acetaminophen   Tablet .. 650 milliGRAM(s) Oral every 6 hours PRN Temp greater or equal to 38.5C (101.3F), Mild Pain (1 - 3)  clonazePAM  Tablet 1 milliGRAM(s) Oral <User Schedule> PRN anxiety  heparin   Injectable 7000 Unit(s) IV Push every 6 hours PRN For aPTT less than 40  heparin   Injectable 3500 Unit(s) IV Push every 6 hours PRN For aPTT between 40 - 57  oxyCODONE    IR 10 milliGRAM(s) Oral every 4 hours PRN Moderate Pain (4 - 6)      LABS:                        9.4    10.37 )-----------( 241      ( 05 May 2020 09:07 )             30.8     05-05    135  |  101  |  16.0  ----------------------------<  312<H>  4.2   |  21.0<L>  |  0.48<L>    Ca    8.9      05 May 2020 09:07  Phos  2.8     05-05  Mg     2.0     05-05      PT/INR - ( 05 May 2020 09:07 )   PT: 22.3 sec;   INR: 1.93 ratio         PTT - ( 05 May 2020 09:07 )  PTT:58.6 sec  Urinalysis Basic - ( 04 May 2020 16:33 )    Color: Yellow / Appearance: Clear / S.015 / pH: x  Gluc: x / Ketone: Negative  / Bili: Negative / Urobili: Negative mg/dL   Blood: x / Protein: Negative mg/dL / Nitrite: Negative   Leuk Esterase: Small / RBC: 0-2 /HPF / WBC 6-10   Sq Epi: x / Non Sq Epi: Few / Bacteria: Occasional        CAPILLARY BLOOD GLUCOSE      POCT Blood Glucose.: 256 mg/dL (05 May 2020 12:35)  POCT Blood Glucose.: 284 mg/dL (05 May 2020 11:12)  POCT Blood Glucose.: 287 mg/dL (05 May 2020 10:21)  POCT Blood Glucose.: 327 mg/dL (05 May 2020 07:34)  POCT Blood Glucose.: 360 mg/dL (05 May 2020 05:51)  POCT Blood Glucose.: 421 mg/dL (04 May 2020 21:10)  POCT Blood Glucose.: 344 mg/dL (04 May 2020 16:32)      PHYSICAL EXAM:    GENERAL: middle aged female, appears older than stated age, long extremities  HEAD:  Atraumatic, Normocephalic  EYES: EOMI, PERRLA, conjunctiva and sclera clear  ENMT: Moist mucous membranes  NECK: Supple   NERVOUS SYSTEM:  Alert & Oriented X3   CHEST/LUNG: bilateral air entry  HEART: Regular rate and rhythm; + S1/S2  ABDOMEN: Soft, Nontender, Nondistended; Bowel sounds present  EXTREMITIES:  no pedal edema

## 2020-05-05 NOTE — PROGRESS NOTE ADULT - SUBJECTIVE AND OBJECTIVE BOX
CECILY Preliminary Report:    1. There is significant artifact present from two mechanical prostheses. There is no clear evidence of vegetation or thrombus seen.   2. Normal left ventricular size and wall thicknesses, with normal systolic and diastolic function.   3. Normal right ventricular size and function.   4. A mechanical valve is present in the mitral position. Mitral prosthesis is functioning normally.   5. Mild-moderate tricuspid regurgitation.   6. Mechanical prosthesis in the aortic valve position. Echo findings are consistent with normal structure and function of the aortic prosthesis.   7. No left atrial appendage thrombus and normal left atrial appendage velocities.    Full report to follow.

## 2020-05-05 NOTE — PROGRESS NOTE ADULT - ASSESSMENT
Patient is a 35 year old female with hx of Marfan syndrome, mechanical AVR and MVR on warfarin, Aortic aneurysm, hypothyroidism, DM, TBI, presents with right internal carotid artery dissection and resolving stroke symptoms. Found to have a right carotid dissection. MRI confirmed tiny left frontal-cortical infarct.    Right carotid dissection   - CTA head/neck reviewed  - CTA negative for aortic dissection  - continue heparin gtt + coumadin bridge  - no neuro deficits on exam  - vascular consult appreciated; no surgical indication    Acute left frontal cortical infarct  -MRI with tiny infarct; discussed with Dr. Carroll - incidental finding  -no neuro deficits on exam  -recommending to continue ASA; start lipitor  -TTE with preserved EF  -CT brain stroke protocol and CTA head reviewed  -MRI as above  -Cardio consult appreciated; CECILY without vegetation of thrombus  -HbA1c 8.4  -lipid panel reviewed; TG significantly elevated. Started fenofibrate and statin.  -change neurochecks to qshift  -PT evaluation   -Discussed with Dr. Carroll - states no further work up needs to be done from a neuro perspective. Outpatient follow up.    Mechanical AVR and MVR  -INR 1.9; continue Coumadin 10 mg and continue heparin gtt  -target INR 3.0-3.5    Aortic Aneurysm  -CT chest without aortic dissection  -TTE reviewed; preserved EF    Hypothyroidism   - c/w home dose synthroid    Depression/anxiety  - c/w home medications; mood stable  - psych consult appreciated    DM  -hyperglycemia improved  -acetone negative  -continue humalog and lantus   -insulin sliding scale coverage as ordered    Chronic pain  -history of spinal surgery  -continue home pain meds; continue oxycodone as needed and methadone    Hyponatremia - resolved  -likely due to hypovolemia given urine osm  -sodium 135; s/p 1 liter of NS  -continue free water restriction  -monitor I/Os    Polyuria  -likely due to uncontrolled DM; rule out UTI  -UA positive; f/u urine culture    DVT ppx- currently on Heparin gtt + coumadin    Dispo - home once INR is therapeutic    Attending Attestation:   Plan discussed with patient, RN

## 2020-05-06 LAB
ANION GAP SERPL CALC-SCNC: 14 MMOL/L — SIGNIFICANT CHANGE UP (ref 5–17)
APTT BLD: 67.5 SEC — HIGH (ref 27.5–36.3)
BASOPHILS # BLD AUTO: 0.04 K/UL — SIGNIFICANT CHANGE UP (ref 0–0.2)
BASOPHILS NFR BLD AUTO: 0.4 % — SIGNIFICANT CHANGE UP (ref 0–2)
BUN SERPL-MCNC: 12 MG/DL — SIGNIFICANT CHANGE UP (ref 8–20)
CALCIUM SERPL-MCNC: 9.3 MG/DL — SIGNIFICANT CHANGE UP (ref 8.6–10.2)
CHLORIDE SERPL-SCNC: 96 MMOL/L — LOW (ref 98–107)
CO2 SERPL-SCNC: 21 MMOL/L — LOW (ref 22–29)
CREAT SERPL-MCNC: 0.58 MG/DL — SIGNIFICANT CHANGE UP (ref 0.5–1.3)
CULTURE RESULTS: SIGNIFICANT CHANGE UP
EOSINOPHIL # BLD AUTO: 0.22 K/UL — SIGNIFICANT CHANGE UP (ref 0–0.5)
EOSINOPHIL NFR BLD AUTO: 2.2 % — SIGNIFICANT CHANGE UP (ref 0–6)
GLUCOSE BLDC GLUCOMTR-MCNC: 308 MG/DL — HIGH (ref 70–99)
GLUCOSE BLDC GLUCOMTR-MCNC: 329 MG/DL — HIGH (ref 70–99)
GLUCOSE BLDC GLUCOMTR-MCNC: 346 MG/DL — HIGH (ref 70–99)
GLUCOSE BLDC GLUCOMTR-MCNC: 423 MG/DL — HIGH (ref 70–99)
GLUCOSE SERPL-MCNC: 320 MG/DL — HIGH (ref 70–99)
HCT VFR BLD CALC: 30.8 % — LOW (ref 34.5–45)
HGB BLD-MCNC: 9.4 G/DL — LOW (ref 11.5–15.5)
IMM GRANULOCYTES NFR BLD AUTO: 2.2 % — HIGH (ref 0–1.5)
INR BLD: 2.67 RATIO — HIGH (ref 0.88–1.16)
LYMPHOCYTES # BLD AUTO: 1.7 K/UL — SIGNIFICANT CHANGE UP (ref 1–3.3)
LYMPHOCYTES # BLD AUTO: 17.2 % — SIGNIFICANT CHANGE UP (ref 13–44)
MAGNESIUM SERPL-MCNC: 2 MG/DL — SIGNIFICANT CHANGE UP (ref 1.8–2.6)
MCHC RBC-ENTMCNC: 25.1 PG — LOW (ref 27–34)
MCHC RBC-ENTMCNC: 30.5 GM/DL — LOW (ref 32–36)
MCV RBC AUTO: 82.1 FL — SIGNIFICANT CHANGE UP (ref 80–100)
MONOCYTES # BLD AUTO: 0.95 K/UL — HIGH (ref 0–0.9)
MONOCYTES NFR BLD AUTO: 9.6 % — SIGNIFICANT CHANGE UP (ref 2–14)
NEUTROPHILS # BLD AUTO: 6.76 K/UL — SIGNIFICANT CHANGE UP (ref 1.8–7.4)
NEUTROPHILS NFR BLD AUTO: 68.4 % — SIGNIFICANT CHANGE UP (ref 43–77)
PHOSPHATE SERPL-MCNC: 2.5 MG/DL — SIGNIFICANT CHANGE UP (ref 2.4–4.7)
PLATELET # BLD AUTO: 277 K/UL — SIGNIFICANT CHANGE UP (ref 150–400)
POTASSIUM SERPL-MCNC: 4 MMOL/L — SIGNIFICANT CHANGE UP (ref 3.5–5.3)
POTASSIUM SERPL-SCNC: 4 MMOL/L — SIGNIFICANT CHANGE UP (ref 3.5–5.3)
PROTHROM AB SERPL-ACNC: 31.1 SEC — HIGH (ref 10–12.9)
RBC # BLD: 3.75 M/UL — LOW (ref 3.8–5.2)
RBC # FLD: 17.6 % — HIGH (ref 10.3–14.5)
SODIUM SERPL-SCNC: 131 MMOL/L — LOW (ref 135–145)
SPECIMEN SOURCE: SIGNIFICANT CHANGE UP
WBC # BLD: 9.89 K/UL — SIGNIFICANT CHANGE UP (ref 3.8–10.5)
WBC # FLD AUTO: 9.89 K/UL — SIGNIFICANT CHANGE UP (ref 3.8–10.5)

## 2020-05-06 PROCEDURE — 99232 SBSQ HOSP IP/OBS MODERATE 35: CPT

## 2020-05-06 RX ORDER — SODIUM CHLORIDE 9 MG/ML
1000 INJECTION INTRAMUSCULAR; INTRAVENOUS; SUBCUTANEOUS
Refills: 0 | Status: COMPLETED | OUTPATIENT
Start: 2020-05-06 | End: 2020-05-06

## 2020-05-06 RX ORDER — INSULIN GLARGINE 100 [IU]/ML
25 INJECTION, SOLUTION SUBCUTANEOUS AT BEDTIME
Refills: 0 | Status: DISCONTINUED | OUTPATIENT
Start: 2020-05-06 | End: 2020-05-07

## 2020-05-06 RX ORDER — INSULIN HUMAN 100 [IU]/ML
10 INJECTION, SOLUTION SUBCUTANEOUS ONCE
Refills: 0 | Status: COMPLETED | OUTPATIENT
Start: 2020-05-06 | End: 2020-05-06

## 2020-05-06 RX ORDER — INSULIN LISPRO 100/ML
6 VIAL (ML) SUBCUTANEOUS
Refills: 0 | Status: DISCONTINUED | OUTPATIENT
Start: 2020-05-06 | End: 2020-05-07

## 2020-05-06 RX ORDER — INSULIN LISPRO 100/ML
10 VIAL (ML) SUBCUTANEOUS ONCE
Refills: 0 | Status: COMPLETED | OUTPATIENT
Start: 2020-05-06 | End: 2020-05-06

## 2020-05-06 RX ORDER — CEPHALEXIN 500 MG
500 CAPSULE ORAL EVERY 12 HOURS
Refills: 0 | Status: DISCONTINUED | OUTPATIENT
Start: 2020-05-06 | End: 2020-05-06

## 2020-05-06 RX ORDER — WARFARIN SODIUM 2.5 MG/1
10 TABLET ORAL ONCE
Refills: 0 | Status: COMPLETED | OUTPATIENT
Start: 2020-05-06 | End: 2020-05-06

## 2020-05-06 RX ORDER — CEPHALEXIN 500 MG
500 CAPSULE ORAL EVERY 6 HOURS
Refills: 0 | Status: DISCONTINUED | OUTPATIENT
Start: 2020-05-06 | End: 2020-05-08

## 2020-05-06 RX ORDER — OXYCODONE HYDROCHLORIDE 5 MG/1
10 TABLET ORAL ONCE
Refills: 0 | Status: DISCONTINUED | OUTPATIENT
Start: 2020-05-06 | End: 2020-05-06

## 2020-05-06 RX ADMIN — Medication 25 MILLIGRAM(S): at 17:09

## 2020-05-06 RX ADMIN — SODIUM CHLORIDE 75 MILLILITER(S): 9 INJECTION INTRAMUSCULAR; INTRAVENOUS; SUBCUTANEOUS at 13:11

## 2020-05-06 RX ADMIN — METHADONE HYDROCHLORIDE 10 MILLIGRAM(S): 40 TABLET ORAL at 05:10

## 2020-05-06 RX ADMIN — Medication 15 MILLIGRAM(S): at 05:10

## 2020-05-06 RX ADMIN — Medication 25 MICROGRAM(S): at 05:10

## 2020-05-06 RX ADMIN — DULOXETINE HYDROCHLORIDE 90 MILLIGRAM(S): 30 CAPSULE, DELAYED RELEASE ORAL at 12:01

## 2020-05-06 RX ADMIN — METHADONE HYDROCHLORIDE 10 MILLIGRAM(S): 40 TABLET ORAL at 21:12

## 2020-05-06 RX ADMIN — INSULIN GLARGINE 25 UNIT(S): 100 INJECTION, SOLUTION SUBCUTANEOUS at 21:12

## 2020-05-06 RX ADMIN — Medication 5 UNIT(S): at 08:16

## 2020-05-06 RX ADMIN — MONTELUKAST 10 MILLIGRAM(S): 4 TABLET, CHEWABLE ORAL at 12:03

## 2020-05-06 RX ADMIN — Medication 8: at 11:54

## 2020-05-06 RX ADMIN — Medication 1 TABLET(S): at 12:03

## 2020-05-06 RX ADMIN — Medication 8: at 17:09

## 2020-05-06 RX ADMIN — Medication 500 MILLIGRAM(S): at 17:09

## 2020-05-06 RX ADMIN — Medication 15 MILLIGRAM(S): at 21:12

## 2020-05-06 RX ADMIN — ATORVASTATIN CALCIUM 40 MILLIGRAM(S): 80 TABLET, FILM COATED ORAL at 21:12

## 2020-05-06 RX ADMIN — OXYCODONE HYDROCHLORIDE 10 MILLIGRAM(S): 5 TABLET ORAL at 01:58

## 2020-05-06 RX ADMIN — Medication 8: at 08:17

## 2020-05-06 RX ADMIN — Medication 500 MILLIGRAM(S): at 13:09

## 2020-05-06 RX ADMIN — Medication 10 UNIT(S): at 21:19

## 2020-05-06 RX ADMIN — WARFARIN SODIUM 10 MILLIGRAM(S): 2.5 TABLET ORAL at 21:12

## 2020-05-06 RX ADMIN — Medication 6 UNIT(S): at 17:10

## 2020-05-06 RX ADMIN — Medication 145 MILLIGRAM(S): at 12:01

## 2020-05-06 RX ADMIN — Medication 15 MILLIGRAM(S): at 13:08

## 2020-05-06 RX ADMIN — Medication 81 MILLIGRAM(S): at 12:01

## 2020-05-06 RX ADMIN — Medication 6 UNIT(S): at 11:56

## 2020-05-06 RX ADMIN — Medication 500 MILLIGRAM(S): at 23:16

## 2020-05-06 RX ADMIN — CHLORHEXIDINE GLUCONATE 1 APPLICATION(S): 213 SOLUTION TOPICAL at 05:10

## 2020-05-06 RX ADMIN — LURASIDONE HYDROCHLORIDE 20 MILLIGRAM(S): 40 TABLET ORAL at 12:03

## 2020-05-06 RX ADMIN — METHADONE HYDROCHLORIDE 10 MILLIGRAM(S): 40 TABLET ORAL at 13:08

## 2020-05-06 RX ADMIN — Medication 5 MILLIGRAM(S): at 21:12

## 2020-05-06 RX ADMIN — INSULIN HUMAN 10 UNIT(S): 100 INJECTION, SOLUTION SUBCUTANEOUS at 09:12

## 2020-05-06 NOTE — PROGRESS NOTE ADULT - ASSESSMENT
Patient is a 35 year old female with hx of Marfan syndrome, mechanical AVR and MVR on warfarin, Aortic aneurysm, hypothyroidism, DM, TBI, presents with right internal carotid artery dissection and resolving stroke symptoms. Found to have a right carotid dissection. MRI confirmed tiny left frontal-cortical infarct.    Right carotid dissection   - CTA head/neck reviewed  - CTA negative for aortic dissection  - continue heparin gtt + coumadin bridge  - no neuro deficits on exam  - vascular consult appreciated; no surgical indication    Acute left frontal cortical infarct  -MRI with tiny infarct; discussed with Dr. Carroll - incidental finding  -no neuro deficits on exam  -recommending to continue ASA; start lipitor  -TTE with preserved EF  -CT brain stroke protocol and CTA head reviewed  -MRI as above  -Cardio consult appreciated; CECILY without vegetation of thrombus  -HbA1c 8.4  -lipid panel reviewed; TG significantly elevated. Continue fenofibrate and statin.  -neurochecks  -PT evaluation   -Discussed with Dr. Carroll - states no further work up needs to be done from a neuro perspective. Outpatient follow up.    Mechanical AVR and MVR  -INR 2.6; continue Coumadin 10 mg and continue heparin gtt  -target INR 3.0-3.5    Aortic Aneurysm  -CT chest without aortic dissection  -TTE reviewed; preserved EF    Hypothyroidism   - c/w home dose synthroid    Depression/anxiety  - c/w home medications; mood stable  - psych consult appreciated    DM  -hyperglycemia improved  -acetone negative  -continue humalog and lantus   -insulin sliding scale coverage as ordered    Chronic pain  -history of spinal surgery  -continue home pain meds; continue oxycodone as needed and methadone    Hyponatremia - resolved  -likely due to hypovolemia given urine osm  -corrected sodium for glucose is 135  -continue free water restriction  -monitor I/Os    Polyuria  -likely due to uncontrolled DM; rule out UTI  -UA positive; f/u urine culture  -start empiric keflex and d/c if ucx is negative    DVT ppx- currently on Heparin gtt + coumadin    Dispo - home once INR is therapeutic (goal 3-3.5)    Attending Attestation:   Plan discussed with patient, RN

## 2020-05-06 NOTE — CHART NOTE - NSCHARTNOTEFT_GEN_A_CORE
Called by RN due to Pt. c/o occipital headache with pain scale of 8/10. Pt reports she has had similar headaches in the past. No change in vision, LOC, weakness, dizziness, SOB, or chest pain noted. Oxycodone 10mg given x1 for severe pain. Pt is written for Oxycodone 10mg PRN q4hr for moderate pain, however, she reports she usually take this medication for severe pain.    Vital Signs Last 24 Hrs  T(C): 36.7 (05 May 2020 23:00), Max: 36.7 (05 May 2020 10:25)  T(F): 98 (05 May 2020 23:00), Max: 98 (05 May 2020 10:25)  HR: 103 (05 May 2020 23:00) (96 - 107)  BP: 95/58 (05 May 2020 23:00) (95/58 - 117/71)  BP(mean): 1 (05 May 2020 10:25) (1 - 1)  RR: 18 (05 May 2020 23:00) (18 - 18)  SpO2: 96% (05 May 2020 23:00) (91% - 100%)    GENERAL:  A&Ox3. NAD  Head: no ttp  Eyes: EOMI, PERRL  Neck: supple  CV: + s1,s2 regular rate and rhythm. mechanical value noted on ausculation.   Lungs: normal effort, CTA bilat. no wheezes, rales or rhonchi  ABDOMEN: soft, nontender, nondistended  skin: warm, dry, no rashes.   Neuro: A&O x 3, no sensory or motor deficit.

## 2020-05-06 NOTE — PROGRESS NOTE ADULT - SUBJECTIVE AND OBJECTIVE BOX
[facility]                                   [address] [place] [address]                                                      PHONE: [phone]                                                         FAX: [phone]  [email or web address]  ---------------------------------------------------------------------------------------------------------------------------------    Overnight events/patient complaints: patient seen at bedside. she denies SOB this morning.       No Known Allergies    MEDICATIONS  (STANDING):  aspirin enteric coated 81 milliGRAM(s) Oral daily  atorvastatin 40 milliGRAM(s) Oral at bedtime  budesonide  80 MICROgram(s)/formoterol 4.5 MICROgram(s) Inhaler 2 Puff(s) Inhalation two times a day  busPIRone 15 milliGRAM(s) Oral every 8 hours  chlorhexidine 2% Cloths 1 Application(s) Topical <User Schedule>  DULoxetine 90 milliGRAM(s) Oral <User Schedule>  fenofibrate Tablet 145 milliGRAM(s) Oral daily  heparin  Infusion. 700 Unit(s)/Hr (7 mL/Hr) IV Continuous <Continuous>  insulin glargine Injectable (LANTUS) 20 Unit(s) SubCutaneous at bedtime  insulin lispro (HumaLOG) corrective regimen sliding scale   SubCutaneous three times a day before meals  insulin lispro Injectable (HumaLOG) 5 Unit(s) SubCutaneous three times a day before meals  levothyroxine 25 MICROGram(s) Oral daily  lurasidone 20 milliGRAM(s) Oral daily  methadone    Tablet 10 milliGRAM(s) Oral three times a day  metoprolol succinate ER 25 milliGRAM(s) Oral two times a day  montelukast 10 milliGRAM(s) Oral daily  multivitamin 1 Tablet(s) Oral daily    MEDICATIONS  (PRN):  acetaminophen   Tablet .. 650 milliGRAM(s) Oral every 6 hours PRN Temp greater or equal to 38.5C (101.3F), Mild Pain (1 - 3)  clonazePAM  Tablet 1 milliGRAM(s) Oral <User Schedule> PRN anxiety  heparin   Injectable 7000 Unit(s) IV Push every 6 hours PRN For aPTT less than 40  heparin   Injectable 3500 Unit(s) IV Push every 6 hours PRN For aPTT between 40 - 57  oxyCODONE    IR 10 milliGRAM(s) Oral every 4 hours PRN Moderate Pain (4 - 6)      Vital Signs Last 24 Hrs  T(C): 36.6 (06 May 2020 08:46), Max: 36.8 (06 May 2020 04:52)  T(F): 97.8 (06 May 2020 08:46), Max: 98.2 (06 May 2020 04:52)  HR: 93 (06 May 2020 08:46) (67 - 107)  BP: 102/68 (06 May 2020 08:46) (94/60 - 117/71)  BP(mean): 1 (05 May 2020 10:25) (1 - 1)  RR: 19 (06 May 2020 08:46) (18 - 20)  SpO2: 98% (06 May 2020 08:46) (91% - 98%)  ICU Vital Signs Last 24 Hrs  JUANY FELICIANO  I&O's Detail    05 May 2020 07:01  -  06 May 2020 07:00  --------------------------------------------------------  IN:    heparin  Infusion.: 77 mL    Oral Fluid: 240 mL  Total IN: 317 mL    OUT:  Total OUT: 0 mL    Total NET: 317 mL        Drug Dosing Weight  JUANY FELICIANO      PHYSICAL EXAM:  General: Appears well developed, well nourished alert and cooperative.  HEENT: Head; normocephalic, atraumatic.  Eyes: Pupils reactive, cornea wnl.  Neck: Supple, no nodes adenopathy, no NVD or carotid bruit or thyromegaly.  CARDIOVASCULAR: mechanical heart sounds present, No murmur, rub, gallop or lift.   LUNGS: No rales, rhonchi or wheeze. Normal breath sounds bilaterally.  ABDOMEN: Soft, nontender without mass or organomegaly. bowel sounds normoactive.  EXTREMITIES: No clubbing, cyanosis or edema. Distal pulses wnl.   SKIN: warm and dry with normal turgor.  NEURO: Alert/oriented x 3/normal motor exam. No pathologic reflexes.    PSYCH: normal affect.        LABS:                        9.4    9.89  )-----------( 277      ( 06 May 2020 08:28 )             30.8     05-06    131<L>  |  96<L>  |  12.0  ----------------------------<  320<H>  4.0   |  21.0<L>  |  0.58    Ca    9.3      06 May 2020 08:28  Phos  2.5     05-06  Mg     2.0     05-06      JUANY FELICIANO      PT/INR - ( 06 May 2020 08:28 )   PT: 31.1 sec;   INR: 2.67 ratio         PTT - ( 06 May 2020 08:28 )  PTT:67.5 sec  Urinalysis Basic - ( 04 May 2020 16:33 )    Color: Yellow / Appearance: Clear / S.015 / pH: x  Gluc: x / Ketone: Negative  / Bili: Negative / Urobili: Negative mg/dL   Blood: x / Protein: Negative mg/dL / Nitrite: Negative   Leuk Esterase: Small / RBC: 0-2 /HPF / WBC 6-10   Sq Epi: x / Non Sq Epi: Few / Bacteria: Occasional        RADIOLOGY & ADDITIONAL STUDIES:    INTERPRETATION OF TELEMETRY (personally reviewed): sinus rhythm     CECILY 20    ASSESSMENT AND PLAN:  35y Female with past medical history significant for history of Marfan syndrome fibromyalgia DM s/p mechanical AVR/MVR with aortic root repair 2013 lasted NUC normal perfusion  who presents with right sided weakness confusion and difficulties finding words.  CTA of the neck showed possible right dissection treated medical by vascular.   MRI of the Brain tiny left frontal cortical infarct with multiple white matter lesions in both hemispheres without micro-hemorrhage or mass.    CVA, Mechanical MVR/AVR, Marfans   - CECILY performed yesterday showed no gross evidence of vegetation or thrombus or any other cardioembolic source of infarct  - continue Heparin drip with bridge to Coumadin when okay with neurology -- goal INR 3-3.5  - continue ASA 81 mg daily    I will arrange for close outpatient follow up in our office. Please call back if additional cardiac issues arise.

## 2020-05-07 LAB
ANION GAP SERPL CALC-SCNC: 10 MMOL/L — SIGNIFICANT CHANGE UP (ref 5–17)
APTT BLD: 75.3 SEC — HIGH (ref 27.5–36.3)
BASOPHILS # BLD AUTO: 0.04 K/UL — SIGNIFICANT CHANGE UP (ref 0–0.2)
BASOPHILS NFR BLD AUTO: 0.4 % — SIGNIFICANT CHANGE UP (ref 0–2)
BUN SERPL-MCNC: 11 MG/DL — SIGNIFICANT CHANGE UP (ref 8–20)
CALCIUM SERPL-MCNC: 9.1 MG/DL — SIGNIFICANT CHANGE UP (ref 8.6–10.2)
CHLORIDE SERPL-SCNC: 102 MMOL/L — SIGNIFICANT CHANGE UP (ref 98–107)
CO2 SERPL-SCNC: 24 MMOL/L — SIGNIFICANT CHANGE UP (ref 22–29)
CREAT SERPL-MCNC: 0.63 MG/DL — SIGNIFICANT CHANGE UP (ref 0.5–1.3)
EOSINOPHIL # BLD AUTO: 0.24 K/UL — SIGNIFICANT CHANGE UP (ref 0–0.5)
EOSINOPHIL NFR BLD AUTO: 2.3 % — SIGNIFICANT CHANGE UP (ref 0–6)
GLUCOSE BLDC GLUCOMTR-MCNC: 309 MG/DL — HIGH (ref 70–99)
GLUCOSE BLDC GLUCOMTR-MCNC: 339 MG/DL — HIGH (ref 70–99)
GLUCOSE BLDC GLUCOMTR-MCNC: 428 MG/DL — HIGH (ref 70–99)
GLUCOSE BLDC GLUCOMTR-MCNC: 446 MG/DL — HIGH (ref 70–99)
GLUCOSE SERPL-MCNC: 329 MG/DL — HIGH (ref 70–99)
HCT VFR BLD CALC: 31.4 % — LOW (ref 34.5–45)
HGB BLD-MCNC: 9.6 G/DL — LOW (ref 11.5–15.5)
IMM GRANULOCYTES NFR BLD AUTO: 1.5 % — SIGNIFICANT CHANGE UP (ref 0–1.5)
INR BLD: 2.78 RATIO — HIGH (ref 0.88–1.16)
LYMPHOCYTES # BLD AUTO: 19.5 % — SIGNIFICANT CHANGE UP (ref 13–44)
LYMPHOCYTES # BLD AUTO: 2.03 K/UL — SIGNIFICANT CHANGE UP (ref 1–3.3)
MAGNESIUM SERPL-MCNC: 2.1 MG/DL — SIGNIFICANT CHANGE UP (ref 1.6–2.6)
MCHC RBC-ENTMCNC: 24.7 PG — LOW (ref 27–34)
MCHC RBC-ENTMCNC: 30.6 GM/DL — LOW (ref 32–36)
MCV RBC AUTO: 80.7 FL — SIGNIFICANT CHANGE UP (ref 80–100)
MONOCYTES # BLD AUTO: 0.95 K/UL — HIGH (ref 0–0.9)
MONOCYTES NFR BLD AUTO: 9.1 % — SIGNIFICANT CHANGE UP (ref 2–14)
NEUTROPHILS # BLD AUTO: 6.97 K/UL — SIGNIFICANT CHANGE UP (ref 1.8–7.4)
NEUTROPHILS NFR BLD AUTO: 67.2 % — SIGNIFICANT CHANGE UP (ref 43–77)
PHOSPHATE SERPL-MCNC: 2.9 MG/DL — SIGNIFICANT CHANGE UP (ref 2.4–4.7)
PLATELET # BLD AUTO: 305 K/UL — SIGNIFICANT CHANGE UP (ref 150–400)
POTASSIUM SERPL-MCNC: 4.4 MMOL/L — SIGNIFICANT CHANGE UP (ref 3.5–5.3)
POTASSIUM SERPL-SCNC: 4.4 MMOL/L — SIGNIFICANT CHANGE UP (ref 3.5–5.3)
PROTHROM AB SERPL-ACNC: 32.4 SEC — HIGH (ref 10–12.9)
RBC # BLD: 3.89 M/UL — SIGNIFICANT CHANGE UP (ref 3.8–5.2)
RBC # FLD: 17.8 % — HIGH (ref 10.3–14.5)
SODIUM SERPL-SCNC: 135 MMOL/L — SIGNIFICANT CHANGE UP (ref 135–145)
WBC # BLD: 10.39 K/UL — SIGNIFICANT CHANGE UP (ref 3.8–10.5)
WBC # FLD AUTO: 10.39 K/UL — SIGNIFICANT CHANGE UP (ref 3.8–10.5)

## 2020-05-07 PROCEDURE — 99232 SBSQ HOSP IP/OBS MODERATE 35: CPT

## 2020-05-07 RX ORDER — INSULIN LISPRO 100/ML
10 VIAL (ML) SUBCUTANEOUS
Refills: 0 | Status: DISCONTINUED | OUTPATIENT
Start: 2020-05-07 | End: 2020-05-08

## 2020-05-07 RX ORDER — INSULIN LISPRO 100/ML
12 VIAL (ML) SUBCUTANEOUS ONCE
Refills: 0 | Status: COMPLETED | OUTPATIENT
Start: 2020-05-07 | End: 2020-05-07

## 2020-05-07 RX ORDER — INSULIN GLARGINE 100 [IU]/ML
30 INJECTION, SOLUTION SUBCUTANEOUS AT BEDTIME
Refills: 0 | Status: DISCONTINUED | OUTPATIENT
Start: 2020-05-07 | End: 2020-05-08

## 2020-05-07 RX ORDER — WARFARIN SODIUM 2.5 MG/1
10 TABLET ORAL ONCE
Refills: 0 | Status: COMPLETED | OUTPATIENT
Start: 2020-05-07 | End: 2020-05-07

## 2020-05-07 RX ORDER — INSULIN LISPRO 100/ML
8 VIAL (ML) SUBCUTANEOUS
Refills: 0 | Status: DISCONTINUED | OUTPATIENT
Start: 2020-05-07 | End: 2020-05-07

## 2020-05-07 RX ORDER — INSULIN HUMAN 100 [IU]/ML
10 INJECTION, SOLUTION SUBCUTANEOUS ONCE
Refills: 0 | Status: COMPLETED | OUTPATIENT
Start: 2020-05-07 | End: 2020-05-07

## 2020-05-07 RX ADMIN — Medication 8 UNIT(S): at 11:58

## 2020-05-07 RX ADMIN — Medication 25 MICROGRAM(S): at 05:14

## 2020-05-07 RX ADMIN — OXYCODONE HYDROCHLORIDE 10 MILLIGRAM(S): 5 TABLET ORAL at 14:42

## 2020-05-07 RX ADMIN — WARFARIN SODIUM 10 MILLIGRAM(S): 2.5 TABLET ORAL at 21:47

## 2020-05-07 RX ADMIN — METHADONE HYDROCHLORIDE 10 MILLIGRAM(S): 40 TABLET ORAL at 05:14

## 2020-05-07 RX ADMIN — Medication 15 MILLIGRAM(S): at 05:14

## 2020-05-07 RX ADMIN — Medication 8: at 11:58

## 2020-05-07 RX ADMIN — Medication 500 MILLIGRAM(S): at 05:14

## 2020-05-07 RX ADMIN — Medication 500 MILLIGRAM(S): at 21:52

## 2020-05-07 RX ADMIN — MONTELUKAST 10 MILLIGRAM(S): 4 TABLET, CHEWABLE ORAL at 12:02

## 2020-05-07 RX ADMIN — Medication 8: at 09:15

## 2020-05-07 RX ADMIN — INSULIN HUMAN 10 UNIT(S): 100 INJECTION, SOLUTION SUBCUTANEOUS at 11:57

## 2020-05-07 RX ADMIN — Medication 25 MILLIGRAM(S): at 16:56

## 2020-05-07 RX ADMIN — METHADONE HYDROCHLORIDE 10 MILLIGRAM(S): 40 TABLET ORAL at 14:42

## 2020-05-07 RX ADMIN — Medication 500 MILLIGRAM(S): at 16:56

## 2020-05-07 RX ADMIN — Medication 6 UNIT(S): at 09:15

## 2020-05-07 RX ADMIN — Medication 145 MILLIGRAM(S): at 12:03

## 2020-05-07 RX ADMIN — ATORVASTATIN CALCIUM 40 MILLIGRAM(S): 80 TABLET, FILM COATED ORAL at 21:47

## 2020-05-07 RX ADMIN — Medication 1 TABLET(S): at 12:03

## 2020-05-07 RX ADMIN — Medication 500 MILLIGRAM(S): at 11:58

## 2020-05-07 RX ADMIN — Medication 12: at 16:54

## 2020-05-07 RX ADMIN — CHLORHEXIDINE GLUCONATE 1 APPLICATION(S): 213 SOLUTION TOPICAL at 05:14

## 2020-05-07 RX ADMIN — Medication 10 UNIT(S): at 16:53

## 2020-05-07 RX ADMIN — Medication 15 MILLIGRAM(S): at 21:46

## 2020-05-07 RX ADMIN — INSULIN GLARGINE 30 UNIT(S): 100 INJECTION, SOLUTION SUBCUTANEOUS at 21:45

## 2020-05-07 RX ADMIN — Medication 81 MILLIGRAM(S): at 11:58

## 2020-05-07 RX ADMIN — METHADONE HYDROCHLORIDE 10 MILLIGRAM(S): 40 TABLET ORAL at 21:50

## 2020-05-07 RX ADMIN — OXYCODONE HYDROCHLORIDE 10 MILLIGRAM(S): 5 TABLET ORAL at 09:33

## 2020-05-07 RX ADMIN — Medication 15 MILLIGRAM(S): at 14:42

## 2020-05-07 RX ADMIN — LURASIDONE HYDROCHLORIDE 20 MILLIGRAM(S): 40 TABLET ORAL at 12:03

## 2020-05-07 RX ADMIN — DULOXETINE HYDROCHLORIDE 90 MILLIGRAM(S): 30 CAPSULE, DELAYED RELEASE ORAL at 12:01

## 2020-05-07 RX ADMIN — Medication 12 UNIT(S): at 21:48

## 2020-05-07 NOTE — PROGRESS NOTE ADULT - SUBJECTIVE AND OBJECTIVE BOX
CHIEF COMPLAINT/INTERVAL HISTORY:    Patient is a 35y old  Female who presents with a chief complaint of right carotid artery dissection (06 May 2020 11:05)    SUBJECTIVE & OBJECTIVE: Pt seen and examined at bedside. No overnight events. Reports feeling tired, but otherwise new complaints. Sugars uncontrolled; insulin adjusted.    ROS: No chest pain, palpitations, SOB, light headedness, dizziness, headache, nausea/vomiting, fevers/chills, abdominal pain, dysuria or increased urinary frequency.    ICU Vital Signs Last 24 Hrs  T(C): 36.4 (07 May 2020 09:00), Max: 36.9 (06 May 2020 15:49)  T(F): 97.6 (07 May 2020 09:00), Max: 98.4 (06 May 2020 15:49)  HR: 93 (07 May 2020 09:00) (86 - 104)  BP: 109/72 (07 May 2020 09:00) (98/67 - 109/72)  RR: 20 (07 May 2020 09:00) (19 - 20)  SpO2: 98% (07 May 2020 09:00) (95% - 100%)    MEDICATIONS  (STANDING):  aspirin enteric coated 81 milliGRAM(s) Oral daily  atorvastatin 40 milliGRAM(s) Oral at bedtime  budesonide  80 MICROgram(s)/formoterol 4.5 MICROgram(s) Inhaler 2 Puff(s) Inhalation two times a day  busPIRone 15 milliGRAM(s) Oral every 8 hours  cephalexin 500 milliGRAM(s) Oral every 6 hours  chlorhexidine 2% Cloths 1 Application(s) Topical <User Schedule>  DULoxetine 90 milliGRAM(s) Oral <User Schedule>  fenofibrate Tablet 145 milliGRAM(s) Oral daily  heparin  Infusion. 700 Unit(s)/Hr (7 mL/Hr) IV Continuous <Continuous>  insulin glargine Injectable (LANTUS) 25 Unit(s) SubCutaneous at bedtime  insulin lispro (HumaLOG) corrective regimen sliding scale   SubCutaneous three times a day before meals  insulin lispro Injectable (HumaLOG) 6 Unit(s) SubCutaneous three times a day before meals  levothyroxine 25 MICROGram(s) Oral daily  lurasidone 20 milliGRAM(s) Oral daily  methadone    Tablet 10 milliGRAM(s) Oral three times a day  metoprolol succinate ER 25 milliGRAM(s) Oral two times a day  montelukast 10 milliGRAM(s) Oral daily  multivitamin 1 Tablet(s) Oral daily  warfarin 10 milliGRAM(s) Oral once    MEDICATIONS  (PRN):  acetaminophen   Tablet .. 650 milliGRAM(s) Oral every 6 hours PRN Temp greater or equal to 38.5C (101.3F), Mild Pain (1 - 3)  clonazePAM  Tablet 1 milliGRAM(s) Oral <User Schedule> PRN anxiety  heparin   Injectable 7000 Unit(s) IV Push every 6 hours PRN For aPTT less than 40  heparin   Injectable 3500 Unit(s) IV Push every 6 hours PRN For aPTT between 40 - 57  oxyCODONE    IR 10 milliGRAM(s) Oral every 4 hours PRN Moderate Pain (4 - 6)      LABS:                        9.6    10.39 )-----------( 305      ( 07 May 2020 06:36 )             31.4     05-07    135  |  102  |  11.0  ----------------------------<  329<H>  4.4   |  24.0  |  0.63    Ca    9.1      07 May 2020 06:36  Phos  2.9     05-07  Mg     2.1     05-07      PT/INR - ( 07 May 2020 06:36 )   PT: 32.4 sec;   INR: 2.78 ratio         PTT - ( 07 May 2020 06:36 )  PTT:75.3 sec      CAPILLARY BLOOD GLUCOSE      POCT Blood Glucose.: 309 mg/dL (07 May 2020 08:25)  POCT Blood Glucose.: 423 mg/dL (06 May 2020 20:59)  POCT Blood Glucose.: 346 mg/dL (06 May 2020 16:48)  POCT Blood Glucose.: 308 mg/dL (06 May 2020 11:28)    PHYSICAL EXAM:    GENERAL: middle aged female, appears older than stated age, long extremities  HEAD:  Atraumatic, Normocephalic  EYES: EOMI, PERRLA, conjunctiva and sclera clear  ENMT: Moist mucous membranes  NECK: Supple   NERVOUS SYSTEM:  Alert & Oriented X3   CHEST/LUNG: bilateral air entry  HEART: Regular rate and rhythm; + S1/S2  ABDOMEN: Soft, Nontender, Nondistended; Bowel sounds present  EXTREMITIES:  no pedal edema

## 2020-05-07 NOTE — PROGRESS NOTE ADULT - REASON FOR ADMISSION
right carotid artery dissection

## 2020-05-07 NOTE — PROGRESS NOTE ADULT - ASSESSMENT
Patient is a 35 year old female with hx of Marfan syndrome, mechanical AVR and MVR on warfarin, Aortic aneurysm, hypothyroidism, DM, TBI, presents with right internal carotid artery dissection and resolving stroke symptoms. Found to have a right carotid dissection. MRI confirmed tiny left frontal-cortical infarct.    Right carotid dissection   - CTA head/neck reviewed  - CTA negative for aortic dissection  - continue heparin gtt + coumadin bridge  - no neuro deficits on exam  - vascular consult appreciated; no surgical indication    Acute left frontal cortical infarct  -MRI with tiny infarct; discussed with Dr. Carroll - incidental finding  -no neuro deficits on exam  -recommending to continue ASA and lipitor  -TTE with preserved EF  -CT brain stroke protocol and CTA head reviewed  -MRI as above  -Cardio consult appreciated; CECILY without vegetation of thrombus  -HbA1c 8.4  -lipid panel reviewed; TG significantly elevated. Continue fenofibrate and statin.  -neurochecks  -PT evaluation   -Discussed with Dr. Carroll - states no further work up needs to be done from a neuro perspective. Outpatient follow up.    Mechanical AVR and MVR  -INR 2.79; continue Coumadin 10 mg and continue heparin gtt  -target INR 3.0-3.5    Aortic Aneurysm  -CT chest without aortic dissection  -TTE reviewed; preserved EF    Hypothyroidism   - c/w home dose synthroid    Depression/anxiety  - c/w home medications; mood stable  - psych consult appreciated    DM  -hyperglycemia noted despite increased insulin  -acetone negative  -increase humalog and lantus   -regular insulin 10 units stat  -insulin sliding scale coverage as ordered  -diabetic educator consult    Chronic pain  -history of spinal surgery  -continue home pain meds; continue oxycodone as needed and methadone    Hyponatremia - resolved  -likely due to hypovolemia given urine osm  -corrected sodium for glucose is 135  -continue free water restriction  -monitor I/Os    Polyuria  -likely due to uncontrolled DM; rule out UTI  -UA positive; urine culture noted  -continue keflex given symptoms    DVT ppx- currently on Heparin gtt + coumadin    Dispo - home once INR is therapeutic (goal 3-3.5)    Attending Attestation:   Plan discussed with patient, RN

## 2020-05-07 NOTE — ADVANCED PRACTICE NURSE CONSULT - RECOMMEDATIONS
continue diabetes self management education  pt to inject all inuslin doses while in the hospital using prefilled insulin syringe and rn supervision  pls consider dc pt on basaglar insulin pen and feliberto needles

## 2020-05-07 NOTE — ADVANCED PRACTICE NURSE CONSULT - ASSESSMENT
went to see pt in am pt is a+ox3 co 0 pain. pt states she has diabetes for a few years and fu w pcp. she test bg 1-2xdaily and bg is between 200-280she takes metformin [id number] januvia and glipizide and uses 1000mg of cinnamon. pt was educated about the need for insulin and benjamin right away that she is not going home on insulin. she was doing it in the past for a family member and refuse to do it or learn it while in the hospital.

## 2020-05-08 ENCOUNTER — TRANSCRIPTION ENCOUNTER (OUTPATIENT)
Age: 35
End: 2020-05-08

## 2020-05-08 VITALS
TEMPERATURE: 98 F | SYSTOLIC BLOOD PRESSURE: 96 MMHG | OXYGEN SATURATION: 93 % | RESPIRATION RATE: 18 BRPM | HEART RATE: 93 BPM | DIASTOLIC BLOOD PRESSURE: 64 MMHG

## 2020-05-08 LAB
ANION GAP SERPL CALC-SCNC: 9 MMOL/L — SIGNIFICANT CHANGE UP (ref 5–17)
APTT BLD: 63.2 SEC — HIGH (ref 27.5–36.3)
BASOPHILS # BLD AUTO: 0.03 K/UL — SIGNIFICANT CHANGE UP (ref 0–0.2)
BASOPHILS NFR BLD AUTO: 0.3 % — SIGNIFICANT CHANGE UP (ref 0–2)
BUN SERPL-MCNC: 13 MG/DL — SIGNIFICANT CHANGE UP (ref 8–20)
CALCIUM SERPL-MCNC: 8.8 MG/DL — SIGNIFICANT CHANGE UP (ref 8.6–10.2)
CHLORIDE SERPL-SCNC: 100 MMOL/L — SIGNIFICANT CHANGE UP (ref 98–107)
CO2 SERPL-SCNC: 23 MMOL/L — SIGNIFICANT CHANGE UP (ref 22–29)
CREAT SERPL-MCNC: 0.62 MG/DL — SIGNIFICANT CHANGE UP (ref 0.5–1.3)
EOSINOPHIL # BLD AUTO: 0.2 K/UL — SIGNIFICANT CHANGE UP (ref 0–0.5)
EOSINOPHIL NFR BLD AUTO: 2 % — SIGNIFICANT CHANGE UP (ref 0–6)
GLUCOSE BLDC GLUCOMTR-MCNC: 342 MG/DL — HIGH (ref 70–99)
GLUCOSE BLDC GLUCOMTR-MCNC: 378 MG/DL — HIGH (ref 70–99)
GLUCOSE BLDC GLUCOMTR-MCNC: 459 MG/DL — CRITICAL HIGH (ref 70–99)
GLUCOSE SERPL-MCNC: 331 MG/DL — HIGH (ref 70–99)
HCT VFR BLD CALC: 30.7 % — LOW (ref 34.5–45)
HGB BLD-MCNC: 9.6 G/DL — LOW (ref 11.5–15.5)
IMM GRANULOCYTES NFR BLD AUTO: 1.8 % — HIGH (ref 0–1.5)
INR BLD: 2.96 RATIO — HIGH (ref 0.88–1.16)
LYMPHOCYTES # BLD AUTO: 1.79 K/UL — SIGNIFICANT CHANGE UP (ref 1–3.3)
LYMPHOCYTES # BLD AUTO: 17.5 % — SIGNIFICANT CHANGE UP (ref 13–44)
MAGNESIUM SERPL-MCNC: 1.9 MG/DL — SIGNIFICANT CHANGE UP (ref 1.6–2.6)
MCHC RBC-ENTMCNC: 24.9 PG — LOW (ref 27–34)
MCHC RBC-ENTMCNC: 31.3 GM/DL — LOW (ref 32–36)
MCV RBC AUTO: 79.7 FL — LOW (ref 80–100)
MONOCYTES # BLD AUTO: 0.91 K/UL — HIGH (ref 0–0.9)
MONOCYTES NFR BLD AUTO: 8.9 % — SIGNIFICANT CHANGE UP (ref 2–14)
NEUTROPHILS # BLD AUTO: 7.09 K/UL — SIGNIFICANT CHANGE UP (ref 1.8–7.4)
NEUTROPHILS NFR BLD AUTO: 69.5 % — SIGNIFICANT CHANGE UP (ref 43–77)
PHOSPHATE SERPL-MCNC: 3.1 MG/DL — SIGNIFICANT CHANGE UP (ref 2.4–4.7)
PLATELET # BLD AUTO: 288 K/UL — SIGNIFICANT CHANGE UP (ref 150–400)
POTASSIUM SERPL-MCNC: 4.2 MMOL/L — SIGNIFICANT CHANGE UP (ref 3.5–5.3)
POTASSIUM SERPL-SCNC: 4.2 MMOL/L — SIGNIFICANT CHANGE UP (ref 3.5–5.3)
PROTHROM AB SERPL-ACNC: 34.6 SEC — HIGH (ref 10–12.9)
RBC # BLD: 3.85 M/UL — SIGNIFICANT CHANGE UP (ref 3.8–5.2)
RBC # FLD: 17.8 % — HIGH (ref 10.3–14.5)
SODIUM SERPL-SCNC: 132 MMOL/L — LOW (ref 135–145)
WBC # BLD: 10.2 K/UL — SIGNIFICANT CHANGE UP (ref 3.8–10.5)
WBC # FLD AUTO: 10.2 K/UL — SIGNIFICANT CHANGE UP (ref 3.8–10.5)

## 2020-05-08 PROCEDURE — 36415 COLL VENOUS BLD VENIPUNCTURE: CPT

## 2020-05-08 PROCEDURE — 86900 BLOOD TYPING SEROLOGIC ABO: CPT

## 2020-05-08 PROCEDURE — 93312 ECHO TRANSESOPHAGEAL: CPT

## 2020-05-08 PROCEDURE — 70450 CT HEAD/BRAIN W/O DYE: CPT

## 2020-05-08 PROCEDURE — 94640 AIRWAY INHALATION TREATMENT: CPT

## 2020-05-08 PROCEDURE — 83930 ASSAY OF BLOOD OSMOLALITY: CPT

## 2020-05-08 PROCEDURE — 97530 THERAPEUTIC ACTIVITIES: CPT

## 2020-05-08 PROCEDURE — 93005 ELECTROCARDIOGRAM TRACING: CPT

## 2020-05-08 PROCEDURE — 83735 ASSAY OF MAGNESIUM: CPT

## 2020-05-08 PROCEDURE — 84550 ASSAY OF BLOOD/URIC ACID: CPT

## 2020-05-08 PROCEDURE — 93325 DOPPLER ECHO COLOR FLOW MAPG: CPT

## 2020-05-08 PROCEDURE — 84443 ASSAY THYROID STIM HORMONE: CPT

## 2020-05-08 PROCEDURE — 97163 PT EVAL HIGH COMPLEX 45 MIN: CPT

## 2020-05-08 PROCEDURE — 93306 TTE W/DOPPLER COMPLETE: CPT

## 2020-05-08 PROCEDURE — 81001 URINALYSIS AUTO W/SCOPE: CPT

## 2020-05-08 PROCEDURE — 86850 RBC ANTIBODY SCREEN: CPT

## 2020-05-08 PROCEDURE — 71045 X-RAY EXAM CHEST 1 VIEW: CPT

## 2020-05-08 PROCEDURE — 80053 COMPREHEN METABOLIC PANEL: CPT

## 2020-05-08 PROCEDURE — 81025 URINE PREGNANCY TEST: CPT

## 2020-05-08 PROCEDURE — 87635 SARS-COV-2 COVID-19 AMP PRB: CPT

## 2020-05-08 PROCEDURE — 84100 ASSAY OF PHOSPHORUS: CPT

## 2020-05-08 PROCEDURE — 84300 ASSAY OF URINE SODIUM: CPT

## 2020-05-08 PROCEDURE — 71275 CT ANGIOGRAPHY CHEST: CPT

## 2020-05-08 PROCEDURE — 80061 LIPID PANEL: CPT

## 2020-05-08 PROCEDURE — 94760 N-INVAS EAR/PLS OXIMETRY 1: CPT

## 2020-05-08 PROCEDURE — 97116 GAIT TRAINING THERAPY: CPT

## 2020-05-08 PROCEDURE — 99239 HOSP IP/OBS DSCHRG MGMT >30: CPT

## 2020-05-08 PROCEDURE — 97110 THERAPEUTIC EXERCISES: CPT

## 2020-05-08 PROCEDURE — 70498 CT ANGIOGRAPHY NECK: CPT

## 2020-05-08 PROCEDURE — 83935 ASSAY OF URINE OSMOLALITY: CPT

## 2020-05-08 PROCEDURE — 82009 KETONE BODYS QUAL: CPT

## 2020-05-08 PROCEDURE — 84484 ASSAY OF TROPONIN QUANT: CPT

## 2020-05-08 PROCEDURE — 99291 CRITICAL CARE FIRST HOUR: CPT | Mod: 25

## 2020-05-08 PROCEDURE — 80048 BASIC METABOLIC PNL TOTAL CA: CPT

## 2020-05-08 PROCEDURE — 83036 HEMOGLOBIN GLYCOSYLATED A1C: CPT

## 2020-05-08 PROCEDURE — 84145 PROCALCITONIN (PCT): CPT

## 2020-05-08 PROCEDURE — 86901 BLOOD TYPING SEROLOGIC RH(D): CPT

## 2020-05-08 PROCEDURE — 87086 URINE CULTURE/COLONY COUNT: CPT

## 2020-05-08 PROCEDURE — 93320 DOPPLER ECHO COMPLETE: CPT

## 2020-05-08 PROCEDURE — 96374 THER/PROPH/DIAG INJ IV PUSH: CPT | Mod: XU

## 2020-05-08 PROCEDURE — 85610 PROTHROMBIN TIME: CPT

## 2020-05-08 PROCEDURE — 70496 CT ANGIOGRAPHY HEAD: CPT

## 2020-05-08 PROCEDURE — 0042T: CPT

## 2020-05-08 PROCEDURE — 80307 DRUG TEST PRSMV CHEM ANLYZR: CPT

## 2020-05-08 PROCEDURE — 85027 COMPLETE CBC AUTOMATED: CPT

## 2020-05-08 PROCEDURE — 82962 GLUCOSE BLOOD TEST: CPT

## 2020-05-08 PROCEDURE — 85730 THROMBOPLASTIN TIME PARTIAL: CPT

## 2020-05-08 PROCEDURE — 70551 MRI BRAIN STEM W/O DYE: CPT

## 2020-05-08 RX ORDER — WARFARIN SODIUM 2.5 MG/1
6 TABLET ORAL
Qty: 0 | Refills: 0 | DISCHARGE

## 2020-05-08 RX ORDER — LEVOTHYROXINE SODIUM 125 MCG
1 TABLET ORAL
Qty: 30 | Refills: 0
Start: 2020-05-08 | End: 2020-06-06

## 2020-05-08 RX ORDER — ASPIRIN/CALCIUM CARB/MAGNESIUM 324 MG
1 TABLET ORAL
Qty: 30 | Refills: 0
Start: 2020-05-08 | End: 2020-06-06

## 2020-05-08 RX ORDER — FENOFIBRATE,MICRONIZED 130 MG
1 CAPSULE ORAL
Qty: 30 | Refills: 0
Start: 2020-05-08 | End: 2020-06-06

## 2020-05-08 RX ORDER — SACCHAROMYCES BOULARDII 250 MG
1 POWDER IN PACKET (EA) ORAL
Qty: 6 | Refills: 0
Start: 2020-05-08 | End: 2020-05-10

## 2020-05-08 RX ORDER — CEPHALEXIN 500 MG
1 CAPSULE ORAL
Qty: 12 | Refills: 0
Start: 2020-05-08 | End: 2020-05-10

## 2020-05-08 RX ORDER — SITAGLIPTIN 50 MG/1
1 TABLET, FILM COATED ORAL
Qty: 30 | Refills: 0
Start: 2020-05-08 | End: 2020-06-06

## 2020-05-08 RX ORDER — LURASIDONE HYDROCHLORIDE 40 MG/1
1 TABLET ORAL
Qty: 0 | Refills: 0 | DISCHARGE
Start: 2020-05-08

## 2020-05-08 RX ADMIN — Medication 81 MILLIGRAM(S): at 12:25

## 2020-05-08 RX ADMIN — METHADONE HYDROCHLORIDE 10 MILLIGRAM(S): 40 TABLET ORAL at 05:09

## 2020-05-08 RX ADMIN — Medication 500 MILLIGRAM(S): at 05:08

## 2020-05-08 RX ADMIN — Medication 145 MILLIGRAM(S): at 12:25

## 2020-05-08 RX ADMIN — CHLORHEXIDINE GLUCONATE 1 APPLICATION(S): 213 SOLUTION TOPICAL at 05:09

## 2020-05-08 RX ADMIN — DULOXETINE HYDROCHLORIDE 90 MILLIGRAM(S): 30 CAPSULE, DELAYED RELEASE ORAL at 12:26

## 2020-05-08 RX ADMIN — Medication 25 MICROGRAM(S): at 05:09

## 2020-05-08 RX ADMIN — Medication 10 UNIT(S): at 08:23

## 2020-05-08 RX ADMIN — Medication 8: at 08:22

## 2020-05-08 RX ADMIN — MONTELUKAST 10 MILLIGRAM(S): 4 TABLET, CHEWABLE ORAL at 12:26

## 2020-05-08 RX ADMIN — Medication 500 MILLIGRAM(S): at 12:26

## 2020-05-08 RX ADMIN — Medication 15 MILLIGRAM(S): at 05:09

## 2020-05-08 RX ADMIN — Medication 12: at 12:26

## 2020-05-08 RX ADMIN — Medication 1 TABLET(S): at 12:26

## 2020-05-08 RX ADMIN — Medication 10 UNIT(S): at 12:33

## 2020-05-08 NOTE — DISCHARGE NOTE NURSING/CASE MANAGEMENT/SOCIAL WORK - NSDCPEPTSTRK_GEN_ALL_CORE
Stroke support groups for patients, families, and friends/Risk factors for stroke/Stroke warning signs and symptoms/Signs and symptoms of stroke/Prescribed medications/Call 911 for stroke/Need for follow up after discharge/Stroke education booklet

## 2020-05-08 NOTE — DISCHARGE NOTE PROVIDER - CARE PROVIDERS DIRECT ADDRESSES
,DirectAddress_Unknown,[email or web address] ,DirectAddress_Unknown,[email or web address]

## 2020-05-08 NOTE — DISCHARGE NOTE PROVIDER - NSDCMRMEDTOKEN_GEN_ALL_CORE_FT
ALPRAZolam 1 mg oral tablet: 1 tab(s) orally 3 times a day, As needed, anxiety  aspirin 81 mg oral delayed release tablet: 1 tab(s) orally once a day  busPIRone 15 mg oral tablet: 1 tab(s) orally every 12 hours  cephalexin 500 mg oral capsule: 1 tab(s) orally every 6 hours   Coumadin: 10 milligram(s) orally once a day  Cymbalta: 220 milligram(s) orally once a day  fenofibrate 145 mg oral tablet: 1 tab(s) orally once a day  Florastor 250 mg oral capsule: 1 cap(s) orally 2 times a day   levothyroxine 25 mcg (0.025 mg) oral tablet: 1 tab(s) orally once a day  lurasidone 20 mg oral tablet: 1 tab(s) orally once a day  metFORMIN 1000 mg oral tablet: 1 tab(s) orally 2 times a day  methadone 10 mg oral tablet: 1 tab(s) orally 3 times a day  metoprolol tartrate 25 mg oral tablet: 1 tab(s) orally 2 times a day  Multiple Vitamins oral tablet: 1 tab(s) orally once a day  oxyCODONE 10 mg oral tablet: 1 tab(s) orally every 4 hours, As needed, Moderate Pain (4 - 6)  pravastatin 20 mg oral tablet: 1 tab(s) orally once a day ALPRAZolam 1 mg oral tablet: 1 tab(s) orally 3 times a day, As needed, anxiety  aspirin 81 mg oral delayed release tablet: 1 tab(s) orally once a day  busPIRone 15 mg oral tablet: 1 tab(s) orally every 12 hours  cephalexin 500 mg oral capsule: 1 tab(s) orally every 6 hours   Coumadin: 10 milligram(s) orally once a day  Cymbalta: 220 milligram(s) orally once a day  fenofibrate 145 mg oral tablet: 1 tab(s) orally once a day  Florastor 250 mg oral capsule: 1 cap(s) orally 2 times a day   glipiZIDE 2.5 mg oral tablet, extended release: 1 tab(s) orally 2 times a day   Januvia 50 mg oral tablet: 1 tab(s) orally once a day  levothyroxine 25 mcg (0.025 mg) oral tablet: 1 tab(s) orally once a day  lurasidone 20 mg oral tablet: 1 tab(s) orally once a day  metFORMIN 1000 mg oral tablet: 1 tab(s) orally 2 times a day  methadone 10 mg oral tablet: 1 tab(s) orally 3 times a day  metoprolol tartrate 25 mg oral tablet: 1 tab(s) orally 2 times a day  Multiple Vitamins oral tablet: 1 tab(s) orally once a day  oxyCODONE 10 mg oral tablet: 1 tab(s) orally every 4 hours, As needed, Moderate Pain (4 - 6)  pravastatin 20 mg oral tablet: 1 tab(s) orally once a day

## 2020-05-08 NOTE — DISCHARGE NOTE PROVIDER - NSDCCPCAREPLAN_GEN_ALL_CORE_FT
PRINCIPAL DISCHARGE DIAGNOSIS  Diagnosis: Internal carotid artery dissection  Assessment and Plan of Treatment: please take your coumadin as prescribed and follow up with vascular surgery as outpatient  please ensure you have your INR level checked on 5/11 and follow up with Dr. Abdi Magaña to see if coumadin needs to be adjusted further  INR goal is 3-3.5      SECONDARY DISCHARGE DIAGNOSES  Diagnosis: Mechanical heart valve present  Assessment and Plan of Treatment: INR goal is 3-3.5 therefore is it imperative that you take your coumadin    Diagnosis: Depressive disorder  Assessment and Plan of Treatment: continue home medications  follow up with your PCP within 1 weeks    Diagnosis: Cerebrovascular accident (CVA)  Assessment and Plan of Treatment: please take aspirin and statin as directed  follow up with neurology as outpatient

## 2020-05-08 NOTE — CHART NOTE - NSCHARTNOTEFT_GEN_A_CORE
Patient with uncontrolled hyperglycemia during entire hospitalization despite increasing doses of insulin. Patient was seen by the diabetic educator and counseled, both the writer and diabetic educator regarding need for insulin. However, patient is refusing to be discharged on insulin. States she takes Glipizide, januvia and metformin at home and her fingersticks are acceptable. HbA1c 8.4. Will discharge home on oral agents, increase glipizide and strongly advised patient to check fingersticks ACHS and keep a log for PMD next week for further adjustments in medications.    Discussed with patient and RN Patient with uncontrolled hyperglycemia during entire hospitalization despite increasing doses of insulin. Patient was seen by the diabetic educator and counseled, both the writer and diabetic educator regarding need for insulin. However, patient is refusing to be discharged on insulin. States she takes Glipizide, januvia and metformin at home and her fingersticks are acceptable. HbA1c 8.4. Will discharge home on oral agents, increase glipizide to 5 mg BID and strongly advised patient to check fingersticks ACHS and keep a log for PMD next week for further adjustments in medications. Discontinue Januvia given increased risk of pancreatitis, especially in the setting of hypertriglyceridemia.    Discussed with patient and RN

## 2020-05-08 NOTE — DISCHARGE NOTE PROVIDER - HOSPITAL COURSE
Patient is a 35 year old female with hx of Marfan syndrome, mechanical AVR and MVR on warfarin, Aortic aneurysm, hypothyroidism, DM, TBI, presents with right internal carotid artery dissection and resolving stroke symptoms. Found to have a right carotid dissection. MRI confirmed tiny left frontal-cortical infarct.        Right carotid dissection     - CTA head/neck reviewed    - CTA negative for aortic dissection    - INR 2.96; d/c heparin and continue coumadin     - no neuro deficits on exam    - vascular consult appreciated; no surgical indication        Acute left frontal cortical infarct    -MRI with tiny infarct; discussed with Dr. Carroll - incidental finding    -no neuro deficits on exam    -recommending to continue ASA and lipitor    -TTE with preserved EF    -CT brain stroke protocol and CTA head reviewed    -MRI as above    -Cardio consult appreciated; CECILY without vegetation of thrombus    -HbA1c 8.4    -lipid panel reviewed; TG significantly elevated. Continue fenofibrate and statin.    -Discussed with Dr. Carroll - states no further work up needs to be done from a neuro perspective. Outpatient follow up.        Mechanical AVR and MVR    -INR 2.96; continue Coumadin 10 mg     -target INR 3.0-3.5    -stable for d/c as discussed with Dr. Salinas        Aortic Aneurysm    -CT chest without aortic dissection    -TTE reviewed; preserved EF        Hypothyroidism     - c/w home dose synthroid        Depression/anxiety    - c/w home medications; mood stable    - psych consult appreciated        DM    -hyperglycemia noted despite increased insulin    -acetone negative    -increased humalog and lantus     -diabetic educator consult appreciated    -patient is adamantly refusing insulin; agreeable to check sugars frequently and continue 2 oral agents at home        Chronic pain    -history of spinal surgery    -continue home pain meds; continue oxycodone as needed and methadone        Hyponatremia - resolved    -likely due to hypovolemia given urine osm    -corrected sodium for glucose is 135    -continue free water restriction    -monitor I/Os        Polyuria    -likely due to uncontrolled DM; rule out UTI    -UA positive; urine culture noted    -continue keflex given symptoms        Medically stable for discharge home as discussed with Dr. Salinas. Continue Coumadin 10 mg daily as discussed with patient until INR check on Monday 5/11. Coumadin to be further adjusted as outpatient. INR goal 3-3.5. Time spent on d/c 45 minutes. Patient is a 35 year old female with hx of Marfan syndrome, mechanical AVR and MVR on warfarin, Aortic aneurysm, hypothyroidism, DM, TBI, presents with right internal carotid artery dissection and resolving stroke symptoms. Found to have a right carotid dissection. MRI confirmed tiny left frontal-cortical infarct.        Right carotid dissection     - CTA head/neck reviewed    - CTA negative for aortic dissection    - INR 2.96; d/c heparin and continue coumadin     - no neuro deficits on exam    - vascular consult appreciated; no surgical indication        Acute left frontal cortical infarct    -MRI with tiny infarct; discussed with Dr. Carroll - incidental finding    -no neuro deficits on exam    -recommending to continue ASA and lipitor    -TTE with preserved EF    -CT brain stroke protocol and CTA head reviewed    -MRI as above    -Cardio consult appreciated; CECILY without vegetation of thrombus    -HbA1c 8.4    -lipid panel reviewed; TG significantly elevated. Continue fenofibrate and statin.    -Discussed with Dr. Carroll - states no further work up needs to be done from a neuro perspective. Outpatient follow up.        Mechanical AVR and MVR    -INR 2.96; continue Coumadin 10 mg     -target INR 3.0-3.5    -stable for d/c as discussed with Dr. Salinas        Aortic Aneurysm    -CT chest without aortic dissection    -TTE reviewed; preserved EF        Hypothyroidism     - c/w home dose synthroid        Depression/anxiety    - c/w home medications; mood stable    - psych consult appreciated        DM    -hyperglycemia noted despite increased insulin    -acetone negative    -increased humalog and lantus     -diabetic educator consult appreciated    -patient is adamantly refusing insulin; agreeable to check sugars frequently and continue 3 oral agents at home        Chronic pain    -history of spinal surgery    -continue home pain meds; continue oxycodone as needed and methadone        Hyponatremia - resolved    -likely due to hypovolemia given urine osm    -corrected sodium for glucose is 135    -continue free water restriction    -monitor I/Os        Polyuria    -likely due to uncontrolled DM; rule out UTI    -UA positive; urine culture noted    -continue keflex given symptoms        Medically stable for discharge home as discussed with Dr. Salinas. Continue Coumadin 10 mg daily as discussed with patient until INR check on Monday 5/11. Coumadin to be further adjusted as outpatient. INR goal 3-3.5. Time spent on d/c 45 minutes.

## 2020-05-08 NOTE — DISCHARGE NOTE PROVIDER - CARE PROVIDER_API CALL
Werner Greene)  Cardiovascular Disease; Internal Medicine  [address] [place] [address]  Phone: [phone]  Fax: [phone]  Follow Up Time:     Scott Carroll)  Neurology  [address] [facility], [address]  [place] [address]  Phone: [phone]  Fax: [phone]  Follow Up Time:     Tyrone Garsia)  Surgery; Vascular Surgery  [address] [facility], [address] [place] [address]  Phone: [phone]  Fax: [phone]  Follow Up Time: Werner Greene)  Cardiovascular Disease; Internal Medicine  [address] [place] [address]  Phone: [phone]  Fax: [phone]  Follow Up Time:     Scott Carroll)  Neurology  [address] [facility], [address]  [place] [address]  Phone: [phone]  Fax: [phone]  Follow Up Time:     Tyrone Garsia)  Surgery; Vascular Surgery  [address] [facility], [address] [place] [address]  Phone: [phone]  Fax: [phone]  Follow Up Time:     Willow Monaco)  EndocrinologyMetabDiabetes; Internal Medicine  [address] [place] [address]  Phone: [phone]  Fax: [phone]  Follow Up Time:

## 2020-05-08 NOTE — DISCHARGE NOTE PROVIDER - NSDCFUSCHEDAPPT_GEN_ALL_CORE_FT
JUANY FELICIANO ; 06/22/2020 ; [facility] [address] JUANY FELICIANO ; 06/22/2020 ; [facility] [address] JUANY FELICIANO ; 06/22/2020 ; [facility] [address] JUANY FELICIANO ; 06/22/2020 ; [facility] [address]

## 2020-05-08 NOTE — DISCHARGE NOTE NURSING/CASE MANAGEMENT/SOCIAL WORK - PATIENT PORTAL LINK FT
You can access the FollowMyHealth Patient Portal offered by [facility] by registering at the following website: [email or web address]. By joining [employer]’s FollowMyHealth portal, you will also be able to view your health information using other applications (apps) compatible with our system.

## 2020-05-08 NOTE — DISCHARGE NOTE PROVIDER - PROVIDER TOKENS
PROVIDER:[TOKEN:[[id number]]],PROVIDER:[TOKEN:[[id number]]],PROVIDER:[TOKEN:[[id number]]] PROVIDER:[TOKEN:[[id number]]],PROVIDER:[TOKEN:[[id number]]],PROVIDER:[TOKEN:[[id number]]],PROVIDER:[TOKEN:[[id number]]]

## 2020-05-11 ENCOUNTER — TRANSCRIPTION ENCOUNTER (OUTPATIENT)
Age: 35
End: 2020-05-11

## 2020-05-12 ENCOUNTER — RX RENEWAL (OUTPATIENT)
Age: 35
End: 2020-05-12

## 2020-05-13 ENCOUNTER — APPOINTMENT (OUTPATIENT)
Dept: FAMILY MEDICINE | Facility: CLINIC | Age: 35
End: 2020-05-13
Payer: MEDICAID

## 2020-05-13 PROCEDURE — 99496 TRANSJ CARE MGMT HIGH F2F 7D: CPT | Mod: 95

## 2020-05-13 RX ORDER — ECONAZOLE NITRATE 10 MG/G
1 CREAM TOPICAL DAILY
Qty: 1 | Refills: 2 | Status: COMPLETED | COMMUNITY
Start: 2019-10-25 | End: 2020-05-13

## 2020-05-13 RX ORDER — METRONIDAZOLE 7.5 MG/G
0.75 CREAM TOPICAL TWICE DAILY
Qty: 1 | Refills: 0 | Status: COMPLETED | COMMUNITY
Start: 2020-01-20 | End: 2020-05-13

## 2020-05-13 RX ORDER — MONTELUKAST 10 MG/1
10 TABLET, FILM COATED ORAL
Qty: 30 | Refills: 2 | Status: COMPLETED | COMMUNITY
Start: 2018-10-16 | End: 2020-05-13

## 2020-05-13 RX ORDER — TERCONAZOLE 8 MG/G
0.8 CREAM VAGINAL
Qty: 1 | Refills: 0 | Status: COMPLETED | COMMUNITY
Start: 2019-04-24 | End: 2020-05-13

## 2020-05-13 RX ORDER — MECLIZINE HYDROCHLORIDE 12.5 MG/1
12.5 TABLET ORAL 3 TIMES DAILY
Qty: 30 | Refills: 0 | Status: COMPLETED | COMMUNITY
Start: 2020-02-05 | End: 2020-05-13

## 2020-05-13 RX ORDER — FLUTICASONE FUROATE AND VILANTEROL TRIFENATATE 200; 25 UG/1; UG/1
200-25 POWDER RESPIRATORY (INHALATION)
Qty: 1 | Refills: 3 | Status: COMPLETED | COMMUNITY
Start: 2019-11-05 | End: 2020-05-13

## 2020-05-13 RX ORDER — ARIPIPRAZOLE 5 MG/1
5 TABLET ORAL
Qty: 30 | Refills: 0 | Status: COMPLETED | COMMUNITY
Start: 2019-02-28 | End: 2020-05-13

## 2020-05-13 NOTE — REVIEW OF SYSTEMS
[Muscle Weakness] : muscle weakness [Anxiety] : anxiety [Depression] : depression [Negative] : Heme/Lymph [FreeTextEntry5] : minimal CP (chronic)  [FreeTextEntry2] : slowly improving energy levels  [FreeTextEntry9] : RLE weakness improving  [FreeTextEntry6] : stable SOB

## 2020-05-13 NOTE — PHYSICAL EXAM
[Well Nourished] : well nourished [No Acute Distress] : no acute distress [Well Developed] : well developed [EOMI] : extraocular movements intact [Well-Appearing] : well-appearing [Normal Outer Ear/Nose] : the outer ears and nose were normal in appearance [No Respiratory Distress] : no respiratory distress  [No Accessory Muscle Use] : no accessory muscle use [Memory Grossly Normal] : memory grossly normal [Speech Grossly Normal] : speech grossly normal [Normal Affect] : the affect was normal [Alert and Oriented x3] : oriented to person, place, and time [Normal Mood] : the mood was normal [Normal Insight/Judgement] : insight and judgment were intact [de-identified] : subjective mild RLE weakness [de-identified] : no obvious focal deficits at this time given limited neurlogic exam via teleheatlh visit

## 2020-05-13 NOTE — ASSESSMENT
[FreeTextEntry1] :  Hospital d/c summary thoroughly reviewed\par Medications reviewed and adjusted appropriately \par \par INR check due 5/18/20 (ambulatory machine)\par \par Vascular f/u c Dr. Pollack [phone]  STRONGLY recommended \par \par Cardio f/u c Dr. Greene ([place]) scheduled 6/1/20\par \par f/u in office 2 weeks

## 2020-05-13 NOTE — HISTORY OF PRESENT ILLNESS
[Post-hospitalization from ___ Hospital] : Post-hospitalization from [unfilled] Hospital [Discharge Summary] : discharge summary [Pertinent Labs] : pertinent labs [Med Reconciliation] : medication reconciliation has been completed [Radiology Findings] : radiology findings [Discharge Med List] : discharge medication list [Patient Contacted By: ____] : and contacted by [unfilled] [FreeTextEntry2] : 36 yo female s/p  [facility] admission 4/30/20- 5/8/20 secondary to "dizziness" and "confusion." pt brought to [facility] via EMS.  pt states while at home on 4/30/20 was unable to read and/or reply to text messages appropriately.  Pt states thoughts and sentences were fumbled.  Pt was Dx'ed c R carotid artery dissection. \par \par \par pt states blood glucose levels in hospital 400-500,  INR on d/c 5/8/20= 2.9  INR on 5/11/20 =3.51 (goal 3-3.5)\par Pt states "for the most part back to my normal self." \par \par \par Denies H/A, no visual changes, denies auditory dysfxn, denies slurred speech, denies facial droop and/or drooling\par Denies UE and/or LE weakness B/L \par \par reports improving balance  and strength in LE B/L \par

## 2020-05-15 ENCOUNTER — TRANSCRIPTION ENCOUNTER (OUTPATIENT)
Age: 35
End: 2020-05-15

## 2020-05-19 ENCOUNTER — TRANSCRIPTION ENCOUNTER (OUTPATIENT)
Age: 35
End: 2020-05-19

## 2020-05-20 ENCOUNTER — APPOINTMENT (OUTPATIENT)
Dept: VASCULAR SURGERY | Facility: CLINIC | Age: 35
End: 2020-05-20
Payer: MEDICAID

## 2020-05-20 VITALS
HEART RATE: 99 BPM | WEIGHT: 193 LBS | OXYGEN SATURATION: 98 % | HEIGHT: 72 IN | TEMPERATURE: 97.4 F | SYSTOLIC BLOOD PRESSURE: 112 MMHG | BODY MASS INDEX: 26.14 KG/M2 | DIASTOLIC BLOOD PRESSURE: 75 MMHG

## 2020-05-20 VITALS — SYSTOLIC BLOOD PRESSURE: 113 MMHG | DIASTOLIC BLOOD PRESSURE: 76 MMHG

## 2020-05-20 PROCEDURE — 99203 OFFICE O/P NEW LOW 30 MIN: CPT

## 2020-05-20 PROCEDURE — 99214 OFFICE O/P EST MOD 30 MIN: CPT

## 2020-05-27 ENCOUNTER — APPOINTMENT (OUTPATIENT)
Dept: FAMILY MEDICINE | Facility: CLINIC | Age: 35
End: 2020-05-27
Payer: MEDICAID

## 2020-05-27 ENCOUNTER — RESULT CHARGE (OUTPATIENT)
Age: 35
End: 2020-05-27

## 2020-05-27 VITALS
DIASTOLIC BLOOD PRESSURE: 60 MMHG | HEIGHT: 72 IN | HEART RATE: 90 BPM | OXYGEN SATURATION: 99 % | SYSTOLIC BLOOD PRESSURE: 108 MMHG | BODY MASS INDEX: 26.28 KG/M2 | TEMPERATURE: 98.1 F | WEIGHT: 194 LBS

## 2020-05-27 PROCEDURE — 99214 OFFICE O/P EST MOD 30 MIN: CPT | Mod: 25

## 2020-05-27 PROCEDURE — 85610 PROTHROMBIN TIME: CPT | Mod: QW

## 2020-05-27 PROCEDURE — 36415 COLL VENOUS BLD VENIPUNCTURE: CPT

## 2020-05-27 NOTE — HISTORY OF PRESENT ILLNESS
[de-identified] : 36 yo female for 2 week f/u.  pt states home INR check 2 days ago= 6.8.   pt however, did not adjust Warfarin dosing appropriately.  pt did NOT notify provider and has cont'd usual 9mg qd dosing over the last 2 days\par \par pt reports fatigue, decreased energy, decreased appetite, and MINIMAL dizziness.  Denies CP/pressure  unchanged chronic SOB,  +ve intermittent palpitations since d/c from hospital on 5/8/20.  \par \par Denies bev hematuria, rectal bleeding, hematemesis, hemoptysis and/or epistaxis \par \par Blood glucose yesterday am fasting =145.  Denies recurrent confusion, mild H/a \par \par Denies new OTC supplements  [FreeTextEntry1] : follow up on dizziness

## 2020-05-27 NOTE — ASSESSMENT
[FreeTextEntry1] : HOLD Warfarin 2 days  \par Home INR check 1st thing am on 5/29/20  \par \par see lab orders

## 2020-05-27 NOTE — PHYSICAL EXAM
[No Acute Distress] : no acute distress [Well Developed] : well developed [Well Nourished] : well nourished [Well-Appearing] : well-appearing [EOMI] : extraocular movements intact [Normal Outer Ear/Nose] : the outer ears and nose were normal in appearance [No Accessory Muscle Use] : no accessory muscle use [No Respiratory Distress] : no respiratory distress  [Clear to Auscultation] : lungs were clear to auscultation bilaterally [No Edema] : there was no peripheral edema [No Carotid Bruits] : no carotid bruits [Coordination Grossly Intact] : coordination grossly intact [No Rash] : no rash [Speech Grossly Normal] : speech grossly normal [Normal Gait] : normal gait [No Focal Deficits] : no focal deficits [Normal Affect] : the affect was normal [Alert and Oriented x3] : oriented to person, place, and time [Normal Mood] : the mood was normal [de-identified] : RRR +murmur  [Normal Insight/Judgement] : insight and judgment were intact

## 2020-05-28 ENCOUNTER — TRANSCRIPTION ENCOUNTER (OUTPATIENT)
Age: 35
End: 2020-05-28

## 2020-05-28 LAB
ALBUMIN SERPL ELPH-MCNC: 3.8 G/DL
ALP BLD-CCNC: 67 U/L
ALT SERPL-CCNC: 17 U/L
ANION GAP SERPL CALC-SCNC: 12 MMOL/L
AST SERPL-CCNC: 45 U/L
BASOPHILS # BLD AUTO: 0.05 K/UL
BASOPHILS NFR BLD AUTO: 0.4 %
BILIRUB SERPL-MCNC: <0.2 MG/DL
BUN SERPL-MCNC: 12 MG/DL
CALCIUM SERPL-MCNC: 9 MG/DL
CHLORIDE SERPL-SCNC: 104 MMOL/L
CO2 SERPL-SCNC: 21 MMOL/L
CREAT SERPL-MCNC: 0.74 MG/DL
EOSINOPHIL # BLD AUTO: 0.19 K/UL
EOSINOPHIL NFR BLD AUTO: 1.4 %
GLUCOSE SERPL-MCNC: 128 MG/DL
HCT VFR BLD CALC: 35.5 %
HGB BLD-MCNC: 10.4 G/DL
IMM GRANULOCYTES NFR BLD AUTO: 0.9 %
LYMPHOCYTES # BLD AUTO: 1.82 K/UL
LYMPHOCYTES NFR BLD AUTO: 13 %
MAN DIFF?: NORMAL
MCHC RBC-ENTMCNC: 24.4 PG
MCHC RBC-ENTMCNC: 29.3 GM/DL
MCV RBC AUTO: 83.1 FL
MONOCYTES # BLD AUTO: 0.73 K/UL
MONOCYTES NFR BLD AUTO: 5.2 %
NEUTROPHILS # BLD AUTO: 11.04 K/UL
NEUTROPHILS NFR BLD AUTO: 79.1 %
PLATELET # BLD AUTO: 404 K/UL
POTASSIUM SERPL-SCNC: 4.6 MMOL/L
PROT SERPL-MCNC: 6.7 G/DL
RBC # BLD: 4.27 M/UL
RBC # FLD: 17.6 %
SODIUM SERPL-SCNC: 137 MMOL/L
WBC # FLD AUTO: 13.96 K/UL

## 2020-06-04 ENCOUNTER — APPOINTMENT (OUTPATIENT)
Dept: FAMILY MEDICINE | Facility: CLINIC | Age: 35
End: 2020-06-04
Payer: MEDICAID

## 2020-06-04 ENCOUNTER — RESULT CHARGE (OUTPATIENT)
Age: 35
End: 2020-06-04

## 2020-06-04 DIAGNOSIS — R30.0 DYSURIA: ICD-10-CM

## 2020-06-04 DIAGNOSIS — R31.9 HEMATURIA, UNSPECIFIED: ICD-10-CM

## 2020-06-04 PROCEDURE — 99442: CPT

## 2020-06-05 LAB
APPEARANCE: ABNORMAL
BACTERIA: NEGATIVE
BILIRUBIN URINE: NEGATIVE
BLOOD URINE: NEGATIVE
CALCIUM OXALATE CRYSTALS: ABNORMAL
COLOR: YELLOW
GLUCOSE QUALITATIVE U: ABNORMAL
HYALINE CASTS: 0 /LPF
KETONES URINE: NORMAL
LEUKOCYTE ESTERASE URINE: ABNORMAL
MICROSCOPIC-UA: NORMAL
NITRITE URINE: NEGATIVE
PH URINE: 6
PROTEIN URINE: ABNORMAL
RED BLOOD CELLS URINE: 0 /HPF
SPECIFIC GRAVITY URINE: 1.04
SQUAMOUS EPITHELIAL CELLS: 14 /HPF
URINE COMMENTS: NORMAL
UROBILINOGEN URINE: ABNORMAL
WHITE BLOOD CELLS URINE: 34 /HPF

## 2020-06-09 ENCOUNTER — TRANSCRIPTION ENCOUNTER (OUTPATIENT)
Age: 35
End: 2020-06-09

## 2020-06-09 LAB — BACTERIA UR CULT: NORMAL

## 2020-06-11 ENCOUNTER — RX RENEWAL (OUTPATIENT)
Age: 35
End: 2020-06-11

## 2020-06-11 ENCOUNTER — APPOINTMENT (OUTPATIENT)
Dept: OBGYN | Facility: CLINIC | Age: 35
End: 2020-06-11

## 2020-06-12 ENCOUNTER — TRANSCRIPTION ENCOUNTER (OUTPATIENT)
Age: 35
End: 2020-06-12

## 2020-06-16 ENCOUNTER — TRANSCRIPTION ENCOUNTER (OUTPATIENT)
Age: 35
End: 2020-06-16

## 2020-06-22 ENCOUNTER — APPOINTMENT (OUTPATIENT)
Dept: OBGYN | Facility: CLINIC | Age: 35
End: 2020-06-22

## 2020-06-24 ENCOUNTER — TRANSCRIPTION ENCOUNTER (OUTPATIENT)
Age: 35
End: 2020-06-24

## 2020-06-25 ENCOUNTER — APPOINTMENT (OUTPATIENT)
Dept: OBGYN | Facility: CLINIC | Age: 35
End: 2020-06-25
Payer: MEDICAID

## 2020-06-25 VITALS
WEIGHT: 187 LBS | HEIGHT: 72 IN | BODY MASS INDEX: 25.33 KG/M2 | DIASTOLIC BLOOD PRESSURE: 78 MMHG | SYSTOLIC BLOOD PRESSURE: 128 MMHG

## 2020-06-25 DIAGNOSIS — Z86.73 PERSONAL HISTORY OF TRANSIENT ISCHEMIC ATTACK (TIA), AND CEREBRAL INFARCTION W/OUT RESIDUAL DEFICITS: ICD-10-CM

## 2020-06-25 DIAGNOSIS — N89.8 OTHER SPECIFIED NONINFLAMMATORY DISORDERS OF VAGINA: ICD-10-CM

## 2020-06-25 PROCEDURE — 99213 OFFICE O/P EST LOW 20 MIN: CPT

## 2020-06-25 RX ORDER — CLOTRIMAZOLE 10 MG/G
1 CREAM TOPICAL
Qty: 30 | Refills: 0 | Status: DISCONTINUED | COMMUNITY
Start: 2020-02-04

## 2020-06-25 RX ORDER — CEPHALEXIN 500 MG/1
500 CAPSULE ORAL
Qty: 12 | Refills: 0 | Status: DISCONTINUED | COMMUNITY
Start: 2020-05-08

## 2020-06-25 NOTE — HISTORY OF PRESENT ILLNESS
[1 Year Ago] : 1 year ago [Good] : being in good health [Definite:  ___ (Date)] : the last menstrual period was [unfilled] [Menarche Age: ____] : age at menarche was [unfilled] [Sexually Active] : is sexually active [Monogamous] : is monogamous [NA] : N/A [Male ___] : [unfilled] male

## 2020-06-30 ENCOUNTER — APPOINTMENT (OUTPATIENT)
Dept: VASCULAR SURGERY | Facility: CLINIC | Age: 35
End: 2020-06-30
Payer: MEDICAID

## 2020-06-30 VITALS
HEART RATE: 96 BPM | OXYGEN SATURATION: 100 % | TEMPERATURE: 97.9 F | DIASTOLIC BLOOD PRESSURE: 71 MMHG | BODY MASS INDEX: 25.33 KG/M2 | HEIGHT: 72 IN | WEIGHT: 187 LBS | SYSTOLIC BLOOD PRESSURE: 107 MMHG

## 2020-06-30 PROCEDURE — 99213 OFFICE O/P EST LOW 20 MIN: CPT

## 2020-06-30 PROCEDURE — 93880 EXTRACRANIAL BILAT STUDY: CPT

## 2020-07-01 ENCOUNTER — APPOINTMENT (OUTPATIENT)
Dept: FAMILY MEDICINE | Facility: CLINIC | Age: 35
End: 2020-07-01
Payer: MEDICAID

## 2020-07-01 VITALS
BODY MASS INDEX: 25.73 KG/M2 | TEMPERATURE: 98 F | OXYGEN SATURATION: 99 % | HEART RATE: 100 BPM | HEIGHT: 72 IN | DIASTOLIC BLOOD PRESSURE: 68 MMHG | SYSTOLIC BLOOD PRESSURE: 110 MMHG | WEIGHT: 190 LBS

## 2020-07-01 DIAGNOSIS — Z86.79 PERSONAL HISTORY OF OTHER DISEASES OF THE CIRCULATORY SYSTEM: ICD-10-CM

## 2020-07-01 PROCEDURE — 99214 OFFICE O/P EST MOD 30 MIN: CPT | Mod: 25

## 2020-07-01 PROCEDURE — 36415 COLL VENOUS BLD VENIPUNCTURE: CPT

## 2020-07-01 RX ORDER — CIPROFLOXACIN HYDROCHLORIDE 250 MG/1
250 TABLET, FILM COATED ORAL
Qty: 10 | Refills: 0 | Status: COMPLETED | COMMUNITY
Start: 2020-06-04 | End: 2020-07-01

## 2020-07-01 NOTE — PHYSICAL EXAM
[No Acute Distress] : no acute distress [Well Developed] : well developed [Well Nourished] : well nourished [EOMI] : extraocular movements intact [Well-Appearing] : well-appearing [No JVD] : no jugular venous distention [Normal Outer Ear/Nose] : the outer ears and nose were normal in appearance [No Respiratory Distress] : no respiratory distress  [No Accessory Muscle Use] : no accessory muscle use [Clear to Auscultation] : lungs were clear to auscultation bilaterally [Normal Rate] : normal rate  [No Carotid Bruits] : no carotid bruits [Normal S1, S2] : normal S1 and S2 [Regular Rhythm] : with a regular rhythm [No Edema] : there was no peripheral edema [No Palpable Aorta] : no palpable aorta [Non-distended] : non-distended [No Extremity Clubbing/Cyanosis] : no extremity clubbing/cyanosis [Normal Anterior Cervical Nodes] : no anterior cervical lymphadenopathy [Normal Posterior Cervical Nodes] : no posterior cervical lymphadenopathy [No CVA Tenderness] : no CVA  tenderness [No Rash] : no rash [Grossly Normal Strength/Tone] : grossly normal strength/tone [Coordination Grossly Intact] : coordination grossly intact [No Focal Deficits] : no focal deficits [Normal Gait] : normal gait [Normal Affect] : the affect was normal [Normal Mood] : the mood was normal [Normal Insight/Judgement] : insight and judgment were intact

## 2020-07-01 NOTE — HISTORY OF PRESENT ILLNESS
[FreeTextEntry1] : follow up on anemia/fatigue  [de-identified] : 36 yo female for f/u on Fe def. Anemia.  pt reports persistent fatigue despite increased dietary intake of Fe.    Denies chills pt states unable to tolerate oral Fe supplementation secondary to significant constipation.  Pt c BM qod at baseline  \par \par pt reports heavy vaginal bleeding x 2 cycles (3-4 days/cycle) over the last 1M \par \par Denies new or worsened chronic atypical CP  stable SOB  no dizziness no palpitations

## 2020-07-01 NOTE — ASSESSMENT
[FreeTextEntry1] : see lab orders \par \par cont current meds \par \par consider Haem consult for Fe infusion if Fe levels still low given persistent fatigue

## 2020-07-06 ENCOUNTER — RX RENEWAL (OUTPATIENT)
Age: 35
End: 2020-07-06

## 2020-07-06 RX ORDER — WARFARIN 1 MG/1
1 TABLET ORAL DAILY
Qty: 30 | Refills: 0 | Status: ACTIVE | COMMUNITY
Start: 2018-04-06 | End: 1900-01-01

## 2020-07-08 ENCOUNTER — TRANSCRIPTION ENCOUNTER (OUTPATIENT)
Age: 35
End: 2020-07-08

## 2020-07-08 LAB
25(OH)D3 SERPL-MCNC: 42.1 NG/ML
ALBUMIN SERPL ELPH-MCNC: 4.2 G/DL
ALP BLD-CCNC: 71 U/L
ALT SERPL-CCNC: 8 U/L
ANION GAP SERPL CALC-SCNC: 13 MMOL/L
AST SERPL-CCNC: 18 U/L
BASOPHILS # BLD AUTO: 0.04 K/UL
BASOPHILS NFR BLD AUTO: 0.3 %
BILIRUB SERPL-MCNC: 0.2 MG/DL
BUN SERPL-MCNC: 11 MG/DL
CALCIUM SERPL-MCNC: 9.2 MG/DL
CHLORIDE SERPL-SCNC: 107 MMOL/L
CHOLEST SERPL-MCNC: 168 MG/DL
CHOLEST/HDLC SERPL: 4.2 RATIO
CK SERPL-CCNC: 75 U/L
CO2 SERPL-SCNC: 19 MMOL/L
CREAT SERPL-MCNC: 0.75 MG/DL
EOSINOPHIL # BLD AUTO: 0.13 K/UL
EOSINOPHIL NFR BLD AUTO: 1 %
ESTIMATED AVERAGE GLUCOSE: 180 MG/DL
FERRITIN SERPL-MCNC: 21 NG/ML
FOLATE SERPL-MCNC: 19.8 NG/ML
GGT SERPL-CCNC: 28 U/L
GLUCOSE SERPL-MCNC: 183 MG/DL
HBA1C MFR BLD HPLC: 7.9 %
HCT VFR BLD CALC: 35.7 %
HDLC SERPL-MCNC: 40 MG/DL
HGB BLD-MCNC: 10.6 G/DL
IMM GRANULOCYTES NFR BLD AUTO: 0.5 %
IRON SATN MFR SERPL: 7 %
IRON SERPL-MCNC: 27 UG/DL
LDLC SERPL CALC-MCNC: 102 MG/DL
LYMPHOCYTES # BLD AUTO: 1.87 K/UL
LYMPHOCYTES NFR BLD AUTO: 13.9 %
MAN DIFF?: NORMAL
MCHC RBC-ENTMCNC: 24.3 PG
MCHC RBC-ENTMCNC: 29.7 GM/DL
MCV RBC AUTO: 81.9 FL
MONOCYTES # BLD AUTO: 0.63 K/UL
MONOCYTES NFR BLD AUTO: 4.7 %
NEUTROPHILS # BLD AUTO: 10.71 K/UL
NEUTROPHILS NFR BLD AUTO: 79.6 %
PLATELET # BLD AUTO: 446 K/UL
POTASSIUM SERPL-SCNC: 4.2 MMOL/L
PROT SERPL-MCNC: 6.7 G/DL
RBC # BLD: 4.36 M/UL
RBC # FLD: 17.6 %
SARS-COV-2 IGG SERPL IA-ACNC: 0.08 INDEX
SARS-COV-2 IGG SERPL QL IA: NEGATIVE
SODIUM SERPL-SCNC: 139 MMOL/L
T3 SERPL-MCNC: 90 NG/DL
T4 SERPL-MCNC: 8.8 UG/DL
TIBC SERPL-MCNC: 373 UG/DL
TRANSFERRIN SERPL-MCNC: 291 MG/DL
TRIGL SERPL-MCNC: 125 MG/DL
TSH SERPL-ACNC: 2.19 UIU/ML
UIBC SERPL-MCNC: 346 UG/DL
URATE SERPL-MCNC: 3.3 MG/DL
VIT B12 SERPL-MCNC: 266 PG/ML
WBC # FLD AUTO: 13.45 K/UL

## 2020-07-09 ENCOUNTER — TRANSCRIPTION ENCOUNTER (OUTPATIENT)
Age: 35
End: 2020-07-09

## 2020-07-13 ENCOUNTER — INPATIENT (INPATIENT)
Facility: HOSPITAL | Age: 35
LOS: 0 days | Discharge: HOME | DRG: 313 | End: 2020-07-14
Attending: INTERNAL MEDICINE | Admitting: INTERNAL MEDICINE
Payer: MEDICAID

## 2020-07-13 VITALS
RESPIRATION RATE: 18 BRPM | OXYGEN SATURATION: 99 % | HEIGHT: 72 IN | HEART RATE: 103 BPM | WEIGHT: 192.9 LBS | TEMPERATURE: 98 F | SYSTOLIC BLOOD PRESSURE: 101 MMHG | DIASTOLIC BLOOD PRESSURE: 66 MMHG

## 2020-07-13 DIAGNOSIS — Z96.1 PRESENCE OF INTRAOCULAR LENS: Chronic | ICD-10-CM

## 2020-07-13 DIAGNOSIS — R07.9 CHEST PAIN, UNSPECIFIED: ICD-10-CM

## 2020-07-13 DIAGNOSIS — Z95.4 PRESENCE OF OTHER HEART-VALVE REPLACEMENT: Chronic | ICD-10-CM

## 2020-07-13 DIAGNOSIS — Z98.1 ARTHRODESIS STATUS: Chronic | ICD-10-CM

## 2020-07-13 LAB
ALBUMIN SERPL ELPH-MCNC: 4.1 G/DL — SIGNIFICANT CHANGE UP (ref 3.3–5.2)
ALP SERPL-CCNC: 65 U/L — SIGNIFICANT CHANGE UP (ref 40–120)
ALT FLD-CCNC: 10 U/L — SIGNIFICANT CHANGE UP
ANION GAP SERPL CALC-SCNC: 14 MMOL/L — SIGNIFICANT CHANGE UP (ref 5–17)
APTT BLD: 64 SEC — HIGH (ref 27.5–35.5)
APTT BLD: >200 SEC — CRITICAL HIGH (ref 27.5–35.5)
AST SERPL-CCNC: 22 U/L — SIGNIFICANT CHANGE UP
BASOPHILS # BLD AUTO: 0.04 K/UL — SIGNIFICANT CHANGE UP (ref 0–0.2)
BASOPHILS NFR BLD AUTO: 0.3 % — SIGNIFICANT CHANGE UP (ref 0–2)
BILIRUB SERPL-MCNC: 0.3 MG/DL — LOW (ref 0.4–2)
BUN SERPL-MCNC: 12 MG/DL — SIGNIFICANT CHANGE UP (ref 8–20)
CALCIUM SERPL-MCNC: 9.4 MG/DL — SIGNIFICANT CHANGE UP (ref 8.6–10.2)
CHLORIDE SERPL-SCNC: 102 MMOL/L — SIGNIFICANT CHANGE UP (ref 98–107)
CO2 SERPL-SCNC: 21 MMOL/L — LOW (ref 22–29)
CREAT SERPL-MCNC: 0.83 MG/DL — SIGNIFICANT CHANGE UP (ref 0.5–1.3)
EOSINOPHIL # BLD AUTO: 0.12 K/UL — SIGNIFICANT CHANGE UP (ref 0–0.5)
EOSINOPHIL NFR BLD AUTO: 0.9 % — SIGNIFICANT CHANGE UP (ref 0–6)
GLUCOSE BLDC GLUCOMTR-MCNC: 107 MG/DL — HIGH (ref 70–99)
GLUCOSE SERPL-MCNC: 208 MG/DL — HIGH (ref 70–99)
HCG SERPL-ACNC: <4 MIU/ML — SIGNIFICANT CHANGE UP
HCT VFR BLD CALC: 30 % — LOW (ref 34.5–45)
HCT VFR BLD CALC: 35.3 % — SIGNIFICANT CHANGE UP (ref 34.5–45)
HGB BLD-MCNC: 10.7 G/DL — LOW (ref 11.5–15.5)
HGB BLD-MCNC: 9.2 G/DL — LOW (ref 11.5–15.5)
IMM GRANULOCYTES NFR BLD AUTO: 0.9 % — SIGNIFICANT CHANGE UP (ref 0–1.5)
INR BLD: 1.83 RATIO — HIGH (ref 0.88–1.16)
LYMPHOCYTES # BLD AUTO: 1.53 K/UL — SIGNIFICANT CHANGE UP (ref 1–3.3)
LYMPHOCYTES # BLD AUTO: 11.2 % — LOW (ref 13–44)
MCHC RBC-ENTMCNC: 24.4 PG — LOW (ref 27–34)
MCHC RBC-ENTMCNC: 24.4 PG — LOW (ref 27–34)
MCHC RBC-ENTMCNC: 30.3 GM/DL — LOW (ref 32–36)
MCHC RBC-ENTMCNC: 30.7 GM/DL — LOW (ref 32–36)
MCV RBC AUTO: 79.6 FL — LOW (ref 80–100)
MCV RBC AUTO: 80.4 FL — SIGNIFICANT CHANGE UP (ref 80–100)
MONOCYTES # BLD AUTO: 0.66 K/UL — SIGNIFICANT CHANGE UP (ref 0–0.9)
MONOCYTES NFR BLD AUTO: 4.8 % — SIGNIFICANT CHANGE UP (ref 2–14)
NEUTROPHILS # BLD AUTO: 11.23 K/UL — HIGH (ref 1.8–7.4)
NEUTROPHILS NFR BLD AUTO: 81.9 % — HIGH (ref 43–77)
PLATELET # BLD AUTO: 391 K/UL — SIGNIFICANT CHANGE UP (ref 150–400)
PLATELET # BLD AUTO: 429 K/UL — HIGH (ref 150–400)
POTASSIUM SERPL-MCNC: 4.1 MMOL/L — SIGNIFICANT CHANGE UP (ref 3.5–5.3)
POTASSIUM SERPL-SCNC: 4.1 MMOL/L — SIGNIFICANT CHANGE UP (ref 3.5–5.3)
PROT SERPL-MCNC: 7.5 G/DL — SIGNIFICANT CHANGE UP (ref 6.6–8.7)
PROTHROM AB SERPL-ACNC: 20.6 SEC — HIGH (ref 10.6–13.6)
RBC # BLD: 3.77 M/UL — LOW (ref 3.8–5.2)
RBC # BLD: 4.39 M/UL — SIGNIFICANT CHANGE UP (ref 3.8–5.2)
RBC # FLD: 17.3 % — HIGH (ref 10.3–14.5)
RBC # FLD: 17.4 % — HIGH (ref 10.3–14.5)
SODIUM SERPL-SCNC: 137 MMOL/L — SIGNIFICANT CHANGE UP (ref 135–145)
TROPONIN T SERPL-MCNC: <0.01 NG/ML — SIGNIFICANT CHANGE UP (ref 0–0.06)
TROPONIN T SERPL-MCNC: <0.01 NG/ML — SIGNIFICANT CHANGE UP (ref 0–0.06)
WBC # BLD: 13.12 K/UL — HIGH (ref 3.8–10.5)
WBC # BLD: 13.71 K/UL — HIGH (ref 3.8–10.5)
WBC # FLD AUTO: 13.12 K/UL — HIGH (ref 3.8–10.5)
WBC # FLD AUTO: 13.71 K/UL — HIGH (ref 3.8–10.5)

## 2020-07-13 PROCEDURE — 71275 CT ANGIOGRAPHY CHEST: CPT | Mod: 26

## 2020-07-13 PROCEDURE — 99223 1ST HOSP IP/OBS HIGH 75: CPT

## 2020-07-13 PROCEDURE — 74174 CTA ABD&PLVS W/CONTRAST: CPT | Mod: 26

## 2020-07-13 PROCEDURE — 99285 EMERGENCY DEPT VISIT HI MDM: CPT

## 2020-07-13 RX ORDER — SODIUM CHLORIDE 9 MG/ML
1000 INJECTION, SOLUTION INTRAVENOUS ONCE
Refills: 0 | Status: COMPLETED | OUTPATIENT
Start: 2020-07-13 | End: 2020-07-13

## 2020-07-13 RX ORDER — HEPARIN SODIUM 5000 [USP'U]/ML
INJECTION INTRAVENOUS; SUBCUTANEOUS
Qty: 25000 | Refills: 0 | Status: DISCONTINUED | OUTPATIENT
Start: 2020-07-13 | End: 2020-07-14

## 2020-07-13 RX ORDER — DEXTROSE 50 % IN WATER 50 %
15 SYRINGE (ML) INTRAVENOUS ONCE
Refills: 0 | Status: DISCONTINUED | OUTPATIENT
Start: 2020-07-13 | End: 2020-07-14

## 2020-07-13 RX ORDER — DULOXETINE HYDROCHLORIDE 30 MG/1
90 CAPSULE, DELAYED RELEASE ORAL DAILY
Refills: 0 | Status: DISCONTINUED | OUTPATIENT
Start: 2020-07-14 | End: 2020-07-14

## 2020-07-13 RX ORDER — METOPROLOL TARTRATE 50 MG
25 TABLET ORAL
Refills: 0 | Status: DISCONTINUED | OUTPATIENT
Start: 2020-07-13 | End: 2020-07-14

## 2020-07-13 RX ORDER — GLUCAGON INJECTION, SOLUTION 0.5 MG/.1ML
1 INJECTION, SOLUTION SUBCUTANEOUS ONCE
Refills: 0 | Status: DISCONTINUED | OUTPATIENT
Start: 2020-07-13 | End: 2020-07-14

## 2020-07-13 RX ORDER — LURASIDONE HYDROCHLORIDE 40 MG/1
40 TABLET ORAL AT BEDTIME
Refills: 0 | Status: DISCONTINUED | OUTPATIENT
Start: 2020-07-13 | End: 2020-07-14

## 2020-07-13 RX ORDER — DEXTROSE 50 % IN WATER 50 %
25 SYRINGE (ML) INTRAVENOUS ONCE
Refills: 0 | Status: DISCONTINUED | OUTPATIENT
Start: 2020-07-13 | End: 2020-07-14

## 2020-07-13 RX ORDER — METHADONE HYDROCHLORIDE 40 MG/1
10 TABLET ORAL THREE TIMES A DAY
Refills: 0 | Status: DISCONTINUED | OUTPATIENT
Start: 2020-07-13 | End: 2020-07-14

## 2020-07-13 RX ORDER — WARFARIN SODIUM 2.5 MG/1
7 TABLET ORAL ONCE
Refills: 0 | Status: DISCONTINUED | OUTPATIENT
Start: 2020-07-13 | End: 2020-07-13

## 2020-07-13 RX ORDER — ATORVASTATIN CALCIUM 80 MG/1
20 TABLET, FILM COATED ORAL AT BEDTIME
Refills: 0 | Status: DISCONTINUED | OUTPATIENT
Start: 2020-07-13 | End: 2020-07-14

## 2020-07-13 RX ORDER — LEVOTHYROXINE SODIUM 125 MCG
25 TABLET ORAL DAILY
Refills: 0 | Status: DISCONTINUED | OUTPATIENT
Start: 2020-07-13 | End: 2020-07-14

## 2020-07-13 RX ORDER — WARFARIN SODIUM 2.5 MG/1
7 TABLET ORAL ONCE
Refills: 0 | Status: COMPLETED | OUTPATIENT
Start: 2020-07-13 | End: 2020-07-13

## 2020-07-13 RX ORDER — DEXTROSE 50 % IN WATER 50 %
12.5 SYRINGE (ML) INTRAVENOUS ONCE
Refills: 0 | Status: DISCONTINUED | OUTPATIENT
Start: 2020-07-13 | End: 2020-07-14

## 2020-07-13 RX ORDER — HEPARIN SODIUM 5000 [USP'U]/ML
3500 INJECTION INTRAVENOUS; SUBCUTANEOUS EVERY 6 HOURS
Refills: 0 | Status: DISCONTINUED | OUTPATIENT
Start: 2020-07-13 | End: 2020-07-14

## 2020-07-13 RX ORDER — INSULIN LISPRO 100/ML
VIAL (ML) SUBCUTANEOUS
Refills: 0 | Status: DISCONTINUED | OUTPATIENT
Start: 2020-07-13 | End: 2020-07-14

## 2020-07-13 RX ORDER — OXYCODONE HYDROCHLORIDE 5 MG/1
5 TABLET ORAL EVERY 6 HOURS
Refills: 0 | Status: DISCONTINUED | OUTPATIENT
Start: 2020-07-13 | End: 2020-07-14

## 2020-07-13 RX ORDER — HEPARIN SODIUM 5000 [USP'U]/ML
7000 INJECTION INTRAVENOUS; SUBCUTANEOUS EVERY 6 HOURS
Refills: 0 | Status: DISCONTINUED | OUTPATIENT
Start: 2020-07-13 | End: 2020-07-14

## 2020-07-13 RX ORDER — SODIUM CHLORIDE 9 MG/ML
1000 INJECTION, SOLUTION INTRAVENOUS
Refills: 0 | Status: DISCONTINUED | OUTPATIENT
Start: 2020-07-13 | End: 2020-07-14

## 2020-07-13 RX ORDER — INSULIN LISPRO 100/ML
VIAL (ML) SUBCUTANEOUS AT BEDTIME
Refills: 0 | Status: DISCONTINUED | OUTPATIENT
Start: 2020-07-13 | End: 2020-07-14

## 2020-07-13 RX ADMIN — HEPARIN SODIUM 1300 UNIT(S)/HR: 5000 INJECTION INTRAVENOUS; SUBCUTANEOUS at 22:19

## 2020-07-13 RX ADMIN — OXYCODONE HYDROCHLORIDE 5 MILLIGRAM(S): 5 TABLET ORAL at 23:30

## 2020-07-13 RX ADMIN — WARFARIN SODIUM 7 MILLIGRAM(S): 2.5 TABLET ORAL at 23:39

## 2020-07-13 RX ADMIN — HEPARIN SODIUM 0 UNIT(S)/HR: 5000 INJECTION INTRAVENOUS; SUBCUTANEOUS at 21:17

## 2020-07-13 RX ADMIN — SODIUM CHLORIDE 1000 MILLILITER(S): 9 INJECTION, SOLUTION INTRAVENOUS at 12:01

## 2020-07-13 RX ADMIN — SODIUM CHLORIDE 1000 MILLILITER(S): 9 INJECTION, SOLUTION INTRAVENOUS at 14:00

## 2020-07-13 RX ADMIN — HEPARIN SODIUM 1600 UNIT(S)/HR: 5000 INJECTION INTRAVENOUS; SUBCUTANEOUS at 13:59

## 2020-07-13 NOTE — ED PROVIDER NOTE - PHYSICAL EXAMINATION
gen: well appearing  Mentation: AAO x 3  psych: mood appropriate  ENT: airway patent  Eyes: conjunctivae clear bilaterally  Cardio: RRR, no m/r/g, no chest wall tenderness; equal radial pulses bilaterally  Resp: normal BS b/l  GI: s/nt/nd   Neuro: AAO x 3, sensation and motor function intact, CN 2-12 intact, EOMI  Skin: No evidence of rash  MSK: normal movement of all extremities

## 2020-07-13 NOTE — ED PROVIDER NOTE - CLINICAL SUMMARY MEDICAL DECISION MAKING FREE TEXT BOX
36 yo female presenting with chest pain and left sided arm pain; patient has hx of aortic dissection; will do cardiac workup rule out acs and dissection; will obtain cta chest and neck, labs ekg coags; if INR is subtherapeutic, will AC with lovenox and admit;

## 2020-07-13 NOTE — PROVIDER CONTACT NOTE (CRITICAL VALUE NOTIFICATION) - ACTION/TREATMENT ORDERED:
Heparin protocol to be followed...Heparin drip to stop for 1 hour, & decrease by 3.  Will continue to monitor

## 2020-07-13 NOTE — ED PROVIDER NOTE - PMH
Anxiety    Depression    Diabetes    Dilated aortic root    Fibromyalgia    Marfan syndrome    Mitral valve prolapse

## 2020-07-13 NOTE — ED ADULT TRIAGE NOTE - CHIEF COMPLAINT QUOTE
Pt c/o left sided chest pain radiating down left arm w/ difficulty breathing, checked her INR last night and read 1.7, states this is normally how her body reacts to low INR, on coumadin for 2 mechanical valves, was in ED 1 month ago for torn carotid artery

## 2020-07-13 NOTE — H&P ADULT - ASSESSMENT
pt. is admitted for     - Subtherapeutic INR, s/p avr mechanicla inr goal between 2.5 and 3.5, iv heparin and coumadin 7 mg tonight and follow am coags.    -  Other chest pain, no active cp at the time of admission, No thoracoabdominal aortic dissection reported on ct angio chest and abdomen. echo, b.blk and cardio consult [place].     - S/P AVR , mechanical, inr goal 2.5 and 3.5.     - DM type 2, with hyperglycemia, no long term insulin use.     - H/O Marfan syndrome. pt. is admitted for     - Subtherapeutic INR, s/p avr mechanicla inr goal between 2.5 and 3.5, iv heparin and coumadin 7 mg tonight and follow am coags.    -  Other chest pain, no active cp at the time of admission, No thoracoabdominal aortic dissection reported on ct angio chest and abdomen. echo, b.blk and cardio consult [place].     - S/P AVR , mechanical, inr goal 2.5 and 3.5.     - DM type 2, with hyperglycemia, no long term insulin use.     - H/O Marfan syndrome.     - Mood disorder, continue home meds.

## 2020-07-13 NOTE — ED PROVIDER NOTE - NS ED ROS FT
Constitutional: no fever  Eyes: no conjunctivitis  Ears: no ear pain   Nose: no nasal congestion, Mouth/Throat: no throat pain, Neck: no stiffness  Cardiovascular: + chest pain  Chest: no cough  Gastrointestinal: no abdominal pain, no vomiting and diarrhea  MSK: no joint pain +arm pain  : no dysuria  Skin: no rash  Neuro: no LOC

## 2020-07-13 NOTE — H&P ADULT - NSHPPHYSICALEXAM_GEN_ALL_CORE
General: pt. lying in bed NAD.   HEENT: AT, NC. PERRL. intact EOM. no throat erythema or exudate.   Neck: supple. no JVD.   Chest: CTA bilaterally  Heart: S1,S2. RRR. + click on auscultation, no edema.   Abdomen: soft. non-tender. non-distended. + BS.   Ext: no calf tenderness. ROM of all ext. intact. distal pulses intact.   Neuro: AAO x3. no focal weakness. no speech disorder. CNs intact.   Skin: no rash noted, no diaphoresis.   Psych : normal affect. no si/hi. General: pt. lying in bed NAD.   HEENT: AT, NC. PERRL. intact EOM. no throat erythema or exudate.   Neck: supple. no JVD.   Chest: CTA bilaterally  Heart: S1,S2. RRR. + click on auscultation, no edema.   Abdomen: soft. non-tender. non-distended. + BS.   Ext: no calf tenderness. marfan syndrome features noted, ROM of all ext. intact. distal pulses intact.   Neuro: AAO x3. no focal weakness. no speech disorder. CNs intact.   Skin: no rash noted, no diaphoresis.   Psych : normal affect. no si/hi. General: pt. lying in bed NAD.   HEENT: AT, NC. PERRL. intact EOM. no throat erythema or exudate.   Neck: supple. no JVD.   Chest: CTA bilaterally  Heart: S1,S2. RRR. + click on auscultation, no edema.   Abdomen: soft. non-tender. non-distended. + BS.   rectal : deferred by pt. at this point.   Ext: no calf tenderness. marfan syndrome features noted, ROM of all ext. intact. distal pulses intact.   Neuro: AAO x3. no focal weakness. no speech disorder. CNs intact.   Skin: no rash noted, no diaphoresis.   Psych : normal affect. no si/hi.

## 2020-07-13 NOTE — H&P ADULT - HISTORY OF PRESENT ILLNESS
34 y/o female with h/o Marfan syndrome, s/p avr mechanical, dm, htn, chronic pain syndrome, anxiety/ depression came to the ER as she had left sided pressure type cp, moderate, felt in left arm as well, lasted for about 5 minutes and then got better. some nausea, no vomiting, no bad. pain, no cough, no fever, no sig. sob, no sick contact, no travel. no diarrhea or black stool or blood per rectum reported. no dizziness, no syncopy. As per pt. when her INR is low she gets this kind of pain. pt. checked her inr and was 1.7, pt. took 6 mg of coumadin lat night, usually takes between 6 to 9 mg of coumadin. pt. felt similar but mild cp in the am briefly. pt. called her pcp and she was instructed to go to the ER. no cp at the time of admission.

## 2020-07-14 ENCOUNTER — TRANSCRIPTION ENCOUNTER (OUTPATIENT)
Age: 35
End: 2020-07-14

## 2020-07-14 ENCOUNTER — APPOINTMENT (OUTPATIENT)
Dept: OBGYN | Facility: CLINIC | Age: 35
End: 2020-07-14

## 2020-07-14 VITALS
SYSTOLIC BLOOD PRESSURE: 103 MMHG | TEMPERATURE: 98 F | HEART RATE: 89 BPM | OXYGEN SATURATION: 100 % | DIASTOLIC BLOOD PRESSURE: 69 MMHG | RESPIRATION RATE: 19 BRPM

## 2020-07-14 LAB
A1C WITH ESTIMATED AVERAGE GLUCOSE RESULT: 7.8 % — HIGH (ref 4–5.6)
ANION GAP SERPL CALC-SCNC: 13 MMOL/L — SIGNIFICANT CHANGE UP (ref 5–17)
APTT BLD: 144.6 SEC — CRITICAL HIGH (ref 27.5–35.5)
APTT BLD: >200 SEC — CRITICAL HIGH (ref 27.5–35.5)
BASOPHILS # BLD AUTO: 0.05 K/UL — SIGNIFICANT CHANGE UP (ref 0–0.2)
BASOPHILS NFR BLD AUTO: 0.4 % — SIGNIFICANT CHANGE UP (ref 0–2)
BUN SERPL-MCNC: 14 MG/DL — SIGNIFICANT CHANGE UP (ref 8–20)
CALCIUM SERPL-MCNC: 8.9 MG/DL — SIGNIFICANT CHANGE UP (ref 8.6–10.2)
CHLORIDE SERPL-SCNC: 102 MMOL/L — SIGNIFICANT CHANGE UP (ref 98–107)
CO2 SERPL-SCNC: 22 MMOL/L — SIGNIFICANT CHANGE UP (ref 22–29)
CREAT SERPL-MCNC: 0.75 MG/DL — SIGNIFICANT CHANGE UP (ref 0.5–1.3)
EOSINOPHIL # BLD AUTO: 0.19 K/UL — SIGNIFICANT CHANGE UP (ref 0–0.5)
EOSINOPHIL NFR BLD AUTO: 1.6 % — SIGNIFICANT CHANGE UP (ref 0–6)
ESTIMATED AVERAGE GLUCOSE: 177 MG/DL — HIGH (ref 68–114)
GLUCOSE BLDC GLUCOMTR-MCNC: 135 MG/DL — HIGH (ref 70–99)
GLUCOSE BLDC GLUCOMTR-MCNC: 166 MG/DL — HIGH (ref 70–99)
GLUCOSE SERPL-MCNC: 140 MG/DL — HIGH (ref 70–99)
HCT VFR BLD CALC: 30.8 % — LOW (ref 34.5–45)
HGB BLD-MCNC: 9.4 G/DL — LOW (ref 11.5–15.5)
IMM GRANULOCYTES NFR BLD AUTO: 0.9 % — SIGNIFICANT CHANGE UP (ref 0–1.5)
INR BLD: 1.93 RATIO — HIGH (ref 0.88–1.16)
LYMPHOCYTES # BLD AUTO: 1.92 K/UL — SIGNIFICANT CHANGE UP (ref 1–3.3)
LYMPHOCYTES # BLD AUTO: 16.1 % — SIGNIFICANT CHANGE UP (ref 13–44)
MCHC RBC-ENTMCNC: 24.4 PG — LOW (ref 27–34)
MCHC RBC-ENTMCNC: 30.5 GM/DL — LOW (ref 32–36)
MCV RBC AUTO: 80 FL — SIGNIFICANT CHANGE UP (ref 80–100)
MONOCYTES # BLD AUTO: 0.84 K/UL — SIGNIFICANT CHANGE UP (ref 0–0.9)
MONOCYTES NFR BLD AUTO: 7 % — SIGNIFICANT CHANGE UP (ref 2–14)
NEUTROPHILS # BLD AUTO: 8.84 K/UL — HIGH (ref 1.8–7.4)
NEUTROPHILS NFR BLD AUTO: 74 % — SIGNIFICANT CHANGE UP (ref 43–77)
PLATELET # BLD AUTO: 401 K/UL — HIGH (ref 150–400)
POTASSIUM SERPL-MCNC: 4 MMOL/L — SIGNIFICANT CHANGE UP (ref 3.5–5.3)
POTASSIUM SERPL-SCNC: 4 MMOL/L — SIGNIFICANT CHANGE UP (ref 3.5–5.3)
PROTHROM AB SERPL-ACNC: 21.7 SEC — HIGH (ref 10.6–13.6)
RBC # BLD: 3.85 M/UL — SIGNIFICANT CHANGE UP (ref 3.8–5.2)
RBC # FLD: 17.4 % — HIGH (ref 10.3–14.5)
SARS-COV-2 RNA SPEC QL NAA+PROBE: SIGNIFICANT CHANGE UP
SODIUM SERPL-SCNC: 137 MMOL/L — SIGNIFICANT CHANGE UP (ref 135–145)
TROPONIN T SERPL-MCNC: <0.01 NG/ML — SIGNIFICANT CHANGE UP (ref 0–0.06)
WBC # BLD: 11.95 K/UL — HIGH (ref 3.8–10.5)
WBC # FLD AUTO: 11.95 K/UL — HIGH (ref 3.8–10.5)

## 2020-07-14 PROCEDURE — 99239 HOSP IP/OBS DSCHRG MGMT >30: CPT

## 2020-07-14 PROCEDURE — 84702 CHORIONIC GONADOTROPIN TEST: CPT

## 2020-07-14 PROCEDURE — 82962 GLUCOSE BLOOD TEST: CPT

## 2020-07-14 PROCEDURE — 93005 ELECTROCARDIOGRAM TRACING: CPT

## 2020-07-14 PROCEDURE — 71275 CT ANGIOGRAPHY CHEST: CPT

## 2020-07-14 PROCEDURE — U0003: CPT

## 2020-07-14 PROCEDURE — 96374 THER/PROPH/DIAG INJ IV PUSH: CPT

## 2020-07-14 PROCEDURE — 74174 CTA ABD&PLVS W/CONTRAST: CPT

## 2020-07-14 PROCEDURE — 84484 ASSAY OF TROPONIN QUANT: CPT

## 2020-07-14 PROCEDURE — 86769 SARS-COV-2 COVID-19 ANTIBODY: CPT

## 2020-07-14 PROCEDURE — 80048 BASIC METABOLIC PNL TOTAL CA: CPT

## 2020-07-14 PROCEDURE — 99285 EMERGENCY DEPT VISIT HI MDM: CPT | Mod: 25

## 2020-07-14 PROCEDURE — 85027 COMPLETE CBC AUTOMATED: CPT

## 2020-07-14 PROCEDURE — 80053 COMPREHEN METABOLIC PANEL: CPT

## 2020-07-14 PROCEDURE — 96361 HYDRATE IV INFUSION ADD-ON: CPT

## 2020-07-14 PROCEDURE — 85610 PROTHROMBIN TIME: CPT

## 2020-07-14 PROCEDURE — 36415 COLL VENOUS BLD VENIPUNCTURE: CPT

## 2020-07-14 PROCEDURE — 83036 HEMOGLOBIN GLYCOSYLATED A1C: CPT

## 2020-07-14 PROCEDURE — 85730 THROMBOPLASTIN TIME PARTIAL: CPT

## 2020-07-14 RX ORDER — ENOXAPARIN SODIUM 100 MG/ML
80 INJECTION SUBCUTANEOUS
Qty: 20 | Refills: 0
Start: 2020-07-14

## 2020-07-14 RX ORDER — ASPIRIN/CALCIUM CARB/MAGNESIUM 324 MG
81 TABLET ORAL DAILY
Refills: 0 | Status: DISCONTINUED | OUTPATIENT
Start: 2020-07-14 | End: 2020-07-14

## 2020-07-14 RX ADMIN — HEPARIN SODIUM 1000 UNIT(S)/HR: 5000 INJECTION INTRAVENOUS; SUBCUTANEOUS at 06:16

## 2020-07-14 RX ADMIN — Medication 1 TABLET(S): at 08:04

## 2020-07-14 RX ADMIN — Medication 15 MILLIGRAM(S): at 05:09

## 2020-07-14 RX ADMIN — METHADONE HYDROCHLORIDE 10 MILLIGRAM(S): 40 TABLET ORAL at 05:09

## 2020-07-14 RX ADMIN — HEPARIN SODIUM 0 UNIT(S)/HR: 5000 INJECTION INTRAVENOUS; SUBCUTANEOUS at 05:03

## 2020-07-14 RX ADMIN — Medication 25 MILLIGRAM(S): at 05:08

## 2020-07-14 RX ADMIN — DULOXETINE HYDROCHLORIDE 90 MILLIGRAM(S): 30 CAPSULE, DELAYED RELEASE ORAL at 08:05

## 2020-07-14 RX ADMIN — Medication 2: at 08:04

## 2020-07-14 RX ADMIN — Medication 25 MICROGRAM(S): at 05:09

## 2020-07-14 NOTE — PROVIDER CONTACT NOTE (CRITICAL VALUE NOTIFICATION) - ACTION/TREATMENT ORDERED:
Full Anticoagulant Heparin protocol followed.  Drip stopped for 1 hour, decreased by 3 ml to be resumed at 10ml/hr.  No s/s of bleeding noted.  Will continue to monitor.

## 2020-07-14 NOTE — DISCHARGE NOTE PROVIDER - HOSPITAL COURSE
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 she describes the pain as left sided and mild 3/10 at it's worst. She denies associated SOB or other cardiovascular symptoms. She denies palpitations, diaphoresis, near syncope, or syncope. she was found to have sub therapeutic INR and mentioned that she gets chest pain when her INR is low , she was started on heparin drip , CTA chest and abdomen pelvis done , normal , no new dissection . pain resolved after admission , patient seen by cardio , will be discharged home with lovenox , patient insisting to be discharged as well as she has done it in past .     her INR will be checked in cardio office within 1-2 days , extensive discussion held about medication compliance and importance of follow up . patient is well aware that she is at high risk of complications from sub therapeutic INR .     details discussed with cardio and with patient , all concerns answered .            Physical Exam :    Vitals : reviewed , stable     GEN : age appropriate , resting , NAD     HEART: S1,S2 RRR     LUNGS: CTAB     ABDOMEN: Soft , NT , ND , positive bowel sounds     EXT: no pitting edema                 35 minutes were spent on discharge co ordination .

## 2020-07-14 NOTE — DISCHARGE NOTE PROVIDER - NSDCFUSCHEDAPPT_GEN_ALL_CORE_FT
JUANY FELICIANO ; 07/14/2020 ; NPP [facility] [address]  JUANY FELICIANO ; 07/14/2020 ; NP [facility] [address]  JUANY FELICIANO ; 09/29/2020 ; NPP [address]

## 2020-07-14 NOTE — DISCHARGE NOTE PROVIDER - NSDCMRMEDTOKEN_GEN_ALL_CORE_FT
ALPRAZolam 1 mg oral tablet: 1 tab(s) orally 3 times a day, As needed, anxiety  aspirin 81 mg oral delayed release tablet: 1 tab(s) orally once a day  busPIRone 15 mg oral tablet: 1 tab(s) orally every 12 hours  Coumadin: 10 milligram(s) orally once a day  Cymbalta: 220 milligram(s) orally once a day  fenofibrate 145 mg oral tablet: 1 tab(s) orally once a day  glipiZIDE 5 mg oral tablet: 1 tab(s) orally 2 times a day   levothyroxine 25 mcg (0.025 mg) oral tablet: 1 tab(s) orally once a day  Lovenox 80 mg/0.8 mL injectable solution: 80 milligram(s) subcutaneously every 12 hours   lurasidone 20 mg oral tablet: 1 tab(s) orally once a day  metFORMIN 1000 mg oral tablet: 1 tab(s) orally 2 times a day  methadone 10 mg oral tablet: 1 tab(s) orally 3 times a day  metoprolol tartrate 25 mg oral tablet: 1 tab(s) orally 2 times a day  Multiple Vitamins oral tablet: 1 tab(s) orally once a day  oxyCODONE 10 mg oral tablet: 1 tab(s) orally every 4 hours, As needed, Moderate Pain (4 - 6)  pravastatin 20 mg oral tablet: 1 tab(s) orally once a day

## 2020-07-14 NOTE — DISCHARGE NOTE PROVIDER - PROVIDER TOKENS
FREE:[LAST:[PCP],PHONE:[(   )    -],FAX:[(   )    -],FOLLOWUP:[1-3 days]],PROVIDER:[TOKEN:[[id number]],FOLLOWUP:[1-3 days]]

## 2020-07-14 NOTE — DISCHARGE NOTE NURSING/CASE MANAGEMENT/SOCIAL WORK - NSDCPELOVENOX_GEN_ALL_CORE
Enoxaparin/Lovenox - Dietary Advice/Enoxaparin/Lovenox - Potential for adverse drug reactions and interactions/Enoxaparin/Lovenox - Follow up monitoring/Enoxaparin/Lovenox - Compliance

## 2020-07-14 NOTE — DISCHARGE NOTE NURSING/CASE MANAGEMENT/SOCIAL WORK - PATIENT PORTAL LINK FT
You can access the FollowMyHealth Patient Portal offered by [facility] by registering at the following website: [email or web address]. By joining [facility] FollowMyHealth portal, you will also be able to view your health information using other applications (apps) compatible with our system.

## 2020-07-14 NOTE — DISCHARGE NOTE PROVIDER - NSDCFUADDINST_GEN_ALL_CORE_FT
please follow up with cardio for INR check in 1-2 days   take lovenox as directed , if any recurrence of symptoms return to ER

## 2020-07-14 NOTE — DISCHARGE NOTE PROVIDER - NSDCCPCAREPLAN_GEN_ALL_CORE_FT
PRINCIPAL DISCHARGE DIAGNOSIS  Diagnosis: Chest pain  Assessment and Plan of Treatment: non specific , resolved      SECONDARY DISCHARGE DIAGNOSES  Diagnosis: INR (international normal ratio) abnormal  Assessment and Plan of Treatment: will start lovenox along with coumadin

## 2020-07-14 NOTE — CONSULT NOTE ADULT - ASSESSMENT
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 she describes the pain as left sided and mild 3/10 at it's worst. She denies associated SOB or other cardiovascular symptoms. She denies palpitations, diaphoresis, near syncope, or syncope.     PAST MEDICAL & SURGICAL HISTORY:  Mitral valve prolapse  Dilated aortic root  Diabetes  Anxiety  Depression  Fibromyalgia  Marfan syndrome  H/O artificial lens replacement  H/O spinal fusion: 2005  H/O aortic valve replacement: 2013      No Known Allergies      MEDICATIONS  (STANDING):  atorvastatin 20 milliGRAM(s) Oral at bedtime  busPIRone 15 milliGRAM(s) Oral every 12 hours  dextrose 5%. 1000 milliLiter(s) (50 mL/Hr) IV Continuous <Continuous>  dextrose 50% Injectable 12.5 Gram(s) IV Push once  dextrose 50% Injectable 25 Gram(s) IV Push once  dextrose 50% Injectable 25 Gram(s) IV Push once  DULoxetine 90 milliGRAM(s) Oral daily  heparin  Infusion.  Unit(s)/Hr (16 mL/Hr) IV Continuous <Continuous>  insulin lispro (HumaLOG) corrective regimen sliding scale   SubCutaneous three times a day before meals  insulin lispro (HumaLOG) corrective regimen sliding scale   SubCutaneous at bedtime  levothyroxine 25 MICROGram(s) Oral daily  lurasidone 40 milliGRAM(s) Oral at bedtime  methadone    Tablet 10 milliGRAM(s) Oral three times a day  metoprolol tartrate 25 milliGRAM(s) Oral two times a day  multivitamin 1 Tablet(s) Oral daily    MEDICATIONS  (PRN):  dextrose 40% Gel 15 Gram(s) Oral once PRN Blood Glucose LESS THAN 70 milliGRAM(s)/deciliter  glucagon  Injectable 1 milliGRAM(s) IntraMuscular once PRN Glucose LESS THAN 70 milligrams/deciliter  heparin   Injectable 7000 Unit(s) IV Push every 6 hours PRN For aPTT less than 40  heparin   Injectable 3500 Unit(s) IV Push every 6 hours PRN For aPTT between 40 - 57  oxyCODONE    IR 5 milliGRAM(s) Oral every 6 hours PRN Moderate Pain (4 - 6)      Social History:  Cigarettes:  denies          Alcohol:   denies           Illicit Drug Abuse:  denies    Family History  denies any significant family history of CAD, MI, CVA, or sudden cardiac death    ROS: Negative other than as mentioned in HPI.    Vital Signs Last 24 Hrs  T(C): 36.6 (14 Jul 2020 07:19), Max: 37 (13 Jul 2020 16:27)  T(F): 97.9 (14 Jul 2020 07:19), Max: 98.6 (13 Jul 2020 16:27)  HR: 87 (14 Jul 2020 07:19) (83 - 99)  BP: 106/65 (14 Jul 2020 07:19) (103/65 - 121/76)  BP(mean): 91 (13 Jul 2020 23:56) (91 - 91)  RR: 19 (14 Jul 2020 07:19) (18 - 20)  SpO2: 98% (14 Jul 2020 07:19) (97% - 100%)  ICU Vital Signs Last 24 Hrs  JUANY FEILCIANO  I&O's Detail    13 Jul 2020 07:01  -  14 Jul 2020 07:00  --------------------------------------------------------  IN:    heparin  Infusion.: 107 mL  Total IN: 107 mL    OUT:  Total OUT: 0 mL    Total NET: 107 mL        I&O's Summary    13 Jul 2020 07:01  -  14 Jul 2020 07:00  --------------------------------------------------------  IN: 107 mL / OUT: 0 mL / NET: 107 mL      Drug Dosing Weight  JUANY FELICIANO      PHYSICAL EXAM:  General: Appears well developed, well nourished alert and cooperative.  HEENT: Head; normocephalic, atraumatic.  Eyes: Pupils reactive, cornea wnl.  Neck: Supple, no nodes adenopathy, no NVD or carotid bruit or thyromegaly.  CARDIOVASCULAR: mechanical heart sounds, regular rhythm   LUNGS: No rales, rhonchi or wheeze. Normal breath sounds bilaterally.  ABDOMEN: Soft, nontender without mass or organomegaly. bowel sounds normoactive.  EXTREMITIES: No clubbing, cyanosis or edema. Distal pulses wnl.   SKIN: warm and dry with normal turgor.  NEURO: Alert/oriented x 3/normal motor exam. No pathologic reflexes.    PSYCH: normal affect.        LABS:                        9.2    13.12 )-----------( 391      ( 13 Jul 2020 20:16 )             30.0     07-13    137  |  102  |  12.0  ----------------------------<  208<H>  4.1   |  21.0<L>  |  0.83    Ca    9.4      13 Jul 2020 12:29    TPro  7.5  /  Alb  4.1  /  TBili  0.3<L>  /  DBili  x   /  AST  22  /  ALT  10  /  AlkPhos  65  07-13    JUANY FELICIANO  CARDIAC MARKERS ( 13 Jul 2020 19:04 )  x     / <0.01 ng/mL / x     / x     / x      CARDIAC MARKERS ( 13 Jul 2020 12:29 )  x     / <0.01 ng/mL / x     / x     / x          PT/INR - ( 14 Jul 2020 04:26 )   PT: 21.7 sec;   INR: 1.93 ratio         PTT - ( 14 Jul 2020 04:26 )  PTT:>200.0 sec      RADIOLOGY & ADDITIONAL STUDIES:    INTERPRETATION OF TELEMETRY (personally reviewed): sinus rhythm     ECG: sinus rhythm 97 bpm, 1st degree AV delay, no ischemic ST abnormality     CECILY 5/5/20   1. There is significant artifact present from two mechanical prostheses. There is no clear evidence of vegetation or thrombus seen.   2. Normal left ventricular size and wall thicknesses, with normal systolic and diastolic function.   3. Normal right ventricular size and function.   4. A mechanical valve is present in the mitral position. Mitral prosthesis is functioning normally.   5. Mild-moderate tricuspid regurgitation.   6. Mechanical prosthesis in the aortic valve position. Echo findings are consistent with normal structure and function of the aortic prosthesis.   7. No left atrial appendage thrombus and normal left atrial appendage velocities.    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    Atypical Chest Pain, Subtherapeutic INR, Mechanical Aortic and Mitral Valves -- troponin is negative x 2 and EKG is not ischemic. Low suspicion for ACS. INR 1.8 on arrival.  - it is okay to discharge the patient home with Lovenox injections 1 mg/kg BID and Coumadin -- I will arrange for an INR check later this week.  - add ASA 81 mg daily    Discussed with Dr. Dunlap. I will arrange for close outpatient follow up in our office.

## 2020-07-14 NOTE — DISCHARGE NOTE PROVIDER - CARE PROVIDER_API CALL
PCP,   Phone: (   )    -  Fax: (   )    -  Follow Up Time: 1-3 days    Parveen Cardona  CARDIOVASCULAR DISEASE  [address] [facility] [address] [place] [id number]  Phone: [phone]  Fax: [phone]  Follow Up Time: 1-3 days

## 2020-07-15 LAB
SARS-COV-2 IGG SERPL QL IA: NEGATIVE — SIGNIFICANT CHANGE UP
SARS-COV-2 IGM SERPL IA-ACNC: <3.8 AU/ML — SIGNIFICANT CHANGE UP

## 2020-07-20 ENCOUNTER — TRANSCRIPTION ENCOUNTER (OUTPATIENT)
Age: 35
End: 2020-07-20

## 2020-08-03 ENCOUNTER — APPOINTMENT (OUTPATIENT)
Dept: FAMILY MEDICINE | Facility: CLINIC | Age: 35
End: 2020-08-03
Payer: MEDICAID

## 2020-08-03 ENCOUNTER — MED ADMIN CHARGE (OUTPATIENT)
Age: 35
End: 2020-08-03

## 2020-08-03 PROCEDURE — 96372 THER/PROPH/DIAG INJ SC/IM: CPT

## 2020-08-03 RX ORDER — CYANOCOBALAMIN 1000 UG/ML
1000 INJECTION INTRAMUSCULAR; SUBCUTANEOUS
Qty: 0 | Refills: 0 | Status: COMPLETED | OUTPATIENT
Start: 2020-08-03

## 2020-08-03 RX ADMIN — CYANOCOBALAMIN 1 MCG/ML: 1000 INJECTION, SOLUTION INTRAMUSCULAR at 00:00

## 2020-08-10 ENCOUNTER — APPOINTMENT (OUTPATIENT)
Dept: FAMILY MEDICINE | Facility: CLINIC | Age: 35
End: 2020-08-10
Payer: MEDICAID

## 2020-08-10 PROCEDURE — 96372 THER/PROPH/DIAG INJ SC/IM: CPT

## 2020-08-10 RX ORDER — CYANOCOBALAMIN 1000 UG/ML
1000 INJECTION INTRAMUSCULAR; SUBCUTANEOUS
Qty: 0 | Refills: 0 | Status: COMPLETED | OUTPATIENT
Start: 2020-08-10

## 2020-08-10 RX ADMIN — CYANOCOBALAMIN 0 MCG/ML: 1000 INJECTION, SOLUTION INTRAMUSCULAR at 00:00

## 2020-08-17 ENCOUNTER — APPOINTMENT (OUTPATIENT)
Dept: FAMILY MEDICINE | Facility: CLINIC | Age: 35
End: 2020-08-17

## 2020-08-28 ENCOUNTER — TRANSCRIPTION ENCOUNTER (OUTPATIENT)
Age: 35
End: 2020-08-28

## 2020-08-31 ENCOUNTER — TRANSCRIPTION ENCOUNTER (OUTPATIENT)
Age: 35
End: 2020-08-31

## 2020-09-01 ENCOUNTER — TRANSCRIPTION ENCOUNTER (OUTPATIENT)
Age: 35
End: 2020-09-01

## 2020-09-07 NOTE — REVIEW OF SYSTEMS
[Headache] : headache [Anxiety] : anxiety [Sleep Disturbances] : sleep disturbances [Depression] : depression [Nl] : Endocrine [FreeTextEntry4] : calin

## 2020-09-07 NOTE — PHYSICAL EXAM
[Labia Majora] : labia major [Labia Minora] : labia minora [Normal] : clitoris [No Bleeding] : there was no active vaginal bleeding [Uterine Adnexae] : were not tender and not enlarged [FreeTextEntry4] : very small amount blood/mucus noted, no active bleeding, vaginal culture preformed.

## 2020-09-07 NOTE — COUNSELING
[Vulvar Hygiene] : vulvar hygiene [Menstrual Calendar] : menstrual calendar [Contraception] : contraception [Safe Sexual Practices] : safe sexual practices

## 2020-09-07 NOTE — DISCUSSION/SUMMARY
[Condoms] : condom use [Pregnancy Prevention] : pregnancy prevention [FreeTextEntry1] : Pt to RTO for TVS to further evaluate episode of irregular menses, differential discussed at length.  Pt will keep menstrual calender.  Pt aware pending culture results any further tx for itching.  Pt needs to RTO for annual.  All the pt's questions and concerns were addressed.

## 2020-09-22 ENCOUNTER — RX RENEWAL (OUTPATIENT)
Age: 35
End: 2020-09-22

## 2020-09-24 ENCOUNTER — TRANSCRIPTION ENCOUNTER (OUTPATIENT)
Age: 35
End: 2020-09-24

## 2020-09-29 ENCOUNTER — APPOINTMENT (OUTPATIENT)
Dept: VASCULAR SURGERY | Facility: CLINIC | Age: 35
End: 2020-09-29

## 2020-09-30 ENCOUNTER — TRANSCRIPTION ENCOUNTER (OUTPATIENT)
Age: 35
End: 2020-09-30

## 2020-10-12 ENCOUNTER — RX RENEWAL (OUTPATIENT)
Age: 35
End: 2020-10-12

## 2020-10-22 ENCOUNTER — LABORATORY RESULT (OUTPATIENT)
Age: 35
End: 2020-10-22

## 2020-10-22 ENCOUNTER — TRANSCRIPTION ENCOUNTER (OUTPATIENT)
Age: 35
End: 2020-10-22

## 2020-10-22 ENCOUNTER — APPOINTMENT (OUTPATIENT)
Dept: OBGYN | Facility: CLINIC | Age: 35
End: 2020-10-22
Payer: MEDICAID

## 2020-10-22 ENCOUNTER — ASOB RESULT (OUTPATIENT)
Age: 35
End: 2020-10-22

## 2020-10-22 VITALS
WEIGHT: 187 LBS | SYSTOLIC BLOOD PRESSURE: 110 MMHG | HEIGHT: 72 IN | TEMPERATURE: 97.1 F | BODY MASS INDEX: 25.33 KG/M2 | DIASTOLIC BLOOD PRESSURE: 70 MMHG

## 2020-10-22 DIAGNOSIS — N84.0 POLYP OF CORPUS UTERI: ICD-10-CM

## 2020-10-22 DIAGNOSIS — Z01.419 ENCOUNTER FOR GYNECOLOGICAL EXAMINATION (GENERAL) (ROUTINE) W/OUT ABNORMAL FINDINGS: ICD-10-CM

## 2020-10-22 DIAGNOSIS — Z01.411 ENCOUNTER FOR GYNECOLOGICAL EXAMINATION (GENERAL) (ROUTINE) WITH ABNORMAL FINDINGS: ICD-10-CM

## 2020-10-22 PROCEDURE — 99072 ADDL SUPL MATRL&STAF TM PHE: CPT

## 2020-10-22 PROCEDURE — 76830 TRANSVAGINAL US NON-OB: CPT

## 2020-10-22 PROCEDURE — 99395 PREV VISIT EST AGE 18-39: CPT | Mod: 25

## 2020-11-02 ENCOUNTER — TRANSCRIPTION ENCOUNTER (OUTPATIENT)
Age: 35
End: 2020-11-02

## 2020-11-03 ENCOUNTER — TRANSCRIPTION ENCOUNTER (OUTPATIENT)
Age: 35
End: 2020-11-03

## 2020-11-06 ENCOUNTER — MED ADMIN CHARGE (OUTPATIENT)
Age: 35
End: 2020-11-06

## 2020-11-06 ENCOUNTER — APPOINTMENT (OUTPATIENT)
Dept: FAMILY MEDICINE | Facility: CLINIC | Age: 35
End: 2020-11-06
Payer: MEDICAID

## 2020-11-06 VITALS
SYSTOLIC BLOOD PRESSURE: 100 MMHG | OXYGEN SATURATION: 97 % | HEIGHT: 72 IN | HEART RATE: 91 BPM | BODY MASS INDEX: 25.73 KG/M2 | TEMPERATURE: 98 F | WEIGHT: 190 LBS | DIASTOLIC BLOOD PRESSURE: 60 MMHG

## 2020-11-06 VITALS — DIASTOLIC BLOOD PRESSURE: 70 MMHG | SYSTOLIC BLOOD PRESSURE: 105 MMHG

## 2020-11-06 DIAGNOSIS — Z23 ENCOUNTER FOR IMMUNIZATION: ICD-10-CM

## 2020-11-06 PROCEDURE — 36415 COLL VENOUS BLD VENIPUNCTURE: CPT

## 2020-11-06 PROCEDURE — 99072 ADDL SUPL MATRL&STAF TM PHE: CPT

## 2020-11-06 PROCEDURE — 90686 IIV4 VACC NO PRSV 0.5 ML IM: CPT

## 2020-11-06 PROCEDURE — 99214 OFFICE O/P EST MOD 30 MIN: CPT | Mod: 25

## 2020-11-06 PROCEDURE — G0008: CPT

## 2020-11-06 PROCEDURE — 96372 THER/PROPH/DIAG INJ SC/IM: CPT

## 2020-11-06 RX ORDER — CYANOCOBALAMIN 1000 UG/ML
1000 INJECTION INTRAMUSCULAR; SUBCUTANEOUS
Qty: 0 | Refills: 0 | Status: COMPLETED | OUTPATIENT
Start: 2020-11-06

## 2020-11-06 RX ADMIN — CYANOCOBALAMIN 0 MCG/ML: 1000 INJECTION INTRAMUSCULAR; SUBCUTANEOUS at 00:00

## 2020-11-06 NOTE — HISTORY OF PRESENT ILLNESS
[FreeTextEntry1] : RPA blood work [de-identified] : 3M check up \par as above +ve fast no CP  stable SOB no dizziness no palpitations no N/V/D +BM qd NL no bloody/black stools \par no urinary complaints \par \par no f/c/s stable PND induced cough,  sense of smell/taste intact

## 2020-11-06 NOTE — PHYSICAL EXAM
[No Acute Distress] : no acute distress [Well Nourished] : well nourished [Well Developed] : well developed [Well-Appearing] : well-appearing [EOMI] : extraocular movements intact [Normal Outer Ear/Nose] : the outer ears and nose were normal in appearance [No JVD] : no jugular venous distention [No Respiratory Distress] : no respiratory distress  [No Accessory Muscle Use] : no accessory muscle use [Clear to Auscultation] : lungs were clear to auscultation bilaterally [Normal Rate] : normal rate  [Regular Rhythm] : with a regular rhythm [Normal S1, S2] : normal S1 and S2 [No Carotid Bruits] : no carotid bruits [No Edema] : there was no peripheral edema [No Palpable Aorta] : no palpable aorta [No Extremity Clubbing/Cyanosis] : no extremity clubbing/cyanosis [Non-distended] : non-distended [Normal Posterior Cervical Nodes] : no posterior cervical lymphadenopathy [Normal Anterior Cervical Nodes] : no anterior cervical lymphadenopathy [No CVA Tenderness] : no CVA  tenderness [Grossly Normal Strength/Tone] : grossly normal strength/tone [No Rash] : no rash [Coordination Grossly Intact] : coordination grossly intact [No Focal Deficits] : no focal deficits [Normal Gait] : normal gait [Normal Affect] : the affect was normal [Normal Mood] : the mood was normal [Normal Insight/Judgement] : insight and judgment were intact

## 2020-11-10 ENCOUNTER — TRANSCRIPTION ENCOUNTER (OUTPATIENT)
Age: 35
End: 2020-11-10

## 2020-11-10 LAB
25(OH)D3 SERPL-MCNC: 36.5 NG/ML
ALBUMIN SERPL ELPH-MCNC: 4.2 G/DL
ALP BLD-CCNC: 58 U/L
ALT SERPL-CCNC: 6 U/L
ANION GAP SERPL CALC-SCNC: 11 MMOL/L
APPEARANCE: CLEAR
AST SERPL-CCNC: 14 U/L
BACTERIA: NEGATIVE
BASOPHILS # BLD AUTO: 0.05 K/UL
BASOPHILS NFR BLD AUTO: 0.4 %
BILIRUB SERPL-MCNC: 0.2 MG/DL
BILIRUBIN URINE: NEGATIVE
BLOOD URINE: NEGATIVE
BUN SERPL-MCNC: 13 MG/DL
CALCIUM SERPL-MCNC: 8.9 MG/DL
CHLORIDE SERPL-SCNC: 104 MMOL/L
CHOLEST SERPL-MCNC: 152 MG/DL
CK SERPL-CCNC: 70 U/L
CO2 SERPL-SCNC: 21 MMOL/L
COLOR: NORMAL
CREAT SERPL-MCNC: 0.92 MG/DL
EOSINOPHIL # BLD AUTO: 0.25 K/UL
EOSINOPHIL NFR BLD AUTO: 1.8 %
ESTIMATED AVERAGE GLUCOSE: 154 MG/DL
FERRITIN SERPL-MCNC: 29 NG/ML
FOLATE SERPL-MCNC: 6.7 NG/ML
GGT SERPL-CCNC: 25 U/L
GLUCOSE QUALITATIVE U: NEGATIVE
GLUCOSE SERPL-MCNC: 135 MG/DL
HBA1C MFR BLD HPLC: 7 %
HCT VFR BLD CALC: 35 %
HDLC SERPL-MCNC: 33 MG/DL
HGB BLD-MCNC: 10 G/DL
HYALINE CASTS: 0 /LPF
IMM GRANULOCYTES NFR BLD AUTO: 1 %
IRON SATN MFR SERPL: 9 %
IRON SERPL-MCNC: 35 UG/DL
KETONES URINE: NEGATIVE
LDLC SERPL CALC-MCNC: 91 MG/DL
LEUKOCYTE ESTERASE URINE: NEGATIVE
LYMPHOCYTES # BLD AUTO: 2.23 K/UL
LYMPHOCYTES NFR BLD AUTO: 16.3 %
MAN DIFF?: NORMAL
MCHC RBC-ENTMCNC: 23.1 PG
MCHC RBC-ENTMCNC: 28.6 GM/DL
MCV RBC AUTO: 80.8 FL
MICROSCOPIC-UA: NORMAL
MONOCYTES # BLD AUTO: 0.89 K/UL
MONOCYTES NFR BLD AUTO: 6.5 %
NEUTROPHILS # BLD AUTO: 10.16 K/UL
NEUTROPHILS NFR BLD AUTO: 74 %
NITRITE URINE: NEGATIVE
NONHDLC SERPL-MCNC: 118 MG/DL
PH URINE: 6
PLATELET # BLD AUTO: 485 K/UL
POTASSIUM SERPL-SCNC: 4.8 MMOL/L
PROT SERPL-MCNC: 6.9 G/DL
PROTEIN URINE: NEGATIVE
RBC # BLD: 4.33 M/UL
RBC # FLD: 18.9 %
RED BLOOD CELLS URINE: 4 /HPF
SODIUM SERPL-SCNC: 136 MMOL/L
SPECIFIC GRAVITY URINE: 1.02
SQUAMOUS EPITHELIAL CELLS: 5 /HPF
T3 SERPL-MCNC: 101 NG/DL
T4 SERPL-MCNC: 9.4 UG/DL
TIBC SERPL-MCNC: 411 UG/DL
TRANSFERRIN SERPL-MCNC: 343 MG/DL
TRIGL SERPL-MCNC: 135 MG/DL
TSH SERPL-ACNC: 2.72 UIU/ML
UIBC SERPL-MCNC: 376 UG/DL
URATE SERPL-MCNC: 3.5 MG/DL
UROBILINOGEN URINE: NORMAL
VIT B12 SERPL-MCNC: 219 PG/ML
WBC # FLD AUTO: 13.72 K/UL
WHITE BLOOD CELLS URINE: 3 /HPF

## 2020-11-16 ENCOUNTER — TRANSCRIPTION ENCOUNTER (OUTPATIENT)
Age: 35
End: 2020-11-16

## 2020-11-17 ENCOUNTER — TRANSCRIPTION ENCOUNTER (OUTPATIENT)
Age: 35
End: 2020-11-17

## 2020-11-17 NOTE — PHYSICAL EXAM
[Appropriately responsive] : appropriately responsive [Alert] : alert [No Acute Distress] : no acute distress [Oriented x3] : oriented x3 [Examination Of The Breasts] : a normal appearance [No Discharge] : no discharge [No Masses] : no breast masses were palpable [No Lesions] : no lesions  [Labia Majora] : normal [Labia Minora] : normal [Pink Rugae] : pink rugae [No Bleeding] : There was no active vaginal bleeding [Normal] : normal [Normal Position] : in a normal position [Uterine Adnexae] : normal

## 2020-11-17 NOTE — HISTORY OF PRESENT ILLNESS
[Irregular Menstrual Interval] : irregular menstrual interval [No] : Patient does not have concerns regarding sex [Currently Active] : currently active [Men] : men [Patient refuses STI testing] : Patient refuses STI testing [PapSmeardate] : 4/12/19 negative [LMPDate] : 10/13/20 [PGHxTotal] : 0 [TextBox_6] : 10/13/20 [TextBox_28] : painful [TextBox_34] : n/a [TextBox_18] : n/a [FreeTextEntry1] : 10/13/20

## 2020-11-17 NOTE — DISCUSSION/SUMMARY
[FreeTextEntry1] : Pt will RTO 12-14 weeks, immediately after menses to further evaluate possible endometrial polyp.  All the pt's questions and concerns were addressed.

## 2020-12-01 ENCOUNTER — EMERGENCY (EMERGENCY)
Facility: HOSPITAL | Age: 35
LOS: 1 days | Discharge: HOME | End: 2020-12-01
Attending: EMERGENCY MEDICINE
Payer: MEDICAID

## 2020-12-01 VITALS
OXYGEN SATURATION: 100 % | RESPIRATION RATE: 20 BRPM | HEART RATE: 113 BPM | HEIGHT: 72 IN | WEIGHT: 190.04 LBS | DIASTOLIC BLOOD PRESSURE: 63 MMHG | TEMPERATURE: 99 F | SYSTOLIC BLOOD PRESSURE: 110 MMHG

## 2020-12-01 VITALS
OXYGEN SATURATION: 100 % | RESPIRATION RATE: 18 BRPM | HEART RATE: 100 BPM | SYSTOLIC BLOOD PRESSURE: 109 MMHG | DIASTOLIC BLOOD PRESSURE: 72 MMHG

## 2020-12-01 DIAGNOSIS — Z98.1 ARTHRODESIS STATUS: Chronic | ICD-10-CM

## 2020-12-01 DIAGNOSIS — Z96.1 PRESENCE OF INTRAOCULAR LENS: Chronic | ICD-10-CM

## 2020-12-01 DIAGNOSIS — Z95.4 PRESENCE OF OTHER HEART-VALVE REPLACEMENT: Chronic | ICD-10-CM

## 2020-12-01 LAB
ALBUMIN SERPL ELPH-MCNC: 3.7 G/DL — SIGNIFICANT CHANGE UP (ref 3.3–5.2)
ALP SERPL-CCNC: 57 U/L — SIGNIFICANT CHANGE UP (ref 40–120)
ALT FLD-CCNC: 7 U/L — SIGNIFICANT CHANGE UP
ANION GAP SERPL CALC-SCNC: 14 MMOL/L — SIGNIFICANT CHANGE UP (ref 5–17)
APTT BLD: 129.4 SEC — CRITICAL HIGH (ref 27.5–35.5)
AST SERPL-CCNC: 19 U/L — SIGNIFICANT CHANGE UP
BASOPHILS # BLD AUTO: 0.04 K/UL — SIGNIFICANT CHANGE UP (ref 0–0.2)
BASOPHILS NFR BLD AUTO: 0.3 % — SIGNIFICANT CHANGE UP (ref 0–2)
BILIRUB SERPL-MCNC: <0.2 MG/DL — LOW (ref 0.4–2)
BUN SERPL-MCNC: 14 MG/DL — SIGNIFICANT CHANGE UP (ref 8–20)
CALCIUM SERPL-MCNC: 9.3 MG/DL — SIGNIFICANT CHANGE UP (ref 8.6–10.2)
CHLORIDE SERPL-SCNC: 102 MMOL/L — SIGNIFICANT CHANGE UP (ref 98–107)
CO2 SERPL-SCNC: 20 MMOL/L — LOW (ref 22–29)
CREAT SERPL-MCNC: 0.81 MG/DL — SIGNIFICANT CHANGE UP (ref 0.5–1.3)
EOSINOPHIL # BLD AUTO: 0.12 K/UL — SIGNIFICANT CHANGE UP (ref 0–0.5)
EOSINOPHIL NFR BLD AUTO: 0.8 % — SIGNIFICANT CHANGE UP (ref 0–6)
GLUCOSE SERPL-MCNC: 186 MG/DL — HIGH (ref 70–99)
HCT VFR BLD CALC: 34.2 % — LOW (ref 34.5–45)
HGB BLD-MCNC: 10.2 G/DL — LOW (ref 11.5–15.5)
IMM GRANULOCYTES NFR BLD AUTO: 0.8 % — SIGNIFICANT CHANGE UP (ref 0–1.5)
INR BLD: 7.94 RATIO — CRITICAL HIGH (ref 0.88–1.16)
LYMPHOCYTES # BLD AUTO: 1.72 K/UL — SIGNIFICANT CHANGE UP (ref 1–3.3)
LYMPHOCYTES # BLD AUTO: 11.1 % — LOW (ref 13–44)
MCHC RBC-ENTMCNC: 22.8 PG — LOW (ref 27–34)
MCHC RBC-ENTMCNC: 29.8 GM/DL — LOW (ref 32–36)
MCV RBC AUTO: 76.5 FL — LOW (ref 80–100)
MONOCYTES # BLD AUTO: 0.66 K/UL — SIGNIFICANT CHANGE UP (ref 0–0.9)
MONOCYTES NFR BLD AUTO: 4.3 % — SIGNIFICANT CHANGE UP (ref 2–14)
NEUTROPHILS # BLD AUTO: 12.77 K/UL — HIGH (ref 1.8–7.4)
NEUTROPHILS NFR BLD AUTO: 82.7 % — HIGH (ref 43–77)
PLATELET # BLD AUTO: 479 K/UL — HIGH (ref 150–400)
POTASSIUM SERPL-MCNC: 4 MMOL/L — SIGNIFICANT CHANGE UP (ref 3.5–5.3)
POTASSIUM SERPL-SCNC: 4 MMOL/L — SIGNIFICANT CHANGE UP (ref 3.5–5.3)
PROT SERPL-MCNC: 7.2 G/DL — SIGNIFICANT CHANGE UP (ref 6.6–8.7)
PROTHROM AB SERPL-ACNC: 83.5 SEC — HIGH (ref 10.6–13.6)
RBC # BLD: 4.47 M/UL — SIGNIFICANT CHANGE UP (ref 3.8–5.2)
RBC # FLD: 18 % — HIGH (ref 10.3–14.5)
SODIUM SERPL-SCNC: 136 MMOL/L — SIGNIFICANT CHANGE UP (ref 135–145)
WBC # BLD: 15.44 K/UL — HIGH (ref 3.8–10.5)
WBC # FLD AUTO: 15.44 K/UL — HIGH (ref 3.8–10.5)

## 2020-12-01 PROCEDURE — 99283 EMERGENCY DEPT VISIT LOW MDM: CPT

## 2020-12-01 PROCEDURE — 36415 COLL VENOUS BLD VENIPUNCTURE: CPT

## 2020-12-01 PROCEDURE — 80053 COMPREHEN METABOLIC PANEL: CPT

## 2020-12-01 PROCEDURE — 85730 THROMBOPLASTIN TIME PARTIAL: CPT

## 2020-12-01 PROCEDURE — 85025 COMPLETE CBC W/AUTO DIFF WBC: CPT

## 2020-12-01 PROCEDURE — 99284 EMERGENCY DEPT VISIT MOD MDM: CPT

## 2020-12-01 PROCEDURE — 85610 PROTHROMBIN TIME: CPT

## 2020-12-01 RX ORDER — PHYTONADIONE (VIT K1) 5 MG
2.5 TABLET ORAL ONCE
Refills: 0 | Status: COMPLETED | OUTPATIENT
Start: 2020-12-01 | End: 2020-12-01

## 2020-12-01 RX ADMIN — Medication 2.5 MILLIGRAM(S): at 16:46

## 2020-12-01 NOTE — ED PROVIDER NOTE - PATIENT PORTAL LINK FT
You can access the FollowMyHealth Patient Portal offered by [facility] by registering at the following website: [email or web address]. By joining [employer]’s FollowMyHealth portal, you will also be able to view your health information using other applications (apps) compatible with our system.

## 2020-12-01 NOTE — ED PROVIDER NOTE - ATTENDING CONTRIBUTION TO CARE
I, Jeet Cope, performed the initial face to face bedside interview with this patient regarding history of present illness, review of symptoms and relevant past medical, social and family history.  I completed an independent physical examination.  I was the initial provider who evaluated this patient. I have signed out the follow up of any pending tests (i.e. labs, radiological studies) to the ACP.  I have communicated the patient’s plan of care and disposition with the ACP.  The history, relevant review of systems, past medical and surgical history, medical decision making, and physical examination was documented by the scribe in my presence and I attest to the accuracy of the documentation.

## 2020-12-01 NOTE — ED PROVIDER NOTE - CLINICAL SUMMARY MEDICAL DECISION MAKING FREE TEXT BOX
34 y/o female with possible supratherapeutic inr, will check inr and treat accordingly. 36 y/o female with possible supratherapeutic INR, will check INR and treat accordingly.

## 2020-12-01 NOTE — ED PROVIDER NOTE - OBJECTIVE STATEMENT
34 y/o female with PMHx of Anxiety, Depression, Diabetes, Dilated aortic root, Fibromyalgia, Marfan syndrome, and Mitral valve prolapse presents to ED for inr levels reading above 8 on her machine. Patient is on coumadin for two mechanical valves. Patient's inr is normally round 3-3.5. Patient c/o ha yesterday but denies current ha, bleeding, bruising, N/V, or abdominal pain. 34 y/o female with PMHx of Anxiety, Depression, Diabetes, Dilated aortic root, Fibromyalgia, Marfan syndrome, and Mitral valve prolapse presents to ED for inr levels reading above 8 on her machine. Patient is on coumadin for two mechanical valves. Patient's inr is normally around 3-3.5. Patient c/o ha yesterday but denies current ha, bleeding, bruising, N/V, or abdominal pain. 34 y/o female with PMHx of Anxiety, Depression, Diabetes, Dilated aortic root, Fibromyalgia, Marfan syndrome, and Mitral valve prolapse presents to ED for INR levels reading above 8 on her machine. Patient is on coumadin for two mechanical valves. Patient's INR is normally around 3-3.5. Patient c/o ha yesterday but denies current ha, bleeding, bruising, N/V, or abdominal pain.

## 2020-12-01 NOTE — ED ADULT TRIAGE NOTE - CHIEF COMPLAINT QUOTE
Pt. complaining of abnormal INR.  Pt. states she has an at home INR pranav; states " I took it this morning and it was an 8, so MD saldaña sent me to the ED."  Pt. denies bleeding but complaining of dark urine.  Pt. with no other complaints at this time.  Pt. with history of 2 mechanical valves on coumadin

## 2020-12-02 ENCOUNTER — TRANSCRIPTION ENCOUNTER (OUTPATIENT)
Age: 35
End: 2020-12-02

## 2020-12-02 ENCOUNTER — RX RENEWAL (OUTPATIENT)
Age: 35
End: 2020-12-02

## 2020-12-03 ENCOUNTER — RX RENEWAL (OUTPATIENT)
Age: 35
End: 2020-12-03

## 2020-12-04 ENCOUNTER — NON-APPOINTMENT (OUTPATIENT)
Age: 35
End: 2020-12-04

## 2020-12-05 ENCOUNTER — TRANSCRIPTION ENCOUNTER (OUTPATIENT)
Age: 35
End: 2020-12-05

## 2020-12-06 ENCOUNTER — TRANSCRIPTION ENCOUNTER (OUTPATIENT)
Age: 35
End: 2020-12-06

## 2020-12-09 LAB
CYTOLOGY CVX/VAG DOC THIN PREP: ABNORMAL
HPV HIGH+LOW RISK DNA PNL CVX: DETECTED

## 2020-12-10 ENCOUNTER — NON-APPOINTMENT (OUTPATIENT)
Age: 35
End: 2020-12-10

## 2020-12-14 ENCOUNTER — NON-APPOINTMENT (OUTPATIENT)
Age: 35
End: 2020-12-14

## 2020-12-15 PROBLEM — Z87.09 HISTORY OF PHARYNGITIS: Status: RESOLVED | Noted: 2017-09-19 | Resolved: 2020-12-15

## 2020-12-16 PROBLEM — Z87.09 HISTORY OF UPPER RESPIRATORY INFECTION: Status: RESOLVED | Noted: 2017-09-19 | Resolved: 2020-12-16

## 2020-12-16 PROBLEM — Z86.69 HISTORY OF OTITIS MEDIA: Status: RESOLVED | Noted: 2018-10-16 | Resolved: 2020-12-16

## 2020-12-21 PROBLEM — Z87.42 HISTORY OF VAGINITIS: Status: RESOLVED | Noted: 2019-04-12 | Resolved: 2020-12-21

## 2020-12-21 PROBLEM — Z87.440 HISTORY OF URINARY TRACT INFECTION: Status: RESOLVED | Noted: 2018-07-19 | Resolved: 2020-12-21

## 2020-12-23 PROBLEM — Z01.419 ENCOUNTER FOR ANNUAL ROUTINE GYNECOLOGICAL EXAMINATION: Status: RESOLVED | Noted: 2020-09-07 | Resolved: 2020-12-23

## 2020-12-28 ENCOUNTER — RX RENEWAL (OUTPATIENT)
Age: 35
End: 2020-12-28

## 2021-01-19 ENCOUNTER — TRANSCRIPTION ENCOUNTER (OUTPATIENT)
Age: 36
End: 2021-01-19

## 2021-01-28 ENCOUNTER — TRANSCRIPTION ENCOUNTER (OUTPATIENT)
Age: 36
End: 2021-01-28

## 2021-02-03 ENCOUNTER — APPOINTMENT (OUTPATIENT)
Dept: VASCULAR SURGERY | Facility: CLINIC | Age: 36
End: 2021-02-03
Payer: MEDICAID

## 2021-02-03 VITALS
WEIGHT: 191 LBS | HEIGHT: 72 IN | SYSTOLIC BLOOD PRESSURE: 103 MMHG | HEART RATE: 103 BPM | TEMPERATURE: 98.6 F | BODY MASS INDEX: 25.87 KG/M2 | DIASTOLIC BLOOD PRESSURE: 67 MMHG | OXYGEN SATURATION: 96 %

## 2021-02-03 DIAGNOSIS — I65.22 OCCLUSION AND STENOSIS OF LEFT CAROTID ARTERY: ICD-10-CM

## 2021-02-03 DIAGNOSIS — Z13.6 ENCOUNTER FOR SCREENING FOR CARDIOVASCULAR DISORDERS: ICD-10-CM

## 2021-02-03 PROCEDURE — 99072 ADDL SUPL MATRL&STAF TM PHE: CPT

## 2021-02-03 PROCEDURE — 93880 EXTRACRANIAL BILAT STUDY: CPT

## 2021-02-03 PROCEDURE — 99213 OFFICE O/P EST LOW 20 MIN: CPT

## 2021-02-03 RX ORDER — OXYCODONE HYDROCHLORIDE 15 MG/1
15 TABLET ORAL
Qty: 60 | Refills: 0 | Status: COMPLETED | COMMUNITY
Start: 2020-06-18 | End: 2021-02-03

## 2021-02-03 RX ORDER — LURASIDONE HYDROCHLORIDE 20 MG/1
20 TABLET, FILM COATED ORAL
Refills: 0 | Status: COMPLETED | COMMUNITY
Start: 2020-05-13 | End: 2021-02-03

## 2021-02-03 RX ORDER — TRAMADOL HYDROCHLORIDE 50 MG/1
50 TABLET, COATED ORAL
Refills: 0 | Status: COMPLETED | COMMUNITY
End: 2021-02-03

## 2021-02-03 RX ORDER — FENOFIBRATE 145 MG/1
145 TABLET, COATED ORAL
Qty: 30 | Refills: 0 | Status: COMPLETED | COMMUNITY
Start: 2020-05-08 | End: 2021-02-03

## 2021-02-03 RX ORDER — PRAVASTATIN SODIUM 20 MG/1
20 TABLET ORAL
Refills: 0 | Status: COMPLETED | COMMUNITY
End: 2021-02-03

## 2021-02-08 ENCOUNTER — NON-APPOINTMENT (OUTPATIENT)
Age: 36
End: 2021-02-08

## 2021-02-18 ENCOUNTER — APPOINTMENT (OUTPATIENT)
Dept: OBGYN | Facility: CLINIC | Age: 36
End: 2021-02-18

## 2021-02-22 ENCOUNTER — RX RENEWAL (OUTPATIENT)
Age: 36
End: 2021-02-22

## 2021-02-23 ENCOUNTER — RX RENEWAL (OUTPATIENT)
Age: 36
End: 2021-02-23

## 2021-03-11 ENCOUNTER — EMERGENCY (EMERGENCY)
Facility: HOSPITAL | Age: 36
LOS: 1 days | Discharge: HOME | End: 2021-03-11
Attending: EMERGENCY MEDICINE
Payer: MEDICAID

## 2021-03-11 VITALS
OXYGEN SATURATION: 99 % | DIASTOLIC BLOOD PRESSURE: 75 MMHG | HEART RATE: 87 BPM | TEMPERATURE: 98 F | HEIGHT: 72 IN | RESPIRATION RATE: 12 BRPM | SYSTOLIC BLOOD PRESSURE: 109 MMHG

## 2021-03-11 DIAGNOSIS — Z96.1 PRESENCE OF INTRAOCULAR LENS: Chronic | ICD-10-CM

## 2021-03-11 DIAGNOSIS — Z95.4 PRESENCE OF OTHER HEART-VALVE REPLACEMENT: Chronic | ICD-10-CM

## 2021-03-11 DIAGNOSIS — Z98.1 ARTHRODESIS STATUS: Chronic | ICD-10-CM

## 2021-03-11 PROCEDURE — 99284 EMERGENCY DEPT VISIT MOD MDM: CPT

## 2021-03-11 NOTE — ED ADULT TRIAGE NOTE - CHIEF COMPLAINT QUOTE
Pt is A&Ox4, in NAD. BIBEMS c/o epistaxis for approximately 1.5 hours. Pt states she has been taking Warfarin for 7 years but that she has been getting frequent nose bleeds over the past couple of weeks. Bleeding is controlled with gauze at this time. Reports that the bleeding stops when she lies flat but that it resumes when sitting upright. Denies chest pain or SOB. VSS.

## 2021-03-11 NOTE — ED ADULT NURSE NOTE - INTERVENTIONS DEFINITIONS
[place] to call system/Instruct patient to call for assistance/Monitor gait and stability/Monitor for mental status changes and reorient to person, place, and time/Review medications for side effects contributing to fall risk/Reinforce activity limits and safety measures with patient and family

## 2021-03-11 NOTE — ED ADULT NURSE NOTE - NSSEPSISSUSPECTED_ED_A_ED
Please create order/referral and diagnosis in chart then this will fall in to my work queue to process referral.   PER Epic TEAM    No

## 2021-03-11 NOTE — ED ADULT NURSE NOTE - OBJECTIVE STATEMENT
Assumed pt care @ this time. Pt received A&Ox4 c/o epistaxis in L nostril since approx 2230. Pt states she is currently taking warfarin x7 years for hx of mechanical valves. Pt states she has been experiencing frequent nose bleeds over the past few weeks. Bleeding is controlled w/gauze @ this time. Pt denies any falls or traumas. Pt denies any lightheadedness, weakness, SOB, HA, blurred vision, cp, NVD,  symptoms. Respirations even & unlabored. NAD present. Pt made aware of POC.

## 2021-03-12 LAB
APTT BLD: 62.2 SEC — HIGH (ref 27.5–35.5)
BASOPHILS # BLD AUTO: 0.07 K/UL — SIGNIFICANT CHANGE UP (ref 0–0.2)
BASOPHILS NFR BLD AUTO: 0.4 % — SIGNIFICANT CHANGE UP (ref 0–2)
EOSINOPHIL # BLD AUTO: 0.24 K/UL — SIGNIFICANT CHANGE UP (ref 0–0.5)
EOSINOPHIL NFR BLD AUTO: 1.2 % — SIGNIFICANT CHANGE UP (ref 0–6)
HCT VFR BLD CALC: 31.9 % — LOW (ref 34.5–45)
HGB BLD-MCNC: 9.5 G/DL — LOW (ref 11.5–15.5)
IMM GRANULOCYTES NFR BLD AUTO: 0.8 % — SIGNIFICANT CHANGE UP (ref 0–1.5)
INR BLD: 2.9 RATIO — HIGH (ref 0.88–1.16)
LYMPHOCYTES # BLD AUTO: 12.1 % — LOW (ref 13–44)
LYMPHOCYTES # BLD AUTO: 2.39 K/UL — SIGNIFICANT CHANGE UP (ref 1–3.3)
MCHC RBC-ENTMCNC: 22.7 PG — LOW (ref 27–34)
MCHC RBC-ENTMCNC: 29.8 GM/DL — LOW (ref 32–36)
MCV RBC AUTO: 76.3 FL — LOW (ref 80–100)
MONOCYTES # BLD AUTO: 1.26 K/UL — HIGH (ref 0–0.9)
MONOCYTES NFR BLD AUTO: 6.4 % — SIGNIFICANT CHANGE UP (ref 2–14)
NEUTROPHILS # BLD AUTO: 15.6 K/UL — HIGH (ref 1.8–7.4)
NEUTROPHILS NFR BLD AUTO: 79.1 % — HIGH (ref 43–77)
PLATELET # BLD AUTO: 418 K/UL — HIGH (ref 150–400)
PROTHROM AB SERPL-ACNC: 32 SEC — HIGH (ref 10.6–13.6)
RBC # BLD: 4.18 M/UL — SIGNIFICANT CHANGE UP (ref 3.8–5.2)
RBC # FLD: 18.1 % — HIGH (ref 10.3–14.5)
WBC # BLD: 19.71 K/UL — HIGH (ref 3.8–10.5)
WBC # FLD AUTO: 19.71 K/UL — HIGH (ref 3.8–10.5)

## 2021-03-12 PROCEDURE — 85025 COMPLETE CBC W/AUTO DIFF WBC: CPT

## 2021-03-12 PROCEDURE — 85730 THROMBOPLASTIN TIME PARTIAL: CPT

## 2021-03-12 PROCEDURE — 85610 PROTHROMBIN TIME: CPT

## 2021-03-12 PROCEDURE — 36415 COLL VENOUS BLD VENIPUNCTURE: CPT

## 2021-03-12 PROCEDURE — 99283 EMERGENCY DEPT VISIT LOW MDM: CPT

## 2021-03-12 RX ORDER — TRANEXAMIC ACID 100 MG/ML
5 INJECTION, SOLUTION INTRAVENOUS ONCE
Refills: 0 | Status: DISCONTINUED | OUTPATIENT
Start: 2021-03-12 | End: 2021-03-12

## 2021-03-12 RX ORDER — OXYMETAZOLINE HYDROCHLORIDE 0.5 MG/ML
2 SPRAY NASAL ONCE
Refills: 0 | Status: COMPLETED | OUTPATIENT
Start: 2021-03-12 | End: 2021-03-12

## 2021-03-12 RX ADMIN — OXYMETAZOLINE HYDROCHLORIDE 2 SPRAY(S): 0.5 SPRAY NASAL at 01:48

## 2021-03-12 NOTE — ED PROVIDER NOTE - OBJECTIVE STATEMENT
36 y/o female with pmhx of Marfan syndrome, s/p avr, mvr mechanical, dm, htn, chronic pain syndrome, anxiety/ depression presents tot  ED c/o atraumatic nose bleed of the left nare that occurred around 1.5 hours ago. Pt applied direct pressure tot the nose but still bleeds. Notes this occurrence has been happening more frequently lately. Pt due to check her INR tomorrow. Hx of nose bleeds when INR gets elevated. Has been on coumadin for the past 7 years. Denies lightheadedness, dizziness, n/v

## 2021-03-12 NOTE — ED PROVIDER NOTE - CARE PROVIDER_API CALL
Renato Mckeon)  Otolaryngology  [address] [place] [address], [place] [address]  Phone: [phone]  Fax: [phone]  Follow Up Time:

## 2021-03-12 NOTE — ED PROVIDER NOTE - ATTENDING CONTRIBUTION TO CARE
Spontaneous epistaxis on warfarin, controlled with pressure no packing required. INR within range. Return precautions discussed.

## 2021-03-12 NOTE — ED PROVIDER NOTE - PROGRESS NOTE DETAILS
Stable INR, CBC chronically elevated as per patient, follows hematologist, Pt reassessed, pt feeling better at this time, vss, pt able to walk, talk and vocalized plan of action. Discussed in depth and explained to pt in depth the next steps that need to be taking including proper follow up with PCP or specialists. All incidental findings were discussed with pt as well. Pt verbalized their concerns and all questions were answered. Pt understands dispo and wants discharge. Given good instructions when to return to ED and importance of f/u.

## 2021-03-12 NOTE — ED PROVIDER NOTE - CLINICAL SUMMARY MEDICAL DECISION MAKING FREE TEXT BOX
36 y/o female with pmhx of Marfan syndrome, s/p avr, mvr mechanical, dm, htn, chronic pain syndrome, anxiety/ depression presents tot  ED c/o atraumatic nose bleed of the left nare that occurred around 1.5 hours ago. Will check inr, afrin, txa, if not stopping will apply rhinorocket.

## 2021-03-12 NOTE — ED PROVIDER NOTE - PATIENT PORTAL LINK FT
You can access the FollowMyHealth Patient Portal offered by [facility] by registering at the following website: [email or web address]. By joining [email or web address]’s FollowMyHealth portal, you will also be able to view your health information using other applications (apps) compatible with our system.

## 2021-03-17 ENCOUNTER — APPOINTMENT (OUTPATIENT)
Dept: FAMILY MEDICINE | Facility: CLINIC | Age: 36
End: 2021-03-17
Payer: MEDICAID

## 2021-03-17 ENCOUNTER — LABORATORY RESULT (OUTPATIENT)
Age: 36
End: 2021-03-17

## 2021-03-17 ENCOUNTER — RESULT CHARGE (OUTPATIENT)
Age: 36
End: 2021-03-17

## 2021-03-17 VITALS
HEIGHT: 72 IN | SYSTOLIC BLOOD PRESSURE: 100 MMHG | HEART RATE: 105 BPM | WEIGHT: 190 LBS | BODY MASS INDEX: 25.73 KG/M2 | OXYGEN SATURATION: 98 % | DIASTOLIC BLOOD PRESSURE: 60 MMHG | TEMPERATURE: 98.6 F

## 2021-03-17 VITALS — DIASTOLIC BLOOD PRESSURE: 58 MMHG | SYSTOLIC BLOOD PRESSURE: 88 MMHG

## 2021-03-17 DIAGNOSIS — R04.0 EPISTAXIS: ICD-10-CM

## 2021-03-17 DIAGNOSIS — D72.829 ELEVATED WHITE BLOOD CELL COUNT, UNSPECIFIED: ICD-10-CM

## 2021-03-17 LAB
INR PPP: 3.6 RATIO
QUALITY CONTROL: YES

## 2021-03-17 PROCEDURE — 36415 COLL VENOUS BLD VENIPUNCTURE: CPT

## 2021-03-17 PROCEDURE — 99072 ADDL SUPL MATRL&STAF TM PHE: CPT

## 2021-03-17 PROCEDURE — 85610 PROTHROMBIN TIME: CPT | Mod: QW

## 2021-03-17 PROCEDURE — 99214 OFFICE O/P EST MOD 30 MIN: CPT | Mod: 25

## 2021-03-17 NOTE — HISTORY OF PRESENT ILLNESS
[FreeTextEntry1] : Follow up with ER visit to [facility] on 03/11/2021. Pt. c/o nose bleed  [de-identified] : 37 yo female presents to office s/p  ED evaluation for Epistaxis on 3/11/21.  Pt states INR at that time was therapeutic at 2.9 \par despite 3 hr nose bleed.  Pt denies recurrent nose bleed since \par \par Denies  CP/SOB at rest UNCHANGED SOB  c activity no dizziness no  palpitations no N/V/D +BM qd NL no bloody/black stools \par no acute change in bowel  habits no urinary complaints \par \par Denies f/c/s no cough sense of smell/taste intact

## 2021-03-17 NOTE — PLAN
[FreeTextEntry1] : resolved, close monitoring \par \par repeat labs today, see orders \par \par POCT INR= 3.6: cont current Warfarin regimen, repeat INR at home in 1 week \par \par \par STABLE, due for repeat labs early 5/2021

## 2021-03-17 NOTE — PHYSICAL EXAM
[No Acute Distress] : no acute distress [Well Nourished] : well nourished [Well Developed] : well developed [Well-Appearing] : well-appearing [EOMI] : extraocular movements intact [Normal Outer Ear/Nose] : the outer ears and nose were normal in appearance [No Respiratory Distress] : no respiratory distress  [No Accessory Muscle Use] : no accessory muscle use [Clear to Auscultation] : lungs were clear to auscultation bilaterally [Regular Rhythm] : with a regular rhythm [Normal S1, S2] : normal S1 and S2 [No Carotid Bruits] : no carotid bruits [No Edema] : there was no peripheral edema [Non-distended] : non-distended [No CVA Tenderness] : no CVA  tenderness [Grossly Normal Strength/Tone] : grossly normal strength/tone [No Rash] : no rash [Coordination Grossly Intact] : coordination grossly intact [No Focal Deficits] : no focal deficits [Normal Gait] : normal gait [Normal Affect] : the affect was normal [Normal Mood] : the mood was normal [Normal Insight/Judgement] : insight and judgment were intact

## 2021-03-18 LAB
ALBUMIN SERPL ELPH-MCNC: 3.9 G/DL
ALP BLD-CCNC: 93 U/L
ALT SERPL-CCNC: 7 U/L
ANION GAP SERPL CALC-SCNC: 10 MMOL/L
AST SERPL-CCNC: 14 U/L
BASOPHILS # BLD AUTO: 0.06 K/UL
BASOPHILS NFR BLD AUTO: 0.4 %
BILIRUB SERPL-MCNC: 0.3 MG/DL
BUN SERPL-MCNC: 16 MG/DL
CALCIUM SERPL-MCNC: 9.2 MG/DL
CHLORIDE SERPL-SCNC: 104 MMOL/L
CO2 SERPL-SCNC: 22 MMOL/L
CREAT SERPL-MCNC: 0.67 MG/DL
EOSINOPHIL # BLD AUTO: 0.29 K/UL
EOSINOPHIL NFR BLD AUTO: 1.8 %
GLUCOSE SERPL-MCNC: 347 MG/DL
HCT VFR BLD CALC: 32.7 %
HGB BLD-MCNC: 9.5 G/DL
IMM GRANULOCYTES NFR BLD AUTO: 1 %
LYMPHOCYTES # BLD AUTO: 2.16 K/UL
LYMPHOCYTES NFR BLD AUTO: 13.2 %
MAN DIFF?: NORMAL
MCHC RBC-ENTMCNC: 22.2 PG
MCHC RBC-ENTMCNC: 29.1 GM/DL
MCV RBC AUTO: 76.6 FL
MONOCYTES # BLD AUTO: 0.86 K/UL
MONOCYTES NFR BLD AUTO: 5.3 %
NEUTROPHILS # BLD AUTO: 12.84 K/UL
NEUTROPHILS NFR BLD AUTO: 78.3 %
PLATELET # BLD AUTO: 453 K/UL
POTASSIUM SERPL-SCNC: 4.5 MMOL/L
PROT SERPL-MCNC: 6.8 G/DL
RBC # BLD: 4.27 M/UL
RBC # FLD: 19.1 %
SODIUM SERPL-SCNC: 136 MMOL/L
WBC # FLD AUTO: 16.37 K/UL

## 2021-03-25 ENCOUNTER — RX RENEWAL (OUTPATIENT)
Age: 36
End: 2021-03-25

## 2021-04-29 NOTE — SBIRT NOTE ADULT - NSSBIRTDRGCOMPLAIN_GEN_A_CORE
Current Issues/Problems reviewed on call:  CKD  S/p Endovas repair of aneurysm  COPD    Details for Interventions/Education completed on call:   Assessed pt surgical incision pt denies any swelling drainage pain.  S/w dtr re: vascular sx f/u she states pt has not f/u post op due to pt not being able to get CTAbd done because of labs/ kidney function.  Informed dtr how important the post op appt is and to call [facility] sx office to see if they want to change the method of the Ct and/or see pt anyway for post op f/u. dtr verbalized understanding  Instructions on given to pt on self care after px: avoid heavy lifting, slowly increase activity, no strenuous activity avoid lots of stair climbing and monitor for bleeding   Confirmed [facility]  Reviewed latest kidney function labs.  Educated on limiting sodium and sent educational materials on sodium limits via portal  Assess resp status  Reviewed meds with dtr      Screening completed for  COVID -19 using the [facility] Symptom . Screening is Negative for symptoms    Time Frame for Follow up:  Within within 1-2 weeks    Plan for Next Call: assess status after vas sx, confirm pt f/u with sx, rev meds  F/u on ckd mgmt  Assess copd mgmt/plan    Care Plan reviewed with Patient/dtr  and she agrees with the plan of care and the call follow up plan.           No

## 2021-05-11 ENCOUNTER — TRANSCRIPTION ENCOUNTER (OUTPATIENT)
Age: 36
End: 2021-05-11

## 2021-05-12 ENCOUNTER — APPOINTMENT (OUTPATIENT)
Dept: FAMILY MEDICINE | Facility: CLINIC | Age: 36
End: 2021-05-12

## 2021-05-13 ENCOUNTER — APPOINTMENT (OUTPATIENT)
Dept: FAMILY MEDICINE | Facility: CLINIC | Age: 36
End: 2021-05-13
Payer: MEDICAID

## 2021-05-13 ENCOUNTER — RESULT CHARGE (OUTPATIENT)
Age: 36
End: 2021-05-13

## 2021-05-13 VITALS
HEIGHT: 72 IN | TEMPERATURE: 97.8 F | BODY MASS INDEX: 26.14 KG/M2 | SYSTOLIC BLOOD PRESSURE: 92 MMHG | WEIGHT: 193 LBS | HEART RATE: 102 BPM | DIASTOLIC BLOOD PRESSURE: 62 MMHG | OXYGEN SATURATION: 98 %

## 2021-05-13 VITALS — SYSTOLIC BLOOD PRESSURE: 92 MMHG | DIASTOLIC BLOOD PRESSURE: 50 MMHG

## 2021-05-13 LAB — INR PPP: 3 RATIO

## 2021-05-13 PROCEDURE — 99214 OFFICE O/P EST MOD 30 MIN: CPT | Mod: 25

## 2021-05-13 PROCEDURE — 36415 COLL VENOUS BLD VENIPUNCTURE: CPT

## 2021-05-13 PROCEDURE — 99072 ADDL SUPL MATRL&STAF TM PHE: CPT

## 2021-05-13 PROCEDURE — 85610 PROTHROMBIN TIME: CPT | Mod: QW

## 2021-05-13 RX ORDER — FENOFIBRATE 145 MG/1
145 TABLET, COATED ORAL DAILY
Qty: 90 | Refills: 3 | Status: COMPLETED | COMMUNITY
Start: 2020-06-24 | End: 2021-05-13

## 2021-05-13 RX ORDER — OXYCODONE 20 MG/1
20 TABLET ORAL
Refills: 0 | Status: ACTIVE | COMMUNITY

## 2021-05-13 RX ORDER — GLIMEPIRIDE 1 MG/1
1 TABLET ORAL
Qty: 90 | Refills: 3 | Status: COMPLETED | COMMUNITY
Start: 2019-07-23 | End: 2021-05-13

## 2021-05-13 RX ORDER — CLOTRIMAZOLE AND BETAMETHASONE DIPROPIONATE 10; .5 MG/G; MG/G
1-0.05 CREAM TOPICAL
Qty: 1 | Refills: 1 | Status: COMPLETED | COMMUNITY
Start: 2020-06-25 | End: 2021-05-13

## 2021-05-13 RX ORDER — METHADONE HYDROCHLORIDE 5 MG/1
5 TABLET ORAL 3 TIMES DAILY
Refills: 0 | Status: ACTIVE | COMMUNITY

## 2021-05-13 NOTE — HISTORY OF PRESENT ILLNESS
[FreeTextEntry1] : Follow up INR 3.0 [de-identified] : 37 yo female for f/u on Valvular Heart Disease \par as above +ve FAST no CP STABLE  SOB, no dizziness no palpitations \par \par no N/V/D +BM qod- qd NL no bloody/black stools \par \par no urinary complaints \par \par Denies f/c/s no cough sense of smell/taste intact

## 2021-05-13 NOTE — PLAN
[FreeTextEntry1] : POCT INR= 3.0 \par \par see lab orders \par \par f/u pending lab results \par \par cont'd Pain Mgment f/u monthly [facility]  ([place])   Dr. Banda

## 2021-05-14 LAB
25(OH)D3 SERPL-MCNC: 28.1 NG/ML
ALBUMIN SERPL ELPH-MCNC: 4.1 G/DL
ALP BLD-CCNC: 98 U/L
ALT SERPL-CCNC: 6 U/L
ANION GAP SERPL CALC-SCNC: 14 MMOL/L
AST SERPL-CCNC: 15 U/L
BASOPHILS # BLD AUTO: 0.07 K/UL
BASOPHILS NFR BLD AUTO: 0.4 %
BILIRUB SERPL-MCNC: 0.3 MG/DL
BUN SERPL-MCNC: 13 MG/DL
CALCIUM SERPL-MCNC: 9.6 MG/DL
CHLORIDE SERPL-SCNC: 101 MMOL/L
CHOLEST SERPL-MCNC: 178 MG/DL
CK SERPL-CCNC: 64 U/L
CO2 SERPL-SCNC: 20 MMOL/L
CREAT SERPL-MCNC: 0.7 MG/DL
EOSINOPHIL # BLD AUTO: 0.2 K/UL
EOSINOPHIL NFR BLD AUTO: 1.2 %
ESTIMATED AVERAGE GLUCOSE: 194 MG/DL
GGT SERPL-CCNC: 34 U/L
GLUCOSE SERPL-MCNC: 167 MG/DL
HBA1C MFR BLD HPLC: 8.4 %
HCT VFR BLD CALC: 37 %
HDLC SERPL-MCNC: 43 MG/DL
HGB BLD-MCNC: 10.4 G/DL
IMM GRANULOCYTES NFR BLD AUTO: 1 %
LDLC SERPL CALC-MCNC: 95 MG/DL
LYMPHOCYTES # BLD AUTO: 2.23 K/UL
LYMPHOCYTES NFR BLD AUTO: 13.4 %
MAN DIFF?: NORMAL
MCHC RBC-ENTMCNC: 21.3 PG
MCHC RBC-ENTMCNC: 28.1 GM/DL
MCV RBC AUTO: 75.8 FL
MONOCYTES # BLD AUTO: 1.01 K/UL
MONOCYTES NFR BLD AUTO: 6 %
NEUTROPHILS # BLD AUTO: 13.03 K/UL
NEUTROPHILS NFR BLD AUTO: 78 %
NONHDLC SERPL-MCNC: 135 MG/DL
PLATELET # BLD AUTO: 470 K/UL
POTASSIUM SERPL-SCNC: 4.4 MMOL/L
PROT SERPL-MCNC: 7.2 G/DL
RBC # BLD: 4.88 M/UL
RBC # FLD: 19.2 %
SODIUM SERPL-SCNC: 136 MMOL/L
T3FREE SERPL-MCNC: 2.2 PG/ML
T4 FREE SERPL-MCNC: 1.3 NG/DL
TRIGL SERPL-MCNC: 197 MG/DL
TSH SERPL-ACNC: 1.49 UIU/ML
URATE SERPL-MCNC: 5.5 MG/DL
WBC # FLD AUTO: 16.7 K/UL

## 2021-05-17 ENCOUNTER — EMERGENCY (EMERGENCY)
Facility: HOSPITAL | Age: 36
LOS: 1 days | Discharge: HOME | End: 2021-05-17
Attending: EMERGENCY MEDICINE
Payer: MEDICAID

## 2021-05-17 VITALS
RESPIRATION RATE: 18 BRPM | TEMPERATURE: 99 F | SYSTOLIC BLOOD PRESSURE: 119 MMHG | HEART RATE: 113 BPM | WEIGHT: 192.9 LBS | OXYGEN SATURATION: 100 % | HEIGHT: 72 IN | DIASTOLIC BLOOD PRESSURE: 76 MMHG

## 2021-05-17 DIAGNOSIS — Z98.1 ARTHRODESIS STATUS: Chronic | ICD-10-CM

## 2021-05-17 DIAGNOSIS — Z96.1 PRESENCE OF INTRAOCULAR LENS: Chronic | ICD-10-CM

## 2021-05-17 DIAGNOSIS — Z95.4 PRESENCE OF OTHER HEART-VALVE REPLACEMENT: Chronic | ICD-10-CM

## 2021-05-17 PROCEDURE — 99283 EMERGENCY DEPT VISIT LOW MDM: CPT | Mod: 25

## 2021-05-17 PROCEDURE — 10080 I&D PILONIDAL CYST SIMPLE: CPT

## 2021-05-17 NOTE — ED PROVIDER NOTE - CARE PROVIDER_API CALL
Dao Piper)  ColonRectal Surgery; Surgery  [address] [address]  Phone: [phone]  Fax: [phone]  Follow Up Time:

## 2021-05-17 NOTE — ED PROVIDER NOTE - CLINICAL SUMMARY MEDICAL DECISION MAKING FREE TEXT BOX
Pt is a 36y F presenting for pilonidal abscess. Will I&D and have wound check in 2 days. pt placed on abx and given return precautions.

## 2021-05-17 NOTE — ED PROVIDER NOTE - OBJECTIVE STATEMENT
Pt is a 36y F with PMH of Anxiety Depression Diabetes Dilated aortic root Fibromyalgia Marfan syndrome Mitral valve prolapse spinal fusion and valve replacement presenting for tailbone pain. pt states she has had pain for the last 3-4 days. Pt denies any injury. She denies any urinary or bowel incontinence/ fever/chills/nausea/vomiting/abd pain/chest pain/urinary symptoms/paresthesias. She has a pain management in [place] who she saw 1 month ago. No other complaints.

## 2021-05-17 NOTE — ED PROVIDER NOTE - NSFOLLOWUPINSTRUCTIONS_ED_ALL_ED_FT
Follow up for wound check in 2 days.  Warm soaks.  Check for signs of worsening infection.  Take antibiotic as prescribed.  Come back with new or worsening symptoms.     Abscess    An abscess is an infected area that contains a collection of pus and debris. It can occur in almost any part of the body and occurs when the tissue gets infection. Symptoms include a painful mass that is red, warm, tender that might break open and HAVE drainage. If your health care provider gave you antibiotics make sure to take the full course and do not stop even if feeling better.     SEEK IMMEDIATE MEDICAL CARE IF YOU HAVE ANY OF THE FOLLOWING SYMPTOMS: chills, fever, muscle aches, or red streaking from the area.

## 2021-05-17 NOTE — ED PROVIDER NOTE - PATIENT PORTAL LINK FT
You can access the FollowMyHealth Patient Portal offered by [facility] by registering at the following website: [email or web address]. By joining [employer]’s FollowMyHealth portal, you will also be able to view your health information using other applications (apps) compatible with our system.

## 2021-05-17 NOTE — ED PROVIDER NOTE - DOMESTIC TRAVEL HIGH RISK QUESTION
Would you like to see patient for medication management prior to physical in June?   Please advise    Future Appointments   Date Time Provider Orion Barros   6/12/2020  8:15 AM DO SRAVANTHI Dominguez No

## 2021-05-19 ENCOUNTER — APPOINTMENT (OUTPATIENT)
Dept: FAMILY MEDICINE | Facility: CLINIC | Age: 36
End: 2021-05-19
Payer: MEDICAID

## 2021-05-19 ENCOUNTER — RESULT CHARGE (OUTPATIENT)
Age: 36
End: 2021-05-19

## 2021-05-19 VITALS
TEMPERATURE: 98.2 F | DIASTOLIC BLOOD PRESSURE: 58 MMHG | BODY MASS INDEX: 26.55 KG/M2 | HEIGHT: 72 IN | HEART RATE: 94 BPM | OXYGEN SATURATION: 100 % | WEIGHT: 196 LBS | SYSTOLIC BLOOD PRESSURE: 100 MMHG

## 2021-05-19 PROCEDURE — 99214 OFFICE O/P EST MOD 30 MIN: CPT | Mod: 25

## 2021-05-19 PROCEDURE — 85610 PROTHROMBIN TIME: CPT | Mod: QW

## 2021-05-19 RX ORDER — GLIPIZIDE 5 MG/1
5 TABLET ORAL
Qty: 60 | Refills: 0 | Status: COMPLETED | COMMUNITY
Start: 2020-05-08 | End: 2021-05-19

## 2021-05-19 NOTE — PHYSICAL EXAM
[No Acute Distress] : no acute distress [Well Nourished] : well nourished [Well Developed] : well developed [Well-Appearing] : well-appearing [EOMI] : extraocular movements intact [Normal Outer Ear/Nose] : the outer ears and nose were normal in appearance [No JVD] : no jugular venous distention [No Respiratory Distress] : no respiratory distress  [No Accessory Muscle Use] : no accessory muscle use [Clear to Auscultation] : lungs were clear to auscultation bilaterally [Normal Rate] : normal rate  [Regular Rhythm] : with a regular rhythm [Normal S1, S2] : normal S1 and S2 [No Edema] : there was no peripheral edema [Non-distended] : non-distended [No CVA Tenderness] : no CVA  tenderness [Grossly Normal Strength/Tone] : grossly normal strength/tone [Coordination Grossly Intact] : coordination grossly intact [No Focal Deficits] : no focal deficits [Normal Gait] : normal gait [Normal Affect] : the affect was normal [Normal Mood] : the mood was normal [Normal Insight/Judgement] : insight and judgment were intact [de-identified] : +ve minimal erythematous lesion L buttock cleft c small incisional opening   wound clean minimal white sanguinous d/c and intact

## 2021-05-19 NOTE — PLAN
[FreeTextEntry1] : cont abx course x 7 days   \par Sitz baths daily x 15-20 mins advised \par \par INR=3.4 \par decrease Warfarin to 6mg qd x 2 d resume usual 9mg qd therafter \par \par f/u in office 1 week for eval and INR check

## 2021-05-19 NOTE — HISTORY OF PRESENT ILLNESS
[FreeTextEntry1] : Follow Up ER visit on 05/17/21 for abscess on tailbone. [de-identified] : 37 yo female s/p [facility] ED eval on 5/17/21 secondary to "tailbone abscess."  \par Pt states pain started in tailbone area 3 days prior to ED eval.  pt states pain at time of ED eval was a 10/10 \par Denies f/c/s \par +ve pain c both sitting/standing/forward bending and to the touch \par ***Today pain is a 1/10*** \par \par Abscess I&D'ed at time of ED eval and Rx'ed Clindamycin 300mg tid x 7d \par \par +ve tolerance/compliance c abx course \par no f/c/s today

## 2021-05-24 ENCOUNTER — RX RENEWAL (OUTPATIENT)
Age: 36
End: 2021-05-24

## 2021-05-26 ENCOUNTER — APPOINTMENT (OUTPATIENT)
Dept: FAMILY MEDICINE | Facility: CLINIC | Age: 36
End: 2021-05-26
Payer: MEDICAID

## 2021-05-26 ENCOUNTER — RESULT CHARGE (OUTPATIENT)
Age: 36
End: 2021-05-26

## 2021-05-26 VITALS
HEIGHT: 72 IN | SYSTOLIC BLOOD PRESSURE: 104 MMHG | TEMPERATURE: 97.2 F | WEIGHT: 196 LBS | BODY MASS INDEX: 26.55 KG/M2 | OXYGEN SATURATION: 97 % | HEART RATE: 96 BPM | DIASTOLIC BLOOD PRESSURE: 70 MMHG

## 2021-05-26 DIAGNOSIS — L05.91 PILONIDAL CYST W/OUT ABSCESS: ICD-10-CM

## 2021-05-26 DIAGNOSIS — B35.9 DERMATOPHYTOSIS, UNSPECIFIED: ICD-10-CM

## 2021-05-26 PROCEDURE — 99214 OFFICE O/P EST MOD 30 MIN: CPT | Mod: 25

## 2021-05-26 PROCEDURE — 85610 PROTHROMBIN TIME: CPT | Mod: QW

## 2021-05-26 NOTE — HISTORY OF PRESENT ILLNESS
[FreeTextEntry1] : Follow up INR -2.2 [de-identified] : 37 yo female for 1 week f/u on INR.  pt states took decreased dose of Warfarin for 3 days as opposed to recommended 2 days AND admits to increased intake of dietary Vit K ("[facility])\par \par INR today= 2.2  \par \par pt reports feeling well today.   Denies f/c/s   \par s/p completion of 7 day abx  course yesterday \par \par pt reports resolved pain at abscess site \par no f/c/s \par denies drainage \par denies difficulty w standing, sitting, and/or walking

## 2021-05-26 NOTE — PLAN
[FreeTextEntry1] : secondary to subtherapeutic INR, recommend increasing Warfarin from 9mg qd to 11mg  x 1 day only, resume 9mg qd thereafter repeat INR 1 week \par \par see med orders \par keep area dry and cool

## 2021-05-26 NOTE — PHYSICAL EXAM
[No Acute Distress] : no acute distress [Well Nourished] : well nourished [Well Developed] : well developed [Well-Appearing] : well-appearing [EOMI] : extraocular movements intact [Normal Outer Ear/Nose] : the outer ears and nose were normal in appearance [No JVD] : no jugular venous distention [No Respiratory Distress] : no respiratory distress  [No Accessory Muscle Use] : no accessory muscle use [Clear to Auscultation] : lungs were clear to auscultation bilaterally [Normal Rate] : normal rate  [Regular Rhythm] : with a regular rhythm [Normal S1, S2] : normal S1 and S2 [No Edema] : there was no peripheral edema [Non-distended] : non-distended [No CVA Tenderness] : no CVA  tenderness [Grossly Normal Strength/Tone] : grossly normal strength/tone [Coordination Grossly Intact] : coordination grossly intact [No Focal Deficits] : no focal deficits [Normal Gait] : normal gait [Normal Affect] : the affect was normal [Normal Mood] : the mood was normal [Normal Insight/Judgement] : insight and judgment were intact [de-identified] : improved lesion s/p I&D of abscess, skin WNL along L buttock cleft

## 2021-06-02 ENCOUNTER — TRANSCRIPTION ENCOUNTER (OUTPATIENT)
Age: 36
End: 2021-06-02

## 2021-06-03 ENCOUNTER — TRANSCRIPTION ENCOUNTER (OUTPATIENT)
Age: 36
End: 2021-06-03

## 2021-06-15 ENCOUNTER — RESULT CHARGE (OUTPATIENT)
Age: 36
End: 2021-06-15

## 2021-06-16 ENCOUNTER — APPOINTMENT (OUTPATIENT)
Dept: FAMILY MEDICINE | Facility: CLINIC | Age: 36
End: 2021-06-16
Payer: MEDICAID

## 2021-06-16 VITALS
TEMPERATURE: 97.9 F | WEIGHT: 196 LBS | HEART RATE: 96 BPM | OXYGEN SATURATION: 100 % | BODY MASS INDEX: 26.55 KG/M2 | HEIGHT: 72 IN | SYSTOLIC BLOOD PRESSURE: 102 MMHG | DIASTOLIC BLOOD PRESSURE: 62 MMHG

## 2021-06-16 DIAGNOSIS — Z01.818 ENCOUNTER FOR OTHER PREPROCEDURAL EXAMINATION: ICD-10-CM

## 2021-06-16 PROCEDURE — 85610 PROTHROMBIN TIME: CPT | Mod: QW

## 2021-06-16 PROCEDURE — 36415 COLL VENOUS BLD VENIPUNCTURE: CPT

## 2021-06-16 PROCEDURE — 99215 OFFICE O/P EST HI 40 MIN: CPT | Mod: 25

## 2021-06-16 NOTE — ASSESSMENT
[Continue anticoagulant treatment as is] : Continue current anticoagulant treatment [Continue medications as is] : Continue current medications [Continue anti-platelet treatment as is] : Continue current anti-platelet treatment [FreeTextEntry4] : Medically optimized for proposed procedure

## 2021-06-16 NOTE — PLAN
[FreeTextEntry1] : increase daily 9mg Warfarin dosing to 10mg po x 1 then resume usual 9mg qd dosing.  Repeat INR at home in 1 week \par \par Asthma STABLE cont all meds

## 2021-06-16 NOTE — HISTORY OF PRESENT ILLNESS
[No Pertinent Cardiac History] : no history of aortic stenosis, atrial fibrillation, coronary artery disease, recent myocardial infarction, or implantable device/pacemaker [Asthma] : asthma [Chronic Anticoagulation] : chronic anticoagulation [Diabetes] : diabetes [(Patient denies any chest pain, claudication, dyspnea on exertion, orthopnea, palpitations or syncope)] : Patient denies any chest pain, claudication, dyspnea on exertion, orthopnea, palpitations or syncope [Aortic Stenosis] : no aortic stenosis [Atrial Fibrillation] : no atrial fibrillation [Coronary Artery Disease] : no coronary artery disease [Recent Myocardial Infarction] : no recent myocardial infarction [Implantable Device/Pacemaker] : no implantable device/pacemaker [COPD] : no COPD [Sleep Apnea] : no sleep apnea [Smoker] : not a smoker [Chronic Kidney Disease] : no chronic kidney disease [FreeTextEntry1] : Dental Cleaning [FreeTextEntry2] : 06/17/21 [FreeTextEntry3] : D [FreeTextEntry4] : JUANY is a 36 year female here for Medical clearance for a dental cleaning.\par \par 37 yo female c hx of Valvular Heart Disease s/p both MV and AV replacement presents to office for medical clearance.  Pt scheduled for routine dental cleaning tomorrow 6/17/21.   \par \par no f/c/s \par \par no CP/SOB c activity no dizziness no palpitations  no N/V/D +BM qod-qd no bloody/black stools \par no urinary complaints  \par \par INR today=2.1 (goal 2.5-3.0)  [FreeTextEntry5] : Last A1C= 8.41 /5/19/21)   INR=2.1 (6/16/21)

## 2021-06-23 ENCOUNTER — RX RENEWAL (OUTPATIENT)
Age: 36
End: 2021-06-23

## 2021-06-28 ENCOUNTER — TRANSCRIPTION ENCOUNTER (OUTPATIENT)
Age: 36
End: 2021-06-28

## 2021-08-04 NOTE — PHYSICAL THERAPY INITIAL EVALUATION ADULT - PRECAUTIONS/LIMITATIONS, REHAB EVAL
fall precautions/cardiac precautions
General Sunscreen Counseling: I recommended a broad spectrum sunscreen with a SPF of 30 or higher.  I explained that SPF 30 sunscreens block approximately 97 percent of the sun's harmful rays.  Sunscreens should be applied at least 15 minutes prior to expected sun exposure and then every 2 hours after that as long as sun exposure continues. If swimming or exercising sunscreen should be reapplied every 45 minutes to an hour after getting wet or sweating.  One ounce, or the equivalent of a shot glass full of sunscreen, is adequate to protect the skin not covered by a bathing suit. I also recommended a lip balm with a sunscreen as well. Sun protective clothing can be used in lieu of sunscreen but must be worn the entire time you are exposed to the sun's rays.
Detail Level: Generalized

## 2021-08-13 ENCOUNTER — RX RENEWAL (OUTPATIENT)
Age: 36
End: 2021-08-13

## 2021-09-14 ENCOUNTER — RX RENEWAL (OUTPATIENT)
Age: 36
End: 2021-09-14

## 2021-10-12 ENCOUNTER — RX RENEWAL (OUTPATIENT)
Age: 36
End: 2021-10-12

## 2021-11-03 ENCOUNTER — NON-APPOINTMENT (OUTPATIENT)
Age: 36
End: 2021-11-03

## 2021-11-03 ENCOUNTER — RX RENEWAL (OUTPATIENT)
Age: 36
End: 2021-11-03

## 2021-11-09 NOTE — STROKE CODE NOTE - NS AS PRENOTIFICATION HOSPITAL
Impression: Age-related nuclear cataract, bilateral: H25.13.  Plan: NVS, will continue to monitor with annual exam.
Impression: Type 2 diabetes mellitus w/o complication: X03.5.  Plan: - No retinopathy seen on exam today
- Continue glucose, BP, and lipid control as per PCP
- Continue with annual DFE
No

## 2021-11-11 ENCOUNTER — RX RENEWAL (OUTPATIENT)
Age: 36
End: 2021-11-11

## 2021-11-16 RX ORDER — MOMETASONE FUROATE AND FORMOTEROL FUMARATE DIHYDRATE 200; 5 UG/1; UG/1
200-5 AEROSOL RESPIRATORY (INHALATION)
Qty: 3 | Refills: 1 | Status: DISCONTINUED | COMMUNITY
Start: 2017-02-08 | End: 2021-11-16

## 2021-12-07 ENCOUNTER — LABORATORY RESULT (OUTPATIENT)
Age: 36
End: 2021-12-07

## 2021-12-07 ENCOUNTER — RESULT CHARGE (OUTPATIENT)
Age: 36
End: 2021-12-07

## 2021-12-07 ENCOUNTER — MED ADMIN CHARGE (OUTPATIENT)
Age: 36
End: 2021-12-07

## 2021-12-07 ENCOUNTER — APPOINTMENT (OUTPATIENT)
Dept: FAMILY MEDICINE | Facility: CLINIC | Age: 36
End: 2021-12-07
Payer: MEDICAID

## 2021-12-07 ENCOUNTER — NON-APPOINTMENT (OUTPATIENT)
Age: 36
End: 2021-12-07

## 2021-12-07 VITALS
HEART RATE: 108 BPM | WEIGHT: 191 LBS | DIASTOLIC BLOOD PRESSURE: 62 MMHG | HEIGHT: 72 IN | OXYGEN SATURATION: 96 % | SYSTOLIC BLOOD PRESSURE: 110 MMHG | BODY MASS INDEX: 25.87 KG/M2 | TEMPERATURE: 97.9 F

## 2021-12-07 VITALS — SYSTOLIC BLOOD PRESSURE: 90 MMHG | DIASTOLIC BLOOD PRESSURE: 60 MMHG

## 2021-12-07 LAB — INR PPP: 3.4 RATIO

## 2021-12-07 PROCEDURE — G0444 DEPRESSION SCREEN ANNUAL: CPT | Mod: 59

## 2021-12-07 PROCEDURE — G0008: CPT

## 2021-12-07 PROCEDURE — 90686 IIV4 VACC NO PRSV 0.5 ML IM: CPT

## 2021-12-07 PROCEDURE — 93000 ELECTROCARDIOGRAM COMPLETE: CPT | Mod: 59

## 2021-12-07 PROCEDURE — G0442 ANNUAL ALCOHOL SCREEN 15 MIN: CPT

## 2021-12-07 PROCEDURE — 85610 PROTHROMBIN TIME: CPT | Mod: QW

## 2021-12-07 PROCEDURE — 99395 PREV VISIT EST AGE 18-39: CPT | Mod: 25

## 2021-12-07 PROCEDURE — 36415 COLL VENOUS BLD VENIPUNCTURE: CPT

## 2021-12-07 RX ORDER — BUSPIRONE HYDROCHLORIDE 15 MG/1
15 TABLET ORAL
Refills: 0 | Status: COMPLETED | COMMUNITY
End: 2021-12-07

## 2021-12-07 RX ORDER — CLOPIDOGREL BISULFATE 75 MG/1
75 TABLET, FILM COATED ORAL DAILY
Qty: 30 | Refills: 5 | Status: COMPLETED | COMMUNITY
Start: 2020-05-20 | End: 2021-12-07

## 2021-12-07 RX ORDER — DULOXETINE HYDROCHLORIDE 30 MG/1
30 CAPSULE, DELAYED RELEASE ORAL
Refills: 0 | Status: COMPLETED | COMMUNITY
Start: 2019-06-06 | End: 2021-12-07

## 2021-12-07 NOTE — PHYSICAL EXAM
[No Acute Distress] : no acute distress [Well Nourished] : well nourished [Well Developed] : well developed [Well-Appearing] : well-appearing [EOMI] : extraocular movements intact [Normal Outer Ear/Nose] : the outer ears and nose were normal in appearance [No JVD] : no jugular venous distention [No Respiratory Distress] : no respiratory distress  [No Accessory Muscle Use] : no accessory muscle use [Clear to Auscultation] : lungs were clear to auscultation bilaterally [Normal Rate] : normal rate  [Regular Rhythm] : with a regular rhythm [Normal S1, S2] : normal S1 and S2 [No Carotid Bruits] : no carotid bruits [No Edema] : there was no peripheral edema [Declined Breast Exam] : declined breast exam  [Soft] : abdomen soft [Non Tender] : non-tender [Non-distended] : non-distended [No Masses] : no abdominal mass palpated [No HSM] : no HSM [Normal Bowel Sounds] : normal bowel sounds [No CVA Tenderness] : no CVA  tenderness [Grossly Normal Strength/Tone] : grossly normal strength/tone [Coordination Grossly Intact] : coordination grossly intact [No Focal Deficits] : no focal deficits [Normal Gait] : normal gait [Speech Grossly Normal] : speech grossly normal [Normal Affect] : the affect was normal [Normal Mood] : the mood was normal [Normal Insight/Judgement] : insight and judgment were intact [de-identified] : +ve murmur  [de-identified] : +ve hyperpigmented scaly rash post upper trunk B/L

## 2021-12-07 NOTE — PLAN
[FreeTextEntry1] : INR=3.4 cont current Warfarin regimen 9 mg daily repeat INR level 1M \par EKG performed:  Sinus Tach at 101 bpm,  no ST/T changes  \par \par Cardio f/u c Dr. Alcantara scheduled in 2 weeks ( [facility]) ,   pt to schedule CTA of chest c contrast in interim secondary to Hx of TAA  \par \par Pulmonary f/u c Dr. Mcgrath strongly advised \par \par pt c chronic Tinea Versicolor, persistent despite topical anti-fungals,  pending LFTs recommend Trial of PO Ketoconazole 200mg daily x 5d.  \par \par see immunization orders

## 2021-12-07 NOTE — HISTORY OF PRESENT ILLNESS
[FreeTextEntry1] : CPE [de-identified] : CPE \par as above +ve FAST denies CP (see below) STABLE  SOB  dizziness Hx of palpitations associated c Ashtma exacerbation  no N/V/D +BM qd NL no bloody/black stools \par no acute change in bowel  habits no urinary complaints \par \par pt reports persistent Asthma exacerbation despite use of Wixela 250/50 1 inh bid.   pt states Dulera has been more effective in treating Asthma.\par no f/c/s  Denies cough   +ve SOB both at rest and c exertion , +ve chest tightness  +ve intermittent wheezing.  Symptoms do NOT disrupt sleep, however, worse c exertional activity  x 1 week \par +ve use of Albuterol HFA   "2x/day" despite  Wixela bid dosing \par \par \par pt admits to poor hydration!!!!!!!!

## 2021-12-07 NOTE — HEALTH RISK ASSESSMENT
[Patient reported PAP Smear was normal] : Patient reported PAP Smear was normal [Yes] : Yes [Monthly or less (1 pt)] : Monthly or less (1 point) [3 or 4 (1 pt)] : 3 or 4  (1 point) [Never (0 pts)] : Never (0 points) [No] : In the past 12 months have you used drugs other than those required for medical reasons? No [1] : 2) Feeling down, depressed, or hopeless for several days (1) [PHQ-2 Negative - No further assessment needed] : PHQ-2 Negative - No further assessment needed [] : No [Audit-CScore] : 2 [[id number]] : 2 [PapSmearDate] : 10/20

## 2021-12-14 ENCOUNTER — RX RENEWAL (OUTPATIENT)
Age: 36
End: 2021-12-14

## 2021-12-15 LAB
25(OH)D3 SERPL-MCNC: 32 NG/ML
ALBUMIN SERPL ELPH-MCNC: 4.4 G/DL
ALP BLD-CCNC: 93 U/L
ALT SERPL-CCNC: 12 U/L
ANION GAP SERPL CALC-SCNC: 15 MMOL/L
AST SERPL-CCNC: 28 U/L
BASOPHILS # BLD AUTO: 0.08 K/UL
BASOPHILS NFR BLD AUTO: 0.4 %
BILIRUB SERPL-MCNC: 0.3 MG/DL
BUN SERPL-MCNC: 11 MG/DL
CALCIUM SERPL-MCNC: 9.9 MG/DL
CHLORIDE SERPL-SCNC: 103 MMOL/L
CHOLEST SERPL-MCNC: 188 MG/DL
CK SERPL-CCNC: 72 U/L
CO2 SERPL-SCNC: 21 MMOL/L
COVID-19 SPIKE DOMAIN ANTIBODY INTERPRETATION: POSITIVE
CREAT SERPL-MCNC: 0.74 MG/DL
EOSINOPHIL # BLD AUTO: 0.24 K/UL
EOSINOPHIL NFR BLD AUTO: 1.3 %
ESTIMATED AVERAGE GLUCOSE: 183 MG/DL
FERRITIN SERPL-MCNC: 22 NG/ML
FOLATE SERPL-MCNC: >20 NG/ML
GGT SERPL-CCNC: 35 U/L
GLUCOSE SERPL-MCNC: 145 MG/DL
HBA1C MFR BLD HPLC: 8 %
HCT VFR BLD CALC: 36.3 %
HDLC SERPL-MCNC: 41 MG/DL
HGB BLD-MCNC: 10.1 G/DL
IMM GRANULOCYTES NFR BLD AUTO: 0.8 %
IRON SATN MFR SERPL: 6 %
IRON SERPL-MCNC: 24 UG/DL
LDLC SERPL CALC-MCNC: 93 MG/DL
LYMPHOCYTES # BLD AUTO: 2.74 K/UL
LYMPHOCYTES NFR BLD AUTO: 15.1 %
MAN DIFF?: NORMAL
MCHC RBC-ENTMCNC: 20.5 PG
MCHC RBC-ENTMCNC: 27.8 GM/DL
MCV RBC AUTO: 73.6 FL
MONOCYTES # BLD AUTO: 1.17 K/UL
MONOCYTES NFR BLD AUTO: 6.5 %
NEUTROPHILS # BLD AUTO: 13.74 K/UL
NEUTROPHILS NFR BLD AUTO: 75.9 %
NONHDLC SERPL-MCNC: 146 MG/DL
PLATELET # BLD AUTO: 521 K/UL
POTASSIUM SERPL-SCNC: 4.6 MMOL/L
PROT SERPL-MCNC: 7.8 G/DL
RBC # BLD: 4.93 M/UL
RBC # FLD: 20.6 %
SARS-COV-2 AB SERPL IA-ACNC: >250 U/ML
SODIUM SERPL-SCNC: 139 MMOL/L
T3FREE SERPL-MCNC: 2.71 PG/ML
T4 FREE SERPL-MCNC: 1.3 NG/DL
TIBC SERPL-MCNC: 383 UG/DL
TRANSFERRIN SERPL-MCNC: 310 MG/DL
TRIGL SERPL-MCNC: 265 MG/DL
TSH SERPL-ACNC: 2.54 UIU/ML
UIBC SERPL-MCNC: 358 UG/DL
URATE SERPL-MCNC: 6.5 MG/DL
VIT B12 SERPL-MCNC: 235 PG/ML
WBC # FLD AUTO: 18.11 K/UL

## 2021-12-16 NOTE — DISCHARGE NOTE NURSING/CASE MANAGEMENT/SOCIAL WORK - NSDCPEPTCOWADIET_GEN_ALL_CORE
Keep your intake of vitamin K regular. The highest amount of vitamin K is found in green and leafy vegetables like broccoli, lettuces, cabbage, and spinach. You can eat these foods but keep the portion size the same. Changes in the amount you eat can affect your PT/INR blood test. Contact your doctor before making any major changes in your diet. Limit your alcohol intake.
75

## 2022-01-04 ENCOUNTER — TRANSCRIPTION ENCOUNTER (OUTPATIENT)
Age: 37
End: 2022-01-04

## 2022-01-05 ENCOUNTER — APPOINTMENT (OUTPATIENT)
Dept: FAMILY MEDICINE | Facility: CLINIC | Age: 37
End: 2022-01-05
Payer: MEDICAID

## 2022-01-05 VITALS — OXYGEN SATURATION: 97 % | HEART RATE: 82 BPM

## 2022-01-05 DIAGNOSIS — Z20.822 CONTACT WITH AND (SUSPECTED) EXPOSURE TO COVID-19: ICD-10-CM

## 2022-01-05 PROCEDURE — 99214 OFFICE O/P EST MOD 30 MIN: CPT | Mod: 95

## 2022-01-05 RX ORDER — KETOCONAZOLE 200 MG/1
200 TABLET ORAL DAILY
Qty: 5 | Refills: 0 | Status: COMPLETED | COMMUNITY
Start: 2021-12-07 | End: 2022-01-05

## 2022-01-05 NOTE — PHYSICAL EXAM
[No Acute Distress] : no acute distress [Well Nourished] : well nourished [Well Developed] : well developed [EOMI] : extraocular movements intact [Normal Outer Ear/Nose] : the outer ears and nose were normal in appearance [No Respiratory Distress] : no respiratory distress  [No Accessory Muscle Use] : no accessory muscle use [Grossly Normal Strength/Tone] : grossly normal strength/tone [Coordination Grossly Intact] : coordination grossly intact [Speech Grossly Normal] : speech grossly normal [Normal Affect] : the affect was normal [Normal Mood] : the mood was normal [Normal Insight/Judgement] : insight and judgment were intact

## 2022-01-05 NOTE — HISTORY OF PRESENT ILLNESS
[Home] : at home, [unfilled] , at the time of the visit. [Medical Office: ([facility])___] : at the medical office located in  [Verbal consent obtained from patient] : the patient, [unfilled] [FreeTextEntry8] : 37 yo female  presents for eval s/p known +ve COVID-19 exposure.  Pt states was directly exposed to COVID-19 unmasked on 12/28/21.\par Presents c 4 day hx of mild sore throat that is improving \par +ve sneezing \par +ve "tickle" in ears \par +ve minimal dry cough in am secondary to "tickle in throat"\par Denies f/c/s \par Denies change in chronic baseline SOB.  "My SOB is the same."  Denies exacerbation \par Denies change in chronic body aches \par Denies GI complaints \par +ve good appetite

## 2022-01-05 NOTE — PLAN
[FreeTextEntry1] : increase hydration!!!!!!!!!!!!  OTC Tylenol prn for body aches and/or if fever develops  \par \par instructed pt to f/u for COVID-19 PCR nasopharyngeal swab today outside office and pulse ox check \par \par CLOSE MONITORING!!!!  instructed pt to isolate until results of COVID-19 swab are obtained

## 2022-01-07 ENCOUNTER — APPOINTMENT (OUTPATIENT)
Dept: CARDIOTHORACIC SURGERY | Facility: CLINIC | Age: 37
End: 2022-01-07

## 2022-01-10 LAB — SARS-COV-2 N GENE NPH QL NAA+PROBE: DETECTED

## 2022-01-14 ENCOUNTER — RX RENEWAL (OUTPATIENT)
Age: 37
End: 2022-01-14

## 2022-02-03 ENCOUNTER — RX RENEWAL (OUTPATIENT)
Age: 37
End: 2022-02-03

## 2022-02-04 ENCOUNTER — APPOINTMENT (OUTPATIENT)
Dept: CARDIOTHORACIC SURGERY | Facility: CLINIC | Age: 37
End: 2022-02-04
Payer: MEDICAID

## 2022-02-04 VITALS
TEMPERATURE: 97.2 F | WEIGHT: 190 LBS | BODY MASS INDEX: 25.73 KG/M2 | RESPIRATION RATE: 16 BRPM | HEIGHT: 72 IN | HEART RATE: 102 BPM | DIASTOLIC BLOOD PRESSURE: 67 MMHG | SYSTOLIC BLOOD PRESSURE: 100 MMHG | OXYGEN SATURATION: 100 %

## 2022-02-04 DIAGNOSIS — Z56.0 UNEMPLOYMENT, UNSPECIFIED: ICD-10-CM

## 2022-02-04 DIAGNOSIS — Z72.89 OTHER PROBLEMS RELATED TO LIFESTYLE: ICD-10-CM

## 2022-02-04 PROCEDURE — 99214 OFFICE O/P EST MOD 30 MIN: CPT

## 2022-02-04 RX ORDER — NYSTATIN AND TRIAMCINOLONE ACETONIDE 100000; 1 MG/G; MG/G
100000-0.1 CREAM TOPICAL TWICE DAILY
Qty: 1 | Refills: 2 | Status: COMPLETED | COMMUNITY
Start: 2021-05-26 | End: 2022-02-04

## 2022-02-04 RX ORDER — FLUTICASONE PROPIONATE AND SALMETEROL 250; 50 UG/1; UG/1
250-50 POWDER RESPIRATORY (INHALATION)
Qty: 1 | Refills: 0 | Status: COMPLETED | COMMUNITY
Start: 2021-11-16 | End: 2022-02-04

## 2022-02-04 SDOH — ECONOMIC STABILITY - INCOME SECURITY: UNEMPLOYMENT, UNSPECIFIED: Z56.0

## 2022-02-04 NOTE — ASSESSMENT
[FreeTextEntry1] : I had the pleasure of seeing Ms. 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 This would be at significant risk and should be with the close evaluation and follow up of her cardiologist.\par - Routine follow up with Dr. Greene\par \par I, JENSEN Childers, am scribing for and in the presence of Dr. Dudley the following sections HISTORY OF PRESENT ILLNESS, PAST MEDICAL/FAMILY/SOCIAL HISTORY; REVIEW OF SYSTEMS, VITAL SIGNS, PHYSICAL EXAM, DISPOSITION.\par \par "I personally performed the services described in the documentation and reviewed the documentation recorded by the scribe in my presence which accurately and completely recorded my words and actions."

## 2022-02-04 NOTE — REVIEW OF SYSTEMS
[Heart Rate Is Slow] : the heart rate was not slow [Chest Pain] : no chest pain [Leg Claudication] : no intermittent leg claudication [Lower Ext Edema] : no lower extremity edema [Wheezing] : no wheezing [Cough] : no cough [Orthopnea] : no orthopnea [PND] : no PND [Abdominal Pain] : no abdominal pain [Vomiting] : no vomiting [Diarrhea] : no diarrhea [Heartburn] : no heartburn [Melena] : no melena [Joint Swelling] : no joint swelling [Joint Stiffness] : no joint stiffness [Skin Wound] : no skin wound [Suicidal] : not suicidal [Sleep Disturbances] : no sleep disturbances [Swollen Glands] : no swollen glands [de-identified] : fungal rash on back is improving, treated by PMD with cream [de-identified] : dizziness when gets up too fast

## 2022-02-04 NOTE — DATA REVIEWED
[FreeTextEntry1] : [facility]\par 12/10/2021\par \par CTA-Chest with contrast\par \par Impression:\par \par Stable normal-sized ascending thoracic aortic graft\par Otherwise normal CTA of the thoracic aorta\par Aortic and mitral valve replacement\par Fatty infiltration of the liver\par Anterior abdominal wall hernia\par Spinal fusion

## 2022-02-04 NOTE — HISTORY OF PRESENT ILLNESS
[FreeTextEntry1] : Ms. JUANY FELICIANO is a 36 year female who presents today for followup. Previously, she has been followed for ascending aortic aneurysm. \par \par Additional past medical history includes Marfan's syndrome, chronic obstructive pulmonary disease, asthma, mitral valve disease with mitral valve replacement, ascending aorta, aortic valve and aortic arch repair 2013 (Dr. Roa). Recent COVID infection.\par \par Today, the patient reports some dizziness if she gets up too fast, occasional palpitations. She reports progressive shortness of breath that has been present several years but she has been told everything is normal on workup per pulmonologist. She states that she is becoming more short of breath on exertion. She had COVID 6 weeks ago although reports mild symptoms at that time.\par \par Cardiologist: Dr. Greene - [facility].

## 2022-03-08 ENCOUNTER — RX RENEWAL (OUTPATIENT)
Age: 37
End: 2022-03-08

## 2022-03-09 ENCOUNTER — NON-APPOINTMENT (OUTPATIENT)
Age: 37
End: 2022-03-09

## 2022-03-11 ENCOUNTER — LABORATORY RESULT (OUTPATIENT)
Age: 37
End: 2022-03-11

## 2022-03-11 ENCOUNTER — APPOINTMENT (OUTPATIENT)
Dept: FAMILY MEDICINE | Facility: CLINIC | Age: 37
End: 2022-03-11
Payer: MEDICAID

## 2022-03-11 VITALS
SYSTOLIC BLOOD PRESSURE: 104 MMHG | HEART RATE: 118 BPM | WEIGHT: 188 LBS | TEMPERATURE: 97.3 F | BODY MASS INDEX: 25.47 KG/M2 | HEIGHT: 72 IN | OXYGEN SATURATION: 98 % | DIASTOLIC BLOOD PRESSURE: 66 MMHG

## 2022-03-11 DIAGNOSIS — F41.9 ANXIETY DISORDER, UNSPECIFIED: ICD-10-CM

## 2022-03-11 DIAGNOSIS — N92.6 IRREGULAR MENSTRUATION, UNSPECIFIED: ICD-10-CM

## 2022-03-11 PROCEDURE — 99214 OFFICE O/P EST MOD 30 MIN: CPT | Mod: 25

## 2022-03-11 PROCEDURE — 36415 COLL VENOUS BLD VENIPUNCTURE: CPT

## 2022-03-11 RX ORDER — TRIAMCINOLONE ACETONIDE 1 MG/G
0.1 CREAM TOPICAL TWICE DAILY
Qty: 1 | Refills: 3 | Status: COMPLETED | COMMUNITY
Start: 2021-05-28 | End: 2022-03-11

## 2022-03-11 RX ORDER — NYSTATIN 100000 [USP'U]/G
100000 CREAM TOPICAL TWICE DAILY
Qty: 15 | Refills: 0 | Status: COMPLETED | COMMUNITY
Start: 2021-05-28 | End: 2022-03-11

## 2022-03-11 NOTE — HISTORY OF PRESENT ILLNESS
[FreeTextEntry1] : Pt is here for 3 month follow up .\par Pt requested pain management referral. [de-identified] : 37 yo female for f/u on DM2/Chronic Pain Syndrome/Valvular Heart Disease/Dyslipidemia/Vitamin def. \par \par as above, negative fast , no CP stable/chronic/unchanged SOB,  Denies PATTON when walking from car to office front door \par earlier today no dizziness no palpitations \par no N/V/D +BM qod no bloody/black stools \par no urinary complaints \par \par Denies f/c/s no cough sense of smell/taste intact \par \par pt states is 10 days late for expected menses

## 2022-03-11 NOTE — PHYSICAL EXAM
[No Acute Distress] : no acute distress [Well Nourished] : well nourished [Well Developed] : well developed [Well-Appearing] : well-appearing [EOMI] : extraocular movements intact [Normal Outer Ear/Nose] : the outer ears and nose were normal in appearance [No JVD] : no jugular venous distention [No Respiratory Distress] : no respiratory distress  [No Accessory Muscle Use] : no accessory muscle use [Clear to Auscultation] : lungs were clear to auscultation bilaterally [Normal Rate] : normal rate  [Regular Rhythm] : with a regular rhythm [Normal S1, S2] : normal S1 and S2 [No Carotid Bruits] : no carotid bruits [No Edema] : there was no peripheral edema [Non-distended] : non-distended [No CVA Tenderness] : no CVA  tenderness [Grossly Normal Strength/Tone] : grossly normal strength/tone [No Rash] : no rash [Coordination Grossly Intact] : coordination grossly intact [No Focal Deficits] : no focal deficits [Normal Gait] : normal gait [Normal Affect] : the affect was normal [Normal Mood] : the mood was normal [Normal Insight/Judgement] : insight and judgment were intact [de-identified] : +ve murmur

## 2022-03-15 LAB
25(OH)D3 SERPL-MCNC: 31.9 NG/ML
ALBUMIN SERPL ELPH-MCNC: 4.4 G/DL
ALP BLD-CCNC: 97 U/L
ALT SERPL-CCNC: 14 U/L
ANION GAP SERPL CALC-SCNC: 18 MMOL/L
AST SERPL-CCNC: 24 U/L
BASOPHILS # BLD AUTO: 0.06 K/UL
BASOPHILS NFR BLD AUTO: 0.4 %
BILIRUB SERPL-MCNC: 0.2 MG/DL
BUN SERPL-MCNC: 11 MG/DL
CALCIUM SERPL-MCNC: 10 MG/DL
CHLORIDE SERPL-SCNC: 102 MMOL/L
CHOLEST SERPL-MCNC: 227 MG/DL
CK SERPL-CCNC: 54 U/L
CO2 SERPL-SCNC: 19 MMOL/L
CREAT SERPL-MCNC: 0.65 MG/DL
EGFR: 116 ML/MIN/1.73M2
EOSINOPHIL # BLD AUTO: 0.15 K/UL
EOSINOPHIL NFR BLD AUTO: 0.9 %
ESTIMATED AVERAGE GLUCOSE: 183 MG/DL
FERRITIN SERPL-MCNC: 21 NG/ML
FOLATE SERPL-MCNC: 12.5 NG/ML
GGT SERPL-CCNC: 28 U/L
GLUCOSE SERPL-MCNC: 267 MG/DL
HBA1C MFR BLD HPLC: 8 %
HCG SERPL-MCNC: <1 MIU/ML
HCT VFR BLD CALC: 35.3 %
HDLC SERPL-MCNC: 41 MG/DL
HGB BLD-MCNC: 9.8 G/DL
IMM GRANULOCYTES NFR BLD AUTO: 0.6 %
IRON SATN MFR SERPL: 6 %
IRON SERPL-MCNC: 24 UG/DL
LDLC SERPL CALC-MCNC: 135 MG/DL
LYMPHOCYTES # BLD AUTO: 2.43 K/UL
LYMPHOCYTES NFR BLD AUTO: 14.4 %
MAN DIFF?: NORMAL
MCHC RBC-ENTMCNC: 20.1 PG
MCHC RBC-ENTMCNC: 27.8 GM/DL
MCV RBC AUTO: 72.5 FL
MONOCYTES # BLD AUTO: 0.99 K/UL
MONOCYTES NFR BLD AUTO: 5.9 %
NEUTROPHILS # BLD AUTO: 13.1 K/UL
NEUTROPHILS NFR BLD AUTO: 77.8 %
NONHDLC SERPL-MCNC: 185 MG/DL
PLATELET # BLD AUTO: 531 K/UL
POTASSIUM SERPL-SCNC: 4.2 MMOL/L
PROT SERPL-MCNC: 7.5 G/DL
RBC # BLD: 4.87 M/UL
RBC # FLD: 20.9 %
SODIUM SERPL-SCNC: 138 MMOL/L
T3FREE SERPL-MCNC: 2.6 PG/ML
T4 FREE SERPL-MCNC: 1.3 NG/DL
TIBC SERPL-MCNC: 389 UG/DL
TRANSFERRIN SERPL-MCNC: 311 MG/DL
TRIGL SERPL-MCNC: 250 MG/DL
TSH SERPL-ACNC: 2.15 UIU/ML
UIBC SERPL-MCNC: 366 UG/DL
URATE SERPL-MCNC: 5.8 MG/DL
VIT B12 SERPL-MCNC: 179 PG/ML
WBC # FLD AUTO: 16.83 K/UL

## 2022-03-21 ENCOUNTER — NON-APPOINTMENT (OUTPATIENT)
Age: 37
End: 2022-03-21

## 2022-03-28 ENCOUNTER — APPOINTMENT (OUTPATIENT)
Dept: FAMILY MEDICINE | Facility: CLINIC | Age: 37
End: 2022-03-28
Payer: MEDICAID

## 2022-03-28 ENCOUNTER — RESULT CHARGE (OUTPATIENT)
Age: 37
End: 2022-03-28

## 2022-03-28 VITALS
TEMPERATURE: 97.1 F | OXYGEN SATURATION: 100 % | DIASTOLIC BLOOD PRESSURE: 62 MMHG | SYSTOLIC BLOOD PRESSURE: 108 MMHG | HEIGHT: 72 IN | WEIGHT: 187 LBS | HEART RATE: 93 BPM | BODY MASS INDEX: 25.33 KG/M2

## 2022-03-28 PROCEDURE — 85610 PROTHROMBIN TIME: CPT | Mod: QW

## 2022-03-28 PROCEDURE — 99213 OFFICE O/P EST LOW 20 MIN: CPT | Mod: 25

## 2022-03-28 RX ADMIN — CYANOCOBALAMIN 1 MCG/ML: 1000 INJECTION INTRAMUSCULAR; SUBCUTANEOUS at 00:00

## 2022-03-28 NOTE — PHYSICAL EXAM
[No Acute Distress] : no acute distress [Well Nourished] : well nourished [Well Developed] : well developed [Well-Appearing] : well-appearing [EOMI] : extraocular movements intact [Normal Outer Ear/Nose] : the outer ears and nose were normal in appearance [No JVD] : no jugular venous distention [No Respiratory Distress] : no respiratory distress  [No Accessory Muscle Use] : no accessory muscle use [Clear to Auscultation] : lungs were clear to auscultation bilaterally [Normal Rate] : normal rate  [Regular Rhythm] : with a regular rhythm [Normal S1, S2] : normal S1 and S2 [No Carotid Bruits] : no carotid bruits [No Edema] : there was no peripheral edema [Non-distended] : non-distended [No CVA Tenderness] : no CVA  tenderness [Grossly Normal Strength/Tone] : grossly normal strength/tone [No Rash] : no rash [Coordination Grossly Intact] : coordination grossly intact [No Focal Deficits] : no focal deficits [Normal Gait] : normal gait [Normal Affect] : the affect was normal [Normal Mood] : the mood was normal [Normal Insight/Judgement] : insight and judgment were intact [de-identified] : +ve murmur

## 2022-03-28 NOTE — PLAN
[FreeTextEntry1] : B12 1 ml L deltoid \par repeat B12 injection c nurse 1 week \par \par POCT INR= 3.9 \par decrease Warfarin 9mg qd to 6mg po x 1 day. repeat INR  1 week \par \par pt advised to to contact office immediately if any signs of bev bleeding. \par \par

## 2022-04-05 ENCOUNTER — RESULT CHARGE (OUTPATIENT)
Age: 37
End: 2022-04-05

## 2022-04-05 ENCOUNTER — MED ADMIN CHARGE (OUTPATIENT)
Age: 37
End: 2022-04-05

## 2022-04-05 ENCOUNTER — APPOINTMENT (OUTPATIENT)
Dept: FAMILY MEDICINE | Facility: CLINIC | Age: 37
End: 2022-04-05
Payer: MEDICAID

## 2022-04-05 LAB — INR PPP: 3.8 RATIO

## 2022-04-05 PROCEDURE — 85610 PROTHROMBIN TIME: CPT | Mod: QW

## 2022-04-05 PROCEDURE — 96372 THER/PROPH/DIAG INJ SC/IM: CPT

## 2022-04-05 RX ADMIN — CYANOCOBALAMIN 0 MCG/ML: 1000 INJECTION INTRAMUSCULAR; SUBCUTANEOUS at 00:00

## 2022-04-06 RX ORDER — CYANOCOBALAMIN 1000 UG/ML
1000 INJECTION INTRAMUSCULAR; SUBCUTANEOUS
Qty: 0 | Refills: 0 | Status: COMPLETED | OUTPATIENT
Start: 2022-04-05

## 2022-04-08 ENCOUNTER — APPOINTMENT (OUTPATIENT)
Dept: OBGYN | Facility: CLINIC | Age: 37
End: 2022-04-08

## 2022-04-11 ENCOUNTER — RESULT CHARGE (OUTPATIENT)
Age: 37
End: 2022-04-11

## 2022-04-12 ENCOUNTER — MED ADMIN CHARGE (OUTPATIENT)
Age: 37
End: 2022-04-12

## 2022-04-12 ENCOUNTER — APPOINTMENT (OUTPATIENT)
Dept: FAMILY MEDICINE | Facility: CLINIC | Age: 37
End: 2022-04-12
Payer: MEDICAID

## 2022-04-12 PROCEDURE — 85610 PROTHROMBIN TIME: CPT | Mod: QW

## 2022-04-12 PROCEDURE — 96372 THER/PROPH/DIAG INJ SC/IM: CPT

## 2022-04-12 RX ADMIN — CYANOCOBALAMIN 0 MCG/ML: 1000 INJECTION, SOLUTION INTRAMUSCULAR at 00:00

## 2022-04-13 ENCOUNTER — MED ADMIN CHARGE (OUTPATIENT)
Age: 37
End: 2022-04-13

## 2022-04-13 RX ORDER — CYANOCOBALAMIN 1000 UG/ML
1000 INJECTION INTRAMUSCULAR; SUBCUTANEOUS
Qty: 0 | Refills: 0 | Status: COMPLETED | OUTPATIENT
Start: 2022-04-12

## 2022-04-13 RX ORDER — CYANOCOBALAMIN 1000 UG/ML
1000 INJECTION INTRAMUSCULAR; SUBCUTANEOUS
Qty: 0 | Refills: 0 | Status: COMPLETED | OUTPATIENT
Start: 2022-03-28

## 2022-04-21 ENCOUNTER — MED ADMIN CHARGE (OUTPATIENT)
Age: 37
End: 2022-04-21

## 2022-04-21 ENCOUNTER — APPOINTMENT (OUTPATIENT)
Dept: FAMILY MEDICINE | Facility: CLINIC | Age: 37
End: 2022-04-21
Payer: MEDICAID

## 2022-04-21 PROCEDURE — 96372 THER/PROPH/DIAG INJ SC/IM: CPT

## 2022-04-21 RX ORDER — CYANOCOBALAMIN 1000 UG/ML
1000 INJECTION INTRAMUSCULAR; SUBCUTANEOUS
Qty: 0 | Refills: 0 | Status: COMPLETED | OUTPATIENT
Start: 2022-04-21

## 2022-04-26 ENCOUNTER — APPOINTMENT (OUTPATIENT)
Dept: FAMILY MEDICINE | Facility: CLINIC | Age: 37
End: 2022-04-26
Payer: MEDICAID

## 2022-04-26 PROCEDURE — 36415 COLL VENOUS BLD VENIPUNCTURE: CPT

## 2022-04-27 LAB — VIT B12 SERPL-MCNC: 536 PG/ML

## 2022-05-04 ENCOUNTER — RX RENEWAL (OUTPATIENT)
Age: 37
End: 2022-05-04

## 2022-05-12 ENCOUNTER — APPOINTMENT (OUTPATIENT)
Dept: FAMILY MEDICINE | Facility: CLINIC | Age: 37
End: 2022-05-12
Payer: MEDICAID

## 2022-05-12 ENCOUNTER — RESULT CHARGE (OUTPATIENT)
Age: 37
End: 2022-05-12

## 2022-05-12 PROCEDURE — 96372 THER/PROPH/DIAG INJ SC/IM: CPT

## 2022-05-12 PROCEDURE — 85610 PROTHROMBIN TIME: CPT | Mod: QW

## 2022-05-12 RX ORDER — CYANOCOBALAMIN 1000 UG/ML
1000 INJECTION INTRAMUSCULAR; SUBCUTANEOUS
Qty: 0 | Refills: 0 | Status: COMPLETED | OUTPATIENT
Start: 2022-05-11

## 2022-05-26 ENCOUNTER — APPOINTMENT (OUTPATIENT)
Dept: FAMILY MEDICINE | Facility: CLINIC | Age: 37
End: 2022-05-26
Payer: MEDICAID

## 2022-05-26 PROCEDURE — 96372 THER/PROPH/DIAG INJ SC/IM: CPT

## 2022-05-26 RX ORDER — CYANOCOBALAMIN 1000 UG/ML
1000 INJECTION INTRAMUSCULAR; SUBCUTANEOUS
Qty: 0 | Refills: 0 | Status: COMPLETED | OUTPATIENT
Start: 2022-05-26

## 2022-05-26 RX ADMIN — CYANOCOBALAMIN 1 MCG/ML: 1000 INJECTION, SOLUTION INTRAMUSCULAR at 00:00

## 2022-05-31 ENCOUNTER — APPOINTMENT (OUTPATIENT)
Dept: OBGYN | Facility: CLINIC | Age: 37
End: 2022-05-31

## 2022-06-03 ENCOUNTER — RX RENEWAL (OUTPATIENT)
Age: 37
End: 2022-06-03

## 2022-06-09 ENCOUNTER — RESULT CHARGE (OUTPATIENT)
Age: 37
End: 2022-06-09

## 2022-06-09 ENCOUNTER — MED ADMIN CHARGE (OUTPATIENT)
Age: 37
End: 2022-06-09

## 2022-06-09 ENCOUNTER — APPOINTMENT (OUTPATIENT)
Dept: FAMILY MEDICINE | Facility: CLINIC | Age: 37
End: 2022-06-09
Payer: MEDICAID

## 2022-06-09 PROCEDURE — 85610 PROTHROMBIN TIME: CPT | Mod: QW

## 2022-06-09 PROCEDURE — 96372 THER/PROPH/DIAG INJ SC/IM: CPT

## 2022-06-09 RX ORDER — CYANOCOBALAMIN 1000 UG/ML
1000 INJECTION INTRAMUSCULAR; SUBCUTANEOUS
Qty: 0 | Refills: 0 | Status: COMPLETED | OUTPATIENT
Start: 2022-06-09

## 2022-06-23 ENCOUNTER — APPOINTMENT (OUTPATIENT)
Dept: FAMILY MEDICINE | Facility: CLINIC | Age: 37
End: 2022-06-23

## 2022-06-26 NOTE — PROGRESS NOTE ADULT - SUBJECTIVE AND OBJECTIVE BOX
CHIEF COMPLAINT/INTERVAL HISTORY:    Patient is a 35y old  Female who presents with a chief complaint of right carotid artery dissection (06 May 2020 10:20)    SUBJECTIVE & OBJECTIVE: Pt seen and examined at bedside. Headache overnight, which has resolved. Patient still reports polyuria; will start keflex and follow up culture. INR 2.6; continue coumadin + heparin. Cardio recommendations appreciated     ROS: No chest pain, palpitations, SOB, light headedness, dizziness, headache, nausea/vomiting, fevers/chills, abdominal pain.    ICU Vital Signs Last 24 Hrs  T(C): 36.6 (06 May 2020 08:46), Max: 36.8 (06 May 2020 04:52)  T(F): 97.8 (06 May 2020 08:46), Max: 98.2 (06 May 2020 04:52)  HR: 93 (06 May 2020 08:46) (67 - 107)  BP: 102/68 (06 May 2020 08:46) (94/60 - 117/71)  RR: 19 (06 May 2020 08:46) (18 - 20)  SpO2: 98% (06 May 2020 08:46) (91% - 98%)    MEDICATIONS  (STANDING):  aspirin enteric coated 81 milliGRAM(s) Oral daily  atorvastatin 40 milliGRAM(s) Oral at bedtime  budesonide  80 MICROgram(s)/formoterol 4.5 MICROgram(s) Inhaler 2 Puff(s) Inhalation two times a day  busPIRone 15 milliGRAM(s) Oral every 8 hours  cephalexin 500 milliGRAM(s) Oral every 6 hours  chlorhexidine 2% Cloths 1 Application(s) Topical <User Schedule>  DULoxetine 90 milliGRAM(s) Oral <User Schedule>  fenofibrate Tablet 145 milliGRAM(s) Oral daily  heparin  Infusion. 700 Unit(s)/Hr (7 mL/Hr) IV Continuous <Continuous>  insulin glargine Injectable (LANTUS) 25 Unit(s) SubCutaneous at bedtime  insulin lispro (HumaLOG) corrective regimen sliding scale   SubCutaneous three times a day before meals  insulin lispro Injectable (HumaLOG) 6 Unit(s) SubCutaneous three times a day before meals  levothyroxine 25 MICROGram(s) Oral daily  lurasidone 20 milliGRAM(s) Oral daily  methadone    Tablet 10 milliGRAM(s) Oral three times a day  metoprolol succinate ER 25 milliGRAM(s) Oral two times a day  montelukast 10 milliGRAM(s) Oral daily  multivitamin 1 Tablet(s) Oral daily  sodium chloride 0.9%. 1000 milliLiter(s) (75 mL/Hr) IV Continuous <Continuous>  warfarin 10 milliGRAM(s) Oral once    MEDICATIONS  (PRN):  acetaminophen   Tablet .. 650 milliGRAM(s) Oral every 6 hours PRN Temp greater or equal to 38.5C (101.3F), Mild Pain (1 - 3)  clonazePAM  Tablet 1 milliGRAM(s) Oral <User Schedule> PRN anxiety  heparin   Injectable 7000 Unit(s) IV Push every 6 hours PRN For aPTT less than 40  heparin   Injectable 3500 Unit(s) IV Push every 6 hours PRN For aPTT between 40 - 57  oxyCODONE    IR 10 milliGRAM(s) Oral every 4 hours PRN Moderate Pain (4 - 6)      LABS:                        9.4    9.89  )-----------( 277      ( 06 May 2020 08:28 )             30.8     05-06    131<L>  |  96<L>  |  12.0  ----------------------------<  320<H>  4.0   |  21.0<L>  |  0.58    Ca    9.3      06 May 2020 08:28  Phos  2.5     05-06  Mg     2.0     05-06      PT/INR - ( 06 May 2020 08:28 )   PT: 31.1 sec;   INR: 2.67 ratio         PTT - ( 06 May 2020 08:28 )  PTT:67.5 sec  Urinalysis Basic - ( 04 May 2020 16:33 )    Color: Yellow / Appearance: Clear / S.015 / pH: x  Gluc: x / Ketone: Negative  / Bili: Negative / Urobili: Negative mg/dL   Blood: x / Protein: Negative mg/dL / Nitrite: Negative   Leuk Esterase: Small / RBC: 0-2 /HPF / WBC 6-10   Sq Epi: x / Non Sq Epi: Few / Bacteria: Occasional        CAPILLARY BLOOD GLUCOSE      POCT Blood Glucose.: 329 mg/dL (06 May 2020 07:48)  POCT Blood Glucose.: 331 mg/dL (05 May 2020 21:02)  POCT Blood Glucose.: 368 mg/dL (05 May 2020 16:42)  POCT Blood Glucose.: 256 mg/dL (05 May 2020 12:35)  POCT Blood Glucose.: 284 mg/dL (05 May 2020 11:12)    PHYSICAL EXAM:    GENERAL: middle aged female, appears older than stated age, long extremities  HEAD:  Atraumatic, Normocephalic  EYES: EOMI, PERRLA, conjunctiva and sclera clear  ENMT: Moist mucous membranes  NECK: Supple   NERVOUS SYSTEM:  Alert & Oriented X3   CHEST/LUNG: bilateral air entry  HEART: Regular rate and rhythm; + S1/S2  ABDOMEN: Soft, Nontender, Nondistended; Bowel sounds present  EXTREMITIES:  no pedal edema 11.4   5.35  )-----------( 244      ( 2022 07:32 )             32.0           134<L>  |  102  |  58<H>  ----------------------------<  375<H>  4.4   |  21<L>  |  2.08<H>    Ca    8.7      2022 08:18    TPro  6.8  /  Alb  3.7  /  TBili  0.8  /  DBili  x   /  AST  10  /  ALT  9<L>  /  AlkPhos  83                Urinalysis Basic - ( 2022 07:14 )    Color: Yellow / Appearance: Clear / S.015 / pH: x  Gluc: x / Ketone: NEGATIVE  / Bili: Negative / Urobili: 0.2 E.U./dL   Blood: x / Protein: NEGATIVE mg/dL / Nitrite: NEGATIVE   Leuk Esterase: NEGATIVE / RBC: x / WBC x   Sq Epi: x / Non Sq Epi: x / Bacteria: x            Lactate Trend   @ 07:32 Lactate:1.4       CARDIAC MARKERS ( 2022 07:32 )  x     / 0.01 ng/mL / x     / x     / x            CAPILLARY BLOOD GLUCOSE      POCT Blood Glucose.: 260 mg/dL (2022 12:24)

## 2022-06-29 NOTE — HISTORY OF PRESENT ILLNESS
[FreeTextEntry1] : follow up on B12 def/ hypothyroid \par INR= 2.0 [de-identified] : 33 yo female presents to office c several week hx of dizziness.  Dizziness present in am and persists intermittently throughout day.  pt states there are no alleviating factors, however, moving around makes it worse.  "I feel  like I am walking sideways."  \par Denies associated CP/SOB and/or palpitations   no change in bowel/urinary habits \par  50 feet

## 2022-06-30 ENCOUNTER — APPOINTMENT (OUTPATIENT)
Dept: FAMILY MEDICINE | Facility: CLINIC | Age: 37
End: 2022-06-30

## 2022-06-30 PROCEDURE — 96372 THER/PROPH/DIAG INJ SC/IM: CPT

## 2022-07-13 RX ADMIN — CYANOCOBALAMIN 1 MCG/ML: 1000 INJECTION INTRAMUSCULAR; SUBCUTANEOUS at 00:00

## 2022-07-14 ENCOUNTER — APPOINTMENT (OUTPATIENT)
Dept: FAMILY MEDICINE | Facility: CLINIC | Age: 37
End: 2022-07-14

## 2022-07-14 ENCOUNTER — MED ADMIN CHARGE (OUTPATIENT)
Age: 37
End: 2022-07-14

## 2022-07-14 PROCEDURE — 96372 THER/PROPH/DIAG INJ SC/IM: CPT

## 2022-07-14 PROCEDURE — 36415 COLL VENOUS BLD VENIPUNCTURE: CPT

## 2022-07-18 LAB — VIT B12 SERPL-MCNC: 327 PG/ML

## 2022-07-19 ENCOUNTER — APPOINTMENT (OUTPATIENT)
Dept: ANTEPARTUM | Facility: CLINIC | Age: 37
End: 2022-07-19

## 2022-07-19 ENCOUNTER — APPOINTMENT (OUTPATIENT)
Dept: OBGYN | Facility: CLINIC | Age: 37
End: 2022-07-19

## 2022-07-19 ENCOUNTER — LABORATORY RESULT (OUTPATIENT)
Age: 37
End: 2022-07-19

## 2022-07-19 ENCOUNTER — ASOB RESULT (OUTPATIENT)
Age: 37
End: 2022-07-19

## 2022-07-19 ENCOUNTER — NON-APPOINTMENT (OUTPATIENT)
Age: 37
End: 2022-07-19

## 2022-07-19 VITALS
WEIGHT: 187 LBS | HEIGHT: 72 IN | DIASTOLIC BLOOD PRESSURE: 78 MMHG | SYSTOLIC BLOOD PRESSURE: 128 MMHG | BODY MASS INDEX: 25.33 KG/M2 | TEMPERATURE: 97 F

## 2022-07-19 DIAGNOSIS — R10.31 RIGHT LOWER QUADRANT PAIN: ICD-10-CM

## 2022-07-19 DIAGNOSIS — R87.810 CERVICAL HIGH RISK HUMAN PAPILLOMAVIRUS (HPV) DNA TEST POSITIVE: ICD-10-CM

## 2022-07-19 DIAGNOSIS — Z12.39 ENCOUNTER FOR OTHER SCREENING FOR MALIGNANT NEOPLASM OF BREAST: ICD-10-CM

## 2022-07-19 DIAGNOSIS — Z11.51 ENCOUNTER FOR SCREENING FOR HUMAN PAPILLOMAVIRUS (HPV): ICD-10-CM

## 2022-07-19 DIAGNOSIS — Z01.419 ENCOUNTER FOR GYNECOLOGICAL EXAMINATION (GENERAL) (ROUTINE) W/OUT ABNORMAL FINDINGS: ICD-10-CM

## 2022-07-19 DIAGNOSIS — R10.32 RIGHT LOWER QUADRANT PAIN: ICD-10-CM

## 2022-07-19 DIAGNOSIS — R87.610 ATYPICAL SQUAMOUS CELLS OF UNDETERMINED SIGNIFICANCE ON CYTOLOGIC SMEAR OF CERVIX (ASC-US): ICD-10-CM

## 2022-07-19 DIAGNOSIS — Z12.4 ENCOUNTER FOR SCREENING FOR MALIGNANT NEOPLASM OF CERVIX: ICD-10-CM

## 2022-07-19 PROCEDURE — 99395 PREV VISIT EST AGE 18-39: CPT

## 2022-07-19 PROCEDURE — 76830 TRANSVAGINAL US NON-OB: CPT

## 2022-07-19 PROCEDURE — 99213 OFFICE O/P EST LOW 20 MIN: CPT | Mod: 25

## 2022-07-19 RX ORDER — BUSPIRONE HYDROCHLORIDE 5 MG/1
5 TABLET ORAL TWICE DAILY
Refills: 0 | Status: DISCONTINUED | COMMUNITY
Start: 2021-10-29 | End: 2022-07-19

## 2022-07-21 PROBLEM — R87.610 ATYPICAL SQUAMOUS CELLS OF UNDETERMINED SIGNIFICANCE (ASCUS) ON PAPANICOLAOU SMEAR OF CERVIX: Status: ACTIVE | Noted: 2022-07-21

## 2022-07-21 PROBLEM — R87.810 CERVICAL HIGH RISK HPV (HUMAN PAPILLOMAVIRUS) TEST POSITIVE: Status: ACTIVE | Noted: 2022-07-21

## 2022-07-21 NOTE — HISTORY OF PRESENT ILLNESS
[N] : Patient denies prior pregnancies [Menarche Age: ____] : age at menarche was [unfilled] [No] : Patient does not have concerns regarding sex [Previously active] : previously active [PapSmeardate] : 10/22/20 [TextBox_31] : ASC-US [GonorrheaDate] : 04/12/19 [TextBox_63] : NEG [ChlamydiaDate] : 04/12/19 [TextBox_68] : NEG [TrichomonasDate] : 04/12/19 [TextBox_73] : NEG [HPVDate] : 10/22/20 [TextBox_78] : POSITIVE [LMPDate] : 07/04/22 [PGHxTotal] : 0 [FreeTextEntry1] : 07/04/22

## 2022-07-21 NOTE — DISCUSSION/SUMMARY
[FreeTextEntry1] : Pt aware pending pap results, f/u will be decided.  AMA, other health concerns regarding future pregnancy discussed.  All the pt's questions and concerns were addressed

## 2022-07-28 ENCOUNTER — APPOINTMENT (OUTPATIENT)
Dept: FAMILY MEDICINE | Facility: CLINIC | Age: 37
End: 2022-07-28

## 2022-07-28 PROCEDURE — 96372 THER/PROPH/DIAG INJ SC/IM: CPT

## 2022-07-28 RX ORDER — CYANOCOBALAMIN 1000 UG/ML
1000 INJECTION INTRAMUSCULAR; SUBCUTANEOUS
Qty: 0 | Refills: 0 | Status: COMPLETED | OUTPATIENT
Start: 2022-07-13

## 2022-07-31 RX ORDER — CYANOCOBALAMIN 1000 UG/ML
1000 INJECTION INTRAMUSCULAR; SUBCUTANEOUS
Qty: 0 | Refills: 0 | Status: COMPLETED | OUTPATIENT
Start: 2022-07-28

## 2022-08-02 ENCOUNTER — RX RENEWAL (OUTPATIENT)
Age: 37
End: 2022-08-02

## 2022-08-04 ENCOUNTER — MED ADMIN CHARGE (OUTPATIENT)
Age: 37
End: 2022-08-04

## 2022-08-04 ENCOUNTER — APPOINTMENT (OUTPATIENT)
Dept: FAMILY MEDICINE | Facility: CLINIC | Age: 37
End: 2022-08-04

## 2022-08-04 ENCOUNTER — RESULT CHARGE (OUTPATIENT)
Age: 37
End: 2022-08-04

## 2022-08-04 LAB — INR PPP: 2.9 RATIO

## 2022-08-04 PROCEDURE — 85610 PROTHROMBIN TIME: CPT | Mod: QW

## 2022-08-11 ENCOUNTER — APPOINTMENT (OUTPATIENT)
Dept: FAMILY MEDICINE | Facility: CLINIC | Age: 37
End: 2022-08-11

## 2022-08-11 RX ORDER — CYANOCOBALAMIN 1000 UG/ML
1000 INJECTION INTRAMUSCULAR; SUBCUTANEOUS
Qty: 0 | Refills: 0 | Status: COMPLETED | OUTPATIENT
Start: 2022-08-04

## 2022-08-18 ENCOUNTER — APPOINTMENT (OUTPATIENT)
Dept: OBGYN | Facility: CLINIC | Age: 37
End: 2022-08-18

## 2022-08-23 ENCOUNTER — APPOINTMENT (OUTPATIENT)
Dept: FAMILY MEDICINE | Facility: CLINIC | Age: 37
End: 2022-08-23

## 2022-08-23 ENCOUNTER — MED ADMIN CHARGE (OUTPATIENT)
Age: 37
End: 2022-08-23

## 2022-08-23 PROCEDURE — 96372 THER/PROPH/DIAG INJ SC/IM: CPT

## 2022-08-23 RX ORDER — CYANOCOBALAMIN 1000 UG/ML
1000 INJECTION INTRAMUSCULAR; SUBCUTANEOUS
Qty: 0 | Refills: 0 | Status: COMPLETED | OUTPATIENT
Start: 2022-08-11

## 2022-08-24 ENCOUNTER — RX RENEWAL (OUTPATIENT)
Age: 37
End: 2022-08-24

## 2022-08-30 ENCOUNTER — APPOINTMENT (OUTPATIENT)
Dept: FAMILY MEDICINE | Facility: CLINIC | Age: 37
End: 2022-08-30

## 2022-08-30 ENCOUNTER — NON-APPOINTMENT (OUTPATIENT)
Age: 37
End: 2022-08-30

## 2022-08-30 PROCEDURE — 96372 THER/PROPH/DIAG INJ SC/IM: CPT

## 2022-08-30 RX ADMIN — CYANOCOBALAMIN 0 MCG/ML: 1000 INJECTION, SOLUTION INTRAMUSCULAR at 00:00

## 2022-08-31 ENCOUNTER — RX RENEWAL (OUTPATIENT)
Age: 37
End: 2022-08-31

## 2022-08-31 RX ORDER — CYANOCOBALAMIN 1000 UG/ML
1000 INJECTION INTRAMUSCULAR; SUBCUTANEOUS
Qty: 0 | Refills: 0 | Status: COMPLETED | OUTPATIENT
Start: 2022-08-30

## 2022-09-03 LAB
CYTOLOGY CVX/VAG DOC THIN PREP: ABNORMAL
HPV HIGH+LOW RISK DNA PNL CVX: DETECTED

## 2022-09-07 ENCOUNTER — RESULT CHARGE (OUTPATIENT)
Age: 37
End: 2022-09-07

## 2022-09-07 ENCOUNTER — LABORATORY RESULT (OUTPATIENT)
Age: 37
End: 2022-09-07

## 2022-09-07 ENCOUNTER — APPOINTMENT (OUTPATIENT)
Dept: FAMILY MEDICINE | Facility: CLINIC | Age: 37
End: 2022-09-07

## 2022-09-07 VITALS
SYSTOLIC BLOOD PRESSURE: 124 MMHG | TEMPERATURE: 97.1 F | HEART RATE: 91 BPM | BODY MASS INDEX: 25.73 KG/M2 | HEIGHT: 72 IN | OXYGEN SATURATION: 98 % | DIASTOLIC BLOOD PRESSURE: 78 MMHG | WEIGHT: 190 LBS

## 2022-09-07 VITALS — DIASTOLIC BLOOD PRESSURE: 60 MMHG | SYSTOLIC BLOOD PRESSURE: 98 MMHG

## 2022-09-07 DIAGNOSIS — L68.0 HIRSUTISM: ICD-10-CM

## 2022-09-07 DIAGNOSIS — Z92.29 PERSONAL HISTORY OF OTHER DRUG THERAPY: ICD-10-CM

## 2022-09-07 DIAGNOSIS — Z86.73 PERSONAL HISTORY OF TRANSIENT ISCHEMIC ATTACK (TIA), AND CEREBRAL INFARCTION W/OUT RESIDUAL DEFICITS: ICD-10-CM

## 2022-09-07 LAB — INR PPP: 3.7 RATIO

## 2022-09-07 PROCEDURE — 85610 PROTHROMBIN TIME: CPT | Mod: QW

## 2022-09-07 PROCEDURE — 36415 COLL VENOUS BLD VENIPUNCTURE: CPT

## 2022-09-07 PROCEDURE — 90686 IIV4 VACC NO PRSV 0.5 ML IM: CPT

## 2022-09-07 PROCEDURE — 99214 OFFICE O/P EST MOD 30 MIN: CPT | Mod: 25

## 2022-09-07 PROCEDURE — 96372 THER/PROPH/DIAG INJ SC/IM: CPT

## 2022-09-07 PROCEDURE — G0008: CPT

## 2022-09-07 RX ORDER — DULOXETINE HYDROCHLORIDE 60 MG/1
60 CAPSULE, DELAYED RELEASE PELLETS ORAL
Qty: 30 | Refills: 5 | Status: ACTIVE | COMMUNITY

## 2022-09-07 RX ORDER — ALBUTEROL SULFATE 90 UG/1
108 (90 BASE) AEROSOL, METERED RESPIRATORY (INHALATION)
Qty: 3 | Refills: 1 | Status: ACTIVE | COMMUNITY
Start: 2017-02-08 | End: 1900-01-01

## 2022-09-07 RX ORDER — ERGOCALCIFEROL 1.25 MG/1
1.25 MG CAPSULE, LIQUID FILLED ORAL
Qty: 8 | Refills: 0 | Status: COMPLETED | COMMUNITY
Start: 2017-02-10 | End: 2022-09-07

## 2022-09-07 RX ORDER — CYANOCOBALAMIN 1000 UG/ML
1000 INJECTION INTRAMUSCULAR; SUBCUTANEOUS
Qty: 0 | Refills: 0 | Status: COMPLETED | OUTPATIENT
Start: 2022-09-07

## 2022-09-07 RX ADMIN — CYANOCOBALAMIN 0 MCG/ML: 1000 INJECTION, SOLUTION INTRAMUSCULAR; SUBCUTANEOUS at 00:00

## 2022-09-07 NOTE — PLAN
[FreeTextEntry1] : POCT INR=3.7,  confirm level via venipuncture today.  If comparable will recheck in 1 week (ambulatory INR machine(NO change in Warfarin regimen at this time))    (Goal INR 3.0-3.5) \par \par see lab orders \par \par see immunization orders \par \par B12 1ml R deltoid   \par \par

## 2022-09-07 NOTE — HISTORY OF PRESENT ILLNESS
[FreeTextEntry1] : JUANY is a 37 year old female here for a follow up [de-identified] : 36 yo female for f/u on  Valvular Heart Disease/Hypothyroidism/HTN \par  \par as above,   feels well no CP at rest or c activity  STABLE SOB c exertion, pt reports no changes.  no  dizziness no palpitations  \par mild nausea  associated c menses   LMP 9/6/22   \par no V/D  +BM qd NL no bloody/black stools \par no urinary complaints

## 2022-09-08 LAB
25(OH)D3 SERPL-MCNC: 28.1 NG/ML
ALBUMIN SERPL ELPH-MCNC: 4 G/DL
ALP BLD-CCNC: 84 U/L
ALT SERPL-CCNC: 7 U/L
ANION GAP SERPL CALC-SCNC: 13 MMOL/L
AST SERPL-CCNC: 17 U/L
BASOPHILS # BLD AUTO: 0.06 K/UL
BASOPHILS NFR BLD AUTO: 0.4 %
BILIRUB SERPL-MCNC: 0.2 MG/DL
BUN SERPL-MCNC: 15 MG/DL
CALCIUM SERPL-MCNC: 9.5 MG/DL
CHLORIDE SERPL-SCNC: 102 MMOL/L
CHOLEST SERPL-MCNC: 186 MG/DL
CK SERPL-CCNC: 55 U/L
CO2 SERPL-SCNC: 22 MMOL/L
COVID-19 SPIKE DOMAIN ANTIBODY INTERPRETATION: POSITIVE
CREAT SERPL-MCNC: 0.64 MG/DL
EGFR: 115 ML/MIN/1.73M2
EOSINOPHIL # BLD AUTO: 0.18 K/UL
EOSINOPHIL NFR BLD AUTO: 1.2 %
ESTIMATED AVERAGE GLUCOSE: 192 MG/DL
GGT SERPL-CCNC: 31 U/L
GLUCOSE SERPL-MCNC: 156 MG/DL
HBA1C MFR BLD HPLC: 8.3 %
HCT VFR BLD CALC: 35.2 %
HDLC SERPL-MCNC: 44 MG/DL
HGB BLD-MCNC: 9.6 G/DL
IMM GRANULOCYTES NFR BLD AUTO: 0.8 %
INR PPP: 3.37 RATIO
INSULIN SERPL-MCNC: 44 UU/ML
LDLC SERPL CALC-MCNC: 107 MG/DL
LYMPHOCYTES # BLD AUTO: 1.99 K/UL
LYMPHOCYTES NFR BLD AUTO: 13.1 %
MAN DIFF?: NORMAL
MCHC RBC-ENTMCNC: 20.4 PG
MCHC RBC-ENTMCNC: 27.3 GM/DL
MCV RBC AUTO: 74.7 FL
MONOCYTES # BLD AUTO: 0.92 K/UL
MONOCYTES NFR BLD AUTO: 6.1 %
NEUTROPHILS # BLD AUTO: 11.92 K/UL
NEUTROPHILS NFR BLD AUTO: 78.4 %
NONHDLC SERPL-MCNC: 143 MG/DL
PLATELET # BLD AUTO: 504 K/UL
POTASSIUM SERPL-SCNC: 4.4 MMOL/L
PROT SERPL-MCNC: 7.1 G/DL
PT BLD: 40.3 SEC
RBC # BLD: 4.71 M/UL
RBC # FLD: 19.9 %
SARS-COV-2 AB SERPL IA-ACNC: >250 U/ML
SODIUM SERPL-SCNC: 136 MMOL/L
T3FREE SERPL-MCNC: 2.83 PG/ML
T4 FREE SERPL-MCNC: 1.4 NG/DL
TESTOST SERPL-MCNC: <2.5 NG/DL
TRIGL SERPL-MCNC: 177 MG/DL
TSH SERPL-ACNC: 3.61 UIU/ML
URATE SERPL-MCNC: 6.1 MG/DL
WBC # FLD AUTO: 15.19 K/UL

## 2022-09-08 RX ORDER — LANCETS 28 GAUGE
EACH MISCELLANEOUS
Qty: 1 | Refills: 3 | Status: COMPLETED | COMMUNITY
Start: 2021-08-02 | End: 2022-09-08

## 2022-09-15 ENCOUNTER — APPOINTMENT (OUTPATIENT)
Dept: FAMILY MEDICINE | Facility: CLINIC | Age: 37
End: 2022-09-15

## 2022-09-15 ENCOUNTER — MED ADMIN CHARGE (OUTPATIENT)
Age: 37
End: 2022-09-15

## 2022-09-15 PROCEDURE — 96372 THER/PROPH/DIAG INJ SC/IM: CPT

## 2022-09-15 RX ADMIN — CYANOCOBALAMIN 0 MCG/ML: 1000 INJECTION INTRAMUSCULAR; SUBCUTANEOUS at 00:00

## 2022-09-16 ENCOUNTER — APPOINTMENT (OUTPATIENT)
Dept: OBGYN | Facility: CLINIC | Age: 37
End: 2022-09-16

## 2022-09-16 VITALS
DIASTOLIC BLOOD PRESSURE: 70 MMHG | WEIGHT: 190 LBS | SYSTOLIC BLOOD PRESSURE: 114 MMHG | HEIGHT: 72 IN | BODY MASS INDEX: 25.73 KG/M2

## 2022-09-16 DIAGNOSIS — R87.810 ATYPICAL SQUAMOUS CELLS OF UNDETERMINED SIGNIFICANCE ON CYTOLOGIC SMEAR OF CERVIX (ASC-US): ICD-10-CM

## 2022-09-16 DIAGNOSIS — Z87.09 PERSONAL HISTORY OF OTHER DISEASES OF THE RESPIRATORY SYSTEM: ICD-10-CM

## 2022-09-16 DIAGNOSIS — Z87.898 PERSONAL HISTORY OF OTHER SPECIFIED CONDITIONS: ICD-10-CM

## 2022-09-16 DIAGNOSIS — R87.610 ATYPICAL SQUAMOUS CELLS OF UNDETERMINED SIGNIFICANCE ON CYTOLOGIC SMEAR OF CERVIX (ASC-US): ICD-10-CM

## 2022-09-16 DIAGNOSIS — L02.91 CUTANEOUS ABSCESS, UNSPECIFIED: ICD-10-CM

## 2022-09-16 LAB
HCG UR QL: NEGATIVE
QUALITY CONTROL: YES

## 2022-09-16 PROCEDURE — 57454 BX/CURETT OF CERVIX W/SCOPE: CPT

## 2022-09-16 PROCEDURE — 81025 URINE PREGNANCY TEST: CPT

## 2022-09-16 NOTE — HISTORY OF PRESENT ILLNESS
[Y] : Patient is sexually active [N] : Patient denies prior pregnancies [Menarche Age: ____] : age at menarche was [unfilled] [No] : Patient does not have concerns regarding sex [Currently Active] : currently active [PapSmeardate] : 07/19/22 [TextBox_31] : ASCUS [HPVDate] : 07/19/22 [TextBox_78] : 18/45 POS [LMPDate] : 09/06/22 [PGHxTotal] : 0 [FreeTextEntry1] : 09/06/22

## 2022-09-16 NOTE — PHYSICAL EXAM
[Chaperone Present] : A chaperone was present in the examining room during all aspects of the physical examination [Appropriately responsive] : appropriately responsive [Alert] : alert [No Acute Distress] : no acute distress [Oriented x3] : oriented x3 [Labia Majora] : normal [Labia Minora] : normal [No Bleeding] : There was no active vaginal bleeding [Normal] : normal [Uterine Adnexae] : normal [FreeTextEntry1] : RAMONA Escobedo

## 2022-09-16 NOTE — END OF VISIT
[FreeTextEntry3] : I, Korilisandro Brownador solely acted as a scribe for Dr. Carmencita Gudino on 09/16/2022 . All medical entries made by the scribe were at my, Dr. Gudino's, direction and personally dictated by me on 09/16/2022 . I have reviewed the chart and agree that the record accurately reflects my personal performance of the history, physical exam, assessment and plan. I have also personally directed, reviewed, and agreed with the chart.\par

## 2022-09-16 NOTE — DISCUSSION/SUMMARY
[FreeTextEntry1] : -Colposcopy was done today secondary to pap with ASCUS and +HPV 18/45. Cervical biopsy at 11 o' clock obtained. ECC performed. Monsels solution applied. Patient tolerated the procedure well with no complications. Post- procedure instructions were given. Patient expressed understanding. \par \par All questions and concerns were discussed.\par

## 2022-09-16 NOTE — PROCEDURE
[Colposcopy] : Colposcopy  [Time out performed] : Pre-procedure time out performed.  Patient's name, date of birth and procedure confirmed. [Consent Obtained] : Consent obtained [Risks] : risks [Benefits] : benefits [Alternatives] : alternatives [Patient] : patient [Infection] : infection [Bleeding] : bleeding [Allergic Reaction] : allergic reaction [ASCUS] : ASCUS [HPV High Risk] : HPV high risk [No Premedication] : no premedication [Colposcopy Adequate] : colposcopy adequate [SCI Fully Visualized] : SCI fully visualized [ECC Performed] : ECC performed [No Abnormalities] : no abnormalities [Lesion] : lesion seen [Hemostasis Obtained] : Hemostasis obtained [Tolerated Well] : the patient tolerated the procedure well [Pap Performed] : pap not performed [Biopsy] : biopsy not taken [de-identified] : +HPV 18/45. [de-identified] : 2 [de-identified] : . [de-identified] : 11 o'clock\par ECC [de-identified] : Ernesto's solution applied.

## 2022-09-19 LAB — DHEA-SULFATE, SERUM: 48 UG/DL

## 2022-09-20 ENCOUNTER — APPOINTMENT (OUTPATIENT)
Dept: FAMILY MEDICINE | Facility: CLINIC | Age: 37
End: 2022-09-20

## 2022-09-20 ENCOUNTER — MED ADMIN CHARGE (OUTPATIENT)
Age: 37
End: 2022-09-20

## 2022-09-20 PROCEDURE — 96372 THER/PROPH/DIAG INJ SC/IM: CPT

## 2022-09-20 RX ORDER — CYANOCOBALAMIN 1000 UG/ML
1000 INJECTION INTRAMUSCULAR; SUBCUTANEOUS
Qty: 0 | Refills: 0 | Status: COMPLETED | OUTPATIENT
Start: 2022-09-15

## 2022-09-20 RX ORDER — CYANOCOBALAMIN 1000 UG/ML
1000 INJECTION INTRAMUSCULAR; SUBCUTANEOUS
Qty: 0 | Refills: 0 | Status: COMPLETED | OUTPATIENT
Start: 2022-09-20

## 2022-09-20 RX ADMIN — CYANOCOBALAMIN 0 MCG/ML: 1000 INJECTION, SOLUTION INTRAMUSCULAR at 00:00

## 2022-09-21 ENCOUNTER — TRANSCRIPTION ENCOUNTER (OUTPATIENT)
Age: 37
End: 2022-09-21

## 2022-09-21 LAB — CORE LAB BIOPSY: NORMAL

## 2022-09-22 NOTE — STROKE CODE NOTE - CT READ
Subjective:   Cerumen impactions     Patient ID: Bianca Amato is a 42 y.o. female.    Chief Complaint:  Excessive ear wax     Bianca Amato is a 42 y.o. female here to see me today for evaluation of a possible wax impaction in bilateral ears.  She has complaints of hearing loss in the affected ears, but denies pain or drainage.  This has been an issue in the past.  The patient has not been using any sort of ear drop to soften the wax.  She usually comes in every 4-6 months for ear cleaning.  Last here with me for ear cleaning in 6/2022.    HPI  Review of Systems   HENT: Positive for hearing loss. Negative for ear discharge, ear pain and tinnitus.        Objective:     Physical Exam   HENT:   Right Ear: External ear and ear canal normal. Decreased hearing is noted.   Left Ear: External ear and ear canal normal. Decreased hearing is noted.   Bilateral complete cerumen impactions, removal described below       Procedure Note    CHIEF COMPLAINT:  Cerumen Impaction    Description:  The patient was seated in an exam chair.  An ear speculum was placed in the right EAC and was examined under the microscope.  Suction and/or loop curettes were used to remove a large cerumen impaction.  The tympanic membrane was visualized and was normal in appearance.  The procedure was repeated on the left side in a similar fashion.  The TM was intact and normal on this side as well.  The patient tolerated the procedure well.           Assessment:     1. Bilateral impacted cerumen        Plan:     1.  Cerumen impaction:  Removed today without difficulty.  I would recommend the use of a wax softening drop, either over the counter Debrox or mineral oil, on a weekly basis.  I also instructed the patient to avoid Qtips.  RTC in 6 months for ear cleaning.        
30-Apr-2020 21:02

## 2022-09-29 ENCOUNTER — APPOINTMENT (OUTPATIENT)
Dept: FAMILY MEDICINE | Facility: CLINIC | Age: 37
End: 2022-09-29

## 2022-09-30 LAB
FOLATE SERPL-MCNC: 15.2 NG/ML
VIT B12 SERPL-MCNC: 632 PG/ML

## 2022-10-10 ENCOUNTER — RX RENEWAL (OUTPATIENT)
Age: 37
End: 2022-10-10

## 2022-10-12 ENCOUNTER — NON-APPOINTMENT (OUTPATIENT)
Age: 37
End: 2022-10-12

## 2022-10-12 ENCOUNTER — RESULT CHARGE (OUTPATIENT)
Age: 37
End: 2022-10-12

## 2022-10-13 ENCOUNTER — APPOINTMENT (OUTPATIENT)
Dept: FAMILY MEDICINE | Facility: CLINIC | Age: 37
End: 2022-10-13

## 2022-10-13 VITALS
OXYGEN SATURATION: 98 % | TEMPERATURE: 98.2 F | BODY MASS INDEX: 25.47 KG/M2 | HEIGHT: 72 IN | DIASTOLIC BLOOD PRESSURE: 70 MMHG | SYSTOLIC BLOOD PRESSURE: 100 MMHG | WEIGHT: 188 LBS | HEART RATE: 82 BPM

## 2022-10-13 VITALS — DIASTOLIC BLOOD PRESSURE: 48 MMHG | HEART RATE: 88 BPM | SYSTOLIC BLOOD PRESSURE: 82 MMHG

## 2022-10-13 DIAGNOSIS — I95.9 HYPOTENSION, UNSPECIFIED: ICD-10-CM

## 2022-10-13 DIAGNOSIS — R25.2 CRAMP AND SPASM: ICD-10-CM

## 2022-10-13 DIAGNOSIS — E53.8 DEFICIENCY OF OTHER SPECIFIED B GROUP VITAMINS: ICD-10-CM

## 2022-10-13 PROCEDURE — 85610 PROTHROMBIN TIME: CPT | Mod: QW

## 2022-10-13 PROCEDURE — 36415 COLL VENOUS BLD VENIPUNCTURE: CPT

## 2022-10-13 PROCEDURE — 96372 THER/PROPH/DIAG INJ SC/IM: CPT

## 2022-10-13 PROCEDURE — 99214 OFFICE O/P EST MOD 30 MIN: CPT | Mod: 25

## 2022-10-13 RX ORDER — PRAVASTATIN SODIUM 40 MG/1
40 TABLET ORAL
Qty: 90 | Refills: 0 | Status: COMPLETED | COMMUNITY
Start: 2017-02-22 | End: 2022-10-13

## 2022-10-13 RX ORDER — CYANOCOBALAMIN 1000 UG/ML
1000 INJECTION INTRAMUSCULAR; SUBCUTANEOUS
Qty: 0 | Refills: 0 | Status: COMPLETED | OUTPATIENT
Start: 2022-10-13

## 2022-10-13 RX ADMIN — CYANOCOBALAMIN 1 MCG/ML: 1000 INJECTION INTRAMUSCULAR; SUBCUTANEOUS at 00:00

## 2022-10-13 NOTE — PLAN
[FreeTextEntry1] : INR=2.7  (goal INR 3.0-3.5) \par increase Warfarin today from 9mg qd to 12mg qd PO x 1 day only, then resume usual 9mg qd \par repeat INR  1 week  \par \par ??? statin induced myalgias.  Recommend trial of OTC CoQ10 200mg bid     reassess leg cramps in 2 weeks via virtual visit   \par see lab orders \par \par cont Rx Vit D 50KU weekly \par check vit D level today \par \par given low BP recommend increased  hydration and increased caloric intake!!!!    \par will not adjust BB therapy at this time secondary to elevated HR \par \par \par

## 2022-10-13 NOTE — PHYSICAL EXAM
[No Acute Distress] : no acute distress [EOMI] : extraocular movements intact [Normal Outer Ear/Nose] : the outer ears and nose were normal in appearance [No JVD] : no jugular venous distention [No Respiratory Distress] : no respiratory distress  [No Accessory Muscle Use] : no accessory muscle use [Clear to Auscultation] : lungs were clear to auscultation bilaterally [No CVA Tenderness] : no CVA  tenderness [Grossly Normal Strength/Tone] : grossly normal strength/tone [No Rash] : no rash [Coordination Grossly Intact] : coordination grossly intact [Speech Grossly Normal] : speech grossly normal [Normal Affect] : the affect was normal [Normal Mood] : the mood was normal [Normal Insight/Judgement] : insight and judgment were intact [de-identified] : RRR at this time +ve murmur

## 2022-10-13 NOTE — HISTORY OF PRESENT ILLNESS
[FreeTextEntry1] : Follow up B12 def./ INR  [de-identified] : 38 yo  female for f/u on Valvular Heart Disease/HLD/Vit D def. \par \par \par pt reports  compliance c newly Rx'ed statin therapy , however, reports  "cramps in distal tibial region posteriorly B/L" x  "few weeks"      \par \par Denies CP today   STABLE SOB  no dizziness \par no palpitations \par no LE swelling \par no syncope

## 2022-10-19 ENCOUNTER — RX RENEWAL (OUTPATIENT)
Age: 37
End: 2022-10-19

## 2022-10-19 LAB
25(OH)D3 SERPL-MCNC: 40.8 NG/ML
ALBUMIN SERPL ELPH-MCNC: 4.3 G/DL
ALP BLD-CCNC: 88 U/L
ALT SERPL-CCNC: 8 U/L
ANION GAP SERPL CALC-SCNC: 13 MMOL/L
AST SERPL-CCNC: 17 U/L
BASOPHILS # BLD AUTO: 0.06 K/UL
BASOPHILS NFR BLD AUTO: 0.4 %
BILIRUB SERPL-MCNC: 0.2 MG/DL
BUN SERPL-MCNC: 15 MG/DL
CALCIUM SERPL-MCNC: 9.1 MG/DL
CHLORIDE SERPL-SCNC: 106 MMOL/L
CHOLEST SERPL-MCNC: 162 MG/DL
CK SERPL-CCNC: 94 U/L
CO2 SERPL-SCNC: 18 MMOL/L
CREAT SERPL-MCNC: 0.65 MG/DL
EGFR: 115 ML/MIN/1.73M2
EOSINOPHIL # BLD AUTO: 0.23 K/UL
EOSINOPHIL NFR BLD AUTO: 1.4 %
GGT SERPL-CCNC: 29 U/L
GLUCOSE SERPL-MCNC: 181 MG/DL
HCT VFR BLD CALC: 36.1 %
HDLC SERPL-MCNC: 38 MG/DL
HGB BLD-MCNC: 9.9 G/DL
IMM GRANULOCYTES NFR BLD AUTO: 1 %
LDLC SERPL CALC-MCNC: 95 MG/DL
LYMPHOCYTES # BLD AUTO: 2.21 K/UL
LYMPHOCYTES NFR BLD AUTO: 13.2 %
MAN DIFF?: NORMAL
MCHC RBC-ENTMCNC: 21.1 PG
MCHC RBC-ENTMCNC: 27.4 GM/DL
MCV RBC AUTO: 76.8 FL
MONOCYTES # BLD AUTO: 0.94 K/UL
MONOCYTES NFR BLD AUTO: 5.6 %
NEUTROPHILS # BLD AUTO: 13.15 K/UL
NEUTROPHILS NFR BLD AUTO: 78.4 %
NONHDLC SERPL-MCNC: 124 MG/DL
PLATELET # BLD AUTO: 490 K/UL
POTASSIUM SERPL-SCNC: 4.5 MMOL/L
PROT SERPL-MCNC: 7.1 G/DL
RBC # BLD: 4.7 M/UL
RBC # FLD: 20.3 %
SODIUM SERPL-SCNC: 137 MMOL/L
TRIGL SERPL-MCNC: 144 MG/DL
WBC # FLD AUTO: 16.76 K/UL

## 2022-10-26 ENCOUNTER — APPOINTMENT (OUTPATIENT)
Dept: ANTEPARTUM | Facility: CLINIC | Age: 37
End: 2022-10-26

## 2022-10-28 ENCOUNTER — ASOB RESULT (OUTPATIENT)
Age: 37
End: 2022-10-28

## 2022-10-28 ENCOUNTER — APPOINTMENT (OUTPATIENT)
Dept: ANTEPARTUM | Facility: CLINIC | Age: 37
End: 2022-10-28

## 2022-10-28 PROCEDURE — 76830 TRANSVAGINAL US NON-OB: CPT

## 2022-10-31 ENCOUNTER — RX RENEWAL (OUTPATIENT)
Age: 37
End: 2022-10-31

## 2022-11-02 ENCOUNTER — APPOINTMENT (OUTPATIENT)
Dept: FAMILY MEDICINE | Facility: CLINIC | Age: 37
End: 2022-11-02

## 2022-11-02 DIAGNOSIS — M79.10 MYALGIA, UNSPECIFIED SITE: ICD-10-CM

## 2022-11-02 PROCEDURE — 99213 OFFICE O/P EST LOW 20 MIN: CPT | Mod: 95

## 2022-11-02 NOTE — PHYSICAL EXAM
[No Acute Distress] : no acute distress [Well Nourished] : well nourished [Well Developed] : well developed [Well-Appearing] : well-appearing [Normal Sclera/Conjunctiva] : normal sclera/conjunctiva [Normal Outer Ear/Nose] : the outer ears and nose were normal in appearance [No JVD] : no jugular venous distention [No Respiratory Distress] : no respiratory distress  [No Accessory Muscle Use] : no accessory muscle use [No Rash] : no rash [Coordination Grossly Intact] : coordination grossly intact [Speech Grossly Normal] : speech grossly normal [Normal Affect] : the affect was normal [Normal Mood] : the mood was normal [Normal Insight/Judgement] : insight and judgment were intact

## 2022-11-02 NOTE — PLAN
[FreeTextEntry1] : cont Atorvastatin 40mg hs \par repeat FBW in 1M \par \par improving, increase CoQ10 200mg qd to 200mg BID \par \par repeat INR at home on 11/4/22

## 2022-11-02 NOTE — HISTORY OF PRESENT ILLNESS
[Home] : at home, [unfilled] , at the time of the visit. [Medical Office: ([facility])___] : at the medical office located in  [Verbal consent obtained from patient] : the patient, [unfilled] [de-identified] : 38 yo female for 2 week f/u on potential statin induced myalgias.  Pt reports both tolerance and compliance c CoQ10 200mg ONCE daily not BID.\par pt states myalgias are occurring c decreased frequency and decreased intensity. \par \par pt feels well  c improving energy level \par \par pt reports compliance c increased statin dose (Atorvastatin 20mg to 40mg hs)

## 2022-11-29 ENCOUNTER — APPOINTMENT (OUTPATIENT)
Dept: ANTEPARTUM | Facility: CLINIC | Age: 37
End: 2022-11-29

## 2022-11-29 ENCOUNTER — APPOINTMENT (OUTPATIENT)
Dept: OBGYN | Facility: CLINIC | Age: 37
End: 2022-11-29

## 2022-11-29 ENCOUNTER — NON-APPOINTMENT (OUTPATIENT)
Age: 37
End: 2022-11-29

## 2022-11-29 VITALS
HEIGHT: 72 IN | SYSTOLIC BLOOD PRESSURE: 96 MMHG | WEIGHT: 191 LBS | DIASTOLIC BLOOD PRESSURE: 60 MMHG | BODY MASS INDEX: 25.87 KG/M2

## 2022-11-29 PROCEDURE — 99214 OFFICE O/P EST MOD 30 MIN: CPT

## 2022-11-29 RX ORDER — LEVOTHYROXINE SODIUM 0.05 MG/1
50 TABLET ORAL DAILY
Qty: 90 | Refills: 1 | Status: DISCONTINUED | COMMUNITY
Start: 2019-12-12 | End: 2022-11-29

## 2022-11-29 RX ORDER — MEDROXYPROGESTERONE ACETATE 5 MG/1
5 TABLET ORAL DAILY
Qty: 7 | Refills: 0 | Status: ACTIVE | COMMUNITY
Start: 2022-11-29 | End: 1900-01-01

## 2022-11-29 RX ORDER — LURASIDONE HYDROCHLORIDE 40 MG/1
40 TABLET, FILM COATED ORAL
Qty: 30 | Refills: 0 | Status: DISCONTINUED | COMMUNITY
Start: 2020-05-15 | End: 2022-11-29

## 2022-11-29 RX ORDER — GLIMEPIRIDE 2 MG/1
2 TABLET ORAL
Qty: 30 | Refills: 0 | Status: DISCONTINUED | COMMUNITY
Start: 2021-11-03

## 2022-11-29 RX ORDER — CHLORHEXIDINE GLUCONATE 4 %
325 (65 FE) LIQUID (ML) TOPICAL TWICE DAILY
Qty: 60 | Refills: 3 | Status: ACTIVE | COMMUNITY
Start: 2022-11-29 | End: 1900-01-01

## 2022-11-29 NOTE — DISCUSSION/SUMMARY
[FreeTextEntry1] : 38 y/o G0 LMP 11/22/22 presenting for acute visit 2/2 menorrhagia.\par \par #Anemia\par - known hx of anemia\par - last Hgb 9.9 last month\par - discussed role of repeat CBC for evaluation of status, she agreed, collected today \par - given RTC precautions for symptoms of anemia and recommended adequate hydration at home \par - Fe supplementation sent to pharmacy, 325mg BID \par - discussed role for follow up with Hematology, previously was seeing them but has not seen them this year, given Hematology consult/referral \par \par #Hx of Menorrhagia \par - known hx of menorrhagia, now worsened from baseline during current menstrual cycle \par - discussed baseline menorrhagia, possible causing factor of baseline anemia, and possible implication of Warfarin therapy with menorrhagia \par - discussed that given anemic status, threshold for bleeding is lower in her case and given she has mild symtpoms of anemia (lightheadedness) already, intervention is warranted \par - discussed repeat CBC and possible transfusion if clinically indicated \par - discussed Provera for stabilization of bleeding with current menses \par - discussed long term intervention with D&C, endometrial ablation, or Mirena IUD including R/B/A and mechanism of action of each \par - discussed given hx of heart surgery, if desired D&C or endometrial ablation it would have to be done in hospital \par - discussed that if desired fertility, endometrial ablation would not be recommended \par - discussed risks and benefits of using progesterone PO and IUD medication for bleeding given hx of heart surgery, mechanical valve, and Warfarin therapy -- discussed that considering she has chronic anemia and menorrhagia that are now progressing, coupled with implications of anemia with heart history and function, benefits of progesterone use outweigh risks \par - patient verbalized understanding and agreed to proceed with Provera PO 5mg QD for 10d\par - will consider above options if menorrhagia continues or if Provera fails \par \par She verbalized understanding and agreement with above counseling regarding differential diagnosis, evaluation, and plan. \par She was given time for questions/concerns which were all answered to her apparent satisfaction.\par All designated lab work for today drawn in office. \par \par RTO 2w for f/u of bleeding and Provera treatment \par Given strict bleeding/symptoms of anemia precautions for RTC sooner than scheduled \par Will f/u CBC via phone call as indicated

## 2022-11-29 NOTE — PHYSICAL EXAM
[Chaperone Present] : A chaperone was present in the examining room during all aspects of the physical examination [Appropriately responsive] : appropriately responsive [Alert] : alert [No Acute Distress] : no acute distress [Oriented x3] : oriented x3 [Labia Majora] : normal [Labia Minora] : normal [Normal] : normal [Uterine Adnexae] : normal [FreeTextEntry1] : ELVIA De Los Santos [Tenderness] : nontender [FreeTextEntry4] : old blood in vault  [FreeTextEntry5] : mildly dilated, old blood at external os, no active bleeding, no lesions/inflammation  [FreeTextEntry6] : midly enlarged, pedunculated fundal fibroid palpated, nontender

## 2022-11-29 NOTE — HISTORY OF PRESENT ILLNESS
[N] : Patient denies prior pregnancies [Menarche Age: ____] : age at menarche was [unfilled] [PapSmeardate] : 7/19/22 [TextBox_31] : ascus [HPVDate] : 7/19/22 [TextBox_78] : positive 18/45 [LMPDate] : 11/22/22 [PGHxTotal] : 0 [FreeTextEntry1] : 11/22/22

## 2022-11-30 ENCOUNTER — NON-APPOINTMENT (OUTPATIENT)
Age: 37
End: 2022-11-30

## 2022-12-02 LAB
BASOPHILS # BLD AUTO: 0.07 K/UL
BASOPHILS NFR BLD AUTO: 0.3 %
EOSINOPHIL # BLD AUTO: 0.12 K/UL
EOSINOPHIL NFR BLD AUTO: 0.6 %
HCT VFR BLD CALC: 31.1 %
HGB BLD-MCNC: 8.8 G/DL
IMM GRANULOCYTES NFR BLD AUTO: 0.9 %
LYMPHOCYTES # BLD AUTO: 2.13 K/UL
LYMPHOCYTES NFR BLD AUTO: 10.2 %
MAN DIFF?: NORMAL
MCHC RBC-ENTMCNC: 20.8 PG
MCHC RBC-ENTMCNC: 28.3 GM/DL
MCV RBC AUTO: 73.5 FL
MONOCYTES # BLD AUTO: 1.12 K/UL
MONOCYTES NFR BLD AUTO: 5.4 %
NEUTROPHILS # BLD AUTO: 17.17 K/UL
NEUTROPHILS NFR BLD AUTO: 82.6 %
PLATELET # BLD AUTO: 530 K/UL
RBC # BLD: 4.23 M/UL
RBC # FLD: 20 %
WBC # FLD AUTO: 20.79 K/UL

## 2022-12-06 NOTE — ED PROVIDER NOTE - OBJECTIVE STATEMENT
[Negative] : Heme/Lymph 34 y/o F with a significant PMHx of TBI(3y ago, resulting in spinal injuries and recurrent dizziness), Marfan's Syndrome, 2 aortic valve placements, DM, depression, and anxiety presents to the ED c/o sudden onset dizziness and confusion 1h PTA. Pt states that she was laying down when she felt the urge to do the dishes, but she claims "it was not time to do the dishes and it felt really strange while she was doing them." She then began to read medication information on her phone and noticed that, "the words just didn't seem right and she could barely read them." Pt states that after this she began to get dizzy and began to worry which is when she presented to the ED. Pt states that over the last few days her depression/anxiety have been "acting up," but she notes that today she felt much better. Pt reports feeling moderately better while being evaluated. Pt denies any tobacco usage, btu states she consumes marijuana edibles(none consumed recently). NKDA. No further complaints at this time.   Pt denies fevers/chills, ha, loc, focal neuro deficits, cp/sob/palp, cough, abd pain/n/v/d, urinary symptoms, recent travel and sick contacts.

## 2022-12-13 ENCOUNTER — RX RENEWAL (OUTPATIENT)
Age: 37
End: 2022-12-13

## 2022-12-13 ENCOUNTER — APPOINTMENT (OUTPATIENT)
Dept: OBGYN | Facility: CLINIC | Age: 37
End: 2022-12-13

## 2022-12-13 VITALS
BODY MASS INDEX: 25.73 KG/M2 | HEIGHT: 72 IN | SYSTOLIC BLOOD PRESSURE: 114 MMHG | DIASTOLIC BLOOD PRESSURE: 60 MMHG | WEIGHT: 190 LBS

## 2022-12-13 PROCEDURE — 99214 OFFICE O/P EST MOD 30 MIN: CPT

## 2022-12-13 NOTE — DISCUSSION/SUMMARY
[FreeTextEntry1] : 38 y/o G0 LMP 11/22/22 presenting for GYN follow up visit. 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 \par Patient also s/p hemorrhagic stroke 2 yrs ago, currently on Warfarin anticoagulation, likely contributing factor. \par Now s/p PO Provera 5mg course ended 12/10, with minimal dark spotting. \par \par #Menorrhagia \par - discussed menorrhagia possibly due to hormonal imbalance vs endometrial etiology but that warfarin therapy most likely factor \par - TVUS a month ago with small pedunculated fibroid, unlikely to be cause of menorrhagia), normal EMS and reaminder of scan\par - discussed that PO Provera has helped so she will likely need to continue treatment to prevent continued heavy bleeding episodes considering she already has chronic anemia, an acute episode of anemia will likely have health effects if menorrhagia continues \par - discussed that Progesterone only interventions, DMPA vs Nexplanon/Mirena are favored given her anticoagulation and prevention of clotting risk given mechanical heart valves, discussed that her previous CVA was hemorrhagic vs thrombotic so PRG treatment should not be risk factor for recurrence \par - discussed that at this point, use of PRG therapy benefits outweigh risks given chronic anemia, known heart conditions, and ongoing and expected long term management with anticoagulation \par - she verbalized understanding, she agreed POP's would be too demanding and verbalized desire for DMPA given she still desires pregnancy in future and would like to discontinue easily \par - Rx of Medroxyprogesterone DEPO 150mg sent to pharmacy \par - discussed role for D&C/Hysteroscopy but implications of having to do it in hospital due to her comorbidities, will hold off for now given satisfactory response to PRG \par - discussed ablation is not recommended considering her fertility desires \par - currently her bleeding is minimal, no symtpoms of anemia, and she is due for repeat blood work at PCP next week \par - encouraged her to talk to her Cardiologist about our plan and her starting PRG with DMPA to have multidisciplinary approach to care, she said she will f/u with them this month as she is due for visit \par - encouraged her to make Hematology appointment for f/u and management of chronic anemia, as well as multidisciplinary approach to care, she said she will make appointment today \par \par #Anemia \par - likely multifactorial, iron/folate studies WNL in past \par - she reports she's been told before that 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 \par She was given time for questions/concerns which were all answered to her apparent satisfaction.\par All designated lab work for today drawn in office. \par \par RTO 1-4 for DMPA administration \par Given RTC precautions for bleeding/symptoms of anemia.

## 2022-12-13 NOTE — COUNSELING
[Nutrition/ Exercise/ Weight Management] : nutrition, exercise, weight management [Vitamins/Supplements] : vitamins/supplements [Contraception/ Emergency Contraception/ Safe Sexual Practices] : contraception, emergency contraception, safe sexual practices [Preconception Care/ Fertility options] : preconception care, fertility options [Pregnancy Options] : pregnancy options [Lab Results] : lab results [Medication Management] : medication management

## 2022-12-21 ENCOUNTER — NON-APPOINTMENT (OUTPATIENT)
Age: 37
End: 2022-12-21

## 2022-12-21 ENCOUNTER — EMERGENCY (EMERGENCY)
Facility: HOSPITAL | Age: 37
LOS: 1 days | Discharge: HOME | End: 2022-12-21
Attending: STUDENT IN AN ORGANIZED HEALTH CARE EDUCATION/TRAINING PROGRAM
Payer: MEDICAID

## 2022-12-21 VITALS
DIASTOLIC BLOOD PRESSURE: 63 MMHG | HEART RATE: 101 BPM | RESPIRATION RATE: 16 BRPM | OXYGEN SATURATION: 100 % | TEMPERATURE: 99 F | SYSTOLIC BLOOD PRESSURE: 110 MMHG

## 2022-12-21 VITALS
SYSTOLIC BLOOD PRESSURE: 128 MMHG | DIASTOLIC BLOOD PRESSURE: 89 MMHG | OXYGEN SATURATION: 99 % | WEIGHT: 190.04 LBS | RESPIRATION RATE: 16 BRPM | HEART RATE: 118 BPM | HEIGHT: 72 IN | TEMPERATURE: 98 F

## 2022-12-21 DIAGNOSIS — Z95.4 PRESENCE OF OTHER HEART-VALVE REPLACEMENT: Chronic | ICD-10-CM

## 2022-12-21 DIAGNOSIS — Z98.1 ARTHRODESIS STATUS: Chronic | ICD-10-CM

## 2022-12-21 DIAGNOSIS — Z96.1 PRESENCE OF INTRAOCULAR LENS: Chronic | ICD-10-CM

## 2022-12-21 LAB
ALBUMIN SERPL ELPH-MCNC: 3.5 G/DL — SIGNIFICANT CHANGE UP (ref 3.3–5.2)
ALP SERPL-CCNC: 96 U/L — SIGNIFICANT CHANGE UP (ref 40–120)
ALT FLD-CCNC: 8 U/L — SIGNIFICANT CHANGE UP
ANION GAP SERPL CALC-SCNC: 12 MMOL/L — SIGNIFICANT CHANGE UP (ref 5–17)
ANISOCYTOSIS BLD QL: SIGNIFICANT CHANGE UP
APTT BLD: 61.1 SEC — HIGH (ref 27.5–35.5)
AST SERPL-CCNC: 17 U/L — SIGNIFICANT CHANGE UP
BASOPHILS # BLD AUTO: 0 K/UL — SIGNIFICANT CHANGE UP (ref 0–0.2)
BASOPHILS NFR BLD AUTO: 0 % — SIGNIFICANT CHANGE UP (ref 0–2)
BILIRUB SERPL-MCNC: 0.3 MG/DL — LOW (ref 0.4–2)
BLD GP AB SCN SERPL QL: SIGNIFICANT CHANGE UP
BUN SERPL-MCNC: 12.4 MG/DL — SIGNIFICANT CHANGE UP (ref 8–20)
CALCIUM SERPL-MCNC: 8.8 MG/DL — SIGNIFICANT CHANGE UP (ref 8.4–10.5)
CHLORIDE SERPL-SCNC: 102 MMOL/L — SIGNIFICANT CHANGE UP (ref 96–108)
CO2 SERPL-SCNC: 21 MMOL/L — LOW (ref 22–29)
CREAT SERPL-MCNC: 0.6 MG/DL — SIGNIFICANT CHANGE UP (ref 0.5–1.3)
EGFR: 117 ML/MIN/1.73M2 — SIGNIFICANT CHANGE UP
ELLIPTOCYTES BLD QL SMEAR: SIGNIFICANT CHANGE UP
EOSINOPHIL # BLD AUTO: 0.12 K/UL — SIGNIFICANT CHANGE UP (ref 0–0.5)
EOSINOPHIL NFR BLD AUTO: 0.9 % — SIGNIFICANT CHANGE UP (ref 0–6)
FLUAV AG NPH QL: SIGNIFICANT CHANGE UP
FLUBV AG NPH QL: SIGNIFICANT CHANGE UP
GIANT PLATELETS BLD QL SMEAR: PRESENT — SIGNIFICANT CHANGE UP
GLUCOSE SERPL-MCNC: 265 MG/DL — HIGH (ref 70–99)
HCG SERPL-ACNC: <4 MIU/ML — SIGNIFICANT CHANGE UP
HCT VFR BLD CALC: 28 % — LOW (ref 34.5–45)
HGB BLD-MCNC: 7.9 G/DL — LOW (ref 11.5–15.5)
INR BLD: 4.2 RATIO — HIGH (ref 0.88–1.16)
LYMPHOCYTES # BLD AUTO: 0.84 K/UL — LOW (ref 1–3.3)
LYMPHOCYTES # BLD AUTO: 6.1 % — LOW (ref 13–44)
MANUAL SMEAR VERIFICATION: SIGNIFICANT CHANGE UP
MCHC RBC-ENTMCNC: 19.8 PG — LOW (ref 27–34)
MCHC RBC-ENTMCNC: 28.2 GM/DL — LOW (ref 32–36)
MCV RBC AUTO: 70 FL — LOW (ref 80–100)
MICROCYTES BLD QL: SIGNIFICANT CHANGE UP
MONOCYTES # BLD AUTO: 0.73 K/UL — SIGNIFICANT CHANGE UP (ref 0–0.9)
MONOCYTES NFR BLD AUTO: 5.3 % — SIGNIFICANT CHANGE UP (ref 2–14)
NEUTROPHILS # BLD AUTO: 12.1 K/UL — HIGH (ref 1.8–7.4)
NEUTROPHILS NFR BLD AUTO: 87.7 % — HIGH (ref 43–77)
OVALOCYTES BLD QL SMEAR: SLIGHT — SIGNIFICANT CHANGE UP
PLAT MORPH BLD: NORMAL — SIGNIFICANT CHANGE UP
PLATELET # BLD AUTO: 497 K/UL — HIGH (ref 150–400)
POIKILOCYTOSIS BLD QL AUTO: SIGNIFICANT CHANGE UP
POLYCHROMASIA BLD QL SMEAR: SLIGHT — SIGNIFICANT CHANGE UP
POTASSIUM SERPL-MCNC: 3.8 MMOL/L — SIGNIFICANT CHANGE UP (ref 3.5–5.3)
POTASSIUM SERPL-SCNC: 3.8 MMOL/L — SIGNIFICANT CHANGE UP (ref 3.5–5.3)
PROT SERPL-MCNC: 7.1 G/DL — SIGNIFICANT CHANGE UP (ref 6.6–8.7)
PROTHROM AB SERPL-ACNC: 49.4 SEC — HIGH (ref 10.5–13.4)
RBC # BLD: 4 M/UL — SIGNIFICANT CHANGE UP (ref 3.8–5.2)
RBC # FLD: 19 % — HIGH (ref 10.3–14.5)
RBC BLD AUTO: ABNORMAL
RSV RNA NPH QL NAA+NON-PROBE: SIGNIFICANT CHANGE UP
SARS-COV-2 RNA SPEC QL NAA+PROBE: SIGNIFICANT CHANGE UP
SCHISTOCYTES BLD QL AUTO: SLIGHT — SIGNIFICANT CHANGE UP
SODIUM SERPL-SCNC: 135 MMOL/L — SIGNIFICANT CHANGE UP (ref 135–145)
TARGETS BLD QL SMEAR: SLIGHT — SIGNIFICANT CHANGE UP
WBC # BLD: 13.8 K/UL — HIGH (ref 3.8–10.5)
WBC # FLD AUTO: 13.8 K/UL — HIGH (ref 3.8–10.5)

## 2022-12-21 PROCEDURE — 71045 X-RAY EXAM CHEST 1 VIEW: CPT

## 2022-12-21 PROCEDURE — 86850 RBC ANTIBODY SCREEN: CPT

## 2022-12-21 PROCEDURE — 87637 SARSCOV2&INF A&B&RSV AMP PRB: CPT

## 2022-12-21 PROCEDURE — 85025 COMPLETE CBC W/AUTO DIFF WBC: CPT

## 2022-12-21 PROCEDURE — 36415 COLL VENOUS BLD VENIPUNCTURE: CPT

## 2022-12-21 PROCEDURE — 99285 EMERGENCY DEPT VISIT HI MDM: CPT | Mod: 25

## 2022-12-21 PROCEDURE — 86901 BLOOD TYPING SEROLOGIC RH(D): CPT

## 2022-12-21 PROCEDURE — P9016: CPT

## 2022-12-21 PROCEDURE — 93005 ELECTROCARDIOGRAM TRACING: CPT

## 2022-12-21 PROCEDURE — 76830 TRANSVAGINAL US NON-OB: CPT | Mod: 26

## 2022-12-21 PROCEDURE — 86923 COMPATIBILITY TEST ELECTRIC: CPT

## 2022-12-21 PROCEDURE — 99243 OFF/OP CNSLTJ NEW/EST LOW 30: CPT

## 2022-12-21 PROCEDURE — 84702 CHORIONIC GONADOTROPIN TEST: CPT

## 2022-12-21 PROCEDURE — 36430 TRANSFUSION BLD/BLD COMPNT: CPT

## 2022-12-21 PROCEDURE — 76830 TRANSVAGINAL US NON-OB: CPT

## 2022-12-21 PROCEDURE — 99285 EMERGENCY DEPT VISIT HI MDM: CPT

## 2022-12-21 PROCEDURE — 76856 US EXAM PELVIC COMPLETE: CPT | Mod: 26,59

## 2022-12-21 PROCEDURE — 76856 US EXAM PELVIC COMPLETE: CPT

## 2022-12-21 PROCEDURE — 86900 BLOOD TYPING SEROLOGIC ABO: CPT

## 2022-12-21 PROCEDURE — 80053 COMPREHEN METABOLIC PANEL: CPT

## 2022-12-21 PROCEDURE — 85610 PROTHROMBIN TIME: CPT

## 2022-12-21 PROCEDURE — 71045 X-RAY EXAM CHEST 1 VIEW: CPT | Mod: 26

## 2022-12-21 PROCEDURE — 85730 THROMBOPLASTIN TIME PARTIAL: CPT

## 2022-12-21 PROCEDURE — 93010 ELECTROCARDIOGRAM REPORT: CPT

## 2022-12-21 NOTE — ED PROVIDER NOTE - ATTENDING CONTRIBUTION TO CARE
37 YOF pmh Michael Danlos Syndrome, Marfan Syndrome, Diabetes, Dilated Aortic Root presents with vaginal bleeding worse over the past day. She has been experiencing vaginal bleeding over the past month since 11/22, was placed on Progesterone by her OB/GYN, Dr. Darby. Patient does report vaginal cramping as well. She states that she has experienced mild sob and dizziness. Denies cp, fever, cough, abd pain.  AP - well appearing, HD stable. prolonged vaginal bleeding. will get labs, US to eval. reassess

## 2022-12-21 NOTE — ED ADULT TRIAGE NOTE - CHIEF COMPLAINT QUOTE
Patient ambulated into ED with steady gait, Pt c/o vaginal bleeding x 1 month saw GYN, took progesterone x 1 week bleeding slowed but never stopped and now bleeding, 1 pad x 2 hours, feeling symptomatic.

## 2022-12-21 NOTE — ED STATDOCS - PROGRESS NOTE DETAILS
Leslie JORGE for ED attending, Dr. Brown. 38 y/o female with PMHx of Anxiety, Depression, DM, Dilated Aortic root, Fibromyalgia, Marfan Syndrome, and Mitral Valve Prolapse on Coumadin s/p mechanical valve replacement x2 presents to ED c/o vaginal bleeding. Patient reports vaginal bleeding x1 month. Was given Progesterone by her OBGYN but states the bleeding is still persistent. OBGYN: Dr. Jeffy Darby. Will send to [facility] for further evaluation by another provider.

## 2022-12-21 NOTE — CONSULT NOTE ADULT - ASSESSMENT
A/P: JUANY FELICIANO is a 37y LMP 12/20/22 who presents to ED for symptomatic anemia in setting of menorrhagia. Patient has had two episodes of heavy bleeding over past month associated with menses +intermenstrual spotting.     # Vaginal bleeding  - BHCG negative - no concern for pregnancy  - SVE showing minimal volume dark red blood with no active bleeding or clots. For conservative management  - TVUS showing thin endometrial lining with 1 exophytic fundal fibroid that is likely not clinically significant.  - Heavy bleeding associated with prior menses resolved with progesterone course  - Vaginal bleeding likely 2/2 menses in setting of supratherapeutic INR and possible withdrawal bleeding 2/2 prior progesterone course.   - Recommend patient f/u w heme outpatient as scheduled for 12/23  - Prescription for 5mg norethindrone to be sent - patient to take daily until outpatient f/u with gyn.    # Anemia symptoms  - Tachycardic ( on admission > 101), vitals otherwise wnl with normal O2 sat  - Hb 9.9 (baseline) > 7.9  - S/p 1u pRBCs in ED - lightheadedness, dizziness, fatigue, chills improved. SOB still present  - Recommend transfusing additional improvement with reevaluation of symptoms.   - Remainder of w/u per ED team    D/w Dr. Temple and Dr. Hewitt A/P: JUANY FELICIANO is a 37y LMP 12/20/22 who presents to ED for symptomatic anemia in setting of menorrhagia. Patient has had two episodes of heavy bleeding over past month associated with menses +intermenstrual spotting.     # Vaginal bleeding  - BHCG negative - no concern for pregnancy  - SVE showing minimal volume dark red blood with no active bleeding or clots. For conservative management  - TVUS showing thin endometrial lining with 1 exophytic fundal fibroid that is likely not clinically significant.  - Heavy bleeding associated with prior menses resolved with progesterone course  - Vaginal bleeding likely 2/2 menses in setting of supratherapeutic INR and possible withdrawal bleeding 2/2 prior progesterone course.   - Recommend patient f/u w heme outpatient as scheduled for 12/23  - Prescription for 5mg norethindrone to be sent - patient to take daily until outpatient f/u with gyn.    # Anemia symptoms  - Tachycardic ( on admission > 101), vitals otherwise wnl with normal O2 sat  - Hb 9.9 (baseline) > 7.9  - S/p 1u pRBCs in ED - lightheadedness, dizziness, fatigue, chills improved. SOB still present  - Recommend transfusing additional improvement with reevaluation of symptoms.   - Remainder of w/u per ED team    D/w Dr. Temple and Dr. Hewitt    Addendum:    Subjective Hx, Physical Exam, Laboratory & Imaging results reviewed.  I agree with the Resident Physician's assessment and plan of care, as discussed above.  Call, follow up, and return to Hospital parameters reviewed at length.  She was given the opportunity to ask questions and all were addressed.     Abdi Hewitt,

## 2022-12-21 NOTE — ED ADULT NURSE NOTE - OBJECTIVE STATEMENT
patient with vaginal bleeding s/p progesterone which resulted in dark brown spotting but has returned with full flow bright red blood. pt reports soaking multiple pads. patient c/o shortness of breath worse with ambulation. normal sinus on cardiac monitor, saturating 100% on room air. skin is warm and dry. ambulatory without difficulty.

## 2022-12-21 NOTE — CONSULT NOTE ADULT - SUBJECTIVE AND OBJECTIVE BOX
JUANY FELICIANO is a 37y LMP 12/20/22 who presents to ED for lightheadedness, dizziness, and shortness of breath in setting of menorrhagia. Patient reports having heavier than normal menses last month beginning on 11/22/22. She was prescribed a short course of progesterone at that time which slowed the bleeding down. Since then she had persistent, mild spotting until 1d ago when the bleeding increased. She reports passage of clots and has been using ~1 pad/h every 2-3h. However, since arrival to the ED her bleeding has decreased further. Patient declined DEPO and mirena IUD outpatient due to desire for pregnancy in the near future. Prior menses were typically q30d, intermittently q15d, lasted 7d with 1 tampon q8h. Reports worsening of the lightheadedness and SOB over the past month; also notes substernal chest pain and palpitations. Denies syncopal episodes, pleuritic chest pain, cough, and other flu-like symptoms. Reports intermittent calf pain for which she takes co q10, denies h/o DVT/PE. Reports menstrual cramping worse than her baseline. Denies abnormal vaginal discharge, dysuria. Patient reports baseline Hb 9.9 prior to 11/22.    ED course: EKG  GYN: Dr. Darby @ [facility]  Follows w cardiology q3-4months  Sees heme intermittently - last visit 2yr ago, has appt scheduled for 12/23/22    OBhx:   Gyn: denies h/o fibroids, +h/o cysts, +abnormal pap 9/2022 - colpo w bx wnl  PMH: marfans syndrome, TIIDM, asthma - denies recent exacerbations, carotid A dissection in 2020, schistocytic anemia, HTN, migraine w aura  PSH: mechanical valve replacement - aortic root and mitral valve (2013), septoplasty, jaw reconstruction, cataract removal, lens replacement, spinal fusion x3 (MELINDA-LI 1996 x2, L1-L3 1999)  Med: warfarin (goal INR 2.5-3.5, tests q2wks, last week INR was 3.2), metformin, methadone, oxycodone, januvia, cymbalta, pravastatin, atorvastatin, gliperide, metoprolol ER, ASA, rescue inhaler, co q10  Allergies: NKDA    Physical exam:    Vitals:   T(C): 37 (12-21-22 @ 19:09), Max: 37 (12-21-22 @ 19:09)  HR: 101 (12-21-22 @ 19:09) (101 - 118)  BP: 110/71 (12-21-22 @ 19:09) (110/71 - 128/89)  RR: 18 (12-21-22 @ 19:09) (16 - 18)  SpO2: 97% (12-21-22 @ 19:09) (97% - 99%)    General: AAOx3, NAD  Abd: Soft, mild suprapubic TTP, non distended; no rebound/guarding  Pelvic:        EXTERNAL GENITALIA: atraumatic, no lesions        VAGINA: minimal dark red blood, no clots, no masses         CERVIX: Pink, 0.5cm dilated, dark red blood per os with no active bleeding on valsalva        UTERUS: appropriate size, fibroid not palpable        ADNEXA: not palpable    Labs:                          7.9    13.80 )-----------( 497      ( 21 Dec 2022 15:47 )             28.0     12-21    135  |  102  |  12.4  ----------------------------<  265<H>  3.8   |  21.0<L>  |  0.60    Ca    8.8      21 Dec 2022 15:47    TPro  7.1  /  Alb  3.5  /  TBili  0.3<L>  /  DBili  x   /  AST  17  /  ALT  8   /  AlkPhos  96  12-21    SERUM bhcg    <4.0  12-21 @ 15:47    RVP: neg  PT/INR/PTT: 49.4/4.2/61.1    Radiology:  < from: US Transvaginal (12.21.22 @ 16:36) >  FINDINGS:  Uterus: 4.3 cm x 3.4 cm x 4.3 cm. Questionable exophytic fundal fibroid   measuring 1.5 cm.  Endometrium: 6 mm. Within normal limits.    Right ovary: 1.8 cm x 1.7 cm x 2.4 cm. Within normal limits. Normal   arterial and venous waveforms.  Left ovary: 4.5 cm x 4.7 cm x 3.6 cm. 4.9 cm cyst which appears simple,   however evaluation is limiteddue to artifact. Normal arterial and venous   waveforms.    Fluid: None.    IMPRESSION:  Questionable small exophytic fundal fibroid measuring 1.5 cm.    There is a 4.9 cm left ovarian cyst. Slightly limited evaluation.   Six-week follow-up pelvic ultrasound is recommended.        --- End of Report ---    < end of copied text >  < from: Xray Chest 1 View- PORTABLE-Urgent (Xray Chest 1 View- PORTABLE-Urgent .) (12.21.22 @ 18:27) >  FINDINGS:  CATHETERS AND TUBES: None    PULMONARY: The visualized lungs are clear of airspace consolidations or   pleural effusions.   No pneumothorax.    HEART/VASCULAR: The heart and mediastinum size and configuration are   within normal limits. Status post cardiac valve replacement.    BONES: Visualized osseous structures are intact. Cervical thoracic lumbar   spinal fusion fixation hardware intact and unchanged from prior exam.      IMPRESSION: No radiographic evidence of active chest disease.    --- End of Report ---    < end of copied text >

## 2022-12-21 NOTE — ED PROVIDER NOTE - PROGRESS NOTE DETAILS
Evangelist: Spoke w OB/GYN, recommend transfusing the patient 1 unit PRBCs. OB/GYN will see the patient. Johnson: patient endorsing no acute complaints s/p 1 unit prbc. well appearing. stable vitals, evaluated by GYN. will discharge.

## 2022-12-21 NOTE — ED PROVIDER NOTE - PATIENT PORTAL LINK FT
You can access the FollowMyHealth Patient Portal offered by [facility] by registering at the following website: [email or web address]. By joining [employer]’s FollowMyHealth portal, you will also be able to view your health information using other applications (apps) compatible with our system.

## 2022-12-21 NOTE — ED PROVIDER NOTE - NSFOLLOWUPINSTRUCTIONS_ED_ALL_ED_FT
followup with your primary care doctor in next 7 days.     Anemia    Anemia is a condition in which the concentration of red blood cells or hemoglobin in the blood is below normal. Hemoglobin is a substance in red blood cells that carries oxygen to the tissues of the body. Anemia results in not enough oxygen reaching these tissues which can cause symptoms such as weakness, dizziness/lightheadedness, shortness of breath, chest pain, paleness, or nausea. The cause of your anemia may or may not be determined immediately. If your hemoglobin was dangerously low, you may have received a blood transfusion. Usually reactions to transfusions occur immediately but monitor yourself for any fevers, rash, or shortness of breath.    SEEK IMMEDIATE MEDICAL CARE IF YOU HAVE ANY OF THE FOLLOWING SYMPTOMS: extreme weakness/chest pain/shortness of breath, black or bloody stools, vomiting blood, fainting, fever, or any signs of dehydration.      Shortness of breath    Shortness of breath (dyspnea) means you have trouble breathing and could indicate a medical problem. Causes include lung disease, heart disease, low amount of red blood cells (anemia), poor physical fitness, being overweight, smoking, etc. Your health care provider today may not be able to find a cause for your shortness of breath after your exam. In this case, it is important to have a follow-up exam with your primary care physician as instructed. If medicines were prescribed, take them as directed for the full length of time directed. Refrain from tobacco products.    SEEK IMMEDIATE MEDICAL CARE IF YOU HAVE ANY OF THE FOLLOWING SYMPTOMS: worsening shortness of breath, chest pain, back pain, abdominal pain, fever, coughing up blood, lightheadedness/dizziness.

## 2022-12-21 NOTE — ED PROVIDER NOTE - OBJECTIVE STATEMENT
38 yo female hx of Michael Danlos Syndrome, Marfan Syndrome, Diabetes, Dilated Aortic Root presents with vaginal bleeding worse over the past day. She has been experiencing vaginal bleeding over the past month, was placed on Progesterone by her OB/GYN, Dr. Darby. Patient does report vaginal cramping as well. She states that she has experienced sob at rest and with movement, denies cp, back pain. Denies fever/chills, other recent illness.

## 2022-12-21 NOTE — ED PROVIDER NOTE - PHYSICAL EXAMINATION
Gen: Well appearing in NAD  Head: NC/AT  Neck: trachea midline  Resp:  No distress, lungs cta b/l  Cardiac: Tachycardic rate and regular rhythm.   Abdomen: Mild suprapubic ttp.   Ext: no deformities  Neuro:  A&O appears non focal  Skin:  Warm and dry as visualized  Psych:  Normal affect and mood

## 2022-12-21 NOTE — ED PROVIDER NOTE - CLINICAL SUMMARY MEDICAL DECISION MAKING FREE TEXT BOX
38 yo female presents with vaginal bleeding, SOB. will obtain labs, EKG, imaging. Monitor and reassess.

## 2022-12-21 NOTE — ED STATDOCS - NS_ ATTENDINGSCRIBEDETAILS _ED_A_ED_FT
I, Tye Brown, performed the initial face to face bedside interview with this patient regarding history of present illness, review of symptoms and relevant past medical, social and family history.  I completed an independent physical examination.    The history, relevant review of systems, past medical and surgical history, medical decision making, and physical examination was documented by the scribe in my presence and I attest to the accuracy of the documentation.

## 2022-12-29 ENCOUNTER — RX RENEWAL (OUTPATIENT)
Age: 37
End: 2022-12-29

## 2023-01-03 ENCOUNTER — RESULT CHARGE (OUTPATIENT)
Age: 38
End: 2023-01-03

## 2023-01-04 ENCOUNTER — APPOINTMENT (OUTPATIENT)
Dept: FAMILY MEDICINE | Facility: CLINIC | Age: 38
End: 2023-01-04
Payer: MEDICAID

## 2023-01-04 VITALS
DIASTOLIC BLOOD PRESSURE: 70 MMHG | SYSTOLIC BLOOD PRESSURE: 120 MMHG | OXYGEN SATURATION: 98 % | WEIGHT: 191 LBS | TEMPERATURE: 98 F | HEART RATE: 115 BPM | HEIGHT: 72 IN | BODY MASS INDEX: 25.87 KG/M2

## 2023-01-04 VITALS — HEART RATE: 84 BPM

## 2023-01-04 DIAGNOSIS — N93.9 ABNORMAL UTERINE AND VAGINAL BLEEDING, UNSPECIFIED: ICD-10-CM

## 2023-01-04 DIAGNOSIS — R04.2 HEMOPTYSIS: ICD-10-CM

## 2023-01-04 DIAGNOSIS — J02.9 ACUTE PHARYNGITIS, UNSPECIFIED: ICD-10-CM

## 2023-01-04 PROCEDURE — 99214 OFFICE O/P EST MOD 30 MIN: CPT

## 2023-01-04 NOTE — PHYSICAL EXAM
[No Acute Distress] : no acute distress [Well Nourished] : well nourished [Well Developed] : well developed [Well-Appearing] : well-appearing [EOMI] : extraocular movements intact [Normal Outer Ear/Nose] : the outer ears and nose were normal in appearance [No JVD] : no jugular venous distention [No Respiratory Distress] : no respiratory distress  [No Accessory Muscle Use] : no accessory muscle use [Clear to Auscultation] : lungs were clear to auscultation bilaterally [Normal Rate] : normal rate  [Regular Rhythm] : with a regular rhythm [Normal S1, S2] : normal S1 and S2 [No CVA Tenderness] : no CVA  tenderness [Grossly Normal Strength/Tone] : grossly normal strength/tone [No Rash] : no rash [Coordination Grossly Intact] : coordination grossly intact [Speech Grossly Normal] : speech grossly normal [Normal Affect] : the affect was normal [Normal Mood] : the mood was normal [Normal Insight/Judgement] : insight and judgment were intact [de-identified] : decreased  clear BS B/L  [de-identified] : +ve murmur

## 2023-01-04 NOTE — PLAN
[FreeTextEntry1] : INR=3.8 \par \par see radiology orders \par \par cont'd GYN f/u  secondary to vaginal bleeding and simple 4.9 cm L ovarian cyst  \par \par scheduled for IV Fe infusion #3 of 5 today   1/4/23 c Dr. RAGHAVENDRA Maradiaga \par \par Prednisone 40mg qd x 2d then 20mg qd x 5d,   CLOSE monitoring of INR.  pt instructed to decrease usual 9mg qd dose to 5mg qd today, tomorrow,and Friday.  Home INR check early am on Friday.  pt to call c result, further instruction to be given at that time \par \par see med orders\par HOLD  statin therapy x 7days

## 2023-01-04 NOTE — HISTORY OF PRESENT ILLNESS
[FreeTextEntry1] : JUANY is a 37 year old female here for eval of cough  c blood  [de-identified] : \par \par s/p [facility] urgent care eval on 12/30/22   NEGATIVE COVID-19 and NEGATIVE for Influenza secondary to persistent cough c hemoptysis \par supportive measures  only   \par pt reports hemoptysis on 1/1/23  improved this am   now c intermittent blood speckled phlegm and  sore throat associated c laryngitis \par \par Denies fever  +/- chills and sweats \par +ve decreased energy level \par decreased appetite

## 2023-01-08 ENCOUNTER — RX RENEWAL (OUTPATIENT)
Age: 38
End: 2023-01-08

## 2023-01-10 ENCOUNTER — RX RENEWAL (OUTPATIENT)
Age: 38
End: 2023-01-10

## 2023-01-10 RX ORDER — METOPROLOL SUCCINATE 25 MG/1
25 TABLET, EXTENDED RELEASE ORAL
Qty: 180 | Refills: 2 | Status: ACTIVE | COMMUNITY
Start: 2018-02-27 | End: 1900-01-01

## 2023-01-11 ENCOUNTER — RX RENEWAL (OUTPATIENT)
Age: 38
End: 2023-01-11

## 2023-01-14 ENCOUNTER — NON-APPOINTMENT (OUTPATIENT)
Age: 38
End: 2023-01-14

## 2023-01-14 ENCOUNTER — EMERGENCY (EMERGENCY)
Facility: HOSPITAL | Age: 38
LOS: 1 days | Discharge: HOME | End: 2023-01-14
Attending: EMERGENCY MEDICINE
Payer: MEDICAID

## 2023-01-14 ENCOUNTER — TRANSCRIPTION ENCOUNTER (OUTPATIENT)
Age: 38
End: 2023-01-14

## 2023-01-14 VITALS
HEART RATE: 111 BPM | TEMPERATURE: 98 F | WEIGHT: 189.38 LBS | SYSTOLIC BLOOD PRESSURE: 114 MMHG | RESPIRATION RATE: 22 BRPM | DIASTOLIC BLOOD PRESSURE: 80 MMHG | OXYGEN SATURATION: 97 % | HEIGHT: 72 IN

## 2023-01-14 DIAGNOSIS — Z98.1 ARTHRODESIS STATUS: Chronic | ICD-10-CM

## 2023-01-14 DIAGNOSIS — Z96.1 PRESENCE OF INTRAOCULAR LENS: Chronic | ICD-10-CM

## 2023-01-14 DIAGNOSIS — Z95.4 PRESENCE OF OTHER HEART-VALVE REPLACEMENT: Chronic | ICD-10-CM

## 2023-01-14 LAB
ALBUMIN SERPL ELPH-MCNC: 4 G/DL — SIGNIFICANT CHANGE UP (ref 3.3–5.2)
ALP SERPL-CCNC: 87 U/L — SIGNIFICANT CHANGE UP (ref 40–120)
ALT FLD-CCNC: 13 U/L — SIGNIFICANT CHANGE UP
ANION GAP SERPL CALC-SCNC: 14 MMOL/L — SIGNIFICANT CHANGE UP (ref 5–17)
APTT BLD: 38.3 SEC — HIGH (ref 27.5–35.5)
AST SERPL-CCNC: 34 U/L — HIGH
BASE EXCESS BLDV CALC-SCNC: -0.8 MMOL/L — SIGNIFICANT CHANGE UP (ref -2–3)
BILIRUB SERPL-MCNC: 0.8 MG/DL — SIGNIFICANT CHANGE UP (ref 0.4–2)
BUN SERPL-MCNC: 16.8 MG/DL — SIGNIFICANT CHANGE UP (ref 8–20)
CA-I SERPL-SCNC: 1.22 MMOL/L — SIGNIFICANT CHANGE UP (ref 1.15–1.33)
CALCIUM SERPL-MCNC: 9.6 MG/DL — SIGNIFICANT CHANGE UP (ref 8.4–10.5)
CHLORIDE BLDV-SCNC: 102 MMOL/L — SIGNIFICANT CHANGE UP (ref 96–108)
CHLORIDE SERPL-SCNC: 100 MMOL/L — SIGNIFICANT CHANGE UP (ref 96–108)
CO2 SERPL-SCNC: 21 MMOL/L — LOW (ref 22–29)
CREAT SERPL-MCNC: 0.87 MG/DL — SIGNIFICANT CHANGE UP (ref 0.5–1.3)
EGFR: 87 ML/MIN/1.73M2 — SIGNIFICANT CHANGE UP
GAS PNL BLDV: 135 MMOL/L — LOW (ref 136–145)
GAS PNL BLDV: SIGNIFICANT CHANGE UP
GLUCOSE BLDV-MCNC: 346 MG/DL — HIGH (ref 70–99)
GLUCOSE SERPL-MCNC: 292 MG/DL — HIGH (ref 70–99)
HCG SERPL-ACNC: <4 MIU/ML — SIGNIFICANT CHANGE UP
HCO3 BLDV-SCNC: 25 MMOL/L — SIGNIFICANT CHANGE UP (ref 22–29)
HCT VFR BLD CALC: 43.6 % — SIGNIFICANT CHANGE UP (ref 34.5–45)
HCT VFR BLDA CALC: 38 % — SIGNIFICANT CHANGE UP
HGB BLD CALC-MCNC: 12.8 G/DL — SIGNIFICANT CHANGE UP (ref 11.7–16.1)
HGB BLD-MCNC: 12.4 G/DL — SIGNIFICANT CHANGE UP (ref 11.5–15.5)
INR BLD: 1.48 RATIO — HIGH (ref 0.88–1.16)
LACTATE BLDV-MCNC: 3.1 MMOL/L — HIGH (ref 0.5–2)
MCHC RBC-ENTMCNC: 22.9 PG — LOW (ref 27–34)
MCHC RBC-ENTMCNC: 28.4 GM/DL — LOW (ref 32–36)
MCV RBC AUTO: 80.6 FL — SIGNIFICANT CHANGE UP (ref 80–100)
NT-PROBNP SERPL-SCNC: 59 PG/ML — SIGNIFICANT CHANGE UP (ref 0–300)
PCO2 BLDV: 45 MMHG — HIGH (ref 39–42)
PH BLDV: 7.35 — SIGNIFICANT CHANGE UP (ref 7.32–7.43)
PLATELET # BLD AUTO: 372 K/UL — SIGNIFICANT CHANGE UP (ref 150–400)
PO2 BLDV: <42 MMHG — SIGNIFICANT CHANGE UP (ref 25–45)
POTASSIUM BLDV-SCNC: 5.4 MMOL/L — HIGH (ref 3.5–5.1)
POTASSIUM SERPL-MCNC: 5.1 MMOL/L — SIGNIFICANT CHANGE UP (ref 3.5–5.3)
POTASSIUM SERPL-SCNC: 5.1 MMOL/L — SIGNIFICANT CHANGE UP (ref 3.5–5.3)
PROT SERPL-MCNC: 7.6 G/DL — SIGNIFICANT CHANGE UP (ref 6.6–8.7)
PROTHROM AB SERPL-ACNC: 17.2 SEC — HIGH (ref 10.5–13.4)
RBC # BLD: 5.41 M/UL — HIGH (ref 3.8–5.2)
RBC # FLD: 29.2 % — HIGH (ref 10.3–14.5)
SAO2 % BLDV: 62.9 % — SIGNIFICANT CHANGE UP
SODIUM SERPL-SCNC: 135 MMOL/L — SIGNIFICANT CHANGE UP (ref 135–145)
TROPONIN T SERPL-MCNC: <0.01 NG/ML — SIGNIFICANT CHANGE UP (ref 0–0.06)
WBC # BLD: 16.51 K/UL — HIGH (ref 3.8–10.5)
WBC # FLD AUTO: 16.51 K/UL — HIGH (ref 3.8–10.5)

## 2023-01-14 PROCEDURE — 99285 EMERGENCY DEPT VISIT HI MDM: CPT

## 2023-01-14 PROCEDURE — 71045 X-RAY EXAM CHEST 1 VIEW: CPT | Mod: 26

## 2023-01-14 PROCEDURE — 93010 ELECTROCARDIOGRAM REPORT: CPT

## 2023-01-14 NOTE — ED PROVIDER NOTE - CLINICAL SUMMARY MEDICAL DECISION MAKING FREE TEXT BOX
cough with sob;  nontherapeutic inr;  labs, inr; ct of chest; cough with sob;  nontherapeutic inr;  labs, inr; ct of chest for evaluation for pe;

## 2023-01-14 NOTE — ED PROVIDER NOTE - NSFOLLOWUPINSTRUCTIONS_ED_ALL_ED_FT
lovenox 85mg SQ x 1 given   take coumadin 9mg by mouth this evening  check INR at home tomorrow  if inr>3 restart coumadin at normal dosage  return to ed with worse symptoms

## 2023-01-14 NOTE — ED ADULT TRIAGE NOTE - CHIEF COMPLAINT QUOTE
C/O of SOB and chest pain for the past few days. increased dyspnea on exertion. Was recently wick and on a zpack but has not felt better. Hx of mechanical valve replacement. On Coumadin.

## 2023-01-14 NOTE — ED PROVIDER NOTE - PHYSICAL EXAMINATION
Alert, lucid, and in no apparent distress. Pt is normocephalic, atraumatic.  Pupils are equal, round,  lips pink, moist mucous membranes, tongue midline. Neck supple.   Lungs clear to auscultation. Heart regular rate and rhythm, normal S1, S2,  Abdomen is soft, nontender, no pulsatile mass, no masses,   Non-focal sensory, 5 out of 5 motor strength, no dysmetria, fluent, goal directed speech. CN2 to 12 intact. Skin without rash,  Normal mentation, does not appear agitated

## 2023-01-14 NOTE — ED ADULT NURSE NOTE - OBJECTIVE STATEMENT
Aox4. Pt presenting to Emergency Department complaining of SOB and cough x 1 week. Pt reports being prescribed antibiotics at home. Pt states she went to see her PCP and her INR was 1.4 and recommended to come to ER. Pt placed on cardiac monitor in Sinus tach 104 HR and  at 96% RA.  Respirations even and unlabored. Skin warm to touch.

## 2023-01-14 NOTE — ED PROVIDER NOTE - OBJECTIVE STATEMENT
37-year-old female past medical history of Ehlos Danlo syndrome Marfan syndrome diabetes dilated aortic root comes to the ED with 1 week history of cough shortness of breath.  Patient seen by her primary care doctor who put patient on Zithromax and prednisone patient notes no improvement of symptoms on the medication.  Patient also noted having done home INR test which she noted was 1.4.  Patient noted self treated herself with a Coumadin dose of 9 mg yesterday.  Patient notes still having cough and shortness of breath.  Patient also noted having a chest x-ray done earlier in the week which was noted to be normal.

## 2023-01-14 NOTE — ED PROVIDER NOTE - PATIENT PORTAL LINK FT
You can access the FollowMyHealth Patient Portal offered by [facility] by registering at the following website: [email or web address]. By joining [email or web address]’s FollowMyHealth portal, you will also be able to view your health information using other applications (apps) compatible with our system.

## 2023-01-15 ENCOUNTER — TRANSCRIPTION ENCOUNTER (OUTPATIENT)
Age: 38
End: 2023-01-15

## 2023-01-15 ENCOUNTER — NON-APPOINTMENT (OUTPATIENT)
Age: 38
End: 2023-01-15

## 2023-01-15 LAB
ACANTHOCYTES BLD QL SMEAR: SLIGHT — SIGNIFICANT CHANGE UP
ANISOCYTOSIS BLD QL: SLIGHT — SIGNIFICANT CHANGE UP
BASOPHILS # BLD AUTO: 0.15 K/UL — SIGNIFICANT CHANGE UP (ref 0–0.2)
BASOPHILS NFR BLD AUTO: 0.9 % — SIGNIFICANT CHANGE UP (ref 0–2)
ELLIPTOCYTES BLD QL SMEAR: SLIGHT — SIGNIFICANT CHANGE UP
EOSINOPHIL # BLD AUTO: 0.15 K/UL — SIGNIFICANT CHANGE UP (ref 0–0.5)
EOSINOPHIL NFR BLD AUTO: 0.9 % — SIGNIFICANT CHANGE UP (ref 0–6)
LYMPHOCYTES # BLD AUTO: 1.8 K/UL — SIGNIFICANT CHANGE UP (ref 1–3.3)
LYMPHOCYTES # BLD AUTO: 10.9 % — LOW (ref 13–44)
MACROCYTES BLD QL: SLIGHT — SIGNIFICANT CHANGE UP
MANUAL SMEAR VERIFICATION: SIGNIFICANT CHANGE UP
MICROCYTES BLD QL: SLIGHT — SIGNIFICANT CHANGE UP
MONOCYTES # BLD AUTO: 1.21 K/UL — HIGH (ref 0–0.9)
MONOCYTES NFR BLD AUTO: 7.3 % — SIGNIFICANT CHANGE UP (ref 2–14)
NEUTROPHILS # BLD AUTO: 13.21 K/UL — HIGH (ref 1.8–7.4)
NEUTROPHILS NFR BLD AUTO: 80 % — HIGH (ref 43–77)
PLAT MORPH BLD: NORMAL — SIGNIFICANT CHANGE UP
POIKILOCYTOSIS BLD QL AUTO: SIGNIFICANT CHANGE UP
RAPID RVP RESULT: SIGNIFICANT CHANGE UP
RBC BLD AUTO: ABNORMAL
SARS-COV-2 RNA SPEC QL NAA+PROBE: SIGNIFICANT CHANGE UP
SMUDGE CELLS # BLD: PRESENT — SIGNIFICANT CHANGE UP

## 2023-01-15 PROCEDURE — 71045 X-RAY EXAM CHEST 1 VIEW: CPT

## 2023-01-15 PROCEDURE — 83605 ASSAY OF LACTIC ACID: CPT

## 2023-01-15 PROCEDURE — 0225U NFCT DS DNA&RNA 21 SARSCOV2: CPT

## 2023-01-15 PROCEDURE — 80053 COMPREHEN METABOLIC PANEL: CPT

## 2023-01-15 PROCEDURE — 36415 COLL VENOUS BLD VENIPUNCTURE: CPT

## 2023-01-15 PROCEDURE — 84702 CHORIONIC GONADOTROPIN TEST: CPT

## 2023-01-15 PROCEDURE — 85018 HEMOGLOBIN: CPT

## 2023-01-15 PROCEDURE — 85025 COMPLETE CBC W/AUTO DIFF WBC: CPT

## 2023-01-15 PROCEDURE — 82947 ASSAY GLUCOSE BLOOD QUANT: CPT

## 2023-01-15 PROCEDURE — 83880 ASSAY OF NATRIURETIC PEPTIDE: CPT

## 2023-01-15 PROCEDURE — 84295 ASSAY OF SERUM SODIUM: CPT

## 2023-01-15 PROCEDURE — 96372 THER/PROPH/DIAG INJ SC/IM: CPT

## 2023-01-15 PROCEDURE — 93005 ELECTROCARDIOGRAM TRACING: CPT

## 2023-01-15 PROCEDURE — 85730 THROMBOPLASTIN TIME PARTIAL: CPT

## 2023-01-15 PROCEDURE — 82803 BLOOD GASES ANY COMBINATION: CPT

## 2023-01-15 PROCEDURE — 82435 ASSAY OF BLOOD CHLORIDE: CPT

## 2023-01-15 PROCEDURE — 82330 ASSAY OF CALCIUM: CPT

## 2023-01-15 PROCEDURE — 85014 HEMATOCRIT: CPT

## 2023-01-15 PROCEDURE — 84484 ASSAY OF TROPONIN QUANT: CPT

## 2023-01-15 PROCEDURE — 99285 EMERGENCY DEPT VISIT HI MDM: CPT

## 2023-01-15 PROCEDURE — 84132 ASSAY OF SERUM POTASSIUM: CPT

## 2023-01-15 PROCEDURE — 85610 PROTHROMBIN TIME: CPT

## 2023-01-15 RX ORDER — ENOXAPARIN SODIUM 100 MG/ML
85 INJECTION SUBCUTANEOUS ONCE
Refills: 0 | Status: COMPLETED | OUTPATIENT
Start: 2023-01-15 | End: 2023-01-15

## 2023-01-15 RX ADMIN — ENOXAPARIN SODIUM 85 MILLIGRAM(S): 100 INJECTION SUBCUTANEOUS at 00:35

## 2023-01-25 ENCOUNTER — LABORATORY RESULT (OUTPATIENT)
Age: 38
End: 2023-01-25

## 2023-01-25 ENCOUNTER — APPOINTMENT (OUTPATIENT)
Dept: FAMILY MEDICINE | Facility: CLINIC | Age: 38
End: 2023-01-25
Payer: MEDICAID

## 2023-01-25 VITALS
DIASTOLIC BLOOD PRESSURE: 68 MMHG | HEIGHT: 72 IN | BODY MASS INDEX: 25.87 KG/M2 | OXYGEN SATURATION: 96 % | HEART RATE: 122 BPM | SYSTOLIC BLOOD PRESSURE: 110 MMHG | TEMPERATURE: 98.6 F | WEIGHT: 191 LBS

## 2023-01-25 DIAGNOSIS — R11.2 NAUSEA WITH VOMITING, UNSPECIFIED: ICD-10-CM

## 2023-01-25 DIAGNOSIS — R19.7 DIARRHEA, UNSPECIFIED: ICD-10-CM

## 2023-01-25 PROCEDURE — 36415 COLL VENOUS BLD VENIPUNCTURE: CPT

## 2023-01-25 PROCEDURE — 99214 OFFICE O/P EST MOD 30 MIN: CPT | Mod: 25

## 2023-01-25 RX ORDER — AZITHROMYCIN 250 MG/1
250 TABLET, FILM COATED ORAL
Qty: 1 | Refills: 0 | Status: COMPLETED | COMMUNITY
Start: 2023-01-04 | End: 2023-01-25

## 2023-01-25 NOTE — PHYSICAL EXAM
[No Acute Distress] : no acute distress [Well Nourished] : well nourished [Well Developed] : well developed [Well-Appearing] : well-appearing [EOMI] : extraocular movements intact [Normal Outer Ear/Nose] : the outer ears and nose were normal in appearance [No JVD] : no jugular venous distention [No Respiratory Distress] : no respiratory distress  [No Accessory Muscle Use] : no accessory muscle use [Clear to Auscultation] : lungs were clear to auscultation bilaterally [Normal Rate] : normal rate  [Regular Rhythm] : with a regular rhythm [Normal S1, S2] : normal S1 and S2 [Soft] : abdomen soft [Non Tender] : non-tender [Non-distended] : non-distended [No Masses] : no abdominal mass palpated [No HSM] : no HSM [Normal Bowel Sounds] : normal bowel sounds [No CVA Tenderness] : no CVA  tenderness [Grossly Normal Strength/Tone] : grossly normal strength/tone [No Rash] : no rash [Coordination Grossly Intact] : coordination grossly intact [Speech Grossly Normal] : speech grossly normal [Normal Affect] : the affect was normal [Normal Mood] : the mood was normal [Normal Insight/Judgement] : insight and judgment were intact [de-identified] : +ve murmur

## 2023-01-25 NOTE — PLAN
[FreeTextEntry1] :  Citrus diet  \par  chicken and beef broth x 24 hrs (no veggies  no meat)    following 24hrs increase to broths c rice and/or noodles (no veggies/meat) \par followed BRAT  diet x 48hrs   \par no dairy x 1 week    \par \par see lab orders    (Serum labs in office) \par \par INR=3.4 on 1/24/23   cont Warfarin 9mg qd and repeat INR 1 week \par \par f/u pending lab results

## 2023-01-25 NOTE — HISTORY OF PRESENT ILLNESS
[FreeTextEntry8] : JUANY is a 37 year old female here for c/o of vomiting and diarrhea. \par \par 36 yo female c 3 week hx of nausea and diarrhea.   pt states symptoms started post IV Fe Infusion x 5  qod from 1/2/23-1/11/23.   pt also completed abx course  on 1/8/23.  \par ** pt is also s/p Lovenox therapy which was completed 1/18/23\par pt reports up to 3 BM's/day   +ve loose stool c each bowel movement.  normal in color.   DENIES  bloody stools    +ve N/V no hematemesis\par denies change in odor \par Denies mucoid stools    \par \par Denies fever, "But I feel feverish"      'I feel sick "       pt reports resolved heavy vaginal bleeding since 1//4/23

## 2023-01-26 ENCOUNTER — LABORATORY RESULT (OUTPATIENT)
Age: 38
End: 2023-01-26

## 2023-02-02 ENCOUNTER — NON-APPOINTMENT (OUTPATIENT)
Age: 38
End: 2023-02-02

## 2023-02-14 ENCOUNTER — ASOB RESULT (OUTPATIENT)
Age: 38
End: 2023-02-14

## 2023-02-14 ENCOUNTER — NON-APPOINTMENT (OUTPATIENT)
Age: 38
End: 2023-02-14

## 2023-02-14 ENCOUNTER — APPOINTMENT (OUTPATIENT)
Dept: ANTEPARTUM | Facility: CLINIC | Age: 38
End: 2023-02-14
Payer: MEDICAID

## 2023-02-14 ENCOUNTER — APPOINTMENT (OUTPATIENT)
Dept: OBGYN | Facility: CLINIC | Age: 38
End: 2023-02-14
Payer: MEDICAID

## 2023-02-14 VITALS
DIASTOLIC BLOOD PRESSURE: 58 MMHG | HEIGHT: 62 IN | WEIGHT: 183 LBS | BODY MASS INDEX: 33.68 KG/M2 | SYSTOLIC BLOOD PRESSURE: 116 MMHG

## 2023-02-14 DIAGNOSIS — N93.9 ABNORMAL UTERINE AND VAGINAL BLEEDING, UNSPECIFIED: ICD-10-CM

## 2023-02-14 PROCEDURE — 99214 OFFICE O/P EST MOD 30 MIN: CPT

## 2023-02-14 PROCEDURE — 76830 TRANSVAGINAL US NON-OB: CPT

## 2023-02-20 ENCOUNTER — RX RENEWAL (OUTPATIENT)
Age: 38
End: 2023-02-20

## 2023-02-20 RX ORDER — WARFARIN 6 MG/1
6 TABLET ORAL
Qty: 135 | Refills: 0 | Status: ACTIVE | COMMUNITY
Start: 2017-01-27 | End: 1900-01-01

## 2023-02-20 NOTE — HISTORY OF PRESENT ILLNESS
[HPV test offered] : HPV test offered [Y] : Patient is sexually active [Ultrasound] : ultrasound [N] : Patient denies prior pregnancies [Menarche Age: ____] : age at menarche was [unfilled] [Currently Active] : currently active [Men] : men [PapSmeardate] : 07/19/2022 [TextBox_31] : ASCUS [HPVDate] : 07/19/2022 [TextBox_78] : Positive [LMPDate] : 02/08/2023 [TextBox_27] : Pelvic: 10/28/2022 [FreeTextEntry1] : 02/08/2023

## 2023-02-20 NOTE — DISCUSSION/SUMMARY
[FreeTextEntry1] : 38 y/o G0 LMP 11/22/22 presenting for GYN follow up visit 2/2 Menorrhagia/AUB. \par TVUS today: unchanged from previous -- EMS 7.1mm, 1cm submucosal fibroid, 2cm left ovarian cyst (complex, likely hemorrhagic)\par CBC on 1/25: Hgb 11.5\par Vital signs and clinical presentation today reassuring. 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 so PRG treatment should not be risk factor for recurrence \par - discussed that at this point, use of PRG therapy benefits outweigh risks given chronic anemia, known heart conditions, and ongoing and expected long term management with anticoagulation \par - discussed that ablation vs UAE vs surgery (hysterectomy) are more definitive but given her comorbidities a procedural approach with general anesthesia would come with significant risk and these interventions would not be conducive and/or eliminate possibility of fertility in the future, patient verbalized understanding and declines all these options \par - she verbalized understanding, she does not want to trial DMPA/Mirena because she still has desire for fertility and she would like to be able to discontinue these contraceptives as soon as possible for pregnancy \par - Rx for Norethindrone 5mg PO daily \par - discussed role for D&C/Hysteroscopy but implications of having to do it in hospital due to her comorbidities, will hold off for now given satisfactory response to PRG \par - currently no symptoms of anemia, blood work a few days ago reassuring, will f/u with PCP for repeat labs per patient request \par - encouraged continued follow up with Cardio/Hematology for multidisciplinary approach to her care \par - discussed role of MFM consult for pre-conception consultation, she was amenable, referral given today \par \par She verbalized understanding and agreement with above counseling regarding differential diagnosis, evaluation, and plan. \par She was given time for questions/concerns which were all answered to her apparent satisfaction.\par All designated lab work for today drawn in office. \par \par RTO in 1 month for f/u \par Given RTC precautions for bleeding

## 2023-03-15 ENCOUNTER — APPOINTMENT (OUTPATIENT)
Dept: FAMILY MEDICINE | Facility: CLINIC | Age: 38
End: 2023-03-15
Payer: MEDICAID

## 2023-03-15 PROCEDURE — 90471 IMMUNIZATION ADMIN: CPT

## 2023-03-15 PROCEDURE — 90715 TDAP VACCINE 7 YRS/> IM: CPT

## 2023-03-17 ENCOUNTER — NON-APPOINTMENT (OUTPATIENT)
Age: 38
End: 2023-03-17

## 2023-03-31 ENCOUNTER — RX RENEWAL (OUTPATIENT)
Age: 38
End: 2023-03-31

## 2023-03-31 ENCOUNTER — APPOINTMENT (OUTPATIENT)
Dept: ANTEPARTUM | Facility: CLINIC | Age: 38
End: 2023-03-31
Payer: MEDICAID

## 2023-03-31 ENCOUNTER — NON-APPOINTMENT (OUTPATIENT)
Age: 38
End: 2023-03-31

## 2023-03-31 ENCOUNTER — APPOINTMENT (OUTPATIENT)
Dept: OBGYN | Facility: CLINIC | Age: 38
End: 2023-03-31
Payer: MEDICAID

## 2023-03-31 ENCOUNTER — ASOB RESULT (OUTPATIENT)
Age: 38
End: 2023-03-31

## 2023-03-31 VITALS
WEIGHT: 183 LBS | HEIGHT: 72 IN | SYSTOLIC BLOOD PRESSURE: 126 MMHG | BODY MASS INDEX: 24.79 KG/M2 | DIASTOLIC BLOOD PRESSURE: 74 MMHG

## 2023-03-31 PROCEDURE — 99214 OFFICE O/P EST MOD 30 MIN: CPT

## 2023-03-31 PROCEDURE — 76830 TRANSVAGINAL US NON-OB: CPT

## 2023-03-31 PROCEDURE — 36415 COLL VENOUS BLD VENIPUNCTURE: CPT

## 2023-04-01 ENCOUNTER — NON-APPOINTMENT (OUTPATIENT)
Age: 38
End: 2023-04-01

## 2023-04-04 ENCOUNTER — EMERGENCY (EMERGENCY)
Facility: HOSPITAL | Age: 38
LOS: 1 days | Discharge: HOME | End: 2023-04-04
Attending: EMERGENCY MEDICINE
Payer: MEDICAID

## 2023-04-04 ENCOUNTER — NON-APPOINTMENT (OUTPATIENT)
Age: 38
End: 2023-04-04

## 2023-04-04 VITALS
OXYGEN SATURATION: 99 % | HEIGHT: 72 IN | RESPIRATION RATE: 20 BRPM | TEMPERATURE: 98 F | DIASTOLIC BLOOD PRESSURE: 66 MMHG | WEIGHT: 182.98 LBS | SYSTOLIC BLOOD PRESSURE: 97 MMHG | HEART RATE: 102 BPM

## 2023-04-04 DIAGNOSIS — Z96.1 PRESENCE OF INTRAOCULAR LENS: Chronic | ICD-10-CM

## 2023-04-04 DIAGNOSIS — Z98.1 ARTHRODESIS STATUS: Chronic | ICD-10-CM

## 2023-04-04 DIAGNOSIS — Z95.4 PRESENCE OF OTHER HEART-VALVE REPLACEMENT: Chronic | ICD-10-CM

## 2023-04-04 LAB
ALBUMIN SERPL ELPH-MCNC: 3.9 G/DL — SIGNIFICANT CHANGE UP (ref 3.3–5.2)
ALP SERPL-CCNC: 62 U/L — SIGNIFICANT CHANGE UP (ref 40–120)
ALT FLD-CCNC: 6 U/L — SIGNIFICANT CHANGE UP
ANION GAP SERPL CALC-SCNC: 14 MMOL/L — SIGNIFICANT CHANGE UP (ref 5–17)
APPEARANCE UR: ABNORMAL
APTT BLD: 74.1 SEC — HIGH (ref 27.5–35.5)
AST SERPL-CCNC: 12 U/L — SIGNIFICANT CHANGE UP
BACTERIA # UR AUTO: ABNORMAL
BASOPHILS # BLD AUTO: 0.04 K/UL — SIGNIFICANT CHANGE UP (ref 0–0.2)
BASOPHILS # BLD AUTO: 0.06 K/UL
BASOPHILS NFR BLD AUTO: 0.3 %
BASOPHILS NFR BLD AUTO: 0.3 % — SIGNIFICANT CHANGE UP (ref 0–2)
BILIRUB SERPL-MCNC: 0.2 MG/DL — LOW (ref 0.4–2)
BILIRUB UR-MCNC: NEGATIVE — SIGNIFICANT CHANGE UP
BLD GP AB SCN SERPL QL: SIGNIFICANT CHANGE UP
BUN SERPL-MCNC: 15.3 MG/DL — SIGNIFICANT CHANGE UP (ref 8–20)
CALCIUM SERPL-MCNC: 9.2 MG/DL — SIGNIFICANT CHANGE UP (ref 8.4–10.5)
CHLORIDE SERPL-SCNC: 105 MMOL/L — SIGNIFICANT CHANGE UP (ref 96–108)
CO2 SERPL-SCNC: 20 MMOL/L — LOW (ref 22–29)
COLOR SPEC: YELLOW — SIGNIFICANT CHANGE UP
CREAT SERPL-MCNC: 0.63 MG/DL — SIGNIFICANT CHANGE UP (ref 0.5–1.3)
DIFF PNL FLD: ABNORMAL
EGFR: 115 ML/MIN/1.73M2 — SIGNIFICANT CHANGE UP
EOSINOPHIL # BLD AUTO: 0.19 K/UL — SIGNIFICANT CHANGE UP (ref 0–0.5)
EOSINOPHIL # BLD AUTO: 0.23 K/UL
EOSINOPHIL NFR BLD AUTO: 1.2 % — SIGNIFICANT CHANGE UP (ref 0–6)
EOSINOPHIL NFR BLD AUTO: 1.3 %
EPI CELLS # UR: SIGNIFICANT CHANGE UP
GLUCOSE SERPL-MCNC: 154 MG/DL — HIGH (ref 70–99)
GLUCOSE UR QL: NEGATIVE — SIGNIFICANT CHANGE UP
HCT VFR BLD CALC: 28.7 % — LOW (ref 34.5–45)
HCT VFR BLD CALC: 30.6 % — LOW (ref 34.5–45)
HCT VFR BLD CALC: 37.4 %
HGB BLD-MCNC: 11.7 G/DL
HGB BLD-MCNC: 9 G/DL — LOW (ref 11.5–15.5)
HGB BLD-MCNC: 9.6 G/DL — LOW (ref 11.5–15.5)
IMM GRANULOCYTES NFR BLD AUTO: 0.7 %
IMM GRANULOCYTES NFR BLD AUTO: 0.8 % — SIGNIFICANT CHANGE UP (ref 0–0.9)
INR BLD: 6.77 RATIO — CRITICAL HIGH (ref 0.88–1.16)
KETONES UR-MCNC: ABNORMAL
LEUKOCYTE ESTERASE UR-ACNC: ABNORMAL
LYMPHOCYTES # BLD AUTO: 15.4 % — SIGNIFICANT CHANGE UP (ref 13–44)
LYMPHOCYTES # BLD AUTO: 2.41 K/UL — SIGNIFICANT CHANGE UP (ref 1–3.3)
LYMPHOCYTES # BLD AUTO: 2.68 K/UL
LYMPHOCYTES NFR BLD AUTO: 14.9 %
MAN DIFF?: NORMAL
MCHC RBC-ENTMCNC: 27 PG
MCHC RBC-ENTMCNC: 27 PG — SIGNIFICANT CHANGE UP (ref 27–34)
MCHC RBC-ENTMCNC: 31.3 GM/DL
MCHC RBC-ENTMCNC: 31.4 GM/DL — LOW (ref 32–36)
MCV RBC AUTO: 86 FL — SIGNIFICANT CHANGE UP (ref 80–100)
MCV RBC AUTO: 86.4 FL
MONOCYTES # BLD AUTO: 0.81 K/UL — SIGNIFICANT CHANGE UP (ref 0–0.9)
MONOCYTES # BLD AUTO: 1.05 K/UL
MONOCYTES NFR BLD AUTO: 5.2 % — SIGNIFICANT CHANGE UP (ref 2–14)
MONOCYTES NFR BLD AUTO: 5.8 %
NEUTROPHILS # BLD AUTO: 12.05 K/UL — HIGH (ref 1.8–7.4)
NEUTROPHILS # BLD AUTO: 13.8 K/UL
NEUTROPHILS NFR BLD AUTO: 77 %
NEUTROPHILS NFR BLD AUTO: 77.1 % — HIGH (ref 43–77)
NITRITE UR-MCNC: POSITIVE
PH UR: 5 — SIGNIFICANT CHANGE UP (ref 5–8)
PLATELET # BLD AUTO: 353 K/UL — SIGNIFICANT CHANGE UP (ref 150–400)
PLATELET # BLD AUTO: 396 K/UL
POTASSIUM SERPL-MCNC: 4.3 MMOL/L — SIGNIFICANT CHANGE UP (ref 3.5–5.3)
POTASSIUM SERPL-SCNC: 4.3 MMOL/L — SIGNIFICANT CHANGE UP (ref 3.5–5.3)
PROT SERPL-MCNC: 7.1 G/DL — SIGNIFICANT CHANGE UP (ref 6.6–8.7)
PROT UR-MCNC: NEGATIVE — SIGNIFICANT CHANGE UP
PROTHROM AB SERPL-ACNC: 80.1 SEC — HIGH (ref 10.5–13.4)
RAPID RVP RESULT: SIGNIFICANT CHANGE UP
RBC # BLD: 3.56 M/UL — LOW (ref 3.8–5.2)
RBC # BLD: 4.33 M/UL
RBC # FLD: 17.6 % — HIGH (ref 10.3–14.5)
RBC # FLD: 17.9 %
RBC CASTS # UR COMP ASSIST: >50 /HPF (ref 0–4)
SARS-COV-2 RNA SPEC QL NAA+PROBE: SIGNIFICANT CHANGE UP
SODIUM SERPL-SCNC: 139 MMOL/L — SIGNIFICANT CHANGE UP (ref 135–145)
SP GR SPEC: 1.02 — SIGNIFICANT CHANGE UP (ref 1.01–1.02)
TROPONIN T SERPL-MCNC: <0.01 NG/ML — SIGNIFICANT CHANGE UP (ref 0–0.06)
TSH SERPL-MCNC: 3.31 UIU/ML — SIGNIFICANT CHANGE UP (ref 0.27–4.2)
UROBILINOGEN FLD QL: NEGATIVE — SIGNIFICANT CHANGE UP
WBC # BLD: 15.62 K/UL — HIGH (ref 3.8–10.5)
WBC # FLD AUTO: 15.62 K/UL — HIGH (ref 3.8–10.5)
WBC # FLD AUTO: 17.95 K/UL
WBC UR QL: SIGNIFICANT CHANGE UP /HPF (ref 0–5)

## 2023-04-04 PROCEDURE — 71045 X-RAY EXAM CHEST 1 VIEW: CPT | Mod: 26

## 2023-04-04 PROCEDURE — 99285 EMERGENCY DEPT VISIT HI MDM: CPT

## 2023-04-04 PROCEDURE — 93010 ELECTROCARDIOGRAM REPORT: CPT

## 2023-04-04 RX ORDER — ACETAMINOPHEN 500 MG
1000 TABLET ORAL ONCE
Refills: 0 | Status: COMPLETED | OUTPATIENT
Start: 2023-04-04 | End: 2023-04-04

## 2023-04-04 RX ORDER — MORPHINE SULFATE 50 MG/1
2 CAPSULE, EXTENDED RELEASE ORAL ONCE
Refills: 0 | Status: DISCONTINUED | OUTPATIENT
Start: 2023-04-04 | End: 2023-04-04

## 2023-04-04 RX ORDER — PHYTONADIONE (VIT K1) 5 MG
5 TABLET ORAL ONCE
Refills: 0 | Status: COMPLETED | OUTPATIENT
Start: 2023-04-04 | End: 2023-04-04

## 2023-04-04 RX ADMIN — Medication 1000 MILLIGRAM(S): at 16:25

## 2023-04-04 RX ADMIN — Medication 400 MILLIGRAM(S): at 15:55

## 2023-04-04 RX ADMIN — Medication 5 MILLIGRAM(S): at 21:43

## 2023-04-04 RX ADMIN — MORPHINE SULFATE 2 MILLIGRAM(S): 50 CAPSULE, EXTENDED RELEASE ORAL at 21:34

## 2023-04-04 NOTE — HISTORY OF PRESENT ILLNESS
[HPV test offered] : HPV test offered [Y] : Patient is sexually active [N] : Patient denies prior pregnancies [Menarche Age: ____] : age at menarche was [unfilled] [Currently Active] : currently active [Men] : men [PapSmeardate] : 07/19/2022 [TextBox_31] : ASCUS [HPVDate] : 07/19/2022 [TextBox_78] : Positive [LMPDate] : 02/09/23 [FreeTextEntry1] : 02/09/23

## 2023-04-04 NOTE — ED PROVIDER NOTE - PROGRESS NOTE DETAILS
JAVIER Aguilar: D/w Dr. Larkin of cardiology, rec start 5mg PO vit k; recheck INR as AM lab; if below 2.5 start heparin gtt; cont to monitor; transfusion consent already filed, patient to start 1u prbc

## 2023-04-04 NOTE — ED ADULT NURSE REASSESSMENT NOTE - NS ED NURSE REASSESS COMMENT FT1
Pt a&ox4, resting in stretcher, respirations even and unlabored. Pt c/o 8/10 lower abdominal pain. Pt medicated as per order

## 2023-04-04 NOTE — CONSULT NOTE ADULT - SUBJECTIVE AND OBJECTIVE BOX
[facility], [facility]                                   [address] [place] [address]                                                      PHONE: [phone]                                                         FAX: [phone]  [email or web address]  ---------------------------------------------------------------------------------------------------------------------------------    Reason for Consult: Anticoagulation management    HPI:  38 year old female with a PMHx of Marfan's disease s/p mechanical AVR/MVR and 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 Patient recently given birth control a few days ago by her OBGYN. Patient has been having worsening vaginal bleeding over the last few days and she presented to the ER. Patient denies any chest pain, palpitations, diaphoresis, near syncope, or syncope. Cardiology consulted for management of anticoagulation.    PAST MEDICAL & SURGICAL HISTORY:  Marfan syndrome      Fibromyalgia      Depression      Anxiety      Diabetes      Dilated aortic root      Mitral valve prolapse      H/O aortic valve replacement  2013      H/O spinal fusion  2005      H/O artificial lens replacement          No Known Allergies      MEDICATIONS  (STANDING):  morphine  - Injectable 2 milliGRAM(s) IV Push Once  phytonadione   Solution 5 milliGRAM(s) Oral Once    MEDICATIONS  (PRN):      Social History:  Cigarettes:  Denies current tobacco use                  Alcohol:  Denies daily etoh            Illicit Drug Abuse:  Denies    Family History:  denies CAD, MI, CVA, or sudden death.    ROS: Negative other than as mentioned in HPI.    Vital Signs Last 24 Hrs  T(C): 36.7 (04 Apr 2023 15:30), Max: 36.7 (04 Apr 2023 11:39)  T(F): 98.1 (04 Apr 2023 15:30), Max: 98.1 (04 Apr 2023 11:39)  HR: 76 (04 Apr 2023 15:30) (76 - 102)  BP: 105/66 (04 Apr 2023 15:30) (97/66 - 105/66)  BP(mean): --  RR: 18 (04 Apr 2023 15:30) (18 - 20)  SpO2: 98% (04 Apr 2023 15:30) (98% - 99%)    Parameters below as of 04 Apr 2023 15:30  Patient On (Oxygen Delivery Method): room air      ICU Vital Signs Last 24 Hrs  JUANY FELICIANO  I&O's Detail    I&O's Summary    Drug Dosing Weight  JUANY FELICIANO      PHYSICAL EXAM:  General: NAD  HEENT: Head; normocephalic, atraumatic.  Eyes: EOMI, conjunctiva normal  Neck: Supple, no JVD noted  CARDIOVASCULAR: Mildly tachycardic, regular rhythm, S1 S2  LUNGS: Clear to auscultation b/l, No rales, rhonchi or wheeze, normal inspiratory effort  ABDOMEN: Soft, nontender, non-distended, +bowel sounds  EXTREMITIES: No edema b/l, no cyanosis   SKIN: warm and dry  NEURO: Alert/oriented x 3  PSYCH: normal affect.        LABS:                        9.0    x     )-----------( x        ( 04 Apr 2023 16:35 )             28.7     04-04    139  |  105  |  15.3  ----------------------------<  154<H>  4.3   |  20.0<L>  |  0.63    Ca    9.2      04 Apr 2023 12:30    TPro  7.1  /  Alb  3.9  /  TBili  0.2<L>  /  DBili  x   /  AST  12  /  ALT  6   /  AlkPhos  62  04-04    JUANY FELICIANO  CARDIAC MARKERS ( 04 Apr 2023 12:30 )  x     / <0.01 ng/mL / x     / x     / x          PT/INR - ( 04 Apr 2023 12:30 )   PT: 80.1 sec;   INR: 6.77 ratio         PTT - ( 04 Apr 2023 12:30 )  PTT:74.1 sec      RADIOLOGY & ADDITIONAL STUDIES:    INTERPRETATION OF TELEMETRY (personally reviewed): No significant cardiac events.    ECG: Unable to determine rhythm due to quality, no acute ischemic changes    Assessment and Plan:  38 year old female with a PMHx of Marfan's disease s/p mechanical AVR/MVR and 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    Vaginal Bleeding  - transfusion as per primary team/OBGYN  - INR 6.7  - Ok to give vitamin K as the patient is in need of blood transfusion however would watch INR very closely to ensure patient's INR does not become subtherapeutic. If INR becomes <2.5, would start heparin gtt  - patient recently started on OCPs    Thank you for letting [facility] to assist in the management of this patient. Please call with any questions.

## 2023-04-04 NOTE — ED PROVIDER NOTE - ATTENDING CONTRIBUTION TO CARE
Patient seen with resident.  Here with vaginal bleeding and fatigue.  Also suprapubic/lower pelvic pain.  Patient with Marfan and mechanical valves on coumadin.  Has machine at home and was supratherapeutic at 6.9.  Patient states saw Gyn last week and on day 4 of new hormonal treatment.  Still passing clots and bleeding. No sx at rest but has PATTON and fatigue.  Otherwise baseline.  Non toxic.  Well appearing. No aggravating or relieving factors. No other pertinent PMH.  No other pertinent PSH.  No other pertinent FHx.  No fever/chills, No chest pain, no SOB/cough/wheeze/stridor, No abdominal pain, No N/V/D, no dysuria/frequency/discharge, No neck/back pain, no rash, no changes in neurological status/function.     Gen: Alert, NAD  Head: NC, AT, PERRL, EOMI, normal lids/conjunctiva  ENT: normal hearing, patent oropharynx without erythema/exudate, uvula midline  Neck: +supple, no tenderness/meningismus/JVD, +Trachea midline  Pulm: Bilateral BS, normal resp effort, no wheeze/stridor/retractions  CV: RRR, no R/G, +dist pulses  Abd: soft, NT/ND, +BS, no hepatosplenomegaly  Mskel: no edema/erythema/cyanosis  Skin: no rash  Neuro: AAOx3, no gross sensory/motor deficits Patient seen with resident.  Here with vaginal bleeding and fatigue.  Also suprapubic/lower pelvic pain.  Patient with Marfan and mechanical valves on coumadin.  Has machine at home and was supratherapeutic at 6.9.  Patient states saw Gyn last week and on day 4 of new hormonal treatment.  Still passing clots and bleeding. No sx at rest but has PATTON and fatigue.  Otherwise baseline.  Non toxic.  Well appearing. No aggravating or relieving factors. No other pertinent PMH.  No other pertinent PSH.  No other pertinent FHx.  No fever/chills, No chest pain, no SOB/cough/wheeze/stridor, No abdominal pain, No N/V/D, no dysuria/frequency/discharge, No neck/back pain, no rash, no changes in neurological status/function.     Gen: Alert, NAD  Head: NC, AT, PERRL, EOMI, normal lids/conjunctiva  ENT: normal hearing, patent oropharynx without erythema/exudate, uvula midline  Neck: +supple, no tenderness/meningismus/JVD, +Trachea midline  Pulm: Bilateral BS, normal resp effort, no wheeze/stridor/retractions  CV: RRR, no R/G, +dist pulses  Abd: soft, NT/ND, +BS, no hepatosplenomegaly  Mskel: no edema/erythema/cyanosis  Skin: no rash  Neuro: AAOx3, no gross sensory/motor deficits    patient with supratherapeutic INR and vaginal bleeding and anemia.  Lab confirm the same.  CT surg consulted and state defer to cards.  GYn consulted and states no intervention.  Pending cards/PMD consult with regard to lowering INR and best plan of continued care.  Patient signed out to incoming physician.  All decisions regarding the progression of care will be made at their discretion.

## 2023-04-04 NOTE — ED PROVIDER NOTE - CLINICAL SUMMARY MEDICAL DECISION MAKING FREE TEXT BOX
Pt presenting with symptomatic anemia from heavy prolonged vaginal bleeding on warfarin for mechanical valves. Getting labs, ekg, cxr. Giving small bolus anticipating giving blood.

## 2023-04-04 NOTE — ED ADULT NURSE REASSESSMENT NOTE - NS ED NURSE REASSESS COMMENT FT1
pt with order for blood transfusion. consent obtained by MD. pt denies any CP/SOB at this time. transfusion reactions discussed with pt. pt verbalizes understanding. pt agrees to blood transfusion. transfusion initiated at 2210 with JOS Flores.

## 2023-04-04 NOTE — ED ADULT NURSE REASSESSMENT NOTE - NS ED NURSE REASSESS COMMENT FT1
RN remained with pt for initial 15 min of transfusion. pt denies any current complaints at this time. pt denies any s/s of transfusion reaction.

## 2023-04-04 NOTE — ED ADULT NURSE REASSESSMENT NOTE - NS ED NURSE REASSESS COMMENT FT1
pt remains a&ox3, reports worsening lower abdominal pain/cramping. md aware pt requesting pain meds. urine cup provided, pt educated we need urine. repeat lab work @ 1630. updated on plan of care, verbalize understanding. call bell in reach

## 2023-04-04 NOTE — DISCUSSION/SUMMARY
[FreeTextEntry1] : 38 y/o G0 LMP 2/9 presenting for GYN acute visit. \par TVUS today: EMS 13.2mm, submucosal myoma previously seen no longer seen, ovaries WNL, no FF \par \par #Menorrhagia \par - discussed likely next menses now and reason for return of heavy bleeding \par - discussed interventions again DMPA/Mirena IUD/ablation/UAE/surgical intervention \par - patient continues to defer more definitive management due to desire for fertility  \par - discussed health risks associated with continued heavy bleeding and strong recommendation for more definitive treatment, she verbalized understanding and desire against definitive treatment \par - discussed health risks associated with pregnancy given her age risk stratification, her cardiovascular conditions, and need for full medical anticoagulation, she verbalized understanding \par - I reiterated MFM referral for full break down of pregnancy risks in order to make final decision about attempts/planning for future fertility vs definitive management of AUB, she says she has too much going on right now for it but she is aware of the above details \par - discussed that given her desires, we can continue to try to manage with PO Aygestin, recommended increasing dose to 2 tablets daily (10mg) total for 10d (presumably the menses cycle she is likely starting now) and then down grading to 1 tablet daily\par - discussed down grading early if bleeding improves before 10d period \par - discussed RTC precautions if bleeding does not improve within 3d after starting 10mg QD or if bleeding returns immediately after downgrading to 5mg again \par - discussed role of CBC today for re-evaluation of health status and possible need for transfusion \par - given RTC precautions for office and ED in case of peristent bleeding, heavier bleeding, or symptoms of anemia \par \par She verbalized understanding and agreement with above counseling regarding differential diagnosis, evaluation, and plan. \par She was given time for questions/concerns which were all answered to her apparent satisfaction.\par All designated lab work for today drawn in office. \par \par RTO 3 months for re-eval of bleeding profile and re-discussion of management options

## 2023-04-04 NOTE — ED ADULT NURSE REASSESSMENT NOTE - NS ED NURSE REASSESS COMMENT FT1
Assumed care of pt from off going RN. Airway intact, respirations even and unlabored. Pt resting in stretcher and does not appear to be in distress at this time. Pt requested additional sanitary pads due to vaginal bleeding. Maternal pads provided Assumed care of pt from off going RN. Pt a&ox4. Airway intact, respirations even and unlabored. Pt remains in NSR on cardiac monitor. Pt resting in stretcher and does not appear to be in distress at this time. Pt c/o abdominal cramping that is unchanged since arrival to ED. Pt requested additional sanitary pads due to vaginal bleeding. Maternal pads provided. Assumed care of pt from off going RN. Pt a&ox4. Airway intact, respirations even and unlabored. Pt remains in NSR on cardiac monitor. Pt resting in stretcher and does not appear to be in distress at this time. Pt c/o abdominal cramping that is unchanged since arrival to ED. Pt requested additional sanitary pads due to vaginal bleeding. Maternal pads provided. Pt reports saturating one pad every 4 hours

## 2023-04-04 NOTE — ED ADULT NURSE NOTE - CAS TRG GENERAL AIRWAY, MLM
PHI Outreach- HTN  Left message asking patient to please call back at earliest convenience.  
Patent

## 2023-04-04 NOTE — ED PROVIDER NOTE - OBJECTIVE STATEMENT
38 y f with pmh of Marfan syndrome, mechanical Aortic and Mitral valves on warfarin, DM, hypothyroidism presenting for vaginal bleeding. Pt has been having fluctuating vaginal bleeding for months. Was seen by her OBGYN 4 days ago, Dr Darby, who increased her birth control medicine. He was having clots 3 days ago which decreased yesterday. 1 pad every 8 hrs yesterday but increased to every 5 hours over night. Pt has PATTON, lightheadedness on standing, palpitations, feels she appears pale. Has had transfusions in the past, last a few months ago. INR was 6.9 yesterday. Has had decrease PO intake recently. Denies cp, n/v, dysuria, flank pain.

## 2023-04-04 NOTE — ED ADULT NURSE NOTE - CHIEF COMPLAINT QUOTE
Pt has had heavy vaginal bleeding and cramps over the last few weeks. Has been seeing GYN. Pt also checked her INR this morning and it is 6.9. Also feeling very weak. Takes Coumadin for 2 mechanical valves. Pt appears pale.

## 2023-04-04 NOTE — ED PROVIDER NOTE - CARE PLAN
1 Principal Discharge DX:	Anemia due to acute blood loss  Secondary Diagnosis:	Supratherapeutic INR

## 2023-04-04 NOTE — ED ADULT NURSE NOTE - OBJECTIVE STATEMENT
pt comes to ED with reports of heavy vaginal bleeding as well as increasing SOB and cramping with generalized weakness. pt reporting she has a known uterine fibroid which is small in size as per a recent sono on Friday. pt reports she is on Coumadin and her INR was elevated above 6 this morning when she checked at home. states she had Aortic valve and Mitral valve replaced 9 years ago due to Marfan's Syndrome. pt is in no apparent distress, denies vomiting and denies bloody/black tarry stool. pt states she has had blood transfusions in the past, last one here at [facility] a few months ago. pt LANGLEY with strength and purpose, no PATTON noted.

## 2023-04-05 VITALS
DIASTOLIC BLOOD PRESSURE: 77 MMHG | HEART RATE: 66 BPM | OXYGEN SATURATION: 96 % | SYSTOLIC BLOOD PRESSURE: 114 MMHG | RESPIRATION RATE: 18 BRPM | TEMPERATURE: 97 F

## 2023-04-05 LAB
APTT BLD: 57 SEC — HIGH (ref 27.5–35.5)
FERRITIN SERPL-MCNC: 41 NG/ML — SIGNIFICANT CHANGE UP (ref 15–150)
FOLATE SERPL-MCNC: >20 NG/ML — SIGNIFICANT CHANGE UP
GLUCOSE BLDC GLUCOMTR-MCNC: 164 MG/DL — HIGH (ref 70–99)
GLUCOSE BLDC GLUCOMTR-MCNC: 228 MG/DL — HIGH (ref 70–99)
HCT VFR BLD CALC: 30.3 % — LOW (ref 34.5–45)
HGB BLD-MCNC: 9.5 G/DL — LOW (ref 11.5–15.5)
INR BLD: 3.43 RATIO — HIGH (ref 0.88–1.16)
INR BLD: 4.54 RATIO — HIGH (ref 0.88–1.16)
IRON SATN MFR SERPL: 55 UG/DL — SIGNIFICANT CHANGE UP (ref 37–145)
LDH SERPL L TO P-CCNC: 358 U/L — HIGH (ref 98–192)
PROTHROM AB SERPL-ACNC: 40.3 SEC — HIGH (ref 10.5–13.4)
PROTHROM AB SERPL-ACNC: 53.5 SEC — HIGH (ref 10.5–13.4)
RBC # BLD: 3.84 M/UL — SIGNIFICANT CHANGE UP (ref 3.8–5.2)
RETICS #: 75.3 K/UL — SIGNIFICANT CHANGE UP (ref 25–125)
RETICS/RBC NFR: 2 % — SIGNIFICANT CHANGE UP (ref 0.5–2.5)
TRANSFERRIN SERPL-MCNC: 246 MG/DL — SIGNIFICANT CHANGE UP (ref 192–382)
VIT B12 SERPL-MCNC: 156 PG/ML — LOW (ref 232–1245)

## 2023-04-05 PROCEDURE — 83880 ASSAY OF NATRIURETIC PEPTIDE: CPT

## 2023-04-05 PROCEDURE — 83615 LACTATE (LD) (LDH) ENZYME: CPT

## 2023-04-05 PROCEDURE — 82607 VITAMIN B-12: CPT

## 2023-04-05 PROCEDURE — 36430 TRANSFUSION BLD/BLD COMPNT: CPT

## 2023-04-05 PROCEDURE — 85018 HEMOGLOBIN: CPT

## 2023-04-05 PROCEDURE — 84702 CHORIONIC GONADOTROPIN TEST: CPT

## 2023-04-05 PROCEDURE — 99236 HOSP IP/OBS SAME DATE HI 85: CPT

## 2023-04-05 PROCEDURE — 96376 TX/PRO/DX INJ SAME DRUG ADON: CPT

## 2023-04-05 PROCEDURE — 86901 BLOOD TYPING SEROLOGIC RH(D): CPT

## 2023-04-05 PROCEDURE — 86900 BLOOD TYPING SEROLOGIC ABO: CPT

## 2023-04-05 PROCEDURE — 82746 ASSAY OF FOLIC ACID SERUM: CPT

## 2023-04-05 PROCEDURE — 86850 RBC ANTIBODY SCREEN: CPT

## 2023-04-05 PROCEDURE — 83540 ASSAY OF IRON: CPT

## 2023-04-05 PROCEDURE — 85730 THROMBOPLASTIN TIME PARTIAL: CPT

## 2023-04-05 PROCEDURE — 85025 COMPLETE CBC W/AUTO DIFF WBC: CPT

## 2023-04-05 PROCEDURE — 82728 ASSAY OF FERRITIN: CPT

## 2023-04-05 PROCEDURE — 99285 EMERGENCY DEPT VISIT HI MDM: CPT

## 2023-04-05 PROCEDURE — 96375 TX/PRO/DX INJ NEW DRUG ADDON: CPT

## 2023-04-05 PROCEDURE — 85014 HEMATOCRIT: CPT

## 2023-04-05 PROCEDURE — 84466 ASSAY OF TRANSFERRIN: CPT

## 2023-04-05 PROCEDURE — 94640 AIRWAY INHALATION TREATMENT: CPT

## 2023-04-05 PROCEDURE — 0225U NFCT DS DNA&RNA 21 SARSCOV2: CPT

## 2023-04-05 PROCEDURE — 71045 X-RAY EXAM CHEST 1 VIEW: CPT

## 2023-04-05 PROCEDURE — G0378: CPT

## 2023-04-05 PROCEDURE — 96374 THER/PROPH/DIAG INJ IV PUSH: CPT

## 2023-04-05 PROCEDURE — 82962 GLUCOSE BLOOD TEST: CPT

## 2023-04-05 PROCEDURE — 93005 ELECTROCARDIOGRAM TRACING: CPT

## 2023-04-05 PROCEDURE — P9016: CPT

## 2023-04-05 PROCEDURE — 84484 ASSAY OF TROPONIN QUANT: CPT

## 2023-04-05 PROCEDURE — 80053 COMPREHEN METABOLIC PANEL: CPT

## 2023-04-05 PROCEDURE — 84443 ASSAY THYROID STIM HORMONE: CPT

## 2023-04-05 PROCEDURE — 85610 PROTHROMBIN TIME: CPT

## 2023-04-05 PROCEDURE — 81001 URINALYSIS AUTO W/SCOPE: CPT

## 2023-04-05 PROCEDURE — 85045 AUTOMATED RETICULOCYTE COUNT: CPT

## 2023-04-05 PROCEDURE — 86923 COMPATIBILITY TEST ELECTRIC: CPT

## 2023-04-05 PROCEDURE — 87186 SC STD MICRODIL/AGAR DIL: CPT

## 2023-04-05 PROCEDURE — 36415 COLL VENOUS BLD VENIPUNCTURE: CPT

## 2023-04-05 PROCEDURE — 87086 URINE CULTURE/COLONY COUNT: CPT

## 2023-04-05 RX ORDER — DEXTROSE 50 % IN WATER 50 %
25 SYRINGE (ML) INTRAVENOUS ONCE
Refills: 0 | Status: DISCONTINUED | OUTPATIENT
Start: 2023-04-05 | End: 2023-04-11

## 2023-04-05 RX ORDER — SODIUM CHLORIDE 9 MG/ML
1000 INJECTION, SOLUTION INTRAVENOUS
Refills: 0 | Status: DISCONTINUED | OUTPATIENT
Start: 2023-04-05 | End: 2023-04-11

## 2023-04-05 RX ORDER — METOPROLOL TARTRATE 50 MG
25 TABLET ORAL
Refills: 0 | Status: DISCONTINUED | OUTPATIENT
Start: 2023-04-05 | End: 2023-04-11

## 2023-04-05 RX ORDER — MORPHINE SULFATE 50 MG/1
4 CAPSULE, EXTENDED RELEASE ORAL ONCE
Refills: 0 | Status: DISCONTINUED | OUTPATIENT
Start: 2023-04-05 | End: 2023-04-05

## 2023-04-05 RX ORDER — MORPHINE SULFATE 50 MG/1
2 CAPSULE, EXTENDED RELEASE ORAL ONCE
Refills: 0 | Status: DISCONTINUED | OUTPATIENT
Start: 2023-04-05 | End: 2023-04-05

## 2023-04-05 RX ORDER — METHADONE HYDROCHLORIDE 40 MG/1
10 TABLET ORAL THREE TIMES A DAY
Refills: 0 | Status: COMPLETED | OUTPATIENT
Start: 2023-04-05 | End: 2023-04-12

## 2023-04-05 RX ORDER — DEXTROSE 50 % IN WATER 50 %
15 SYRINGE (ML) INTRAVENOUS ONCE
Refills: 0 | Status: DISCONTINUED | OUTPATIENT
Start: 2023-04-05 | End: 2023-04-11

## 2023-04-05 RX ORDER — WARFARIN SODIUM 2.5 MG/1
9 TABLET ORAL
Refills: 0 | DISCHARGE

## 2023-04-05 RX ORDER — BUDESONIDE AND FORMOTEROL FUMARATE DIHYDRATE 160; 4.5 UG/1; UG/1
2 AEROSOL RESPIRATORY (INHALATION)
Refills: 0 | Status: DISCONTINUED | OUTPATIENT
Start: 2023-04-05 | End: 2023-04-11

## 2023-04-05 RX ORDER — LEVOTHYROXINE SODIUM 125 MCG
50 TABLET ORAL DAILY
Refills: 0 | Status: DISCONTINUED | OUTPATIENT
Start: 2023-04-05 | End: 2023-04-11

## 2023-04-05 RX ORDER — NORETHINDRONE 0.35 MG/1
1 TABLET ORAL
Refills: 0 | DISCHARGE

## 2023-04-05 RX ORDER — OXYCODONE HYDROCHLORIDE 5 MG/1
10 TABLET ORAL EVERY 4 HOURS
Refills: 0 | Status: DISCONTINUED | OUTPATIENT
Start: 2023-04-05 | End: 2023-04-05

## 2023-04-05 RX ORDER — SITAGLIPTIN 50 MG/1
1 TABLET, FILM COATED ORAL
Refills: 0 | DISCHARGE

## 2023-04-05 RX ORDER — GLUCAGON INJECTION, SOLUTION 0.5 MG/.1ML
1 INJECTION, SOLUTION SUBCUTANEOUS ONCE
Refills: 0 | Status: DISCONTINUED | OUTPATIENT
Start: 2023-04-05 | End: 2023-04-11

## 2023-04-05 RX ORDER — MOMETASONE FUROATE AND FORMOTEROL FUMARATE DIHYDRATE 200; 5 UG/1; UG/1
2 AEROSOL RESPIRATORY (INHALATION)
Refills: 0 | DISCHARGE

## 2023-04-05 RX ORDER — LEVOTHYROXINE SODIUM 125 MCG
1 TABLET ORAL
Refills: 0 | DISCHARGE

## 2023-04-05 RX ORDER — ACETAMINOPHEN 500 MG
1000 TABLET ORAL ONCE
Refills: 0 | Status: COMPLETED | OUTPATIENT
Start: 2023-04-05 | End: 2023-04-05

## 2023-04-05 RX ORDER — WARFARIN SODIUM 2.5 MG/1
10 TABLET ORAL
Qty: 0 | Refills: 0 | DISCHARGE

## 2023-04-05 RX ORDER — ATORVASTATIN CALCIUM 80 MG/1
20 TABLET, FILM COATED ORAL AT BEDTIME
Refills: 0 | Status: DISCONTINUED | OUTPATIENT
Start: 2023-04-05 | End: 2023-04-11

## 2023-04-05 RX ORDER — DEXTROSE 50 % IN WATER 50 %
12.5 SYRINGE (ML) INTRAVENOUS ONCE
Refills: 0 | Status: DISCONTINUED | OUTPATIENT
Start: 2023-04-05 | End: 2023-04-11

## 2023-04-05 RX ORDER — INSULIN LISPRO 100/ML
VIAL (ML) SUBCUTANEOUS
Refills: 0 | Status: DISCONTINUED | OUTPATIENT
Start: 2023-04-05 | End: 2023-04-11

## 2023-04-05 RX ORDER — GLIMEPIRIDE 1 MG
1 TABLET ORAL
Refills: 0 | DISCHARGE

## 2023-04-05 RX ORDER — ATORVASTATIN CALCIUM 80 MG/1
1 TABLET, FILM COATED ORAL
Refills: 0 | DISCHARGE

## 2023-04-05 RX ADMIN — Medication 400 MILLIGRAM(S): at 00:58

## 2023-04-05 RX ADMIN — Medication 1000 MILLIGRAM(S): at 08:41

## 2023-04-05 RX ADMIN — MORPHINE SULFATE 2 MILLIGRAM(S): 50 CAPSULE, EXTENDED RELEASE ORAL at 11:50

## 2023-04-05 RX ADMIN — MORPHINE SULFATE 2 MILLIGRAM(S): 50 CAPSULE, EXTENDED RELEASE ORAL at 09:37

## 2023-04-05 RX ADMIN — OXYCODONE HYDROCHLORIDE 10 MILLIGRAM(S): 5 TABLET ORAL at 05:35

## 2023-04-05 RX ADMIN — MORPHINE SULFATE 2 MILLIGRAM(S): 50 CAPSULE, EXTENDED RELEASE ORAL at 00:58

## 2023-04-05 RX ADMIN — MORPHINE SULFATE 4 MILLIGRAM(S): 50 CAPSULE, EXTENDED RELEASE ORAL at 11:52

## 2023-04-05 RX ADMIN — Medication 4: at 08:17

## 2023-04-05 RX ADMIN — Medication 50 MICROGRAM(S): at 06:18

## 2023-04-05 RX ADMIN — BUDESONIDE AND FORMOTEROL FUMARATE DIHYDRATE 2 PUFF(S): 160; 4.5 AEROSOL RESPIRATORY (INHALATION) at 09:15

## 2023-04-05 RX ADMIN — METHADONE HYDROCHLORIDE 10 MILLIGRAM(S): 40 TABLET ORAL at 06:19

## 2023-04-05 RX ADMIN — OXYCODONE HYDROCHLORIDE 10 MILLIGRAM(S): 5 TABLET ORAL at 08:40

## 2023-04-05 RX ADMIN — OXYCODONE HYDROCHLORIDE 10 MILLIGRAM(S): 5 TABLET ORAL at 09:29

## 2023-04-05 NOTE — ED CDU PROVIDER DISPOSITION NOTE - PATIENT PORTAL LINK FT
You can access the FollowMyHealth Patient Portal offered by [facility] by registering at the following website: [email or web address]. By joining [email or web address]’s FollowMyHealth portal, you will also be able to view your health information using other applications (apps) compatible with our system.

## 2023-04-05 NOTE — ED CDU PROVIDER DISPOSITION NOTE - ATTENDING CONTRIBUTION TO CARE
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 on coumadin  INR noted to be 6.77 [place] cardiology consulted, pt given vitamin k repeat INR - pt down to 3.4, spoke to cardiology stated for patient to start warfarin tonight will follow up in cardiology office pt explained dc instructions, f/u with obgyn in office and continue with birth control pills    --pt notes vaginal bleeding has improved since being in the ed denies any symptoms of cp/dizziness; pt to restart warfarin tonight as per cardiology dc with gyn follow up

## 2023-04-05 NOTE — CONSULT NOTE ADULT - SUBJECTIVE AND OBJECTIVE BOX
38 y f with pmh of Marfan syndrome, mechanical Aortic and Mitral valves on warfarin, DM, hypothyroidism presenting for vaginal bleeding. Pt has been having fluctuating vaginal bleeding for months. Pt has PATTON, lightheadedness on standing, palpitations, feels she appears pale. Has had transfusions in the past, last a few months ago. INR was 6.9 yesterday.  Patient being followed by Dr. Mills at [facility] for history of chronic mild hemolytic anemia and iron deficiency anemia related to heavy menses.  Latest clinic visit with hematologist in January 2023, ferritin was adequate at 231.  Received 5 doses of Venofer 200 mg IV in January 2023 with last dose on 1/10/23 on outpatient basis.    PAST MEDICAL & SURGICAL HISTORY:  Marfan syndrome  Fibromyalgia  Depression  Anxiety  Diabetes  Dilated aortic root  Mitral valve prolapse  H/O aortic valve replacement  2013  H/O spinal fusion  2005  H/O artificial lens replacement    No Known Allergies    MEDICATIONS  (STANDING):  morphine  - Injectable 2 milliGRAM(s) IV Push Once    Social History:  Cigarettes:  Denies current tobacco use  Alcohol:  Denies daily etoh  Illicit Drug Abuse:  Denies    Family History:  denies CAD, MI, CVA, or sudden death.    ROS: Negative other than as mentioned in HPI.    PHYSICAL EXAM:  General: NAD  HEENT: Head; normocephalic, atraumatic.  Eyes: EOMI, conjunctiva normal  Neck: Supple, no JVD noted  CARDIOVASCULAR: Mildly tachycardic, regular rhythm, S1 S2  LUNGS: Clear to auscultation b/l, No rales, rhonchi or wheeze, normal inspiratory effort  ABDOMEN: Soft, nontender, non-distended, +bowel sounds  EXTREMITIES: No edema b/l, no cyanosis   SKIN: warm and dry  NEURO: Alert/oriented x 3  PSYCH: normal affect.    CBC:            9.5    x     )-----------( x        ( 04-05-23 @ 06:10 )             30.3         Chem:         ( 04-04-23 @ 12:30 )    139  |  105  |  15.3  ----------------------------<  154<H>  4.3   |  20.0<L>  |  0.63        Liver Functions: ( 04-04-23 @ 12:30 )  Alb: 3.9 g/dL / Pro: 7.1 g/dL / ALK PHOS: 62 U/L / ALT: 6 U/L / AST: 12 U/L / GGT: x

## 2023-04-05 NOTE — ED CDU PROVIDER DISPOSITION NOTE - NSFOLLOWUPINSTRUCTIONS_ED_ALL_ED_FT
please follow up with cardiology outpatient, start warfarin tonight   please follow up with obgyn outpatient

## 2023-04-05 NOTE — ED ADULT NURSE REASSESSMENT NOTE - NS ED NURSE REASSESS COMMENT FT1
blood transfusion completed at 0130. pt denies any s/s of transfusion reaction. pt denies any current complaints. pt lying in stretcher. pt remains in NSR on CM. RR even and unlabored.

## 2023-04-05 NOTE — CONSULT NOTE ADULT - ASSESSMENT
38 year old female with a PMHx of Marfan's disease s/p mechanical AVR/MVR and 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    Hx of ARUN and chronic mild hemolytic anemia:  -Can send B12, folate, retic count, LDH, iron profile, ferritin  - p.r.n. 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    Hx of ARUN and chronic mild hemolytic anemia:ferritin  - p.r.n. 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

## 2023-04-05 NOTE — ED CDU PROVIDER INITIAL DAY NOTE - CLINICAL SUMMARY MEDICAL DECISION MAKING FREE TEXT BOX
Pt presenting with symptomatic anemia from heavy prolonged vaginal bleeding on warfarin for mechanical valves, with supratherapeutic INR 6.7. cardiology consulted. GYN- no intervention. Received 1unit PRBC transfusion. Pt given vitamin K, repeat INR 4.5. holding tonight's warfarin dose. repeat INR  and H&H in AM

## 2023-04-05 NOTE — ED ADULT NURSE REASSESSMENT NOTE - NS ED NURSE REASSESS COMMENT FT1
Pt Aox4 not in distress, attached to cardiac monitor, denies any transfusion reaction after 1 unit of PRBC, will continue to monitor

## 2023-04-05 NOTE — ED ADULT NURSE REASSESSMENT NOTE - NS ED NURSE REASSESS COMMENT FT1
Pt a&ox4, remains in NSR on cardiac monitor, respirations even and unlabored. Pt reports cramping pain is starting to subside since receiving oxycodone. Pt denies any other complaints at this time. Pt resting in stretcher. Pt awaiting results from repeat bloodwork.

## 2023-04-05 NOTE — ED CDU PROVIDER INITIAL DAY NOTE - ATTENDING APP SHARED VISIT CONTRIBUTION OF CARE
JAVIER Aguilar: given PO vit k and transfusing. repeat INRs; hold warfarin tonight, cards following for INR reveral , cont to monitor in obs given hx mechanical valve do not want to drop INR too low; check repeat INR in AM     I have personally performed a face to face diagnostic evaluation on this patient.  I have reviewed the ACP note and agree with the history, exam, and plan of care, except as noted.   My medical decision making and observations are found above.

## 2023-04-05 NOTE — ED CDU PROVIDER INITIAL DAY NOTE - OBJECTIVE STATEMENT
38 y f with pmh of Marfan syndrome, mechanical Aortic and Mitral valves on warfarin, DM, hypothyroidism presenting for vaginal bleeding. Pt has been having fluctuating vaginal bleeding for months. Was seen by her OBGYN 4 days ago, Dr Darby, who increased her birth control medicine. She was having clots 3 days ago which decreased yesterday. 1 pad every 8 hrs yesterday but increased to every 5 hours over night. Pt has PATTON, lightheadedness on standing, palpitations, feels she appears pale. Has had transfusions in the past, last a few months ago. INR was 6.9 yesterday. Has had decrease PO intake recently. Denies cp, n/v, dysuria, flank pain. Declines

## 2023-04-05 NOTE — ED CDU PROVIDER DISPOSITION NOTE - CLINICAL COURSE
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 pt was seen by obgyn four days ago dr gutiérrez that increased birth control medicine to help control the bleeding. pt presented to the ed for bleeding and feeling lightheaded, palpitations, pt given 1 unit blood while in the ed, INR noted to be 6.77 [place] cardiology consulted, pt given vitamin k repeat INR - pt down to 3.4, spoke to cardiology stated for patient to start warfarin tonight will follow up in cardiology office pt explained dc instructions, f/u with obgyn in office and continue with birth control pills

## 2023-04-05 NOTE — ED CDU PROVIDER INITIAL DAY NOTE - PHYSICAL EXAMINATION
General: pale appearing, NAD  Head: NC, AT  EENT: EOMI, no scleral icterus, pale sclera  Cardiac: tachycardic, murmurs consistent with mechanical valves, no lower extremity edema  Respiratory: CTABL, no respiratory distress   Abdomen: soft, ND, nonperitonitic, mild tenderness suprapubic,   MSK/Vascular: full ROM, soft compartments, warm extremities  Neuro: AAOx3, sensation to light touch intact  Psych: calm, cooperative

## 2023-04-05 NOTE — PROGRESS NOTE ADULT - SUBJECTIVE AND OBJECTIVE BOX
[facility]                                   [address] [place] [address]                                                      PHONE: [phone]                                                         FAX: [phone]  [email or web address]  ---------------------------------------------------------------------------------------------------------------------------------    Overnight events/patient complaints: patient seen at bedside. She denies chest pain or SOB.       No Known Allergies    MEDICATIONS  (STANDING):  atorvastatin 20 milliGRAM(s) Oral at bedtime  budesonide 160 MICROgram(s)/formoterol 4.5 MICROgram(s) Inhaler 2 Puff(s) Inhalation two times a day  dextrose 5%. 1000 milliLiter(s) (50 mL/Hr) IV Continuous <Continuous>  dextrose 5%. 1000 milliLiter(s) (100 mL/Hr) IV Continuous <Continuous>  dextrose 50% Injectable 25 Gram(s) IV Push once  dextrose 50% Injectable 12.5 Gram(s) IV Push once  dextrose 50% Injectable 25 Gram(s) IV Push once  glucagon  Injectable 1 milliGRAM(s) IntraMuscular once  insulin lispro (ADMELOG) corrective regimen sliding scale   SubCutaneous three times a day before meals  levothyroxine 50 MICROGram(s) Oral daily  methadone    Tablet 10 milliGRAM(s) Oral three times a day  metoprolol tartrate 25 milliGRAM(s) Oral two times a day    MEDICATIONS  (PRN):  dextrose Oral Gel 15 Gram(s) Oral once PRN Blood Glucose LESS THAN 70 milliGRAM(s)/deciliter  oxyCODONE    IR 10 milliGRAM(s) Oral every 4 hours PRN Moderate Pain (4 - 6)      Vital Signs Last 24 Hrs  T(C): 36.8 (2023 07:28), Max: 37.1 (2023 22:25)  T(F): 98.3 (2023 07:28), Max: 98.8 (2023 01:32)  HR: 98 (:) (76 - 102)  BP: 115/75 (2023 07:) (97/56 - 115/75)  BP(mean): --  RR: 18 (:) (15 - 20)  SpO2: 100% (:) (98% - 100%)    Parameters below as of :  Patient On (Oxygen Delivery Method): room air      ICU Vital Signs Last 24 Hrs  JUANY FELICIANO  I&O's Detail    Drug Dosing Weight  PATRICIASHADI JODIE      PHYSICAL EXAM:  General: NAD  HEENT: Head; normocephalic, atraumatic.  Eyes: Pupils reactive, cornea wnl.  Neck: Supple, no nodes adenopathy, no NVD or carotid bruit or thyromegaly.  CARDIOVASCULAR: Normal S1 and S2, No murmur, rub, gallop or lift. mechanical heart sounds.   LUNGS: No rales, rhonchi or wheeze. Normal breath sounds bilaterally.  ABDOMEN: Soft, nontender without mass or organomegaly. bowel sounds normoactive.  EXTREMITIES: No clubbing, cyanosis or edema. Distal pulses wnl.   SKIN: warm and dry with normal turgor.  NEURO: Alert/oriented x 3  PSYCH: normal affect.        LABS:                        9.5    x     )-----------( x        ( 2023 06:10 )             30.3     04-04    139  |  105  |  15.3  ----------------------------<  154<H>  4.3   |  20.0<L>  |  0.63    Ca    9.2      2023 12:30    TPro  7.1  /  Alb  3.9  /  TBili  0.2<L>  /  DBili  x   /  AST  12  /  ALT  6   /  AlkPhos  62  04-04    JUANY FELICIANO  CARDIAC MARKERS ( 2023 12:30 )  x     / <0.01 ng/mL / x     / x     / x          PT/INR - ( 2023 06:10 )   PT: 40.3 sec;   INR: 3.43 ratio         PTT - ( 2023 06:10 )  PTT:57.0 sec  Urinalysis Basic - ( 2023 21:30 )    Color: Yellow / Appearance: very cloudy / S.020 / pH: x  Gluc: x / Ketone: Trace  / Bili: Negative / Urobili: Negative   Blood: x / Protein: Negative / Nitrite: Positive   Leuk Esterase: Trace / RBC: >50 /HPF / WBC 3-5 /HPF   Sq Epi: x / Non Sq Epi: Occasional / Bacteria: Occasional      ASSESSMENT AND PLAN:  38 year old female with a PMHx of Marfan's disease s/p mechanical AVR/MVR and 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    - vaginal bleeding has slowed -- started on OCP by GYN who recommend outpatient follow up  - INR 3.4 this morning -- okay to resume Coumadin tonight  - she follows with her PMD for her INR -- she will follow up this week    Stable for discharge home today.

## 2023-04-05 NOTE — ED CDU PROVIDER INITIAL DAY NOTE - NS ED ATTENDING STATEMENT MOD
This was a shared visit with the XAVI. I reviewed and verified the documentation and independently performed the documented:

## 2023-04-05 NOTE — ED ADULT NURSE REASSESSMENT NOTE - NS ED NURSE REASSESS COMMENT FT1
Pt due to take metoprolol. BP taken prior to administration was 105/56. JENSEN Alexandra contacted and made aware of BP. Metoprolol dose held due to BP Pt due to take metoprolol. BP taken prior to administration was 105/56. JENSEN Alexandra contacted and made aware of BP. Metoprolol dose rescheduled for 1000 as per JENSEN Alexandra

## 2023-04-05 NOTE — ED ADULT NURSE REASSESSMENT NOTE - NSFALLRSKASSESSDT_ED_ALL_ED
04-Apr-2023 23:12
04-Apr-2023 15:38
04-Apr-2023 15:59
05-Apr-2023 05:19
05-Apr-2023 06:19
05-Apr-2023 06:42
05-Apr-2023 07:37
04-Apr-2023 22:15
05-Apr-2023 11:02
04-Apr-2023 22:29
05-Apr-2023 01:49

## 2023-04-07 RX ORDER — CEPHALEXIN 500 MG
1 CAPSULE ORAL
Qty: 14 | Refills: 0
Start: 2023-04-07 | End: 2023-04-13

## 2023-04-10 ENCOUNTER — RESULT CHARGE (OUTPATIENT)
Age: 38
End: 2023-04-10

## 2023-04-10 ENCOUNTER — APPOINTMENT (OUTPATIENT)
Dept: FAMILY MEDICINE | Facility: CLINIC | Age: 38
End: 2023-04-10
Payer: MEDICAID

## 2023-04-10 VITALS
HEART RATE: 104 BPM | SYSTOLIC BLOOD PRESSURE: 124 MMHG | WEIGHT: 183 LBS | TEMPERATURE: 97.1 F | DIASTOLIC BLOOD PRESSURE: 84 MMHG | BODY MASS INDEX: 24.79 KG/M2 | OXYGEN SATURATION: 100 % | HEIGHT: 72 IN

## 2023-04-10 VITALS — SYSTOLIC BLOOD PRESSURE: 82 MMHG | DIASTOLIC BLOOD PRESSURE: 58 MMHG

## 2023-04-10 VITALS — SYSTOLIC BLOOD PRESSURE: 92 MMHG | DIASTOLIC BLOOD PRESSURE: 60 MMHG
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
## 2023-04-10 LAB — GLUCOSE BLDC GLUCOMTR-MCNC: 128

## 2023-04-10 PROCEDURE — 82962 GLUCOSE BLOOD TEST: CPT

## 2023-04-10 PROCEDURE — 99215 OFFICE O/P EST HI 40 MIN: CPT | Mod: 25

## 2023-04-10 PROCEDURE — 36415 COLL VENOUS BLD VENIPUNCTURE: CPT

## 2023-04-10 RX ORDER — PREDNISONE 20 MG/1
20 TABLET ORAL
Qty: 9 | Refills: 0 | Status: COMPLETED | COMMUNITY
Start: 2023-01-04 | End: 2023-04-10

## 2023-04-10 NOTE — PLAN
[FreeTextEntry1] : POCT Glucose= 124\par \par see lab orders  \par \par increase hydration!!!!!!\par \par cont weekly Psychotherapy consult c  "Kena Polanco"  [facility] ([place]) in person visit  \par monthly Psychiatric NP evaluation c Parveen Olmstead   [facility] ([place])\par \par BP check 1 week

## 2023-04-10 NOTE — PHYSICAL EXAM
[No Acute Distress] : no acute distress [EOMI] : extraocular movements intact [Normal Outer Ear/Nose] : the outer ears and nose were normal in appearance [No JVD] : no jugular venous distention [No Respiratory Distress] : no respiratory distress  [No Accessory Muscle Use] : no accessory muscle use [Clear to Auscultation] : lungs were clear to auscultation bilaterally [Normal Rate] : normal rate  [Regular Rhythm] : with a regular rhythm [Normal S1, S2] : normal S1 and S2 [No Edema] : there was no peripheral edema [No CVA Tenderness] : no CVA  tenderness [No Rash] : no rash [Coordination Grossly Intact] : coordination grossly intact [No Focal Deficits] : no focal deficits [Normal Gait] : normal gait [Normal Affect] : the affect was normal [Normal Insight/Judgement] : insight and judgment were intact [de-identified] : RRR at this time  [de-identified] : +ve anxious mood

## 2023-04-10 NOTE — HISTORY OF PRESENT ILLNESS
[FreeTextEntry1] : JUANY is a 38 year female here for follow abnormal INR readings\par -Pt INR 04/09/23  2.9\par \par  [de-identified] : 39 yo female for eval s/p  [facility] ED  eval on 4/3/23 secondary to supratherapeutic INR= 6.9.  pt given PO Vitamin K 5mg x 1 and d/c'ed from ED on 4/5/23 c INR=3.4 \par pt states was  also given 1U of PRBC  secondary to symptomatic anemia    HgB= 9 at that time.\par \par pt states Home INR= 2.9 yesterday am,   pt resumed usual 9mg qd Warfarin dosing yesterday 4/9/23  \par \par pt reports  bleeding gums yesterday, NOTHING today,  Denies known bev hematuria,   +ve vaginal bleeding   1 pad last 6 hrs, however, associated c pelvic cramping and LBP \par Denies rectal bleeding and/or melena   Denies hematemesis\par Denies hemoptysis \par Denies epistaxis \par \par Today pt states she "feels off"   Lightheaded.   Denies CP   STABLE unchanged chronic SOB,  resolved palpitations  no syncope no LE edema B/L \par Denies wt gain \par +ve FAST today

## 2023-04-11 ENCOUNTER — EMERGENCY (EMERGENCY)
Facility: HOSPITAL | Age: 38
LOS: 1 days | End: 2023-04-11
Attending: EMERGENCY MEDICINE
Payer: MEDICAID

## 2023-04-11 VITALS
WEIGHT: 182.98 LBS | RESPIRATION RATE: 18 BRPM | SYSTOLIC BLOOD PRESSURE: 95 MMHG | DIASTOLIC BLOOD PRESSURE: 68 MMHG | HEIGHT: 72 IN | HEART RATE: 122 BPM | TEMPERATURE: 98 F | OXYGEN SATURATION: 99 %

## 2023-04-11 DIAGNOSIS — Z96.1 PRESENCE OF INTRAOCULAR LENS: Chronic | ICD-10-CM

## 2023-04-11 DIAGNOSIS — Z95.4 PRESENCE OF OTHER HEART-VALVE REPLACEMENT: Chronic | ICD-10-CM

## 2023-04-11 DIAGNOSIS — Z98.1 ARTHRODESIS STATUS: Chronic | ICD-10-CM

## 2023-04-11 LAB
ALBUMIN SERPL ELPH-MCNC: 4 G/DL — SIGNIFICANT CHANGE UP (ref 3.3–5.2)
ALP SERPL-CCNC: 76 U/L — SIGNIFICANT CHANGE UP (ref 40–120)
ALT FLD-CCNC: 7 U/L — SIGNIFICANT CHANGE UP
ANION GAP SERPL CALC-SCNC: 16 MMOL/L — SIGNIFICANT CHANGE UP (ref 5–17)
APTT BLD: 58.4 SEC — HIGH (ref 27.5–35.5)
AST SERPL-CCNC: 14 U/L — SIGNIFICANT CHANGE UP
BASOPHILS # BLD AUTO: 0.06 K/UL — SIGNIFICANT CHANGE UP (ref 0–0.2)
BASOPHILS NFR BLD AUTO: 0.3 % — SIGNIFICANT CHANGE UP (ref 0–2)
BILIRUB SERPL-MCNC: 0.3 MG/DL — LOW (ref 0.4–2)
BUN SERPL-MCNC: 24.1 MG/DL — HIGH (ref 8–20)
CALCIUM SERPL-MCNC: 9.7 MG/DL — SIGNIFICANT CHANGE UP (ref 8.4–10.5)
CHLORIDE SERPL-SCNC: 102 MMOL/L — SIGNIFICANT CHANGE UP (ref 96–108)
CO2 SERPL-SCNC: 18 MMOL/L — LOW (ref 22–29)
CREAT SERPL-MCNC: 0.76 MG/DL — SIGNIFICANT CHANGE UP (ref 0.5–1.3)
EGFR: 102 ML/MIN/1.73M2 — SIGNIFICANT CHANGE UP
EOSINOPHIL # BLD AUTO: 0.15 K/UL — SIGNIFICANT CHANGE UP (ref 0–0.5)
EOSINOPHIL NFR BLD AUTO: 0.7 % — SIGNIFICANT CHANGE UP (ref 0–6)
GLUCOSE SERPL-MCNC: 128 MG/DL — HIGH (ref 70–99)
HCT VFR BLD CALC: 30.5 % — LOW (ref 34.5–45)
HGB BLD-MCNC: 9.2 G/DL — LOW (ref 11.5–15.5)
IMM GRANULOCYTES NFR BLD AUTO: 0.9 % — SIGNIFICANT CHANGE UP (ref 0–0.9)
INR BLD: 4.25 RATIO — HIGH (ref 0.88–1.16)
LYMPHOCYTES # BLD AUTO: 12 % — LOW (ref 13–44)
LYMPHOCYTES # BLD AUTO: 2.58 K/UL — SIGNIFICANT CHANGE UP (ref 1–3.3)
MCHC RBC-ENTMCNC: 26.6 PG — LOW (ref 27–34)
MCHC RBC-ENTMCNC: 30.2 GM/DL — LOW (ref 32–36)
MCV RBC AUTO: 88.2 FL — SIGNIFICANT CHANGE UP (ref 80–100)
MONOCYTES # BLD AUTO: 1.09 K/UL — HIGH (ref 0–0.9)
MONOCYTES NFR BLD AUTO: 5.1 % — SIGNIFICANT CHANGE UP (ref 2–14)
NEUTROPHILS # BLD AUTO: 17.43 K/UL — HIGH (ref 1.8–7.4)
NEUTROPHILS NFR BLD AUTO: 81 % — HIGH (ref 43–77)
PLATELET # BLD AUTO: 451 K/UL — HIGH (ref 150–400)
POTASSIUM SERPL-MCNC: 4.8 MMOL/L — SIGNIFICANT CHANGE UP (ref 3.5–5.3)
POTASSIUM SERPL-SCNC: 4.8 MMOL/L — SIGNIFICANT CHANGE UP (ref 3.5–5.3)
PROT SERPL-MCNC: 7.6 G/DL — SIGNIFICANT CHANGE UP (ref 6.6–8.7)
PROTHROM AB SERPL-ACNC: 50 SEC — HIGH (ref 10.5–13.4)
RBC # BLD: 3.46 M/UL — LOW (ref 3.8–5.2)
RBC # FLD: 16.7 % — HIGH (ref 10.3–14.5)
SODIUM SERPL-SCNC: 136 MMOL/L — SIGNIFICANT CHANGE UP (ref 135–145)
WBC # BLD: 21.51 K/UL — HIGH (ref 3.8–10.5)
WBC # FLD AUTO: 21.51 K/UL — HIGH (ref 3.8–10.5)

## 2023-04-11 PROCEDURE — 80053 COMPREHEN METABOLIC PANEL: CPT

## 2023-04-11 PROCEDURE — 93010 ELECTROCARDIOGRAM REPORT: CPT

## 2023-04-11 PROCEDURE — G1004: CPT

## 2023-04-11 PROCEDURE — 36415 COLL VENOUS BLD VENIPUNCTURE: CPT

## 2023-04-11 PROCEDURE — 96374 THER/PROPH/DIAG INJ IV PUSH: CPT | Mod: XU

## 2023-04-11 PROCEDURE — 84702 CHORIONIC GONADOTROPIN TEST: CPT

## 2023-04-11 PROCEDURE — 85730 THROMBOPLASTIN TIME PARTIAL: CPT

## 2023-04-11 PROCEDURE — 99285 EMERGENCY DEPT VISIT HI MDM: CPT | Mod: 25

## 2023-04-11 PROCEDURE — 74177 CT ABD & PELVIS W/CONTRAST: CPT | Mod: MG

## 2023-04-11 PROCEDURE — 74177 CT ABD & PELVIS W/CONTRAST: CPT | Mod: 26,MG

## 2023-04-11 PROCEDURE — 85610 PROTHROMBIN TIME: CPT

## 2023-04-11 PROCEDURE — 93005 ELECTROCARDIOGRAM TRACING: CPT

## 2023-04-11 PROCEDURE — 99285 EMERGENCY DEPT VISIT HI MDM: CPT

## 2023-04-11 PROCEDURE — 85025 COMPLETE CBC W/AUTO DIFF WBC: CPT

## 2023-04-11 RX ORDER — FENTANYL CITRATE 50 UG/ML
50 INJECTION INTRAVENOUS ONCE
Refills: 0 | Status: DISCONTINUED | OUTPATIENT
Start: 2023-04-11 | End: 2023-04-11

## 2023-04-11 RX ADMIN — FENTANYL CITRATE 50 MICROGRAM(S): 50 INJECTION INTRAVENOUS at 19:22

## 2023-04-11 NOTE — ED PROVIDER NOTE - PATIENT PORTAL LINK FT
You can access the FollowMyHealth Patient Portal offered by [facility] by registering at the following website: [email or web address]. By joining [employer]’s FollowMyHealth portal, you will also be able to view your health information using other applications (apps) compatible with our system.

## 2023-04-11 NOTE — ED ADULT NURSE REASSESSMENT NOTE - NS ED NURSE REASSESS COMMENT FT1
pt remains AOX4, even and unlabored resps, VSS at this time, pt with Ct completed and pt awaiting results. pt with no complaitns, tolerating PO and meal given. pt comfortable appearing, smiling and interactive.

## 2023-04-11 NOTE — ED PROVIDER NOTE - OBJECTIVE STATEMENT
Pt is a 37 yo F oc vaginal bleeding.  PMHx significant for htn, chronic pain, hypothyroidism, DM, marfan's syndrome, metallic valve replacements on coumadin. Pt was here 1 wk ago for dizziness, sob.  pt was given 1 Unit PRBC and was discharged. Pt states that she has been feeling better but today she passed a large blood clot vaginally and called her OB who told her to come to the ER. Pt states that she has lower abd pain which is chronic for her and unchanged today. no n/v. no diarrhea. no other complaints.

## 2023-04-11 NOTE — ED ADULT NURSE NOTE - CHIEF COMPLAINT QUOTE
sent in by OBGYN. c/o of vaginal bleeding, bilateral back pain, abdominal cramping and sob since november. reports going thru 3 pads a day. seen week ago, required blood transfusion.

## 2023-04-11 NOTE — ED ADULT TRIAGE NOTE - CHIEF COMPLAINT QUOTE
Benzodiazepines (Xanax) Pulmicort was refilled x3 months.  Prescription was sent to the [facility] pharmacy.  Mom wants to defer follow-up because she is seeking psychotherapy for him at her work which will be free visits.   sent in by OBGYN. c/o of vaginal bleeding, bilateral back pain, abdominal cramping and sob since november. reports going thru 3 pads a day. seen week ago, required blood transfusion.

## 2023-04-11 NOTE — ED ADULT NURSE NOTE - OBJECTIVE STATEMENT
pt comes to ED ambulatory with reports of vaginal bleeding with clots, pt seen here recently for same. pt reporting some fatigue, otherwise no SOB no vomiting blood no black tarry BMs, no hypoxia. pt with Marfan's syndrome, hx aortic valve replacement and pt on coumadin, last week INR measured > 6, pt states she spoke with her OBGYN this morning and they recommended she come to ED for blood work and evaluation. pt currently being treated with PO keflex for known UTI,

## 2023-04-12 VITALS
HEART RATE: 91 BPM | SYSTOLIC BLOOD PRESSURE: 93 MMHG | DIASTOLIC BLOOD PRESSURE: 63 MMHG | TEMPERATURE: 98 F | RESPIRATION RATE: 16 BRPM | OXYGEN SATURATION: 100 %

## 2023-04-12 NOTE — CONSULT NOTE ADULT - ASSESSMENT
JUANY FELICIANO is a 38y who presents for passage of 5x2cm clot this morning in setting of known h/o menorrhagia currently on aygestin 10mg qd.   Recent admission for supratherapeutic INR; missed two doses of aygestin at that time.    - VSS, H/H stable, no anemia symptoms, vaginal bleeding improved since this morning, no active bleeding on exam. No acute intervention necessary at this time.  - Episode of heavier than usual vaginal bleeding likely attributed to recent episode of supratherapeutic INR and withdrawal bleeding 2/2 missed aygestin doses.   - To continue with aygestin 10mg qd. To take 400mg ibuprofen q6h as needed for cramping abdominal pain. To follow up with Dr. Darby at the end of this month as scheduled.  - Patient to call office or return to ED if bleeding worsens (ie requiring 2-3 pads/h for 2 consecutive hours), if anemia symptoms, or for any other concerns.    D/w Dr. Temple JUANY FELICIANO is a 38y who presents for passage of 5x2cm clot this morning in setting of known h/o menorrhagia currently on aygestin 10mg qd.   Recent admission for supratherapeutic INR; missed two doses of aygestin at that time.    - VSS, H/H stable, no anemia symptoms, vaginal bleeding improved since this morning, no active bleeding on exam. No acute intervention necessary at this time.  - Episode of heavier than usual vaginal bleeding likely attributed to recent episode of supratherapeutic INR and withdrawal bleeding 2/2 missed aygestin doses.   - To continue with aygestin 10mg qd. To take 400mg ibuprofen q6h as needed for cramping abdominal pain. To follow up with Dr. Darby at the end of this month as scheduled.  - Patient to call office or return to ED if bleeding worsens (ie requiring 2-3 pads/h for 2 consecutive hours), if anemia symptoms, or for any other concerns.    D/w Dr. Temple    Agree with above. Patient with stable bleeding in context of elevated INR and missed Aygestin doses, was previously controlled on Aygestin. Continue with Aygestin and follow up outpatient.    Rob Temple MD

## 2023-04-12 NOTE — CONSULT NOTE ADULT - SUBJECTIVE AND OBJECTIVE BOX
JUANY FELICIANO is a 38y LMP    Physical exam:    Vitals:   T(C): 36.8 (04-12-23 @ 00:00), Max: 36.8 (04-12-23 @ 00:00)  HR: 106 (04-12-23 @ 00:00) (100 - 122)  BP: 102/63 (04-12-23 @ 00:00) (95/68 - 102/63)  RR: 18 (04-12-23 @ 00:00) (18 - 18)  SpO2: 98% (04-12-23 @ 00:00) (98% - 100%)    General: AAOx3, NAD  Abd: Soft, nontender, non distended; no rebound/guarding  Pelvic:    Labs:                        9.2    21.51 )-----------( 451      ( 11 Apr 2023 18:20 )             30.5     04-11    136  |  102  |  24.1<H>  ----------------------------<  128<H>  4.8   |  18.0<L>  |  0.76    Ca    9.7      11 Apr 2023 18:20    TPro  7.6  /  Alb  4.0  /  TBili  0.3<L>  /  DBili  x   /  AST  14  /  ALT  7   /  AlkPhos  76  04-11    SERUM bhcg    <4.0  04-11 @ 18:20    Radiology:  < from: CT Abdomen and Pelvis w/ IV Cont (04.11.23 @ 21:25) >  INTERPRETATION:  CLINICAL INFORMATION: Lower abdominal pain.    COMPARISON: CT of the abdomen and pelvis from 7/13/2020.    CONTRAST/COMPLICATIONS:  IV Contrast: Omnipaque 350  90 cc administered   10 cc discarded  Oral Contrast: NONE  Complications: None reported at time of study completion    PROCEDURE:  CT of the Abdomen and Pelvis was performed.  Sagittal and coronal reformats were performed.    FINDINGS:  LOWER CHEST: Bibasilar subsegmental atelectasis. Aortic and mitral valve   replacement.    LIVER: Within normal limits.  BILE DUCTS: Normal caliber.  GALLBLADDER: Within normal limits.  SPLEEN: Within normal limits.  PANCREAS: Within normal limits.  ADRENALS: Within normal limits.  KIDNEYS/URETERS: Nonobstructing 3 mm right renal calculus. No   hydronephrosis. Left kidney within normal limits.    BLADDER: Collapsed limiting evaluation.  REPRODUCTIVE ORGANS: Retroverted uterus. Thickened endometrium to 2.4 cm.   No adnexal masses.    BOWEL: No bowel obstruction or inflammation. Appendix is normal. Mild   amount of stool throughout the colon.  PERITONEUM: Trace high attenuation pelvic free fluid likely containing   hemorrhage. Left upper quadrant surgical clips.  VESSELS: Within normal limits.  RETROPERITONEUM/LYMPH NODES: No lymphadenopathy.  ABDOMINAL WALL: Tiny fat-containing umbilical hernia.  BONES: Posterior thoracolumbar spinal fusion with inferior extent to L4.   Moderate degenerative disc disease at L4-L5 and L5-S1. Bone graft donor   site in the right ilium.    IMPRESSION:  1. No bowel obstruction or inflammation.  2. Thickened endometrium which appears greater than expected to correlate   withthe phase of the patient's menstrual cycle. Trace high attenuation   pelvic free fluid likely representing hemoperitoneum. A follow-up pelvic   ultrasound is recommended for further evaluation.      < end of copied text >      A/P: JUANY FELICIANO is a 38y LMP *** who presents to ED *** with    D/w *** JUANY FELICIANO is a 38y LMP    GYN: Dr. Darby @ [facility]  Follows w cardiology q3-4months  Follows w hematologist    OBhx: denies  Gyn: denies h/o fibroids, +h/o cysts, +abnormal pap 9/2022 - colpo w bx wnl  PMH: marfans syndrome, TIIDM, asthma - denies recent exacerbations, carotid A dissection in 2020, schistocytic anemia, HTN, migraine w aura  PSH: mechanical valve replacement - aortic root and mitral valve (2013), septoplasty, jaw reconstruction, cataract removal, lens replacement, spinal fusion x3 (TI-LI 1996 x2, L1-L3 1999)  Med: warfarin (goal INR 2.5-3.5, tests q2wks, last week INR was 3.2), metformin, methadone, oxycodone, januvia, cymbalta, pravastatin, atorvastatin, gliperide, metoprolol ER, ASA, rescue inhaler, co q10  Allergies: NKDA    Physical exam:    Vitals:   T(C): 36.8 (04-12-23 @ 00:00), Max: 36.8 (04-12-23 @ 00:00)  HR: 106 (04-12-23 @ 00:00) (100 - 122)  BP: 102/63 (04-12-23 @ 00:00) (95/68 - 102/63)  RR: 18 (04-12-23 @ 00:00) (18 - 18)  SpO2: 98% (04-12-23 @ 00:00) (98% - 100%)    General: AAOx3, NAD  Abd: Soft, nontender, non distended; no rebound/guarding  Pelvic:    Labs:                        9.2    21.51 )-----------( 451      ( 11 Apr 2023 18:20 )             30.5     04-11    136  |  102  |  24.1<H>  ----------------------------<  128<H>  4.8   |  18.0<L>  |  0.76    Ca    9.7      11 Apr 2023 18:20    TPro  7.6  /  Alb  4.0  /  TBili  0.3<L>  /  DBili  x   /  AST  14  /  ALT  7   /  AlkPhos  76  04-11    SERUM bhcg    <4.0  04-11 @ 18:20    Radiology:  < from: CT Abdomen and Pelvis w/ IV Cont (04.11.23 @ 21:25) >  INTERPRETATION:  CLINICAL INFORMATION: Lower abdominal pain.    COMPARISON: CT of the abdomen and pelvis from 7/13/2020.    CONTRAST/COMPLICATIONS:  IV Contrast: Omnipaque 350  90 cc administered   10 cc discarded  Oral Contrast: NONE  Complications: None reported at time of study completion    PROCEDURE:  CT of the Abdomen and Pelvis was performed.  Sagittal and coronal reformats were performed.    FINDINGS:  LOWER CHEST: Bibasilar subsegmental atelectasis. Aortic and mitral valve   replacement.    LIVER: Within normal limits.  BILE DUCTS: Normal caliber.  GALLBLADDER: Within normal limits.  SPLEEN: Within normal limits.  PANCREAS: Within normal limits.  ADRENALS: Within normal limits.  KIDNEYS/URETERS: Nonobstructing 3 mm right renal calculus. No   hydronephrosis. Left kidney within normal limits.    BLADDER: Collapsed limiting evaluation.  REPRODUCTIVE ORGANS: Retroverted uterus. Thickened endometrium to 2.4 cm.   No adnexal masses.    BOWEL: No bowel obstruction or inflammation. Appendix is normal. Mild   amount of stool throughout the colon.  PERITONEUM: Trace high attenuation pelvic free fluid likely containing   hemorrhage. Left upper quadrant surgical clips.  VESSELS: Within normal limits.  RETROPERITONEUM/LYMPH NODES: No lymphadenopathy.  ABDOMINAL WALL: Tiny fat-containing umbilical hernia.  BONES: Posterior thoracolumbar spinal fusion with inferior extent to L4.   Moderate degenerative disc disease at L4-L5 and L5-S1. Bone graft donor   site in the right ilium.    IMPRESSION:  1. No bowel obstruction or inflammation.  2. Thickened endometrium which appears greater than expected to correlate   withthe phase of the patient's menstrual cycle. Trace high attenuation   pelvic free fluid likely representing hemoperitoneum. A follow-up pelvic   ultrasound is recommended for further evaluation.      < end of copied text > JUANY FELICIANO is a 38y who presents for passage of 5x2cm clot this morning in setting of known h/o menorrhagia currently on aygestin 10mg qd.     GYN: Dr. Darby @ [facility]  Follows w cardiology q3-4months  Follows w hematologist    OBhx: denies  Gyn: denies h/o fibroids, +h/o cysts, +abnormal pap 9/2022 - colpo w bx wnl  PMH: marfans syndrome, TIIDM, asthma - denies recent exacerbations, carotid A dissection in 2020, schistocytic anemia, HTN, migraine w aura  PSH: mechanical valve replacement - aortic root and mitral valve (2013), septoplasty, jaw reconstruction, cataract removal, lens replacement, spinal fusion x3 (TI-LI 1996 x2, L1-L3 1999)  Med: warfarin (goal INR 2.5-3.5, tests q2wks, last week INR was 3.2), metformin, methadone, oxycodone, januvia, cymbalta, pravastatin, atorvastatin, gliperide, metoprolol ER, ASA, rescue inhaler, co q10  Allergies: NKDA    Physical exam:    Vitals:   T(C): 36.8 (04-12-23 @ 00:00), Max: 36.8 (04-12-23 @ 00:00)  HR: 106 (04-12-23 @ 00:00) (100 - 122)  BP: 102/63 (04-12-23 @ 00:00) (95/68 - 102/63)  RR: 18 (04-12-23 @ 00:00) (18 - 18)  SpO2: 98% (04-12-23 @ 00:00) (98% - 100%)    General: AAOx3, NAD  Abd: Soft, nontender, non distended; no rebound/guarding  Pelvic:    Labs:                        9.2    21.51 )-----------( 451      ( 11 Apr 2023 18:20 )             30.5     04-11    136  |  102  |  24.1<H>  ----------------------------<  128<H>  4.8   |  18.0<L>  |  0.76    Ca    9.7      11 Apr 2023 18:20    TPro  7.6  /  Alb  4.0  /  TBili  0.3<L>  /  DBili  x   /  AST  14  /  ALT  7   /  AlkPhos  76  04-11    SERUM bhcg    <4.0  04-11 @ 18:20    Radiology:  < from: CT Abdomen and Pelvis w/ IV Cont (04.11.23 @ 21:25) >  INTERPRETATION:  CLINICAL INFORMATION: Lower abdominal pain.    COMPARISON: CT of the abdomen and pelvis from 7/13/2020.    CONTRAST/COMPLICATIONS:  IV Contrast: Omnipaque 350  90 cc administered   10 cc discarded  Oral Contrast: NONE  Complications: None reported at time of study completion    PROCEDURE:  CT of the Abdomen and Pelvis was performed.  Sagittal and coronal reformats were performed.    FINDINGS:  LOWER CHEST: Bibasilar subsegmental atelectasis. Aortic and mitral valve   replacement.    LIVER: Within normal limits.  BILE DUCTS: Normal caliber.  GALLBLADDER: Within normal limits.  SPLEEN: Within normal limits.  PANCREAS: Within normal limits.  ADRENALS: Within normal limits.  KIDNEYS/URETERS: Nonobstructing 3 mm right renal calculus. No   hydronephrosis. Left kidney within normal limits.    BLADDER: Collapsed limiting evaluation.  REPRODUCTIVE ORGANS: Retroverted uterus. Thickened endometrium to 2.4 cm.   No adnexal masses.    BOWEL: No bowel obstruction or inflammation. Appendix is normal. Mild   amount of stool throughout the colon.  PERITONEUM: Trace high attenuation pelvic free fluid likely containing   hemorrhage. Left upper quadrant surgical clips.  VESSELS: Within normal limits.  RETROPERITONEUM/LYMPH NODES: No lymphadenopathy.  ABDOMINAL WALL: Tiny fat-containing umbilical hernia.  BONES: Posterior thoracolumbar spinal fusion with inferior extent to L4.   Moderate degenerative disc disease at L4-L5 and L5-S1. Bone graft donor   site in the right ilium.    IMPRESSION:  1. No bowel obstruction or inflammation.  2. Thickened endometrium which appears greater than expected to correlate   withthe phase of the patient's menstrual cycle. Trace high attenuation   pelvic free fluid likely representing hemoperitoneum. A follow-up pelvic   ultrasound is recommended for further evaluation.      < end of copied text > JUANY FELICIANO is a 38y who presents for passage of 5x2cm clot this morning in setting of known h/o menorrhagia currently on aygestin 10mg qd. Reports she has had heavier bleeding than usual for the past week. Patient was recently admitted for supratherapeutic INR last week; missed two doses of her aygestin during that time. Reports compliance to aygestin otherwise. Denies anemia symptoms. Denies N/V, dysuria, abnormal vaginal discharge, fevers, chills. Reports abdominal cramping pain.     GYN: Dr. Darby @ [facility]  Follows w cardiology q3-4months  Follows w hematologist    OBhx: denies  Gyn: denies h/o fibroids, +h/o cysts, +abnormal pap 9/2022 - colpo w bx wnl  PMH: marfans syndrome, TIIDM, asthma - denies recent exacerbations, carotid A dissection in 2020, schistocytic anemia, HTN, migraine w aura  PSH: mechanical valve replacement - aortic root and mitral valve (2013), septoplasty, jaw reconstruction, cataract removal, lens replacement, spinal fusion x3 (TI-LI 1996 x2, L1-L3 1999)  Med: warfarin (goal INR 2.5-3.5, tests q2wks, last week INR was 3.2), metformin, methadone, oxycodone, januvia, cymbalta, pravastatin, atorvastatin, gliperide, metoprolol ER, ASA, rescue inhaler, co q10  Allergies: NKDA    Physical exam:    Vitals:   T(C): 36.8 (04-12-23 @ 00:00), Max: 36.8 (04-12-23 @ 00:00)  HR: 106 (04-12-23 @ 00:00) (100 - 122)  BP: 102/63 (04-12-23 @ 00:00) (95/68 - 102/63)  RR: 18 (04-12-23 @ 00:00) (18 - 18)  SpO2: 98% (04-12-23 @ 00:00) (98% - 100%)    General: AAOx3, NAD  Abd: Soft, nontender, non distended; no rebound/guarding  Pelvic: 2x1cm clot in vagina, small volume pooling of dark red blood in vagina, no active bleeding per os. Cervix closed, no CMT, mild uterine tenderness.    Labs:                        9.2    21.51 )-----------( 451      ( 11 Apr 2023 18:20 )             30.5     04-11    136  |  102  |  24.1<H>  ----------------------------<  128<H>  4.8   |  18.0<L>  |  0.76    Ca    9.7      11 Apr 2023 18:20    TPro  7.6  /  Alb  4.0  /  TBili  0.3<L>  /  DBili  x   /  AST  14  /  ALT  7   /  AlkPhos  76  04-11    SERUM bhcg    <4.0  04-11 @ 18:20    Radiology:  < from: CT Abdomen and Pelvis w/ IV Cont (04.11.23 @ 21:25) >  INTERPRETATION:  CLINICAL INFORMATION: Lower abdominal pain.    COMPARISON: CT of the abdomen and pelvis from 7/13/2020.    CONTRAST/COMPLICATIONS:  IV Contrast: Omnipaque 350  90 cc administered   10 cc discarded  Oral Contrast: NONE  Complications: None reported at time of study completion    PROCEDURE:  CT of the Abdomen and Pelvis was performed.  Sagittal and coronal reformats were performed.    FINDINGS:  LOWER CHEST: Bibasilar subsegmental atelectasis. Aortic and mitral valve   replacement.    LIVER: Within normal limits.  BILE DUCTS: Normal caliber.  GALLBLADDER: Within normal limits.  SPLEEN: Within normal limits.  PANCREAS: Within normal limits.  ADRENALS: Within normal limits.  KIDNEYS/URETERS: Nonobstructing 3 mm right renal calculus. No   hydronephrosis. Left kidney within normal limits.    BLADDER: Collapsed limiting evaluation.  REPRODUCTIVE ORGANS: Retroverted uterus. Thickened endometrium to 2.4 cm.   No adnexal masses.    BOWEL: No bowel obstruction or inflammation. Appendix is normal. Mild   amount of stool throughout the colon.  PERITONEUM: Trace high attenuation pelvic free fluid likely containing   hemorrhage. Left upper quadrant surgical clips.  VESSELS: Within normal limits.  RETROPERITONEUM/LYMPH NODES: No lymphadenopathy.  ABDOMINAL WALL: Tiny fat-containing umbilical hernia.  BONES: Posterior thoracolumbar spinal fusion with inferior extent to L4.   Moderate degenerative disc disease at L4-L5 and L5-S1. Bone graft donor   site in the right ilium.    IMPRESSION:  1. No bowel obstruction or inflammation.  2. Thickened endometrium which appears greater than expected to correlate   withthe phase of the patient's menstrual cycle. Trace high attenuation   pelvic free fluid likely representing hemoperitoneum. A follow-up pelvic   ultrasound is recommended for further evaluation.      < end of copied text >

## 2023-04-16 ENCOUNTER — TRANSCRIPTION ENCOUNTER (OUTPATIENT)
Age: 38
End: 2023-04-16

## 2023-04-19 ENCOUNTER — TRANSCRIPTION ENCOUNTER (OUTPATIENT)
Age: 38
End: 2023-04-19

## 2023-04-19 ENCOUNTER — APPOINTMENT (OUTPATIENT)
Dept: FAMILY MEDICINE | Facility: CLINIC | Age: 38
End: 2023-04-19
Payer: MEDICAID

## 2023-04-19 ENCOUNTER — RESULT CHARGE (OUTPATIENT)
Age: 38
End: 2023-04-19

## 2023-04-19 VITALS
HEART RATE: 113 BPM | TEMPERATURE: 97.5 F | WEIGHT: 183 LBS | OXYGEN SATURATION: 95 % | DIASTOLIC BLOOD PRESSURE: 60 MMHG | SYSTOLIC BLOOD PRESSURE: 86 MMHG | HEIGHT: 72 IN | BODY MASS INDEX: 24.79 KG/M2

## 2023-04-19 VITALS — HEART RATE: 96 BPM

## 2023-04-19 VITALS — DIASTOLIC BLOOD PRESSURE: 65 MMHG | SYSTOLIC BLOOD PRESSURE: 98 MMHG

## 2023-04-19 DIAGNOSIS — R29.898 OTHER SYMPTOMS AND SIGNS INVOLVING THE MUSCULOSKELETAL SYSTEM: ICD-10-CM

## 2023-04-19 DIAGNOSIS — R53.83 OTHER FATIGUE: ICD-10-CM

## 2023-04-19 DIAGNOSIS — R42 DIZZINESS AND GIDDINESS: ICD-10-CM

## 2023-04-19 DIAGNOSIS — I73.9 PERIPHERAL VASCULAR DISEASE, UNSPECIFIED: ICD-10-CM

## 2023-04-19 DIAGNOSIS — Z87.898 PERSONAL HISTORY OF OTHER SPECIFIED CONDITIONS: ICD-10-CM

## 2023-04-19 LAB
ALBUMIN SERPL ELPH-MCNC: 4.3 G/DL
ALP BLD-CCNC: 65 U/L
ALT SERPL-CCNC: 7 U/L
ANION GAP SERPL CALC-SCNC: 16 MMOL/L
AST SERPL-CCNC: 10 U/L
BASOPHILS # BLD AUTO: 0.07 K/UL
BASOPHILS NFR BLD AUTO: 0.4 %
BILIRUB SERPL-MCNC: 0.3 MG/DL
BUN SERPL-MCNC: 15 MG/DL
CALCIUM SERPL-MCNC: 9.8 MG/DL
CHLORIDE SERPL-SCNC: 105 MMOL/L
CO2 SERPL-SCNC: 17 MMOL/L
CREAT SERPL-MCNC: 0.63 MG/DL
EGFR: 115 ML/MIN/1.73M2
EOSINOPHIL # BLD AUTO: 0.15 K/UL
EOSINOPHIL NFR BLD AUTO: 0.8 %
GLUCOSE SERPL-MCNC: 127 MG/DL
HCT VFR BLD CALC: 34.2 %
HGB BLD-MCNC: 10.2 G/DL
IMM GRANULOCYTES NFR BLD AUTO: 0.8 %
INR PPP: 3.25 RATIO
LYMPHOCYTES # BLD AUTO: 2.47 K/UL
LYMPHOCYTES NFR BLD AUTO: 12.4 %
MAN DIFF?: NORMAL
MCHC RBC-ENTMCNC: 26.8 PG
MCHC RBC-ENTMCNC: 29.8 GM/DL
MCV RBC AUTO: 89.8 FL
MONOCYTES # BLD AUTO: 1.17 K/UL
MONOCYTES NFR BLD AUTO: 5.9 %
NEUTROPHILS # BLD AUTO: 15.9 K/UL
NEUTROPHILS NFR BLD AUTO: 79.7 %
PLATELET # BLD AUTO: 480 K/UL
POTASSIUM SERPL-SCNC: 4.4 MMOL/L
PROT SERPL-MCNC: 6.8 G/DL
PT BLD: 38.1 SEC
RBC # BLD: 3.81 M/UL
RBC # FLD: 17 %
SODIUM SERPL-SCNC: 138 MMOL/L
TSH SERPL-ACNC: 2.6 UIU/ML
WBC # FLD AUTO: 19.92 K/UL

## 2023-04-19 PROCEDURE — 99214 OFFICE O/P EST MOD 30 MIN: CPT | Mod: 25

## 2023-04-19 PROCEDURE — 36415 COLL VENOUS BLD VENIPUNCTURE: CPT

## 2023-04-19 RX ORDER — ENOXAPARIN SODIUM 80 MG/.8ML
80 INJECTION, SOLUTION SUBCUTANEOUS
Qty: 1 | Refills: 0 | Status: COMPLETED | COMMUNITY
Start: 2023-01-15 | End: 2023-04-19

## 2023-04-19 NOTE — REVIEW OF SYSTEMS
[Fatigue] : fatigue [Dyspnea on Exertion] : dyspnea on exertion [Negative] : Heme/Lymph [FreeTextEntry5] : see HPI  [FreeTextEntry6] : see HPI  [FreeTextEntry9] : see HPI

## 2023-04-19 NOTE — HISTORY OF PRESENT ILLNESS
[FreeTextEntry1] : JUANY is a 38 year old female here for a follow up. \par Gluclose =153\par INR= 2.8 [de-identified] : 37 yo female c hx of Valvular Heart Disease c fluctuating INR level presents for eval.    pt reports B/L UE and B/L LE "stretch" feeling \par Denies body aches elsewhere \par Pt states standing up to wash dishes and/or walking short distances is difficult\par \par pt reports fatigue, weakness, PATTON, and claudication like symptoms c ambulation.  symptoms gradually improve c rest, however, can take up to 30 mins

## 2023-04-19 NOTE — PHYSICAL EXAM
[No Acute Distress] : no acute distress [Well Nourished] : well nourished [Well Developed] : well developed [Well-Appearing] : well-appearing [EOMI] : extraocular movements intact [Normal Outer Ear/Nose] : the outer ears and nose were normal in appearance [Coordination Grossly Intact] : coordination grossly intact [Speech Grossly Normal] : speech grossly normal [Normal Affect] : the affect was normal [Normal Insight/Judgement] : insight and judgment were intact [de-identified] : mild anxious mood

## 2023-04-19 NOTE — PLAN
[FreeTextEntry1] : INR= 2.9 \par cont current Warfarin regimen 9mg qd \par \par FBS= 143 \par cont DM2 diet  \par \par see radiology orders \par \par see lab orders \par \par f/u pending lab results \par \par pt strongly encouraged to INCREASE hydration!!!!  in order to increase intravascular volume and to increase caloric intake

## 2023-04-20 ENCOUNTER — TRANSCRIPTION ENCOUNTER (OUTPATIENT)
Age: 38
End: 2023-04-20

## 2023-04-20 ENCOUNTER — EMERGENCY (EMERGENCY)
Facility: HOSPITAL | Age: 38
LOS: 1 days | Discharge: HOME | End: 2023-04-20
Attending: EMERGENCY MEDICINE
Payer: MEDICAID

## 2023-04-20 VITALS
DIASTOLIC BLOOD PRESSURE: 66 MMHG | OXYGEN SATURATION: 100 % | SYSTOLIC BLOOD PRESSURE: 110 MMHG | TEMPERATURE: 98 F | HEART RATE: 104 BPM | HEIGHT: 72 IN | RESPIRATION RATE: 16 BRPM | WEIGHT: 182.98 LBS

## 2023-04-20 DIAGNOSIS — Z98.1 ARTHRODESIS STATUS: Chronic | ICD-10-CM

## 2023-04-20 DIAGNOSIS — Z96.1 PRESENCE OF INTRAOCULAR LENS: Chronic | ICD-10-CM

## 2023-04-20 DIAGNOSIS — Z95.4 PRESENCE OF OTHER HEART-VALVE REPLACEMENT: Chronic | ICD-10-CM

## 2023-04-20 LAB
ALBUMIN SERPL ELPH-MCNC: 3.8 G/DL — SIGNIFICANT CHANGE UP (ref 3.3–5.2)
ALBUMIN SERPL ELPH-MCNC: 4 G/DL
ALP BLD-CCNC: 61 U/L
ALP SERPL-CCNC: 53 U/L — SIGNIFICANT CHANGE UP (ref 40–120)
ALT FLD-CCNC: <5 U/L — SIGNIFICANT CHANGE UP
ALT SERPL-CCNC: 5 U/L
ANION GAP SERPL CALC-SCNC: 10 MMOL/L — SIGNIFICANT CHANGE UP (ref 5–17)
ANION GAP SERPL CALC-SCNC: 14 MMOL/L
ANISOCYTOSIS BLD QL: SLIGHT — SIGNIFICANT CHANGE UP
APPEARANCE UR: ABNORMAL
AST SERPL-CCNC: 10 U/L — SIGNIFICANT CHANGE UP
AST SERPL-CCNC: 9 U/L
BACTERIA # UR AUTO: ABNORMAL
BASOPHILS # BLD AUTO: 0 K/UL — SIGNIFICANT CHANGE UP (ref 0–0.2)
BASOPHILS # BLD AUTO: 0.04 K/UL
BASOPHILS NFR BLD AUTO: 0 % — SIGNIFICANT CHANGE UP (ref 0–2)
BASOPHILS NFR BLD AUTO: 0.3 %
BILIRUB SERPL-MCNC: 0.3 MG/DL
BILIRUB SERPL-MCNC: <0.2 MG/DL — LOW (ref 0.4–2)
BILIRUB UR-MCNC: NEGATIVE — SIGNIFICANT CHANGE UP
BLD GP AB SCN SERPL QL: SIGNIFICANT CHANGE UP
BUN SERPL-MCNC: 12.9 MG/DL — SIGNIFICANT CHANGE UP (ref 8–20)
BUN SERPL-MCNC: 13 MG/DL
CALCIUM SERPL-MCNC: 8.9 MG/DL — SIGNIFICANT CHANGE UP (ref 8.4–10.5)
CALCIUM SERPL-MCNC: 9.4 MG/DL
CHLORIDE SERPL-SCNC: 103 MMOL/L
CHLORIDE SERPL-SCNC: 107 MMOL/L — SIGNIFICANT CHANGE UP (ref 96–108)
CO2 SERPL-SCNC: 19 MMOL/L
CO2 SERPL-SCNC: 22 MMOL/L — SIGNIFICANT CHANGE UP (ref 22–29)
COLOR SPEC: YELLOW — SIGNIFICANT CHANGE UP
CREAT SERPL-MCNC: 0.57 MG/DL — SIGNIFICANT CHANGE UP (ref 0.5–1.3)
CREAT SERPL-MCNC: 0.67 MG/DL
DIFF PNL FLD: ABNORMAL
EGFR: 113 ML/MIN/1.73M2
EGFR: 118 ML/MIN/1.73M2 — SIGNIFICANT CHANGE UP
ELLIPTOCYTES BLD QL SMEAR: SLIGHT — SIGNIFICANT CHANGE UP
EOSINOPHIL # BLD AUTO: 0 K/UL — SIGNIFICANT CHANGE UP (ref 0–0.5)
EOSINOPHIL # BLD AUTO: 0.15 K/UL
EOSINOPHIL NFR BLD AUTO: 0 % — SIGNIFICANT CHANGE UP (ref 0–6)
EOSINOPHIL NFR BLD AUTO: 1 %
EPI CELLS # UR: SIGNIFICANT CHANGE UP
GLUCOSE SERPL-MCNC: 123 MG/DL — HIGH (ref 70–99)
GLUCOSE SERPL-MCNC: 127 MG/DL
GLUCOSE UR QL: NEGATIVE MG/DL — SIGNIFICANT CHANGE UP
HCG SERPL-ACNC: <4 MIU/ML — SIGNIFICANT CHANGE UP
HCT VFR BLD CALC: 19.5 % — CRITICAL LOW (ref 34.5–45)
HCT VFR BLD CALC: 23.6 %
HGB BLD-MCNC: 5.8 G/DL — CRITICAL LOW (ref 11.5–15.5)
HGB BLD-MCNC: 6.7 G/DL
HYPOCHROMIA BLD QL: SLIGHT — SIGNIFICANT CHANGE UP
IMM GRANULOCYTES NFR BLD AUTO: 0.7 %
INR BLD: 3.7 RATIO — HIGH (ref 0.88–1.16)
KETONES UR-MCNC: ABNORMAL
LEUKOCYTE ESTERASE UR-ACNC: ABNORMAL
LYMPHOCYTES # BLD AUTO: 19.3 % — SIGNIFICANT CHANGE UP (ref 13–44)
LYMPHOCYTES # BLD AUTO: 2.08 K/UL
LYMPHOCYTES # BLD AUTO: 2.78 K/UL — SIGNIFICANT CHANGE UP (ref 1–3.3)
LYMPHOCYTES NFR BLD AUTO: 14.2 %
MAN DIFF?: NORMAL
MANUAL SMEAR VERIFICATION: SIGNIFICANT CHANGE UP
MCHC RBC-ENTMCNC: 25.4 PG — LOW (ref 27–34)
MCHC RBC-ENTMCNC: 25.7 PG
MCHC RBC-ENTMCNC: 28.4 GM/DL
MCHC RBC-ENTMCNC: 29.7 GM/DL — LOW (ref 32–36)
MCV RBC AUTO: 85.5 FL — SIGNIFICANT CHANGE UP (ref 80–100)
MCV RBC AUTO: 90.4 FL
MICROCYTES BLD QL: SLIGHT — SIGNIFICANT CHANGE UP
MONOCYTES # BLD AUTO: 0.24 K/UL — SIGNIFICANT CHANGE UP (ref 0–0.9)
MONOCYTES # BLD AUTO: 0.84 K/UL
MONOCYTES NFR BLD AUTO: 1.7 % — LOW (ref 2–14)
MONOCYTES NFR BLD AUTO: 5.7 %
MYELOCYTES NFR BLD: 0.9 % — HIGH (ref 0–0)
NEUTROPHILS # BLD AUTO: 11.12 K/UL — HIGH (ref 1.8–7.4)
NEUTROPHILS # BLD AUTO: 11.46 K/UL
NEUTROPHILS NFR BLD AUTO: 77.2 % — HIGH (ref 43–77)
NEUTROPHILS NFR BLD AUTO: 78.1 %
NITRITE UR-MCNC: NEGATIVE — SIGNIFICANT CHANGE UP
NT-PROBNP SERPL-SCNC: 282 PG/ML — SIGNIFICANT CHANGE UP (ref 0–300)
OVALOCYTES BLD QL SMEAR: SLIGHT — SIGNIFICANT CHANGE UP
PH UR: 6.5 — SIGNIFICANT CHANGE UP (ref 5–8)
PLAT MORPH BLD: NORMAL — SIGNIFICANT CHANGE UP
PLATELET # BLD AUTO: 458 K/UL — HIGH (ref 150–400)
PLATELET # BLD AUTO: 560 K/UL
POIKILOCYTOSIS BLD QL AUTO: SLIGHT — SIGNIFICANT CHANGE UP
POLYCHROMASIA BLD QL SMEAR: SLIGHT — SIGNIFICANT CHANGE UP
POTASSIUM SERPL-MCNC: 4.2 MMOL/L — SIGNIFICANT CHANGE UP (ref 3.5–5.3)
POTASSIUM SERPL-SCNC: 4.2 MMOL/L — SIGNIFICANT CHANGE UP (ref 3.5–5.3)
POTASSIUM SERPL-SCNC: 4.8 MMOL/L
PROT SERPL-MCNC: 6.5 G/DL — LOW (ref 6.6–8.7)
PROT SERPL-MCNC: 6.8 G/DL
PROT UR-MCNC: 30 MG/DL
PROTHROM AB SERPL-ACNC: 43.5 SEC — HIGH (ref 10.5–13.4)
RAPID RVP RESULT: SIGNIFICANT CHANGE UP
RBC # BLD: 2.28 M/UL — LOW (ref 3.8–5.2)
RBC # BLD: 2.61 M/UL
RBC # FLD: 16.4 % — HIGH (ref 10.3–14.5)
RBC # FLD: 16.9 %
RBC BLD AUTO: ABNORMAL
RBC CASTS # UR COMP ASSIST: >50 /HPF (ref 0–4)
SARS-COV-2 RNA SPEC QL NAA+PROBE: SIGNIFICANT CHANGE UP
SODIUM SERPL-SCNC: 136 MMOL/L
SODIUM SERPL-SCNC: 139 MMOL/L — SIGNIFICANT CHANGE UP (ref 135–145)
SP GR SPEC: 1.01 — SIGNIFICANT CHANGE UP (ref 1.01–1.02)
TROPONIN T SERPL-MCNC: <0.01 NG/ML — SIGNIFICANT CHANGE UP (ref 0–0.06)
UROBILINOGEN FLD QL: NEGATIVE MG/DL — SIGNIFICANT CHANGE UP
VARIANT LYMPHS # BLD: 0.9 % — SIGNIFICANT CHANGE UP (ref 0–6)
WBC # BLD: 14.4 K/UL — HIGH (ref 3.8–10.5)
WBC # FLD AUTO: 14.4 K/UL — HIGH (ref 3.8–10.5)
WBC # FLD AUTO: 14.68 K/UL
WBC UR QL: ABNORMAL /HPF (ref 0–5)

## 2023-04-20 PROCEDURE — 99223 1ST HOSP IP/OBS HIGH 75: CPT

## 2023-04-20 PROCEDURE — 71045 X-RAY EXAM CHEST 1 VIEW: CPT | Mod: 26

## 2023-04-20 PROCEDURE — 93010 ELECTROCARDIOGRAM REPORT: CPT | Mod: 77

## 2023-04-20 PROCEDURE — 93010 ELECTROCARDIOGRAM REPORT: CPT

## 2023-04-20 PROCEDURE — 93970 EXTREMITY STUDY: CPT | Mod: 26

## 2023-04-20 RX ORDER — ATORVASTATIN CALCIUM 80 MG/1
20 TABLET, FILM COATED ORAL AT BEDTIME
Refills: 0 | Status: DISCONTINUED | OUTPATIENT
Start: 2023-04-20 | End: 2023-04-28

## 2023-04-20 RX ORDER — METOPROLOL TARTRATE 50 MG
25 TABLET ORAL ONCE
Refills: 0 | Status: DISCONTINUED | OUTPATIENT
Start: 2023-04-20 | End: 2023-04-21

## 2023-04-20 RX ORDER — SODIUM CHLORIDE 9 MG/ML
1000 INJECTION INTRAMUSCULAR; INTRAVENOUS; SUBCUTANEOUS ONCE
Refills: 0 | Status: COMPLETED | OUTPATIENT
Start: 2023-04-20 | End: 2023-04-20

## 2023-04-20 RX ORDER — LEVOTHYROXINE SODIUM 125 MCG
50 TABLET ORAL DAILY
Refills: 0 | Status: DISCONTINUED | OUTPATIENT
Start: 2023-04-20 | End: 2023-04-28

## 2023-04-20 RX ORDER — ASPIRIN/CALCIUM CARB/MAGNESIUM 324 MG
81 TABLET ORAL DAILY
Refills: 0 | Status: DISCONTINUED | OUTPATIENT
Start: 2023-04-20 | End: 2023-04-28

## 2023-04-20 RX ORDER — OXYCODONE HYDROCHLORIDE 5 MG/1
20 TABLET ORAL ONCE
Refills: 0 | Status: DISCONTINUED | OUTPATIENT
Start: 2023-04-20 | End: 2023-04-20

## 2023-04-20 RX ADMIN — SODIUM CHLORIDE 1000 MILLILITER(S): 9 INJECTION INTRAMUSCULAR; INTRAVENOUS; SUBCUTANEOUS at 14:16

## 2023-04-20 RX ADMIN — SODIUM CHLORIDE 1000 MILLILITER(S): 9 INJECTION INTRAMUSCULAR; INTRAVENOUS; SUBCUTANEOUS at 16:02

## 2023-04-20 RX ADMIN — OXYCODONE HYDROCHLORIDE 20 MILLIGRAM(S): 5 TABLET ORAL at 19:29

## 2023-04-20 RX ADMIN — OXYCODONE HYDROCHLORIDE 20 MILLIGRAM(S): 5 TABLET ORAL at 22:05

## 2023-04-20 NOTE — CONSULT NOTE ADULT - ASSESSMENT
JUANY FELICIANO is a 38y w/ known hx of AUB, Marfan syndrome, mechanical Aortic and Mitral valves on warfarin, DM, hypothyroidism who presents to the ED from PCP office for management of symptomatic anemia     PLAN:  - Patient currently receiving 1st unit of pRBC, to receive total 2u pRBC. Post transfusion CBC pending  - Minimal active bleeding present on speculum exam and on valsalva   - To continue with aygestin 10mg qd. To take 400mg ibuprofen q6h as needed for cramping abdominal pain. To follow up with Dr. Darby on 4/27  - Patient to call office or return to ED if bleeding worsens (ie requiring 2-3 pads/h for 2 consecutive hours), if anemia symptoms, or for any other concerns.  - Patient verbalized understanding at bedside    Plan d/w Dr. Darby

## 2023-04-20 NOTE — ED CDU PROVIDER INITIAL DAY NOTE - ATTENDING APP SHARED VISIT CONTRIBUTION OF CARE
vaginal bleeding, varying in intensity since feb 14th.  On coumadin for mechanical valve; h/o Marfans syndrome.  Reports back, arms and leg soreness with increasing PATTON, palpitations and LH gradually worsening since previous visit.  Prior transfusions without reaction.  PE:  non-toxic appearing, NARD,  pale nailbeds.  Labs reviewed.  PRBC ordered.  doppler of LEs ordered.

## 2023-04-20 NOTE — ED ADULT TRIAGE NOTE - CHIEF COMPLAINT QUOTE
Patient presents from her PCP c/o low hemoglobin secondary to vaginal bleeding. States she has been here multiple times, given PRBC's, and then discharged home. Reports SOB.

## 2023-04-20 NOTE — ED CDU PROVIDER INITIAL DAY NOTE - CLINICAL SUMMARY MEDICAL DECISION MAKING FREE TEXT BOX
39 yo Female  PMHx significant for htn, chronic pain, hypothyroidism, DM, marfan' s syndrome, metallic valve replacements on coumadin sent from Primary care [place] for low H/h and blood transfusion. pt states she start having vaginal bleeding and passing blood-clotting Since mid november of the last yr . had f.u With Dr polanco GYN start the pt on [id number] then later raise it to 10 Mg . pt had visit in ER 9 days ago for heavy vaginal bleeding told  cont 10 mg norethindrone. pt states she still having vaginal bleeding now is but is less than before she changes 2 pad a day  . she was feeling fatigue and dizzy that she had f.u With pcp and she received the call today to go to ER .  Anemia Due to DUB on coumadin INR is therapeutic now   GYN consult By primary Tean   transfusing 2 unit of PRBC  DVT study LE less likely DVT , Pt is requesting   pain med    cont home med - PT brought her own meds

## 2023-04-20 NOTE — ED PROVIDER NOTE - OBJECTIVE STATEMENT
The patient is a 38 year old female with known dysfunctional uterine bleeding on medication presents with recurrent low Hb level sent in by PMD  Mild fatigue, SOB and LH  No CP, No abd pain, No back pain, No HA, No motor No sensory loss  No change in color of BM  Moderate vaginal bleed  On coumadin for mitral valve replacement

## 2023-04-20 NOTE — ED CDU PROVIDER INITIAL DAY NOTE - PHYSICAL EXAMINATION
Const: AOX3 nontoxic appearing, no apparent respiratory or physical distress. thin appear marfan   HEENT: NC/AT. Moist mucous membranes.  Eyes: DIPAK. pale conjunctiva EOMI  Neck: Soft and supple. Full ROM without pain.  Cardiac: Regular rate and regular rhythm. +S1/S2. +murmurs. Peripheral pulses 2+ and symmetric. No LE edema.  Resp: Speaking in full sentences. No evidence of respiratory distress. No wheezes, rales or rhonchi. No adventitious breath sounds   Abd: Soft, non-tender, non-distended.  Skin: No rashes, abrasions or lacerations.  MSk moves all her extremities symmetrically - LE Dp pulse +2 equal b.l - No call TTP - mild lateral knee muscle TTP - no Erythema or ecchymosis - ROM grossly intact from knee and ankle and hip area   Neuro: Awake, alert & oriented x 3. Moves all extremities symmetrically.

## 2023-04-20 NOTE — ED PROVIDER NOTE - CLINICAL SUMMARY MEDICAL DECISION MAKING FREE TEXT BOX
The patient presents with symptomatic anemia and will need transfusion and will place in ED observation

## 2023-04-20 NOTE — ED CDU PROVIDER INITIAL DAY NOTE - OBJECTIVE STATEMENT
37 yo Female  PMHx significant for htn, chronic pain, hypothyroidism, DM, marfan' s syndrome, metallic valve replacements on coumadin sent from [place] care [place] for [place] H/h and blood transfusion. pt states she start having vaginal bleeding and passing blood-clotting Since mid november of the last yr . had f.u With Dr polanco GYN start the pt on [id number] then later raise it to 10 Mg . pt had visit in ER 9 days ago for heavy vaginal bleeding told  cont 10 mg norethindrone. pt states she still having vaginal bleeding now is but is less than before she changes 2 pad a day  . she was feeling fatigue and dizzy that she had f.u With pcp and she received the call today to go to ER .

## 2023-04-20 NOTE — CONSULT NOTE ADULT - SUBJECTIVE AND OBJECTIVE BOX
JUANY FELICIANO is a 39yo G0 hx of anemia and AUB on aygestin 10mg qd and on Warfarin for mechanical valve who presents to ED sent from PCP for anemia, patient reported Hb of 6. She reports that since ED visit on 4/12, her bleeding has actually improved. She reports using about 1 pad/2-3h when moving and 1 pad/4-5h overnight at rest. She states that since ED visit 4/12 she felt some SOB with exertion, lightheadedness, dizziness, chills. She denies nausea, vomiting, fevers, abnormal vaginal discharge. Her cramps have been controlled with ibuprofen    GYN: Dr. Darby @ [facility]  Follows w cardiology q3-4months  Follows w hematologist    OBhx: denies  Gyn: denies h/o fibroids, +h/o cysts, +abnormal pap 9/2022 - colpo w bx wnl  PMH: marfans syndrome, TIIDM, asthma - denies recent exacerbations, carotid A dissection in 2020, schistocytic anemia, HTN, migraine w aura  PSH: mechanical valve replacement - aortic root and mitral valve (2013), septoplasty, jaw reconstruction, cataract removal, lens replacement, spinal fusion x3 (TI-LI 1996 x2, L1-L3 1999)  Med: warfarin (goal INR 2.5-3.5, tests q2wks, last week INR was 3.2), metformin, methadone, oxycodone, januvia, cymbalta, pravastatin, atorvastatin, gliperide, metoprolol ER, ASA, rescue inhaler, co q10  Allergies: NKDA    Vital Signs Last 24 Hrs  T(C): 36.8 (20 Apr 2023 16:25), Max: 36.8 (20 Apr 2023 16:25)  T(F): 98.3 (20 Apr 2023 16:25), Max: 98.3 (20 Apr 2023 16:25)  HR: 100 (20 Apr 2023 16:25) (100 - 104)  BP: 113/56 (20 Apr 2023 16:25) (110/66 - 113/56)  BP(mean): --  RR: 16 (20 Apr 2023 16:25) (16 - 16)  SpO2: 100% (20 Apr 2023 16:25) (100% - 100%)    Parameters below as of 20 Apr 2023 16:25  Patient On (Oxygen Delivery Method): room air    PHYSICAL EXAM:  CHEST/LUNG: Clear to percussion bilaterally; No rales, rhonchi, wheezing, or rubs  HEART: Regular rate and rhythm; No murmurs, rubs, or gallops  ABDOMEN: Soft, Nontender, Nondistended; Bowel sounds present  EXTREMITIES:  2+ Peripheral Pulses, No clubbing, cyanosis, or edema  PELVIC: *PENDING*        EXTERNAL GENITALIA: Normal. No rashes or lesions noted.         VAGINA: healthy pink mucosa, no gross lesions, no discharge noted, no blood noted.          CERVIX: no lesions. closed, no active bleeding through os. no CMt noted.        UTERUS: normal size, nontender, mobile        ADNEXA: no adnexal masses or tenderness appreciated.    LABS:                        5.8    14.40 )-----------( [id number]      ( 20 Apr 2023 13:45 )             19.5     04-20    139  |  107  |  12.9  ----------------------------<  123<H>  4.2   |  22.0  |  0.57    Ca    8.9      20 Apr 2023 13:45    TPro  6.5<L>  /  Alb  3.8  /  TBili  <0.2<L>  /  DBili  x   /  AST  10  /  ALT  <5  /  AlkPhos  53  04-20     JUANY FELICIANO is a 39yo G0 hx of anemia and AUB on aygestin 10mg qd and on Warfarin for mechanical valve who presents to ED sent from PCP for anemia, patient reported Hb of 6. She reports that since ED visit on 4/12, her bleeding has actually improved. She reports using about 1 pad/2-3h when moving and 1 pad/4-5h overnight at rest. She states that since ED visit 4/12 she felt some SOB with exertion, lightheadedness, dizziness, chills. She denies nausea, vomiting, fevers, abnormal vaginal discharge. Her cramps have been controlled with ibuprofen    GYN: Dr. Darby @ [facility]  Follows w cardiology q3-4months  Follows w hematologist    OBhx: denies  Gyn: denies h/o fibroids, +h/o cysts, +abnormal pap 9/2022 - colpo w bx wnl  PMH: marfans syndrome, TIIDM, asthma - denies recent exacerbations, carotid A dissection in 2020, schistocytic anemia, HTN, migraine w aura  PSH: mechanical valve replacement - aortic root and mitral valve (2013), septoplasty, jaw reconstruction, cataract removal, lens replacement, spinal fusion x3 (TI-LI 1996 x2, L1-L3 1999)  Med: warfarin (goal INR 2.5-3.5, tests q2wks, last week INR was 3.2), metformin, methadone, oxycodone, januvia, cymbalta, pravastatin, atorvastatin, gliperide, metoprolol ER, ASA, rescue inhaler, co q10  Allergies: NKDA    Vital Signs Last 24 Hrs  T(C): 36.8 (20 Apr 2023 16:25), Max: 36.8 (20 Apr 2023 16:25)  T(F): 98.3 (20 Apr 2023 16:25), Max: 98.3 (20 Apr 2023 16:25)  HR: 100 (20 Apr 2023 16:25) (100 - 104)  BP: 113/56 (20 Apr 2023 16:25) (110/66 - 113/56)  RR: 16 (20 Apr 2023 16:25) (16 - 16)  SpO2: 100% (20 Apr 2023 16:25) (100% - 100%)    Parameters below as of 20 Apr 2023 16:25  Patient On (Oxygen Delivery Method): room air    PHYSICAL EXAM:  GEN: NAD  PELVIC:         EXTERNAL GENITALIA: Normal. No rashes or lesions noted.         VAGINA: small volume pooling of dark red blood in vagina, no active bleeding per os. Cervix closed, no CMT, mild uterine tenderness.          CERVIX: no lesions. closed, no active bleeding through os. no CMt noted.       LABS:                        5.8    14.40 )-----------( 458      ( 20 Apr 2023 13:45 )             19.5     04-20    139  |  107  |  12.9  ----------------------------<  123<H>  4.2   |  22.0  |  0.57    Ca    8.9      20 Apr 2023 13:45    TPro  6.5<L>  /  Alb  3.8  /  TBili  <0.2<L>  /  DBili  x   /  AST  10  /  ALT  <5  /  AlkPhos  53  04-20

## 2023-04-20 NOTE — ED PROVIDER NOTE - SEVERITY
[place] ambulatory encounter(APSO):    [facility]  [address] [address]  [phone]    Assessment & Plan:  1. Attention deficit disorder (ADD) without hyperactivity      Return in about 6 months (around 9/26/2018).  ADHD without hyperactivity doing well with Vyvanse.  Soccer exam done also.      The patient indicates understanding of these issues and agrees with the plan.       CHIEF COMPLAINT:   Chief Complaint   Patient presents with   • Behavioral Problem     Medication Follow up         History of present illness:    He is complaining of/requesting evaluation for follow up of ADHD without hyperactivity.  Abdias had been off medication feeling it wasn't helpful at last visit 10/20/17.   His chemistry and math grades started dropping and getting harder.  He finds when he is on the medication it is easier to focus.  No side effect on medication.      Also playing soccer.  No history of heart problems, no chest pain pressure palpitations or syncope.  No orthopedic issues.  No FH of sudden death.      Father declines HPV vaccine.        []  YES    [x]  NO Difficulty falling asleep  [x]  YES    []  NO Normal appetite  [x]  YES    []  NO Headaches   [x]  YES    []  NO Palpitations  [x]  YES    []  NO Stomach aches  [x]  YES    []  NO Bowel changes  [x]  YES    []  NO Able to get homework done  []  YES    [x]  NO Problems focusing in school/ at Work  []  YES    [x]  NO Behavioral problems/ Legal problems      School: [employer]  Grade Level: 10  Academic Performance:B's to A  Behavioral Issues: no  Social Relationships: good  Marijuana/ Drug Use: denies       Dating now, playing soccer.      PAST HISTORIES:  I have reviewed the past medical history,  social history, medications and allergies listed in the medical record as obtained by my nursing staff and support staff and agree with their documentation.  ALLERGIES:  No Known  Allergies  Current Outpatient Prescriptions   Medication Sig Dispense Refill   • lisdexamfetamine (VYVANSE) 20 MG capsule Take 1 capsule by mouth every morning. F90.9 28 capsule 0     No current facility-administered medications for this visit.      Past Medical History:   Diagnosis Date   • ADD (attention deficit disorder)    • Vitiligo      Past Surgical History:   Procedure Laterality Date   • Tonsillectomy and adenoidectomy         REVIEW OF SYSTEMS:  Except as stated above, the patient denies:  Headaches, diplopia, field cut, or swallowing difficulty. No chest pain, chest  pressure, shortness of breath, PND, orthopnea or hemoptysis. No nausea,  vomiting, diarrhea, constipation, melena, hematochezia or hematemesis. No  change in urination. No dysuria, frequency, hematuria, or urgency. No  joint swelling. No skin rash.     PHYSICAL exam:    Vitals:    03/26/18 0814   BP: 114/60   Pulse: 50   Resp: 16   Temp: 96.1 °F (35.6 °C)   TempSrc: Tympanic   Weight: 70.9 kg   Height: 5' 10.87\" (1.8 m)     GENERAL:  Pleasant, Cooperative in no distress.  Color is good.  HEENT: Head normocephalic, atraumatic. Eyes: Extraocular movements  intact. Pupils are equally responsive and reactive to light. Conjunctivae  pink. Sclerae anicteric. Ears: External ears are normal. Tympanic  membranes and external auditory canals are normal. Nasopharyngeal mucosa  is normal. Oropharynx: mucosa is normal.  NECK: Supple without mass. No thyromegaly or adenopathy.   LUNGS: Lung fields are clear to auscultation and percussion.  HEART: Regular rate and rhythm. Normal S1 and S2. No murmur, rub,  gallop, or click.   ABDOMEN: Soft and nontender. No mass. No hepatosplenomegaly. No flank percussion tenderness.  LOWER EXTREMITIES: No edema. Dorsalis pedis and posterior tibial pulses  are normal.  LYMPH: No axillary, supraclavicular, or inguinal adenopathy.  : normal, no hernia.                PAIN SCALE 4 OF 10.

## 2023-04-20 NOTE — ED ADULT NURSE NOTE - OBJECTIVE STATEMENT
since January c/o frequent vaginal bleeding, has required multiple PRBC infusions in the past. sent by PCP for hgb of 6

## 2023-04-21 ENCOUNTER — APPOINTMENT (OUTPATIENT)
Dept: ULTRASOUND IMAGING | Facility: CLINIC | Age: 38
End: 2023-04-21

## 2023-04-21 VITALS
OXYGEN SATURATION: 98 % | RESPIRATION RATE: 18 BRPM | HEART RATE: 91 BPM | DIASTOLIC BLOOD PRESSURE: 64 MMHG | SYSTOLIC BLOOD PRESSURE: 105 MMHG | TEMPERATURE: 98 F

## 2023-04-21 LAB
BASOPHILS # BLD AUTO: 0.05 K/UL — SIGNIFICANT CHANGE UP (ref 0–0.2)
BASOPHILS NFR BLD AUTO: 0.4 % — SIGNIFICANT CHANGE UP (ref 0–2)
EOSINOPHIL # BLD AUTO: 0.18 K/UL — SIGNIFICANT CHANGE UP (ref 0–0.5)
EOSINOPHIL NFR BLD AUTO: 1.4 % — SIGNIFICANT CHANGE UP (ref 0–6)
HCT VFR BLD CALC: 26 % — LOW (ref 34.5–45)
HCT VFR BLD CALC: 29.1 % — LOW (ref 34.5–45)
HGB BLD-MCNC: 8.2 G/DL — LOW (ref 11.5–15.5)
HGB BLD-MCNC: 9.3 G/DL — LOW (ref 11.5–15.5)
IMM GRANULOCYTES NFR BLD AUTO: 0.6 % — SIGNIFICANT CHANGE UP (ref 0–0.9)
LYMPHOCYTES # BLD AUTO: 15.9 % — SIGNIFICANT CHANGE UP (ref 13–44)
LYMPHOCYTES # BLD AUTO: 2.02 K/UL — SIGNIFICANT CHANGE UP (ref 1–3.3)
MCHC RBC-ENTMCNC: 27.2 PG — SIGNIFICANT CHANGE UP (ref 27–34)
MCHC RBC-ENTMCNC: 27.4 PG — SIGNIFICANT CHANGE UP (ref 27–34)
MCHC RBC-ENTMCNC: 31.5 GM/DL — LOW (ref 32–36)
MCHC RBC-ENTMCNC: 32 GM/DL — SIGNIFICANT CHANGE UP (ref 32–36)
MCV RBC AUTO: 85.8 FL — SIGNIFICANT CHANGE UP (ref 80–100)
MCV RBC AUTO: 86.4 FL — SIGNIFICANT CHANGE UP (ref 80–100)
MONOCYTES # BLD AUTO: 0.76 K/UL — SIGNIFICANT CHANGE UP (ref 0–0.9)
MONOCYTES NFR BLD AUTO: 6 % — SIGNIFICANT CHANGE UP (ref 2–14)
NEUTROPHILS # BLD AUTO: 9.61 K/UL — HIGH (ref 1.8–7.4)
NEUTROPHILS NFR BLD AUTO: 75.7 % — SIGNIFICANT CHANGE UP (ref 43–77)
PLATELET # BLD AUTO: 433 K/UL — HIGH (ref 150–400)
PLATELET # BLD AUTO: 512 K/UL — HIGH (ref 150–400)
RBC # BLD: 3.01 M/UL — LOW (ref 3.8–5.2)
RBC # BLD: 3.39 M/UL — LOW (ref 3.8–5.2)
RBC # FLD: 15.7 % — HIGH (ref 10.3–14.5)
RBC # FLD: 15.7 % — HIGH (ref 10.3–14.5)
WBC # BLD: 12.7 K/UL — HIGH (ref 3.8–10.5)
WBC # BLD: 16.12 K/UL — HIGH (ref 3.8–10.5)
WBC # FLD AUTO: 12.7 K/UL — HIGH (ref 3.8–10.5)
WBC # FLD AUTO: 16.12 K/UL — HIGH (ref 3.8–10.5)

## 2023-04-21 PROCEDURE — 86900 BLOOD TYPING SEROLOGIC ABO: CPT

## 2023-04-21 PROCEDURE — 85027 COMPLETE CBC AUTOMATED: CPT

## 2023-04-21 PROCEDURE — 99285 EMERGENCY DEPT VISIT HI MDM: CPT | Mod: 25

## 2023-04-21 PROCEDURE — 85610 PROTHROMBIN TIME: CPT

## 2023-04-21 PROCEDURE — 86923 COMPATIBILITY TEST ELECTRIC: CPT

## 2023-04-21 PROCEDURE — 83880 ASSAY OF NATRIURETIC PEPTIDE: CPT

## 2023-04-21 PROCEDURE — 0225U NFCT DS DNA&RNA 21 SARSCOV2: CPT

## 2023-04-21 PROCEDURE — 80053 COMPREHEN METABOLIC PANEL: CPT

## 2023-04-21 PROCEDURE — 93005 ELECTROCARDIOGRAM TRACING: CPT

## 2023-04-21 PROCEDURE — 86850 RBC ANTIBODY SCREEN: CPT

## 2023-04-21 PROCEDURE — 81001 URINALYSIS AUTO W/SCOPE: CPT

## 2023-04-21 PROCEDURE — 86901 BLOOD TYPING SEROLOGIC RH(D): CPT

## 2023-04-21 PROCEDURE — 99238 HOSP IP/OBS DSCHRG MGMT 30/<: CPT

## 2023-04-21 PROCEDURE — G0378: CPT

## 2023-04-21 PROCEDURE — 85025 COMPLETE CBC W/AUTO DIFF WBC: CPT

## 2023-04-21 PROCEDURE — 84702 CHORIONIC GONADOTROPIN TEST: CPT

## 2023-04-21 PROCEDURE — 93970 EXTREMITY STUDY: CPT

## 2023-04-21 PROCEDURE — 71045 X-RAY EXAM CHEST 1 VIEW: CPT

## 2023-04-21 PROCEDURE — 87086 URINE CULTURE/COLONY COUNT: CPT

## 2023-04-21 PROCEDURE — P9016: CPT

## 2023-04-21 PROCEDURE — 84484 ASSAY OF TROPONIN QUANT: CPT

## 2023-04-21 PROCEDURE — 36430 TRANSFUSION BLD/BLD COMPNT: CPT

## 2023-04-21 PROCEDURE — 36415 COLL VENOUS BLD VENIPUNCTURE: CPT

## 2023-04-21 RX ORDER — IBUPROFEN 200 MG
600 TABLET ORAL ONCE
Refills: 0 | Status: COMPLETED | OUTPATIENT
Start: 2023-04-21 | End: 2023-04-21

## 2023-04-21 RX ORDER — OXYCODONE HYDROCHLORIDE 5 MG/1
10 TABLET ORAL ONCE
Refills: 0 | Status: DISCONTINUED | OUTPATIENT
Start: 2023-04-21 | End: 2023-04-21

## 2023-04-21 RX ADMIN — Medication 81 MILLIGRAM(S): at 08:20

## 2023-04-21 RX ADMIN — OXYCODONE HYDROCHLORIDE 10 MILLIGRAM(S): 5 TABLET ORAL at 08:20

## 2023-04-21 RX ADMIN — OXYCODONE HYDROCHLORIDE 10 MILLIGRAM(S): 5 TABLET ORAL at 08:19

## 2023-04-21 RX ADMIN — Medication 600 MILLIGRAM(S): at 05:13

## 2023-04-21 RX ADMIN — Medication 600 MILLIGRAM(S): at 02:56

## 2023-04-21 NOTE — ED ADULT NURSE REASSESSMENT NOTE - NS ED NURSE REASSESS COMMENT FT1
patient requesting to speak with patient experience regarding interaction she had in triage. call placed to Patient experience, no answer but message left
Blood transfusion started, pt notify of signs effects to look out for and call bell left within reach. VS done and BP noted to be low, pt voiced that she does feel dizzy and lightheaded. blood progressing, will continue to monitor
Pt received from day rn @ 1900, pt a&ox4, denying any complaints. Pt currently has pRbcs running @ 100. Pt aware of poc. Tele maintained, nsr 95. Will continue to monitor.
Assume care of pt from night RN, nil reports of any overnight events. Pt received lying in bed, same complaints of pain rated at 7/10. Bilateral rise and fall of chest, breathing spontaneously on room air. Denies any SOB or chest pain at this time. Awaiting repeat CBC

## 2023-04-21 NOTE — ED CDU PROVIDER SUBSEQUENT DAY NOTE - PHYSICAL EXAMINATION
Gen: No acute distress, non toxic  HEENT: Mucous membranes moist, pink conjunctivae, EOMI. PERRL.   CV: RRR, nl s1/s2.  Resp: CTAB, normal rate and effort  GI: Abdomen soft, NT, ND. No rebound, no guarding  Neuro: A&O x4, MAEx4. 5/5 str ext x 4. Sensation intact, symmetric throughout. No FNDs. Gait intact. CN 2-12 intact.   MSK: No spine or joint tenderness to palpation  Skin: No rashes. intact and perfused. cap refill <2sec

## 2023-04-21 NOTE — ED ADULT NURSE REASSESSMENT NOTE - COMFORT CARE
meal provided/plan of care explained/po fluids offered
meal provided/po fluids offered/warm blanket provided

## 2023-04-21 NOTE — ED CDU PROVIDER SUBSEQUENT DAY NOTE - PROGRESS NOTE DETAILS
pt re-assessed After transfusion.  Patient reports she is feeling better than when she came in although she is still reporting slight fatigue.  Still actively bleeding however states it is significantly less when she is not ambulating.  Repeat hemoglobin 8.2, patient would prefer another unit of blood as her baseline is 9. will give 1 more unit PRBC and discharge following transfusion.

## 2023-04-21 NOTE — ED CDU PROVIDER DISPOSITION NOTE - NSFOLLOWUPINSTRUCTIONS_ED_ALL_ED_FT
- Please follow-up with your primary care doctor in the next 5-7 days.  Please call tomorrow for an appointment.  If you cannot follow-up with your primary care doctor please return to the ED for any urgent issues.  - You were given a copy of the tests performed today.  Please bring the results with you and review them with your primary care doctor.  - If you have any worsening of symptoms or any other concerns please return to the ED immediately.  - Please continue taking your home medications as directed.   - Follow up with the gynecologist within 1 week.  - Follow up with the hematologist within 1 week.     Anemia    Anemia is a condition in which the concentration of red blood cells or hemoglobin in the blood is below normal. Hemoglobin is a substance in red blood cells that carries oxygen to the tissues of the body. Anemia results in not enough oxygen reaching these tissues which can cause symptoms such as weakness, dizziness/lightheadedness, shortness of breath, chest pain, paleness, or nausea. The cause of your anemia may or may not be determined immediately. If your hemoglobin was dangerously low, you may have received a blood transfusion. Usually reactions to transfusions occur immediately but monitor yourself for any fevers, rash, or shortness of breath.    SEEK IMMEDIATE MEDICAL CARE IF YOU HAVE ANY OF THE FOLLOWING SYMPTOMS: extreme weakness/chest pain/shortness of breath, black or bloody stools, vomiting blood, fainting, fever, or any signs of dehydration. - Please follow-up with your primary care doctor in the next 5-7 days.  Please call tomorrow for an appointment.  If you cannot follow-up with your primary care doctor please return to the ED for any urgent issues.  - You were given a copy of the tests performed today.  Please bring the results with you and review them with your primary care doctor.  - If you have any worsening of symptoms or any other concerns please return to the ED immediately.  - Please continue taking your home medications as directed.   - Follow up with the gynecologist as scheduled on 4/27  - Follow up with the hematologist within 1 week.   - Continue to take Aygestin 10mg 1x/daily. Take 400mg ibuprofen every 6 hours as needed for cramping abdominal pain. Follow up with Dr. Darby on 4/27  - Call office or return to ED if bleeding worsens (ie requiring 2-3 pads/h for 2 consecutive hours), if anemia symptoms, or for any other concerns.        Anemia    Anemia is a condition in which the concentration of red blood cells or hemoglobin in the blood is below normal. Hemoglobin is a substance in red blood cells that carries oxygen to the tissues of the body. Anemia results in not enough oxygen reaching these tissues which can cause symptoms such as weakness, dizziness/lightheadedness, shortness of breath, chest pain, paleness, or nausea. The cause of your anemia may or may not be determined immediately. If your hemoglobin was dangerously low, you may have received a blood transfusion. Usually reactions to transfusions occur immediately but monitor yourself for any fevers, rash, or shortness of breath.    SEEK IMMEDIATE MEDICAL CARE IF YOU HAVE ANY OF THE FOLLOWING SYMPTOMS: extreme weakness/chest pain/shortness of breath, black or bloody stools, vomiting blood, fainting, fever, or any signs of dehydration.

## 2023-04-21 NOTE — ED CDU PROVIDER DISPOSITION NOTE - PATIENT PORTAL LINK FT
You can access the FollowMyHealth Patient Portal offered by [facility] by registering at the following website: [email or web address]. By joining [employer]’s FollowMyHealth portal, you will also be able to view your health information using other applications (apps) compatible with our system.

## 2023-04-21 NOTE — ED CDU PROVIDER DISPOSITION NOTE - CLINICAL COURSE
39 yo Female  PMHx significant for htn, chronic pain, hypothyroidism, DM, marfan' s syndrome, mitral valve replacements on coumadin sent from PCP for low H/h and blood transfusion. H/H when presented was 5.8/19.5. Pt received 2upRBC's. Symptomatically improved, VSS, hemodynamically stable. GYN and hem/onc f/u explained. strict return precautions explained. 39 yo Female  PMHx significant for htn, chronic pain, hypothyroidism, DM, marfan' s syndrome, mitral valve replacements on coumadin sent from PCP for low H/h and blood transfusion. H/H when presented was 5.8/19.5. Pt received 2u PRBC's, well tolerated. Pt still felt fatigued, rpt CBC 8.2. Given a 3rd unit PRBC now with significant improvement in symptoms. Symptomatically improved, VSS, hemodynamically stable. GYN and hem/onc f/u explained. strict return precautions explained.

## 2023-04-21 NOTE — ED CDU PROVIDER SUBSEQUENT DAY NOTE - HISTORY
Pt resting comfortably at time of re-assessment. No events overnight. Pending reassessment. Will continue to monitor.

## 2023-04-21 NOTE — ED CDU PROVIDER DISPOSITION NOTE - CARE PROVIDER_API CALL
Whit Varela)  HematologyOncology; Internal Medicine; Medical Oncology  [address], [place] [id number]  Phone: [phone]  Fax: [phone]  Follow Up Time:     Jeffy Darby)  [facility]  [facility] [address] [address]  Phone: [phone]  Fax: [phone]  Follow Up Time:

## 2023-04-21 NOTE — ED CDU PROVIDER SUBSEQUENT DAY NOTE - NS ED ROS FT
Gen: +fatigue. denies fever, chills  Skin: denies rashes  HEENT: denies visual changes, ear pain, nasal congestion, throat pain  Respiratory: denies PATTON, SOB, cough, wheezing  Cardiovascular: denies chest pain, palpitations, diaphoresis, LE edema  GI: denies abdominal pain, n/v/d  : denies dysuria, frequency, urgency, bowel/bladder incontinence  MSK: denies joint swelling/pain, back pain, neck pain  Neuro: +dizziness (resolved). denies headache, dizziness, weakness, numbness

## 2023-04-21 NOTE — ED CDU PROVIDER SUBSEQUENT DAY NOTE - ATTENDING APP SHARED VISIT CONTRIBUTION OF CARE
in observation on anemia protocol  still feels weak  repeat cbc sig HgB 8.2  will give one more unit prbc for symptomatic anemia  agree with care plan

## 2023-04-21 NOTE — ED CDU PROVIDER SUBSEQUENT DAY NOTE - CLINICAL SUMMARY MEDICAL DECISION MAKING FREE TEXT BOX
37 yo Female  PMHx significant for htn, chronic pain, hypothyroidism, DM, marfan' s syndrome, metallic valve replacements on coumadin sent from Primary care in [place] for low H/h and blood transfusion. H/H when presented was 5.8/19.5. Pt received 2upRBC's.

## 2023-04-22 LAB
CULTURE RESULTS: SIGNIFICANT CHANGE UP
SPECIMEN SOURCE: SIGNIFICANT CHANGE UP

## 2023-04-27 ENCOUNTER — OUTPATIENT (OUTPATIENT)
Dept: OUTPATIENT SERVICES | Facility: HOSPITAL | Age: 38
LOS: 1 days | End: 2023-04-27
Payer: MEDICAID

## 2023-04-27 ENCOUNTER — APPOINTMENT (OUTPATIENT)
Dept: ULTRASOUND IMAGING | Facility: CLINIC | Age: 38
End: 2023-04-27
Payer: MEDICAID

## 2023-04-27 ENCOUNTER — APPOINTMENT (OUTPATIENT)
Dept: OBGYN | Facility: CLINIC | Age: 38
End: 2023-04-27
Payer: MEDICAID

## 2023-04-27 VITALS
DIASTOLIC BLOOD PRESSURE: 58 MMHG | HEIGHT: 72 IN | WEIGHT: 183 LBS | BODY MASS INDEX: 24.79 KG/M2 | SYSTOLIC BLOOD PRESSURE: 100 MMHG

## 2023-04-27 DIAGNOSIS — Z98.1 ARTHRODESIS STATUS: Chronic | ICD-10-CM

## 2023-04-27 DIAGNOSIS — Z95.4 PRESENCE OF OTHER HEART-VALVE REPLACEMENT: Chronic | ICD-10-CM

## 2023-04-27 DIAGNOSIS — Z87.898 PERSONAL HISTORY OF OTHER SPECIFIED CONDITIONS: ICD-10-CM

## 2023-04-27 PROCEDURE — 93930 UPPER EXTREMITY STUDY: CPT | Mod: 26

## 2023-04-27 PROCEDURE — 93925 LOWER EXTREMITY STUDY: CPT | Mod: 26

## 2023-04-27 PROCEDURE — 93925 LOWER EXTREMITY STUDY: CPT

## 2023-04-27 PROCEDURE — 93930 UPPER EXTREMITY STUDY: CPT

## 2023-04-27 PROCEDURE — 99214 OFFICE O/P EST MOD 30 MIN: CPT

## 2023-04-28 ENCOUNTER — TRANSCRIPTION ENCOUNTER (OUTPATIENT)
Age: 38
End: 2023-04-28

## 2023-04-28 ENCOUNTER — APPOINTMENT (OUTPATIENT)
Dept: ULTRASOUND IMAGING | Facility: CLINIC | Age: 38
End: 2023-04-28

## 2023-05-01 ENCOUNTER — TRANSCRIPTION ENCOUNTER (OUTPATIENT)
Age: 38
End: 2023-05-01

## 2023-05-01 ENCOUNTER — NON-APPOINTMENT (OUTPATIENT)
Age: 38
End: 2023-05-01

## 2023-05-04 ENCOUNTER — TRANSCRIPTION ENCOUNTER (OUTPATIENT)
Age: 38
End: 2023-05-04

## 2023-05-17 RX ORDER — BLOOD SUGAR DIAGNOSTIC
STRIP MISCELLANEOUS 3 TIMES DAILY
Qty: 1 | Refills: 3 | Status: ACTIVE | COMMUNITY
Start: 2021-08-02 | End: 1900-01-01

## 2023-05-18 NOTE — HISTORY OF PRESENT ILLNESS
[HPV test offered] : HPV test offered [Y] : Patient is sexually active [N] : Patient denies prior pregnancies [Menarche Age: ____] : age at menarche was [unfilled] [Currently Active] : currently active [Men] : men [PapSmeardate] : 07/19/2022 [TextBox_31] : ASCUS [HPVDate] : 07/19/2022 [TextBox_78] : POSITITVE [LMPDate] : 02/09/2023 [FreeTextEntry1] : 02/09/2023

## 2023-05-18 NOTE — DISCUSSION/SUMMARY
[FreeTextEntry1] : 39 y/o G0 LMP unknown presenting for GYN acute visit. \par TVUS 3/31: EMS 13.2mm, submucosal myoma previously seen no longer seen, ovaries WNL, no FF \par \par #Menorrhagia \par - discussed association between INR and acute vaginal bleeding, now better controlled on Aygestin 10mg QD, no bleeding today \par - re-discussed interventions again DMPA/Mirena IUD/ablation/UAE/surgical intervention \par - patient continues to defer more definitive management due to desire for fertility \par - discussed health risks associated with continued heavy bleeding and strong recommendation for more definitive treatment, she verbalized understanding and desire against definitive treatment \par - discussed health risks associated with pregnancy given her age risk stratification, her cardiovascular conditions, and need for full medical anticoagulation, she verbalized understanding \par - re-discussed and re-iterated role of MFM referral for full break down of pregnancy risks in order to make final decision about attempts/planning for future fertility vs definitive management of AUB, she says she has too much going on right now for it but she is aware of the above details \par - discussed that given her desires, we can continue to try to manage with PO Aygestin 10mg QD \par - given RTC precautions for office and ED in case of peristent bleeding, heavier bleeding, or symptoms of anemia \par \par She verbalized understanding and agreement with above counseling regarding differential diagnosis, evaluation, and plan. \par She was given time for questions/concerns which were all answered to her apparent satisfaction.\par All designated lab work for today drawn in office. \par \par RTO 1-3 months, after MFM consultation, for definitive management of AUB

## 2023-05-19 ENCOUNTER — RESULT CHARGE (OUTPATIENT)
Age: 38
End: 2023-05-19

## 2023-05-29 ENCOUNTER — TRANSCRIPTION ENCOUNTER (OUTPATIENT)
Age: 38
End: 2023-05-29

## 2023-06-14 ENCOUNTER — TRANSCRIPTION ENCOUNTER (OUTPATIENT)
Age: 38
End: 2023-06-14

## 2023-06-16 ENCOUNTER — APPOINTMENT (OUTPATIENT)
Dept: MATERNAL FETAL MEDICINE | Facility: CLINIC | Age: 38
End: 2023-06-16
Payer: MEDICAID

## 2023-06-16 ENCOUNTER — NON-APPOINTMENT (OUTPATIENT)
Age: 38
End: 2023-06-16

## 2023-06-16 ENCOUNTER — APPOINTMENT (OUTPATIENT)
Dept: ANTEPARTUM | Facility: CLINIC | Age: 38
End: 2023-06-16

## 2023-06-16 VITALS
DIASTOLIC BLOOD PRESSURE: 60 MMHG | RESPIRATION RATE: 16 BRPM | WEIGHT: 171 LBS | SYSTOLIC BLOOD PRESSURE: 110 MMHG | HEART RATE: 94 BPM | HEIGHT: 72 IN | OXYGEN SATURATION: 97 % | BODY MASS INDEX: 23.16 KG/M2

## 2023-06-16 DIAGNOSIS — R54 AGE-RELATED PHYSICAL DEBILITY: ICD-10-CM

## 2023-06-16 DIAGNOSIS — Z31.69 ENCOUNTER FOR OTHER GENERAL COUNSELING AND ADVICE ON PROCREATION: ICD-10-CM

## 2023-06-16 DIAGNOSIS — I71.9 AORTIC ANEURYSM OF UNSPECIFIED SITE, W/OUT RUPTURE: ICD-10-CM

## 2023-06-16 DIAGNOSIS — G47.33 OBSTRUCTIVE SLEEP APNEA (ADULT) (PEDIATRIC): ICD-10-CM

## 2023-06-16 DIAGNOSIS — E61.1 IRON DEFICIENCY: ICD-10-CM

## 2023-06-16 PROCEDURE — 99205 OFFICE O/P NEW HI 60 MIN: CPT | Mod: GC

## 2023-06-16 NOTE — ACTIVE PROBLEMS
[Heart Disease] : no heart disease [Autoimmune Disease] : no autoimmune disease [Renal Disease] : no kidney disease, no UTI [Neurologic Disorder] : no neurologic disorder, no epilepsy [Depression] : no depression, no post partum depression [Hepatic Disorder] : no hepatitis, no liver disease [Thrombophlebitis] : no varicosities, no phlebitis

## 2023-06-22 ENCOUNTER — TRANSCRIPTION ENCOUNTER (OUTPATIENT)
Age: 38
End: 2023-06-22

## 2023-06-26 NOTE — REVIEW OF SYSTEMS
[Abn Vag Bleeding] : abnormal vaginal bleeding [Anxiety] : anxiety [Easy Bleeding] : easy bleeding [Nl] : Integumentary

## 2023-06-26 NOTE — PHYSICAL EXAM
[FreeTextEntry1] : Well appearing female with Marfanoid appearance.  Answers questions appropriately. No visible rash or skin lesion.

## 2023-06-26 NOTE — FAMILY HISTORY
[Reported Family History Of Birth Defects] : no congenital heart defects [Surendra-Sachs Carrier] : no Surendra-Sachs [Family History] : no mental retardation/autism [Reported Family History Of Genetic Disease] : no history of child defect in child of baby father [FreeTextEntry1] : Pt has Marfan syndrome

## 2023-06-26 NOTE — DISCUSSION/SUMMARY
[FreeTextEntry1] : Troy Magaña presents for preconception consultation.  She has multiple medical comorbidities and is current under evaluation for management of menorrhagia and anemia.  We reviewed that pregnancy would be associated with significant maternal and fetal risk.  We specifically reviewed the following:\par \par 1) Marfan Syndrome - Marfan syndrome is an autosomal-dominant generalized connective tissue disorder with 80% of affected women reporting a family history of this condition. Troy states there are known family members with this condition. The main risk in pregnancy is aortic aneurysm, leading to rupture or aortic dissection.  Women with a personal or family history of Marfan syndrome should have an echocardiogram and genetic counseling.  Prognosis is reasonable when there is no aortic root involvement (<5% mortality) although mortality can still occur.  There is a risk of aortic rupture, dissection, and mortality (up to 50%) in pregnancy when the aortic root is dilated beyond 4 cm, such that pregnancy is contraindicated in these women before repair.  This may result in a shearing force of normal pregnancy because of an increase in blood volume and cardiac output.  Prenatally, serial maternal echocardiograms to follow the cardiac root should be performed.  Hypertension should be avoided, and beta-blockade therapy should be considered.  Although pregnancy data are limited for this last recommendation, long-term use in nonpregnant patients has been shown to slow the progression of aortic root dilation. It is recommended to avoid positive inotropic drugs and plan epidural (watch for dural ectasia, present in 90% of patients with Marfan syndrome) to reduce cardiovascular stress.  If  delivery is required, retention sutures should be considered because of generalized connective tissue weakness.  Marfan syndrome with aortic root > 4 cm is associated with a high maternal mortality. \par \par 2) History of Ascending Aortic Aneurysm/Aortic Arch Repair/Mitral and Aortic Mechanical Valves - There is a history of an ascending aortic aneurysm with mitral and aortic mechanical valves with aortic valve and aortic arch repair in 2013 by Dr. Roa.  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  She currently follows with [facility].  As discussed above, Marfan syndrome with an aortic root > 4 cm is associated with a high maternal mortality and pregnancy is not recommended in this circumstance. We recommend a maternal echocardiogram. \par \par 3) Chronic Anticoagulation with Warfarin - She is currently anticoagulated on a regimen of warfarin 9 mg daily.  There is no ideal anticoagulant for pregnant individuals with a mechanical heart valve.  Warfarin leads to the lowest risk of valve thrombosis, however, it is associated with the highest likelihood of congenital abnormalities (with first trimester use) and  loss, particularly with dosages > 5 mg/day which is the case for this patient.  For patients on a warfarin dose greater than 5 mg/day it is suggested to switch to dose adjusted low molecular weight heparin for the first trimester of pregnancy.  Warfarin can be reinitiated in the 2nd and 3rd trimesters of pregnancy in such patients. \par \par 4) Advanced Maternal Age - We reviewed that advanced maternal age (typically defined as age 35 at delivery) increases the risk for fetal aneuploidy, spontaneous , congenital malformations, diabetes, and hypertensive disorders of pregnancy including preeclampsia,  delivery, stillbirth, and low birth weight neonates. She is interested in spontaneous conception and we reviewed that fertility also decreases with advancing maternal age.\par \par 5) Severe Anemia - She has been having severe menorrhagia for which she is following with Dr. Darby.  She has required multiple blood transfusions and IV iron.  She is currently considering an endometrial ablation due to the severity of her menorrhagia.  We reviewed that pregnancy after endometrial ablation may not be possible as it is associated with significant endometrial scarring. We also reviewed that iron requirements increase in pregnancy to accommodate for fetal and placental needs, expansion of the maternal RBC mass, and blood loss during delivery. Women with iron deficiency anemia are at increased risk of transfusion,  delivery, and  delivery as well as increased risk of adverse fetal outcomes, including 5-minute Apgar <7 and intensive care unit admission.  \par \par 6) Type 2 Diabetes Mellitus - It was reviewed that tight glycemic control in pregnancy is necessary to decrease fetal and  risks including macrosomia, shoulder dystocia, medically or obstetrically indicated  delivery,  section, polyhydramnios, and preeclampsia. Fetal echocardiogram is recommended in pregnancies in individuals with pre-existing diabetes.  There is also a substantial risk for the development of fetal anomalies when blood glucose is inadequately controlled during the embryonic period.  Her last HA1C of 8.4% in 2022 indicating that her diabetes is suboptimally controlled and she is at increased risk of fetal malformations. \par \par Recommendations/Considerations\par 1) Potential for severe maternal morbidity/mortality with pregnancy at this time\par 2) Marfan syndrome with an aortic root > 4 cm is associated with a high maternal mortality\par 3) Maternal cardiac imaging is recommended to evaluate maternal cardiac function/ascending aorta for aneurysm before pregnancy\par 4) Obtain more optimal glycemic control \par 5) Continue discussion of ongoing alternative reproduction options such as a gestational carrier and adoption\par 6) Consider preimplantation genetic testing for Marfan Syndrome if own oocytes used for assisted reproduction\par \par Thank you for requesting preconception consultation for this patient. At the end of our discussion, the patient indicated that her questions were answered, and she seemed satisfied with our discussion. The patient was seen by Dr. Driscoll and Dr. Hopkins.

## 2023-06-26 NOTE — CHIEF COMPLAINT
[G ___] : G [unfilled] [de-identified] : preconceptual counseling - Marfan Syndrome, mechanical mitral and aortic valve, chronic anticoagulation, history of ascending aortic aneurysm, T2DM, menorrhagia with severe anemia

## 2023-06-26 NOTE — HISTORY OF PRESENT ILLNESS
[FreeTextEntry1] : Troy Magaña is a 38 y.o. G0 with unknown LMP who presents for preconceptual counseling.  She has a medical history significant for Marfan Syndrome complicated by mechanical valve replacement and chronic therapeutic anticoagulation.  She recently has experienced heavy vaginal bleeding with minimal response to hormonal therapy.  She has been counseled regarding the availability of endometrial ablation and/or uterine artery embolization for surgical management and presents today to discuss how these procedure may affect any future pregnancy.  She also presents to discuss potential maternal and fetal risks in any future pregnancy. On the day of consultation, she feels well but expresses frustration at her continued vaginal bleeding and desires surgical intervention.

## 2023-06-26 NOTE — DATA REVIEWED
[FreeTextEntry1] : Available imaging reviewed  including CT, MRI, and fetal echocardiogram\par \par CBC 4/20/23 - H/H 6.7/23.6,

## 2023-06-28 ENCOUNTER — RX RENEWAL (OUTPATIENT)
Age: 38
End: 2023-06-28

## 2023-06-29 ENCOUNTER — NON-APPOINTMENT (OUTPATIENT)
Age: 38
End: 2023-06-29

## 2023-06-30 ENCOUNTER — RX RENEWAL (OUTPATIENT)
Age: 38
End: 2023-06-30

## 2023-06-30 ENCOUNTER — TRANSCRIPTION ENCOUNTER (OUTPATIENT)
Age: 38
End: 2023-06-30

## 2023-06-30 RX ORDER — ASPIRIN ENTERIC COATED TABLETS 81 MG 81 MG/1
81 TABLET, DELAYED RELEASE ORAL
Qty: 90 | Refills: 3 | Status: ACTIVE | COMMUNITY
Start: 2019-12-09 | End: 1900-01-01

## 2023-07-13 ENCOUNTER — APPOINTMENT (OUTPATIENT)
Dept: OBGYN | Facility: CLINIC | Age: 38
End: 2023-07-13
Payer: MEDICAID

## 2023-07-13 VITALS
HEIGHT: 72 IN | DIASTOLIC BLOOD PRESSURE: 70 MMHG | SYSTOLIC BLOOD PRESSURE: 108 MMHG | BODY MASS INDEX: 23.43 KG/M2 | WEIGHT: 173 LBS

## 2023-07-13 DIAGNOSIS — N92.1 EXCESSIVE AND FREQUENT MENSTRUATION WITH IRREGULAR CYCLE: ICD-10-CM

## 2023-07-13 PROCEDURE — 99214 OFFICE O/P EST MOD 30 MIN: CPT

## 2023-07-14 PROBLEM — N92.1 MENORRHAGIA WITH IRREGULAR CYCLE: Status: ACTIVE | Noted: 2022-11-29

## 2023-07-14 NOTE — HISTORY OF PRESENT ILLNESS
[Y] : Patient is sexually active [N] : Patient denies prior pregnancies [Menarche Age: ____] : age at menarche was [unfilled] [No] : Patient does not have concerns regarding sex [LMP unknown] : LMP unknown [unknown] : Patient is unsure of the date of her LMP [FreeTextEntry1] : 39 y/o G0 LMP unknown presenting for GYN f/u visit. \par \par INTERVAL HX: She presents today reporting that bleeding had stopped but again started, she restarted the Aygestin 10mg QD and bleeding is still ongoing but minimal. Since last visit, she has had a consultation with MFM for pre-conception counseling. She reports today that she is ready to trial long acting intervention.\par Today she denies dizziness, lightheadedness, feeling faint, SOB, fatigue, palpitations, or unsteady gait. \par \par Hx: Patient with known AUB/Menorrhagia likely 2/2 medical anticoagulation effects given she is on full anticoagulation. AUB ongoing since Nov 2022. \par She was started on PO Aygestin 5mg QD mid Feb 2022 for management of chronic AUB, with inconsistent improvement, was uptitrated to PO Aygestin 10mg QD in April after another exacerbation of bleeding due to elevated INR which required 3u pRBC transfusion at [facility]. \par \par She previously declined management with DMPA/Mirena IUD due to desired fertility, declined ablation/UAE due to desire for fertility, declined surgical management (hysterectomy) for same reasons and due to health risks of surgical intervention given medical history. \par \par Additional past medical history includes: Hx of CVA, Marfan's syndrome, chronic obstructive pulmonary disease, asthma, mitral valve disease with mitral valve replacement, ascending aortic aneurysm, ascending aorta, aortic valve and aortic arch repair 2013 (Dr. Roa). Recent COVID infection.\par \par Last pap Jul 2022, ASCUS HRHPV POS. \par Colposcopy Sep 2022, ONEAL 1. \par Pending repeat cotesting Jul 2023.  [PGHxTotal] : 0

## 2023-07-14 NOTE — DISCUSSION/SUMMARY
[FreeTextEntry1] : 39 y/o G0 LMP unknown with AUB/menorrhagia 2/2 chronic medical anticoagulation. \par Currently on Aygestin 10mg QD \par \par #AUB\par - discussed trial of IUD +/- ablation vs surgery, not a candidate for UAE \par - discussed ablation is usually under sedation, discussed possibility of doing it awake (with cervical block + ppx PO pain meds) vs under sedation/anesthesia in the hospital (given her comorbidities); of note patient already on PO Oxycodone for Neuro/MSK related chronic pain disorder \par - discussed starting with IUD given she is appropriately fearful of general anesthesia given her comorbidities and would like to avoid it and because we do not know the likelihood of her being able to tolerate conscious ablation  \par - discussed goal would be to transition from PO PRG to IUD only with adequate control of bleeding, she verbalized understanding and agreed \par \par #Hx of ASCUS +HRHPV in July 2022, CIN1 on Colpo in Sep 2022 \par - repeat cotesting due now, discussed repeating with IUD insertion, she was agreeable \par \par #AUB, malignancy risk \par - discussed role of EMB during IUD insertion visit for evaluation/rule-out of malignancy/hyperplasia related contribution/etiology to AUB, she was agreeable \par \par She verbalized understanding and agreement with above counseling regarding differential diagnosis, evaluation, and plan. \par She was given time for questions/concerns which were all answered to her apparent satisfaction.\par All designated lab work for today drawn in office. \par \par RTO 2w for IUD insertion, PAP, EMB, and ANNUAL

## 2023-07-31 NOTE — CONSULT NOTE ADULT - PROVIDER SPECIALTY LIST ADULT
Health Maintenance Due   Topic Date Due   • COVID-19 Vaccine (3 - Pfizer series) 09/08/2021   • Depression Screening  01/05/2024       Patient is due for topics as listed above but is not proceeding with Immunization(s) COVID-19 at this time. Education provided for Immunization(s) COVID-19.   Cardiology

## 2023-08-06 ENCOUNTER — NON-APPOINTMENT (OUTPATIENT)
Age: 38
End: 2023-08-06

## 2023-08-08 ENCOUNTER — LABORATORY RESULT (OUTPATIENT)
Age: 38
End: 2023-08-08

## 2023-08-08 ENCOUNTER — APPOINTMENT (OUTPATIENT)
Dept: FAMILY MEDICINE | Facility: CLINIC | Age: 38
End: 2023-08-08
Payer: MEDICAID

## 2023-08-08 VITALS
HEART RATE: 96 BPM | DIASTOLIC BLOOD PRESSURE: 70 MMHG | OXYGEN SATURATION: 99 % | HEIGHT: 72 IN | BODY MASS INDEX: 23.03 KG/M2 | TEMPERATURE: 97.5 F | WEIGHT: 170 LBS | SYSTOLIC BLOOD PRESSURE: 110 MMHG

## 2023-08-08 VITALS — DIASTOLIC BLOOD PRESSURE: 70 MMHG | SYSTOLIC BLOOD PRESSURE: 92 MMHG

## 2023-08-08 VITALS — HEART RATE: 84 BPM

## 2023-08-08 DIAGNOSIS — G44.89 OTHER HEADACHE SYNDROME: ICD-10-CM

## 2023-08-08 LAB — INR PPP: 1.4 RATIO

## 2023-08-08 PROCEDURE — 99214 OFFICE O/P EST MOD 30 MIN: CPT | Mod: 25

## 2023-08-08 PROCEDURE — 85610 PROTHROMBIN TIME: CPT | Mod: QW

## 2023-08-08 PROCEDURE — 36415 COLL VENOUS BLD VENIPUNCTURE: CPT

## 2023-08-08 NOTE — PHYSICAL EXAM
[No Acute Distress] : no acute distress [Well Nourished] : well nourished [Well Developed] : well developed [Well-Appearing] : well-appearing [EOMI] : extraocular movements intact [Normal Outer Ear/Nose] : the outer ears and nose were normal in appearance [No JVD] : no jugular venous distention [No Respiratory Distress] : no respiratory distress  [No Accessory Muscle Use] : no accessory muscle use [Clear to Auscultation] : lungs were clear to auscultation bilaterally [Normal Rate] : normal rate  [Regular Rhythm] : with a regular rhythm [Normal S1, S2] : normal S1 and S2 [No Edema] : there was no peripheral edema [Non-distended] : non-distended [No CVA Tenderness] : no CVA  tenderness [No Rash] : no rash [Coordination Grossly Intact] : coordination grossly intact [Speech Grossly Normal] : speech grossly normal [Normal Affect] : the affect was normal [Normal Mood] : the mood was normal [Normal Insight/Judgement] : insight and judgment were intact

## 2023-08-08 NOTE — REVIEW OF SYSTEMS
[Fatigue] : fatigue [Negative] : Heme/Lymph [FreeTextEntry5] : ssee HPI [FreeTextEntry6] : ssee HPI  [de-identified] : ssee HPI

## 2023-08-08 NOTE — HISTORY OF PRESENT ILLNESS
[FreeTextEntry1] : JUANY is a 38 year old female here for weight management.  [de-identified] : 39 yo female presents to office s/p MARY parham yesterdday 8/7/23 2n2 to symptomatic subtherapeutic INR.   pt called office yesterday stating INR= 1.3 and feeling both SOB and H/A that started 2 days ago.    Pt today still c SOB but NO longer at rest, ONLY occurring c activity . +ve persisting generalized H/A.      Denies dizziness and/or lightheadedness  Denies palpitations, however, reports "deep heavy heart pump"            pt reports compliance c increased  Warfarin dose of 12mg yesterday   pt states has been off Rx vit D x several months

## 2023-08-10 ENCOUNTER — APPOINTMENT (OUTPATIENT)
Dept: OBGYN | Facility: CLINIC | Age: 38
End: 2023-08-10

## 2023-08-10 ENCOUNTER — APPOINTMENT (OUTPATIENT)
Dept: ANTEPARTUM | Facility: CLINIC | Age: 38
End: 2023-08-10

## 2023-08-10 LAB
25(OH)D3 SERPL-MCNC: 36.4 NG/ML
ALBUMIN SERPL ELPH-MCNC: 4.6 G/DL
ALP BLD-CCNC: 71 U/L
ALT SERPL-CCNC: 9 U/L
ANION GAP SERPL CALC-SCNC: 13 MMOL/L
AST SERPL-CCNC: 15 U/L
BILIRUB SERPL-MCNC: 0.4 MG/DL
BUN SERPL-MCNC: 20 MG/DL
CALCIUM SERPL-MCNC: 10.3 MG/DL
CHLORIDE SERPL-SCNC: 105 MMOL/L
CHOLEST SERPL-MCNC: 176 MG/DL
CK SERPL-CCNC: 69 U/L
CO2 SERPL-SCNC: 21 MMOL/L
CREAT SERPL-MCNC: 0.67 MG/DL
EGFR: 113 ML/MIN/1.73M2
ESTIMATED AVERAGE GLUCOSE: 148 MG/DL
FERRITIN SERPL-MCNC: 304 NG/ML
FOLATE SERPL-MCNC: 15.2 NG/ML
GGT SERPL-CCNC: 31 U/L
GLUCOSE SERPL-MCNC: 121 MG/DL
HBA1C MFR BLD HPLC: 6.8 %
HDLC SERPL-MCNC: 34 MG/DL
IRON SATN MFR SERPL: 25 %
IRON SERPL-MCNC: 72 UG/DL
LDLC SERPL CALC-MCNC: 111 MG/DL
NONHDLC SERPL-MCNC: 142 MG/DL
POTASSIUM SERPL-SCNC: 4.7 MMOL/L
PROT SERPL-MCNC: 7.4 G/DL
SODIUM SERPL-SCNC: 139 MMOL/L
T3FREE SERPL-MCNC: 3.17 PG/ML
T4 FREE SERPL-MCNC: 1.4 NG/DL
TIBC SERPL-MCNC: 286 UG/DL
TRANSFERRIN SERPL-MCNC: 217 MG/DL
TRIGL SERPL-MCNC: 173 MG/DL
TSH SERPL-ACNC: 1.37 UIU/ML
UIBC SERPL-MCNC: 213 UG/DL
URATE SERPL-MCNC: 6.7 MG/DL
VIT B12 SERPL-MCNC: 164 PG/ML

## 2023-08-22 ENCOUNTER — TRANSCRIPTION ENCOUNTER (OUTPATIENT)
Age: 38
End: 2023-08-22

## 2023-08-23 ENCOUNTER — TRANSCRIPTION ENCOUNTER (OUTPATIENT)
Age: 38
End: 2023-08-23

## 2023-08-24 ENCOUNTER — APPOINTMENT (OUTPATIENT)
Dept: FAMILY MEDICINE | Facility: CLINIC | Age: 38
End: 2023-08-24
Payer: MEDICAID

## 2023-08-24 PROCEDURE — 96372 THER/PROPH/DIAG INJ SC/IM: CPT

## 2023-08-29 ENCOUNTER — TRANSCRIPTION ENCOUNTER (OUTPATIENT)
Age: 38
End: 2023-08-29

## 2023-08-30 ENCOUNTER — TRANSCRIPTION ENCOUNTER (OUTPATIENT)
Age: 38
End: 2023-08-30

## 2023-08-30 RX ORDER — CYANOCOBALAMIN 1000 UG/ML
1000 INJECTION INTRAMUSCULAR; SUBCUTANEOUS
Qty: 0 | Refills: 0 | Status: COMPLETED | OUTPATIENT
Start: 2023-08-24

## 2023-08-31 ENCOUNTER — APPOINTMENT (OUTPATIENT)
Dept: OBGYN | Facility: CLINIC | Age: 38
End: 2023-08-31
Payer: MEDICAID

## 2023-08-31 VITALS
WEIGHT: 170 LBS | HEIGHT: 72 IN | BODY MASS INDEX: 23.03 KG/M2 | SYSTOLIC BLOOD PRESSURE: 112 MMHG | DIASTOLIC BLOOD PRESSURE: 80 MMHG

## 2023-08-31 DIAGNOSIS — Z01.419 ENCOUNTER FOR GYNECOLOGICAL EXAMINATION (GENERAL) (ROUTINE) W/OUT ABNORMAL FINDINGS: ICD-10-CM

## 2023-08-31 PROCEDURE — 36415 COLL VENOUS BLD VENIPUNCTURE: CPT

## 2023-08-31 PROCEDURE — 99396 PREV VISIT EST AGE 40-64: CPT

## 2023-09-01 ENCOUNTER — APPOINTMENT (OUTPATIENT)
Dept: FAMILY MEDICINE | Facility: CLINIC | Age: 38
End: 2023-09-01
Payer: MEDICAID

## 2023-09-01 PROCEDURE — 96372 THER/PROPH/DIAG INJ SC/IM: CPT

## 2023-09-01 RX ORDER — CYANOCOBALAMIN 1000 UG/ML
1000 INJECTION INTRAMUSCULAR; SUBCUTANEOUS
Qty: 1 | Refills: 0 | Status: COMPLETED | OUTPATIENT
Start: 2023-09-01

## 2023-09-06 LAB
CYTOLOGY CVX/VAG DOC THIN PREP: NORMAL
ESTRADIOL SERPL-MCNC: 15 PG/ML
FSH SERPL-MCNC: 6.4 IU/L
HPV HIGH+LOW RISK DNA PNL CVX: DETECTED
LH SERPL-ACNC: 9.1 IU/L
T3FREE SERPL-MCNC: 2.88 PG/ML
T4 FREE SERPL-MCNC: 1.4 NG/DL
T4 SERPL-MCNC: 8.8 UG/DL
TSH SERPL-ACNC: 1.7 UIU/ML

## 2023-09-06 NOTE — HISTORY OF PRESENT ILLNESS
[Y] : Patient is sexually active [N] : Patient denies prior pregnancies [Menarche Age: ____] : age at menarche was [unfilled] [Currently Active] : currently active [LMP unknown] : LMP unknown [unknown] : Patient is unsure of the date of her LMP [FreeTextEntry1] : 37 y/o G0 patient presents for annual.  Hx of AUB (menorrhagia) since Nov 2022, likely 2/2 anticoagulation (hx of CVA, hx of PE, hx of aritificial heart valves, hx of aortic aneurysm).  Patient has been managed with Aygestin, reports that bleeding has stopped since last visit on 7/13. Denies any other issues/complaints at this time.  [PapSmeardate] : 07/19/22 [TextBox_31] : ASC-US [GonorrheaDate] : 04/12/19 [TextBox_63] : NEG [ChlamydiaDate] : 04/12/19 [TextBox_68] : NEG [HPVDate] : 07/19/22 [TextBox_78] : POS [PGHxTotal] : 0

## 2023-09-06 NOTE — DISCUSSION/SUMMARY
[FreeTextEntry1] : 37 y/o G0 patient presents for annual.   #Well Woman Visit  1.Cervical Screening: last PAP July 2022, ASCUS HRHPV+, Colpo in Sep 2022, benign. PAP collected today.  2. Breast Screening: given referral for mammo.   #AUB - will continue Aygestin 10mg QD  - patient defers IUD/EMB management in order to not cause return of bleeding that is now well controlled   She verbalized understanding and agreement with above counseling regarding differential diagnosis, evaluation, and plan.  She was given time for questions/concerns which were all answered to her apparent satisfaction. All designated lab work for today drawn in office.   RTO PRN or for next ANNUAL   ADDENDUM: PAP resulted NILM +HRHPV, recs for repeat cotesting in 1yr

## 2023-09-06 NOTE — PHYSICAL EXAM
[Chaperone Present] : A chaperone was present in the examining room during all aspects of the physical examination [Appropriately responsive] : appropriately responsive [Alert] : alert [No Acute Distress] : no acute distress [Soft] : soft [Non-tender] : non-tender [Non-distended] : non-distended [Oriented x3] : oriented x3 [Examination Of The Breasts] : a normal appearance [No Masses] : no breast masses were palpable [Labia Majora] : normal [Labia Minora] : normal [Normal] : normal [Uterine Adnexae] : normal [FreeTextEntry1] : ELVIA Holm  [FreeTextEntry6] : unremarkable

## 2023-09-08 ENCOUNTER — APPOINTMENT (OUTPATIENT)
Dept: FAMILY MEDICINE | Facility: CLINIC | Age: 38
End: 2023-09-08
Payer: MEDICAID

## 2023-09-08 ENCOUNTER — MED ADMIN CHARGE (OUTPATIENT)
Age: 38
End: 2023-09-08

## 2023-09-08 PROCEDURE — 96372 THER/PROPH/DIAG INJ SC/IM: CPT

## 2023-09-12 ENCOUNTER — RX RENEWAL (OUTPATIENT)
Age: 38
End: 2023-09-12

## 2023-09-14 ENCOUNTER — APPOINTMENT (OUTPATIENT)
Dept: FAMILY MEDICINE | Facility: CLINIC | Age: 38
End: 2023-09-14
Payer: MEDICAID

## 2023-09-14 PROCEDURE — 96372 THER/PROPH/DIAG INJ SC/IM: CPT

## 2023-09-15 RX ORDER — CYANOCOBALAMIN 1000 UG/ML
1000 INJECTION INTRAMUSCULAR; SUBCUTANEOUS
Qty: 0 | Refills: 0 | Status: COMPLETED | OUTPATIENT
Start: 2023-09-13

## 2023-09-19 ENCOUNTER — RX RENEWAL (OUTPATIENT)
Age: 38
End: 2023-09-19

## 2023-09-22 ENCOUNTER — NON-APPOINTMENT (OUTPATIENT)
Age: 38
End: 2023-09-22

## 2023-09-22 ENCOUNTER — APPOINTMENT (OUTPATIENT)
Dept: FAMILY MEDICINE | Facility: CLINIC | Age: 38
End: 2023-09-22
Payer: MEDICAID

## 2023-09-22 PROCEDURE — 36415 COLL VENOUS BLD VENIPUNCTURE: CPT

## 2023-09-22 PROCEDURE — 96372 THER/PROPH/DIAG INJ SC/IM: CPT

## 2023-09-22 RX ORDER — CYANOCOBALAMIN 1000 UG/ML
1000 INJECTION INTRAMUSCULAR; SUBCUTANEOUS
Qty: 0 | Refills: 0 | Status: COMPLETED | OUTPATIENT
Start: 2023-09-22

## 2023-09-25 LAB
FOLATE SERPL-MCNC: 15.9 NG/ML
VIT B12 SERPL-MCNC: 385 PG/ML

## 2023-09-29 ENCOUNTER — APPOINTMENT (OUTPATIENT)
Dept: FAMILY MEDICINE | Facility: CLINIC | Age: 38
End: 2023-09-29
Payer: MEDICAID

## 2023-09-29 PROCEDURE — 96372 THER/PROPH/DIAG INJ SC/IM: CPT

## 2023-10-02 ENCOUNTER — APPOINTMENT (OUTPATIENT)
Dept: FAMILY MEDICINE | Facility: CLINIC | Age: 38
End: 2023-10-02
Payer: MEDICAID

## 2023-10-02 VITALS
OXYGEN SATURATION: 98 % | HEART RATE: 92 BPM | BODY MASS INDEX: 23.03 KG/M2 | SYSTOLIC BLOOD PRESSURE: 108 MMHG | HEIGHT: 72 IN | TEMPERATURE: 98.3 F | DIASTOLIC BLOOD PRESSURE: 74 MMHG | WEIGHT: 170 LBS

## 2023-10-02 DIAGNOSIS — J06.9 ACUTE UPPER RESPIRATORY INFECTION, UNSPECIFIED: ICD-10-CM

## 2023-10-02 DIAGNOSIS — R79.1 ABNORMAL COAGULATION PROFILE: ICD-10-CM

## 2023-10-02 PROCEDURE — 99214 OFFICE O/P EST MOD 30 MIN: CPT

## 2023-10-02 PROCEDURE — 85610 PROTHROMBIN TIME: CPT | Mod: QW

## 2023-10-02 RX ORDER — FLUTICASONE PROPIONATE 50 UG/1
50 SPRAY, METERED NASAL DAILY
Qty: 1 | Refills: 2 | Status: ACTIVE | COMMUNITY
Start: 2023-10-02 | End: 1900-01-01

## 2023-10-03 LAB
RAPID RVP RESULT: DETECTED
RV+EV RNA SPEC QL NAA+PROBE: DETECTED
SARS-COV-2 RNA PNL RESP NAA+PROBE: NOT DETECTED

## 2023-10-04 ENCOUNTER — OUTPATIENT (OUTPATIENT)
Dept: OUTPATIENT SERVICES | Facility: HOSPITAL | Age: 38
LOS: 1 days | End: 2023-10-04
Payer: MEDICAID

## 2023-10-04 ENCOUNTER — APPOINTMENT (OUTPATIENT)
Dept: MAMMOGRAPHY | Facility: CLINIC | Age: 38
End: 2023-10-04
Payer: MEDICAID

## 2023-10-04 ENCOUNTER — RESULT REVIEW (OUTPATIENT)
Age: 38
End: 2023-10-04

## 2023-10-04 DIAGNOSIS — Z95.4 PRESENCE OF OTHER HEART-VALVE REPLACEMENT: Chronic | ICD-10-CM

## 2023-10-04 DIAGNOSIS — Z01.419 ENCOUNTER FOR GYNECOLOGICAL EXAMINATION (GENERAL) (ROUTINE) WITHOUT ABNORMAL FINDINGS: ICD-10-CM

## 2023-10-04 DIAGNOSIS — Z98.1 ARTHRODESIS STATUS: Chronic | ICD-10-CM

## 2023-10-04 DIAGNOSIS — Z96.1 PRESENCE OF INTRAOCULAR LENS: Chronic | ICD-10-CM

## 2023-10-04 PROCEDURE — 77063 BREAST TOMOSYNTHESIS BI: CPT

## 2023-10-04 PROCEDURE — 77063 BREAST TOMOSYNTHESIS BI: CPT | Mod: 26

## 2023-10-04 PROCEDURE — 77067 SCR MAMMO BI INCL CAD: CPT

## 2023-10-04 PROCEDURE — 77067 SCR MAMMO BI INCL CAD: CPT | Mod: 26

## 2023-10-05 ENCOUNTER — APPOINTMENT (OUTPATIENT)
Dept: FAMILY MEDICINE | Facility: CLINIC | Age: 38
End: 2023-10-05
Payer: MEDICAID

## 2023-10-05 PROCEDURE — 96372 THER/PROPH/DIAG INJ SC/IM: CPT

## 2023-10-05 RX ORDER — CYANOCOBALAMIN 1000 UG/ML
1000 INJECTION INTRAMUSCULAR; SUBCUTANEOUS
Qty: 0 | Refills: 0 | Status: COMPLETED | OUTPATIENT
Start: 2023-09-29

## 2023-10-06 RX ORDER — CYANOCOBALAMIN 1000 UG/ML
1000 INJECTION INTRAMUSCULAR; SUBCUTANEOUS
Qty: 0 | Refills: 0 | Status: COMPLETED | OUTPATIENT
Start: 2023-10-05

## 2023-10-12 ENCOUNTER — APPOINTMENT (OUTPATIENT)
Dept: FAMILY MEDICINE | Facility: CLINIC | Age: 38
End: 2023-10-12
Payer: MEDICAID

## 2023-10-12 PROCEDURE — 96372 THER/PROPH/DIAG INJ SC/IM: CPT

## 2023-10-13 RX ORDER — CYANOCOBALAMIN 1000 UG/ML
1000 INJECTION INTRAMUSCULAR; SUBCUTANEOUS
Qty: 0 | Refills: 0 | Status: COMPLETED | OUTPATIENT
Start: 2023-10-12

## 2023-10-16 ENCOUNTER — RX RENEWAL (OUTPATIENT)
Age: 38
End: 2023-10-16

## 2023-10-18 RX ORDER — CYANOCOBALAMIN 1000 UG/ML
1000 INJECTION INTRAMUSCULAR; SUBCUTANEOUS
Qty: 0 | Refills: 0 | Status: COMPLETED | OUTPATIENT
Start: 2023-09-08

## 2023-10-19 ENCOUNTER — APPOINTMENT (OUTPATIENT)
Dept: FAMILY MEDICINE | Facility: CLINIC | Age: 38
End: 2023-10-19
Payer: MEDICAID

## 2023-10-19 ENCOUNTER — APPOINTMENT (OUTPATIENT)
Dept: PULMONOLOGY | Facility: CLINIC | Age: 38
End: 2023-10-19
Payer: MEDICAID

## 2023-10-19 VITALS
HEART RATE: 99 BPM | RESPIRATION RATE: 16 BRPM | SYSTOLIC BLOOD PRESSURE: 100 MMHG | DIASTOLIC BLOOD PRESSURE: 68 MMHG | OXYGEN SATURATION: 98 %

## 2023-10-19 VITALS — WEIGHT: 170 LBS | BODY MASS INDEX: 23.03 KG/M2 | HEIGHT: 72 IN

## 2023-10-19 DIAGNOSIS — J98.4 OTHER DISORDERS OF LUNG: ICD-10-CM

## 2023-10-19 PROCEDURE — 96372 THER/PROPH/DIAG INJ SC/IM: CPT

## 2023-10-19 PROCEDURE — 99204 OFFICE O/P NEW MOD 45 MIN: CPT

## 2023-10-25 ENCOUNTER — TRANSCRIPTION ENCOUNTER (OUTPATIENT)
Age: 38
End: 2023-10-25

## 2023-10-25 LAB
FOLATE SERPL-MCNC: 17.7 NG/ML
VIT B12 SERPL-MCNC: 518 PG/ML

## 2023-10-26 ENCOUNTER — RX RENEWAL (OUTPATIENT)
Age: 38
End: 2023-10-26

## 2023-10-27 ENCOUNTER — APPOINTMENT (OUTPATIENT)
Dept: FAMILY MEDICINE | Facility: CLINIC | Age: 38
End: 2023-10-27
Payer: MEDICAID

## 2023-10-27 ENCOUNTER — MED ADMIN CHARGE (OUTPATIENT)
Age: 38
End: 2023-10-27

## 2023-10-27 PROCEDURE — 96372 THER/PROPH/DIAG INJ SC/IM: CPT

## 2023-10-27 RX ORDER — CYANOCOBALAMIN 1000 UG/ML
1000 INJECTION INTRAMUSCULAR; SUBCUTANEOUS
Qty: 1 | Refills: 0 | Status: COMPLETED | OUTPATIENT
Start: 2023-10-16

## 2023-10-27 RX ORDER — CYANOCOBALAMIN 1000 UG/ML
1000 INJECTION INTRAMUSCULAR; SUBCUTANEOUS
Qty: 0 | Refills: 0 | Status: COMPLETED | OUTPATIENT
Start: 2023-10-26

## 2023-10-30 ENCOUNTER — NON-APPOINTMENT (OUTPATIENT)
Age: 38
End: 2023-10-30

## 2023-10-31 ENCOUNTER — APPOINTMENT (OUTPATIENT)
Dept: FAMILY MEDICINE | Facility: CLINIC | Age: 38
End: 2023-10-31
Payer: MEDICAID

## 2023-10-31 VITALS
DIASTOLIC BLOOD PRESSURE: 66 MMHG | HEIGHT: 72 IN | HEART RATE: 84 BPM | BODY MASS INDEX: 23.03 KG/M2 | OXYGEN SATURATION: 98 % | TEMPERATURE: 98 F | SYSTOLIC BLOOD PRESSURE: 104 MMHG | WEIGHT: 170 LBS

## 2023-10-31 DIAGNOSIS — R79.1 ABNORMAL COAGULATION PROFILE: ICD-10-CM

## 2023-10-31 DIAGNOSIS — Z95.2 PRESENCE OF PROSTHETIC HEART VALVE: ICD-10-CM

## 2023-10-31 LAB
INR PPP: 1.6 RATIO
QUALITY CONTROL: YES

## 2023-10-31 PROCEDURE — 99214 OFFICE O/P EST MOD 30 MIN: CPT | Mod: 25

## 2023-10-31 PROCEDURE — 85610 PROTHROMBIN TIME: CPT | Mod: QW

## 2023-10-31 PROCEDURE — 36415 COLL VENOUS BLD VENIPUNCTURE: CPT

## 2023-11-01 RX ORDER — LANCETS 28 GAUGE
EACH MISCELLANEOUS
Qty: 1 | Refills: 3 | Status: ACTIVE | COMMUNITY
Start: 2023-10-31 | End: 1900-01-01

## 2023-11-02 ENCOUNTER — APPOINTMENT (OUTPATIENT)
Dept: FAMILY MEDICINE | Facility: CLINIC | Age: 38
End: 2023-11-02
Payer: MEDICAID

## 2023-11-02 PROCEDURE — 96372 THER/PROPH/DIAG INJ SC/IM: CPT

## 2023-11-02 RX ORDER — CYANOCOBALAMIN 1000 UG/ML
1000 INJECTION INTRAMUSCULAR; SUBCUTANEOUS
Qty: 0 | Refills: 0 | Status: COMPLETED | OUTPATIENT
Start: 2023-11-01

## 2023-11-03 ENCOUNTER — TRANSCRIPTION ENCOUNTER (OUTPATIENT)
Age: 38
End: 2023-11-03

## 2023-11-06 ENCOUNTER — TRANSCRIPTION ENCOUNTER (OUTPATIENT)
Age: 38
End: 2023-11-06

## 2023-11-06 LAB
ALBUMIN SERPL ELPH-MCNC: 4.2 G/DL
ALP BLD-CCNC: 77 U/L
ALT SERPL-CCNC: 5 U/L
ANION GAP SERPL CALC-SCNC: 13 MMOL/L
AST SERPL-CCNC: 15 U/L
BASOPHILS # BLD AUTO: 0.05 K/UL
BASOPHILS NFR BLD AUTO: 0.3 %
BILIRUB SERPL-MCNC: 0.5 MG/DL
BUN SERPL-MCNC: 21 MG/DL
CALCIUM SERPL-MCNC: 9.8 MG/DL
CHLORIDE SERPL-SCNC: 106 MMOL/L
CO2 SERPL-SCNC: 20 MMOL/L
CREAT SERPL-MCNC: 0.58 MG/DL
EGFR: 117 ML/MIN/1.73M2
EOSINOPHIL # BLD AUTO: 0.28 K/UL
EOSINOPHIL NFR BLD AUTO: 1.8 %
GLUCOSE SERPL-MCNC: 163 MG/DL
HCT VFR BLD CALC: 41.3 %
HGB BLD-MCNC: 13.1 G/DL
IMM GRANULOCYTES NFR BLD AUTO: 0.7 %
INR PPP: 1.57 RATIO
LYMPHOCYTES # BLD AUTO: 1.71 K/UL
LYMPHOCYTES NFR BLD AUTO: 11.2 %
MAN DIFF?: NORMAL
MCHC RBC-ENTMCNC: 28.9 PG
MCHC RBC-ENTMCNC: 31.7 GM/DL
MCV RBC AUTO: 91 FL
MONOCYTES # BLD AUTO: 0.74 K/UL
MONOCYTES NFR BLD AUTO: 4.8 %
NEUTROPHILS # BLD AUTO: 12.38 K/UL
NEUTROPHILS NFR BLD AUTO: 81.2 %
PLATELET # BLD AUTO: 347 K/UL
POTASSIUM SERPL-SCNC: 4.4 MMOL/L
PROT SERPL-MCNC: 6.9 G/DL
PT BLD: 17.5 SEC
RBC # BLD: 4.54 M/UL
RBC # FLD: 17 %
SODIUM SERPL-SCNC: 140 MMOL/L
WBC # FLD AUTO: 15.26 K/UL

## 2023-11-09 ENCOUNTER — APPOINTMENT (OUTPATIENT)
Dept: FAMILY MEDICINE | Facility: CLINIC | Age: 38
End: 2023-11-09
Payer: MEDICAID

## 2023-11-09 PROCEDURE — 96372 THER/PROPH/DIAG INJ SC/IM: CPT

## 2023-11-09 RX ORDER — CYANOCOBALAMIN 1000 UG/ML
1000 INJECTION INTRAMUSCULAR; SUBCUTANEOUS
Qty: 0 | Refills: 0 | Status: COMPLETED | OUTPATIENT
Start: 2023-11-08

## 2023-11-17 ENCOUNTER — APPOINTMENT (OUTPATIENT)
Dept: FAMILY MEDICINE | Facility: CLINIC | Age: 38
End: 2023-11-17
Payer: MEDICAID

## 2023-11-17 ENCOUNTER — MED ADMIN CHARGE (OUTPATIENT)
Age: 38
End: 2023-11-17

## 2023-11-17 PROCEDURE — 96372 THER/PROPH/DIAG INJ SC/IM: CPT

## 2023-11-17 RX ORDER — CYANOCOBALAMIN 1000 UG/ML
1000 INJECTION INTRAMUSCULAR; SUBCUTANEOUS
Qty: 0 | Refills: 0 | Status: COMPLETED | OUTPATIENT
Start: 2023-11-17

## 2023-11-17 RX ADMIN — CYANOCOBALAMIN 1 MCG/ML: 1000 INJECTION INTRAMUSCULAR; SUBCUTANEOUS at 00:00

## 2023-11-28 ENCOUNTER — TRANSCRIPTION ENCOUNTER (OUTPATIENT)
Age: 38
End: 2023-11-28

## 2023-11-29 ENCOUNTER — RX RENEWAL (OUTPATIENT)
Age: 38
End: 2023-11-29

## 2023-11-29 RX ORDER — ATORVASTATIN CALCIUM 20 MG/1
20 TABLET, FILM COATED ORAL
Qty: 90 | Refills: 3 | Status: ACTIVE | COMMUNITY
Start: 2022-09-08 | End: 1900-01-01

## 2023-11-29 RX ORDER — WARFARIN 6 MG/1
6 TABLET ORAL
Qty: 135 | Refills: 3 | Status: ACTIVE | COMMUNITY
Start: 2023-01-06 | End: 1900-01-01

## 2023-12-07 ENCOUNTER — APPOINTMENT (OUTPATIENT)
Dept: FAMILY MEDICINE | Facility: CLINIC | Age: 38
End: 2023-12-07
Payer: MEDICAID

## 2023-12-07 PROCEDURE — 36415 COLL VENOUS BLD VENIPUNCTURE: CPT

## 2023-12-07 PROCEDURE — 96372 THER/PROPH/DIAG INJ SC/IM: CPT

## 2023-12-07 RX ORDER — CYANOCOBALAMIN 1000 UG/ML
1000 INJECTION INTRAMUSCULAR; SUBCUTANEOUS
Qty: 0 | Refills: 0 | Status: COMPLETED | OUTPATIENT
Start: 2023-12-06

## 2023-12-18 ENCOUNTER — APPOINTMENT (OUTPATIENT)
Dept: FAMILY MEDICINE | Facility: CLINIC | Age: 38
End: 2023-12-18
Payer: MEDICAID

## 2023-12-18 VITALS
OXYGEN SATURATION: 93 % | WEIGHT: 168 LBS | TEMPERATURE: 98.4 F | HEIGHT: 72 IN | DIASTOLIC BLOOD PRESSURE: 68 MMHG | SYSTOLIC BLOOD PRESSURE: 108 MMHG | HEART RATE: 88 BPM | BODY MASS INDEX: 22.75 KG/M2

## 2023-12-18 DIAGNOSIS — E55.9 VITAMIN D DEFICIENCY, UNSPECIFIED: ICD-10-CM

## 2023-12-18 DIAGNOSIS — Z12.83 ENCOUNTER FOR SCREENING FOR MALIGNANT NEOPLASM OF SKIN: ICD-10-CM

## 2023-12-18 DIAGNOSIS — Q87.40 MARFAN'S SYNDROME, UNSPECIFIED: ICD-10-CM

## 2023-12-18 DIAGNOSIS — E78.5 HYPERLIPIDEMIA, UNSPECIFIED: ICD-10-CM

## 2023-12-18 DIAGNOSIS — E11.9 TYPE 2 DIABETES MELLITUS W/OUT COMPLICATIONS: ICD-10-CM

## 2023-12-18 DIAGNOSIS — D64.9 ANEMIA, UNSPECIFIED: ICD-10-CM

## 2023-12-18 DIAGNOSIS — Z00.00 ENCOUNTER FOR GENERAL ADULT MEDICAL EXAMINATION W/OUT ABNORMAL FINDINGS: ICD-10-CM

## 2023-12-18 DIAGNOSIS — I38 ENDOCARDITIS, VALVE UNSPECIFIED: ICD-10-CM

## 2023-12-18 DIAGNOSIS — E03.9 HYPOTHYROIDISM, UNSPECIFIED: ICD-10-CM

## 2023-12-18 PROCEDURE — 99396 PREV VISIT EST AGE 40-64: CPT | Mod: 25

## 2023-12-18 PROCEDURE — 36415 COLL VENOUS BLD VENIPUNCTURE: CPT

## 2023-12-18 RX ORDER — GENTAMICIN SULFATE 1 MG/G
0.1 CREAM TOPICAL
Qty: 30 | Refills: 0 | Status: COMPLETED | COMMUNITY
Start: 2023-10-05

## 2023-12-18 RX ORDER — METOPROLOL SUCCINATE 50 MG/1
50 TABLET, EXTENDED RELEASE ORAL
Qty: 180 | Refills: 0 | Status: ACTIVE | COMMUNITY
Start: 2023-05-10

## 2023-12-18 RX ORDER — PREDNISONE 20 MG/1
20 TABLET ORAL
Qty: 5 | Refills: 0 | Status: COMPLETED | COMMUNITY
Start: 2023-10-02 | End: 2023-12-18

## 2023-12-18 NOTE — PLAN
Clarified with patient Dr. Cristobal notes from OV on 9/20/23   [FreeTextEntry1] : EKG UTD c SB CV ([facility])  Dr. Greene   medical record to be obtained   d/c glimepiride 1mg qd 2n2 hypoglycemia   see lab orders   see  med orders

## 2023-12-18 NOTE — PHYSICAL EXAM
[No Acute Distress] : no acute distress [Well Nourished] : well nourished [Well Developed] : well developed [Well-Appearing] : well-appearing [EOMI] : extraocular movements intact [Normal Outer Ear/Nose] : the outer ears and nose were normal in appearance [No Respiratory Distress] : no respiratory distress  [No Accessory Muscle Use] : no accessory muscle use [Normal Rate] : normal rate  [Regular Rhythm] : with a regular rhythm [Normal S1, S2] : normal S1 and S2 [No Abdominal Bruit] : a ~M bruit was not heard ~T in the abdomen [No Edema] : there was no peripheral edema [No Palpable Aorta] : no palpable aorta [Soft] : abdomen soft [Non Tender] : non-tender [Non-distended] : non-distended [No Masses] : no abdominal mass palpated [No HSM] : no HSM [Normal Bowel Sounds] : normal bowel sounds [No CVA Tenderness] : no CVA  tenderness [Grossly Normal Strength/Tone] : grossly normal strength/tone [No Rash] : no rash [Coordination Grossly Intact] : coordination grossly intact [Speech Grossly Normal] : speech grossly normal [Normal Affect] : the affect was normal [Normal Mood] : the mood was normal [Normal Insight/Judgement] : insight and judgment were intact [de-identified] : +ve murmur  [de-identified] : +ve scaly macular lesison anterior neck B  R>L

## 2023-12-18 NOTE — HISTORY OF PRESENT ILLNESS
[FreeTextEntry1] : JUANY is a 38-year-old female here for a CPE [de-identified] : CPE  as above,  +ve FAST   Denies new or changed chronic CP and/or SOB  UNCHANGED stable palpitations  +ve nausea  no vomiting +ve BM daily no bloody/black stools      pt reports frequent episodes of symptomatic hypoglycemia (cold sweats,  lightheadedness)     FBS = 59

## 2023-12-19 LAB
25(OH)D3 SERPL-MCNC: 23 NG/ML
ALBUMIN SERPL ELPH-MCNC: 4.5 G/DL
ALP BLD-CCNC: 84 U/L
ALT SERPL-CCNC: 6 U/L
ANION GAP SERPL CALC-SCNC: 14 MMOL/L
APPEARANCE: ABNORMAL
AST SERPL-CCNC: 13 U/L
BACTERIA: ABNORMAL /HPF
BASOPHILS # BLD AUTO: 0.05 K/UL
BASOPHILS NFR BLD AUTO: 0.3 %
BILIRUB SERPL-MCNC: 0.5 MG/DL
BILIRUBIN URINE: NEGATIVE
BLOOD URINE: ABNORMAL
BUN SERPL-MCNC: 15 MG/DL
CALCIUM SERPL-MCNC: 9.8 MG/DL
CAST: 1 /LPF
CHLORIDE SERPL-SCNC: 102 MMOL/L
CHOLEST SERPL-MCNC: 202 MG/DL
CK SERPL-CCNC: 81 U/L
CO2 SERPL-SCNC: 22 MMOL/L
COLOR: YELLOW
CREAT SERPL-MCNC: 0.69 MG/DL
EGFR: 112 ML/MIN/1.73M2
EOSINOPHIL # BLD AUTO: 0.17 K/UL
EOSINOPHIL NFR BLD AUTO: 1.2 %
EPITHELIAL CELLS: 32 /HPF
ESTIMATED AVERAGE GLUCOSE: 148 MG/DL
FERRITIN SERPL-MCNC: 138 NG/ML
FOLATE SERPL-MCNC: 12.5 NG/ML
GGT SERPL-CCNC: 34 U/L
GLUCOSE QUALITATIVE U: NEGATIVE MG/DL
GLUCOSE SERPL-MCNC: 119 MG/DL
HBA1C MFR BLD HPLC: 6.8 %
HCT VFR BLD CALC: 45.5 %
HDLC SERPL-MCNC: 38 MG/DL
HGB BLD-MCNC: 13.9 G/DL
IMM GRANULOCYTES NFR BLD AUTO: 1.1 %
INR PPP: 2.71 RATIO
IRON SATN MFR SERPL: 17 %
IRON SERPL-MCNC: 52 UG/DL
KETONES URINE: NEGATIVE MG/DL
LDLC SERPL CALC-MCNC: 132 MG/DL
LEUKOCYTE ESTERASE URINE: NEGATIVE
LYMPHOCYTES # BLD AUTO: 1.33 K/UL
LYMPHOCYTES NFR BLD AUTO: 9.1 %
MAN DIFF?: NORMAL
MCHC RBC-ENTMCNC: 27.4 PG
MCHC RBC-ENTMCNC: 30.5 GM/DL
MCV RBC AUTO: 89.7 FL
MICROSCOPIC-UA: NORMAL
MONOCYTES # BLD AUTO: 1 K/UL
MONOCYTES NFR BLD AUTO: 6.8 %
NEUTROPHILS # BLD AUTO: 11.93 K/UL
NEUTROPHILS NFR BLD AUTO: 81.5 %
NITRITE URINE: NEGATIVE
NONHDLC SERPL-MCNC: 165 MG/DL
PH URINE: 6
PLATELET # BLD AUTO: 391 K/UL
POTASSIUM SERPL-SCNC: 4.2 MMOL/L
PROT SERPL-MCNC: 7.3 G/DL
PROTEIN URINE: NEGATIVE MG/DL
PT BLD: 29.7 SEC
RBC # BLD: 5.07 M/UL
RBC # FLD: 16.4 %
RED BLOOD CELLS URINE: 34 /HPF
SODIUM SERPL-SCNC: 137 MMOL/L
SPECIFIC GRAVITY URINE: 1.03
T3FREE SERPL-MCNC: 2.33 PG/ML
T4 FREE SERPL-MCNC: 1.5 NG/DL
TIBC SERPL-MCNC: 304 UG/DL
TRANSFERRIN SERPL-MCNC: 229 MG/DL
TRIGL SERPL-MCNC: 184 MG/DL
TSH SERPL-ACNC: 2.24 UIU/ML
UIBC SERPL-MCNC: 252 UG/DL
URATE SERPL-MCNC: 6 MG/DL
UROBILINOGEN URINE: 0.2 MG/DL
VIT B12 SERPL-MCNC: 428 PG/ML
WBC # FLD AUTO: 14.64 K/UL
WHITE BLOOD CELLS URINE: 2 /HPF

## 2023-12-20 ENCOUNTER — TRANSCRIPTION ENCOUNTER (OUTPATIENT)
Age: 38
End: 2023-12-20

## 2023-12-21 RX ORDER — ECONAZOLE NITRATE 10 MG/G
1 CREAM TOPICAL DAILY
Qty: 1 | Refills: 2 | Status: ACTIVE | COMMUNITY
Start: 2017-06-14

## 2023-12-26 ENCOUNTER — TRANSCRIPTION ENCOUNTER (OUTPATIENT)
Age: 38
End: 2023-12-26

## 2023-12-29 ENCOUNTER — MED ADMIN CHARGE (OUTPATIENT)
Age: 38
End: 2023-12-29

## 2023-12-29 ENCOUNTER — APPOINTMENT (OUTPATIENT)
Dept: FAMILY MEDICINE | Facility: CLINIC | Age: 38
End: 2023-12-29
Payer: MEDICAID

## 2023-12-29 PROCEDURE — 96372 THER/PROPH/DIAG INJ SC/IM: CPT

## 2023-12-29 RX ADMIN — CYANOCOBALAMIN 1 MCG/ML: 1000 INJECTION, SOLUTION INTRAMUSCULAR at 00:00

## 2024-01-01 NOTE — ED ADULT NURSE NOTE - NS ED NURSE RECORD ANOTHER HT AND WT
Infant is a 2 days old AGA male born at 40w4d to a 19 y.o.  via Vaginal, Spontaneous. GBS Negative. PNL negative. Jennie negative. ROM 28 hrs PTD. Down -8% since birth. Birth Weight: 3990 g (8 lb 12.7 oz). +meconium at delivery    Discharge planning:  Received Vitamin K, erythromycin eye ointment   DECLINED Hepatitis B vaccine  Hearing: Hearing Screen Date: 24  Hearing Screen, Right Ear: ABR (auditory brainstem response), passed  Hearing Screen, Left Ear: ABR (auditory brainstem response), passed  CCHD: SpO2: Pre-Ductal (Right Hand): 97 % SpO2: Post-Ductal: 98 %  Lab Results   Component Value Date/Time    TCBILIRUBIN 2024 06:28 AM     Discharge education completed, to include safe sleep, routine  feeding, car seat safety, and RTC precautions; all questions answered. All discharge instructions and exam performed with official  services. Parents voiced feeling confident in being discharged home today.       Yes

## 2024-01-02 RX ORDER — CYANOCOBALAMIN 1000 UG/ML
1000 INJECTION INTRAMUSCULAR; SUBCUTANEOUS
Qty: 0 | Refills: 0 | Status: COMPLETED | OUTPATIENT
Start: 2023-12-29

## 2024-01-03 ENCOUNTER — APPOINTMENT (OUTPATIENT)
Dept: PULMONOLOGY | Facility: CLINIC | Age: 39
End: 2024-01-03
Payer: MEDICAID

## 2024-01-03 VITALS — HEIGHT: 63 IN | WEIGHT: 158 LBS | BODY MASS INDEX: 28 KG/M2

## 2024-01-03 VITALS — HEIGHT: 71.5 IN | BODY MASS INDEX: 23 KG/M2 | WEIGHT: 168 LBS

## 2024-01-03 DIAGNOSIS — R05.9 COUGH, UNSPECIFIED: ICD-10-CM

## 2024-01-03 PROCEDURE — 94729 DIFFUSING CAPACITY: CPT

## 2024-01-03 PROCEDURE — 85018 HEMOGLOBIN: CPT | Mod: QW

## 2024-01-03 PROCEDURE — 94727 GAS DIL/WSHOT DETER LNG VOL: CPT

## 2024-01-03 PROCEDURE — 94010 BREATHING CAPACITY TEST: CPT

## 2024-01-05 ENCOUNTER — APPOINTMENT (OUTPATIENT)
Dept: FAMILY MEDICINE | Facility: CLINIC | Age: 39
End: 2024-01-05
Payer: MEDICAID

## 2024-01-05 PROCEDURE — 96372 THER/PROPH/DIAG INJ SC/IM: CPT

## 2024-01-08 RX ORDER — CYANOCOBALAMIN 1000 UG/ML
1000 INJECTION INTRAMUSCULAR; SUBCUTANEOUS
Qty: 0 | Refills: 0 | Status: COMPLETED | OUTPATIENT
Start: 2024-01-05

## 2024-01-12 ENCOUNTER — APPOINTMENT (OUTPATIENT)
Dept: FAMILY MEDICINE | Facility: CLINIC | Age: 39
End: 2024-01-12
Payer: MEDICAID

## 2024-01-12 PROCEDURE — 96372 THER/PROPH/DIAG INJ SC/IM: CPT

## 2024-01-12 RX ORDER — CYANOCOBALAMIN 1000 UG/ML
1000 INJECTION INTRAMUSCULAR; SUBCUTANEOUS
Qty: 1 | Refills: 0 | Status: COMPLETED | OUTPATIENT
Start: 2024-01-09

## 2024-01-17 ENCOUNTER — RX RENEWAL (OUTPATIENT)
Age: 39
End: 2024-01-17

## 2024-01-17 RX ORDER — GLIMEPIRIDE 1 MG/1
1 TABLET ORAL
Qty: 90 | Refills: 0 | Status: ACTIVE | COMMUNITY
Start: 2021-05-19 | End: 1900-01-01

## 2024-01-19 ENCOUNTER — APPOINTMENT (OUTPATIENT)
Dept: FAMILY MEDICINE | Facility: CLINIC | Age: 39
End: 2024-01-19

## 2024-01-25 ENCOUNTER — APPOINTMENT (OUTPATIENT)
Dept: FAMILY MEDICINE | Facility: CLINIC | Age: 39
End: 2024-01-25
Payer: MEDICAID

## 2024-01-25 ENCOUNTER — APPOINTMENT (OUTPATIENT)
Dept: PULMONOLOGY | Facility: CLINIC | Age: 39
End: 2024-01-25
Payer: MEDICAID

## 2024-01-25 VITALS
DIASTOLIC BLOOD PRESSURE: 68 MMHG | SYSTOLIC BLOOD PRESSURE: 114 MMHG | RESPIRATION RATE: 16 BRPM | HEART RATE: 100 BPM | BODY MASS INDEX: 23 KG/M2 | WEIGHT: 168 LBS | OXYGEN SATURATION: 98 % | HEIGHT: 71.5 IN

## 2024-01-25 DIAGNOSIS — J84.9 INTERSTITIAL PULMONARY DISEASE, UNSPECIFIED: ICD-10-CM

## 2024-01-25 DIAGNOSIS — J45.909 UNSPECIFIED ASTHMA, UNCOMPLICATED: ICD-10-CM

## 2024-01-25 DIAGNOSIS — R06.09 OTHER FORMS OF DYSPNEA: ICD-10-CM

## 2024-01-25 PROCEDURE — 96372 THER/PROPH/DIAG INJ SC/IM: CPT

## 2024-01-25 PROCEDURE — 99214 OFFICE O/P EST MOD 30 MIN: CPT

## 2024-01-25 RX ADMIN — CYANOCOBALAMIN 0 MCG/ML: 1000 INJECTION INTRAMUSCULAR; SUBCUTANEOUS at 00:00

## 2024-01-30 ENCOUNTER — TRANSCRIPTION ENCOUNTER (OUTPATIENT)
Age: 39
End: 2024-01-30

## 2024-02-01 ENCOUNTER — APPOINTMENT (OUTPATIENT)
Dept: FAMILY MEDICINE | Facility: CLINIC | Age: 39
End: 2024-02-01
Payer: MEDICAID

## 2024-02-01 PROCEDURE — 36415 COLL VENOUS BLD VENIPUNCTURE: CPT

## 2024-02-01 PROCEDURE — 96372 THER/PROPH/DIAG INJ SC/IM: CPT

## 2024-02-01 RX ORDER — CYANOCOBALAMIN 1000 UG/ML
1000 INJECTION INTRAMUSCULAR; SUBCUTANEOUS
Qty: 0 | Refills: 0 | Status: COMPLETED | OUTPATIENT
Start: 2024-01-29

## 2024-02-05 NOTE — ED ADULT NURSE NOTE - SUICIDE SCREENING QUESTION 1
Continue with  Toujeo 88 units daily  Continue with glyambi 25-5 daily with breakfast and glipizide 10mg twice daily   Yes

## 2024-02-06 LAB
ALBUMIN SERPL ELPH-MCNC: 4.4 G/DL
ALP BLD-CCNC: 86 U/L
ALT SERPL-CCNC: 8 U/L
ANION GAP SERPL CALC-SCNC: 15 MMOL/L
AST SERPL-CCNC: 14 U/L
BASOPHILS # BLD AUTO: 0.06 K/UL
BASOPHILS NFR BLD AUTO: 0.4 %
BILIRUB SERPL-MCNC: 0.5 MG/DL
BUN SERPL-MCNC: 19 MG/DL
CALCIUM SERPL-MCNC: 9.2 MG/DL
CHLORIDE SERPL-SCNC: 106 MMOL/L
CO2 SERPL-SCNC: 20 MMOL/L
CREAT SERPL-MCNC: 0.64 MG/DL
EGFR: 114 ML/MIN/1.73M2
EOSINOPHIL # BLD AUTO: 0.19 K/UL
EOSINOPHIL NFR BLD AUTO: 1.2 %
FOLATE SERPL-MCNC: 14.5 NG/ML
GLUCOSE SERPL-MCNC: 159 MG/DL
HCT VFR BLD CALC: 42.6 %
HGB BLD-MCNC: 13.4 G/DL
IMM GRANULOCYTES NFR BLD AUTO: 0.7 %
LYMPHOCYTES # BLD AUTO: 2.14 K/UL
LYMPHOCYTES NFR BLD AUTO: 14 %
MAN DIFF?: NORMAL
MCHC RBC-ENTMCNC: 28 PG
MCHC RBC-ENTMCNC: 31.5 GM/DL
MCV RBC AUTO: 88.9 FL
MONOCYTES # BLD AUTO: 0.86 K/UL
MONOCYTES NFR BLD AUTO: 5.6 %
NEUTROPHILS # BLD AUTO: 11.95 K/UL
NEUTROPHILS NFR BLD AUTO: 78.1 %
PLATELET # BLD AUTO: 406 K/UL
POTASSIUM SERPL-SCNC: 4.5 MMOL/L
PROT SERPL-MCNC: 6.8 G/DL
RBC # BLD: 4.79 M/UL
RBC # FLD: 16.2 %
SODIUM SERPL-SCNC: 140 MMOL/L
VIT B12 SERPL-MCNC: 695 PG/ML
WBC # FLD AUTO: 15.3 K/UL

## 2024-02-12 ENCOUNTER — OUTPATIENT (OUTPATIENT)
Dept: OUTPATIENT SERVICES | Facility: HOSPITAL | Age: 39
LOS: 1 days | End: 2024-02-12
Payer: MEDICAID

## 2024-02-12 ENCOUNTER — APPOINTMENT (OUTPATIENT)
Dept: CT IMAGING | Facility: CLINIC | Age: 39
End: 2024-02-12
Payer: MEDICAID

## 2024-02-12 DIAGNOSIS — Z98.1 ARTHRODESIS STATUS: Chronic | ICD-10-CM

## 2024-02-12 DIAGNOSIS — Z00.8 ENCOUNTER FOR OTHER GENERAL EXAMINATION: ICD-10-CM

## 2024-02-12 DIAGNOSIS — Z96.1 PRESENCE OF INTRAOCULAR LENS: Chronic | ICD-10-CM

## 2024-02-12 DIAGNOSIS — Q87.40 MARFAN SYNDROME, UNSPECIFIED: ICD-10-CM

## 2024-02-12 DIAGNOSIS — J84.9 INTERSTITIAL PULMONARY DISEASE, UNSPECIFIED: ICD-10-CM

## 2024-02-12 DIAGNOSIS — Z95.4 PRESENCE OF OTHER HEART-VALVE REPLACEMENT: Chronic | ICD-10-CM

## 2024-02-12 PROCEDURE — 71250 CT THORAX DX C-: CPT

## 2024-02-12 PROCEDURE — 71250 CT THORAX DX C-: CPT | Mod: 26

## 2024-02-12 NOTE — REVIEW OF SYSTEMS
[Vision Problems] : vision problems [Dyspnea on Exertion] : dyspnea on exertion [Negative] : Psychiatric [Negative] : Genitourinary

## 2024-02-16 ENCOUNTER — APPOINTMENT (OUTPATIENT)
Dept: FAMILY MEDICINE | Facility: CLINIC | Age: 39
End: 2024-02-16
Payer: MEDICAID

## 2024-02-16 PROCEDURE — 96372 THER/PROPH/DIAG INJ SC/IM: CPT

## 2024-02-16 RX ORDER — CYANOCOBALAMIN 1000 UG/ML
1000 INJECTION INTRAMUSCULAR; SUBCUTANEOUS
Qty: 0 | Refills: 0 | Status: COMPLETED | OUTPATIENT
Start: 2024-02-16

## 2024-02-20 NOTE — HISTORY OF PRESENT ILLNESS
[TextBox_4] : 1/25/24 My 1st visit with this complicated patient with Marfan's.  I reviewed all recent notes, orders, lab results and images for 15 minutes on all available platforms (including Clctin, Vahna, Loopd Via, and VGTel Epic prior to this visit and made notes.  38-year-old female last seen by Dr Mcgrath in 2018. She has a history of Marfan syndrome with 2 mechanical valve replacements (AVR and MVR). In the past she had significant retrognathia and had severe sleep apnea. She had jaw lengthening surgery which corrected her sleep apnea. In the past her lung function showed mild degrees of restriction. Over the past year she has been complaining of some increasing shortness of breath. She had significant vaginal bleeding from an elevated INR and required transfusions and iron infusions. Lately this has been under control and her breathing is somewhat better but she is concerned that there may have been a change in her respiratory status. Denies wheezing or coughing.

## 2024-02-20 NOTE — END OF VISIT
[FreeTextEntry3] : 1st visit with this patient. New H&P I reviewed all recent notes, orders, lab results and images for 15 minutes on all available platforms (including Spotsi, Abacast, Network Vision, and ShopIgniter Epic prior to this visit and made notes. Reviewed Pathophysiology, associated medical conditions, WATTS and Rx options for asthma and restrictive pulmonary impairment.

## 2024-02-20 NOTE — RESULTS/DATA
[TextEntry] : PFT on 1/3/24: Mildly restricted flows and volumes.  Diffusion reduced to 52% predicted.  CT Chest on 2/12/24: Clear lungs

## 2024-02-20 NOTE — DISCUSSION/SUMMARY
[FreeTextEntry1] : Assessment  Asthma Resolved MICHELET Restrictive lung disease Marfan's  Plan  Dulera Yearly and PRN FU CT chest to R/O ILD  PE unlikely on Warfarin

## 2024-02-21 ENCOUNTER — RX RENEWAL (OUTPATIENT)
Age: 39
End: 2024-02-21

## 2024-02-23 ENCOUNTER — RX RENEWAL (OUTPATIENT)
Age: 39
End: 2024-02-23

## 2024-02-28 ENCOUNTER — RX RENEWAL (OUTPATIENT)
Age: 39
End: 2024-02-28

## 2024-02-28 RX ORDER — MOMETASONE FUROATE AND FORMOTEROL FUMARATE DIHYDRATE 200; 5 UG/1; UG/1
200-5 AEROSOL RESPIRATORY (INHALATION)
Qty: 39 | Refills: 2 | Status: ACTIVE | COMMUNITY
Start: 2021-12-07 | End: 1900-01-01

## 2024-03-01 ENCOUNTER — APPOINTMENT (OUTPATIENT)
Dept: FAMILY MEDICINE | Facility: CLINIC | Age: 39
End: 2024-03-01
Payer: MEDICAID

## 2024-03-01 PROCEDURE — 96372 THER/PROPH/DIAG INJ SC/IM: CPT

## 2024-03-01 NOTE — BEHAVIORAL HEALTH ASSESSMENT NOTE - NS ED BHA MED ROS ALLERGIC IMMUNOLOGIC
Ena Zhao  Colon/Rectal Surgery  [address], [address]  [place] [address]  Phone: [phone]  Fax: [phone]  Follow Up Time:   
No complaints

## 2024-03-04 RX ORDER — CYANOCOBALAMIN 1000 UG/ML
1000 INJECTION INTRAMUSCULAR; SUBCUTANEOUS
Qty: 0 | Refills: 0 | Status: COMPLETED | OUTPATIENT
Start: 2024-03-01

## 2024-03-04 RX ORDER — CYANOCOBALAMIN 1000 UG/ML
1000 INJECTION INTRAMUSCULAR; SUBCUTANEOUS
Qty: 0 | Refills: 0 | Status: COMPLETED | OUTPATIENT
Start: 2024-01-25

## 2024-03-08 ENCOUNTER — APPOINTMENT (OUTPATIENT)
Dept: FAMILY MEDICINE | Facility: CLINIC | Age: 39
End: 2024-03-08
Payer: MEDICAID

## 2024-03-08 ENCOUNTER — APPOINTMENT (OUTPATIENT)
Dept: CT IMAGING | Facility: CLINIC | Age: 39
End: 2024-03-08

## 2024-03-08 PROCEDURE — 96372 THER/PROPH/DIAG INJ SC/IM: CPT

## 2024-03-08 RX ORDER — CYANOCOBALAMIN 1000 UG/ML
1000 INJECTION INTRAMUSCULAR; SUBCUTANEOUS
Qty: 0 | Refills: 0 | Status: COMPLETED | OUTPATIENT
Start: 2024-03-06

## 2024-03-08 RX ORDER — NORETHINDRONE ACETATE 5 MG/1
5 TABLET ORAL
Qty: 180 | Refills: 1 | Status: ACTIVE | COMMUNITY
Start: 2023-02-14 | End: 1900-01-01

## 2024-03-15 ENCOUNTER — APPOINTMENT (OUTPATIENT)
Dept: FAMILY MEDICINE | Facility: CLINIC | Age: 39
End: 2024-03-15
Payer: MEDICAID

## 2024-03-15 ENCOUNTER — RX RENEWAL (OUTPATIENT)
Age: 39
End: 2024-03-15

## 2024-03-15 PROCEDURE — 96372 THER/PROPH/DIAG INJ SC/IM: CPT

## 2024-03-15 RX ORDER — ATORVASTATIN CALCIUM 80 MG/1
80 TABLET, FILM COATED ORAL
Qty: 90 | Refills: 1 | Status: ACTIVE | COMMUNITY
Start: 2022-09-08 | End: 1900-01-01

## 2024-03-18 RX ORDER — CYANOCOBALAMIN 1000 UG/ML
1000 INJECTION INTRAMUSCULAR; SUBCUTANEOUS
Qty: 0 | Refills: 0 | Status: COMPLETED | OUTPATIENT
Start: 2024-03-15

## 2024-03-19 ENCOUNTER — TRANSCRIPTION ENCOUNTER (OUTPATIENT)
Age: 39
End: 2024-03-19

## 2024-03-20 ENCOUNTER — OUTPATIENT (OUTPATIENT)
Dept: OUTPATIENT SERVICES | Facility: HOSPITAL | Age: 39
LOS: 1 days | End: 2024-03-20
Payer: MEDICAID

## 2024-03-20 ENCOUNTER — APPOINTMENT (OUTPATIENT)
Dept: CT IMAGING | Facility: CLINIC | Age: 39
End: 2024-03-20
Payer: MEDICAID

## 2024-03-20 DIAGNOSIS — Z00.8 ENCOUNTER FOR OTHER GENERAL EXAMINATION: ICD-10-CM

## 2024-03-20 DIAGNOSIS — Q87.40 MARFAN SYNDROME, UNSPECIFIED: ICD-10-CM

## 2024-03-20 DIAGNOSIS — Z98.1 ARTHRODESIS STATUS: Chronic | ICD-10-CM

## 2024-03-20 DIAGNOSIS — Z96.1 PRESENCE OF INTRAOCULAR LENS: Chronic | ICD-10-CM

## 2024-03-20 DIAGNOSIS — Z95.4 PRESENCE OF OTHER HEART-VALVE REPLACEMENT: Chronic | ICD-10-CM

## 2024-03-20 PROCEDURE — 71275 CT ANGIOGRAPHY CHEST: CPT | Mod: 26

## 2024-03-20 PROCEDURE — 71275 CT ANGIOGRAPHY CHEST: CPT

## 2024-03-22 ENCOUNTER — APPOINTMENT (OUTPATIENT)
Dept: FAMILY MEDICINE | Facility: CLINIC | Age: 39
End: 2024-03-22
Payer: MEDICAID

## 2024-03-22 PROCEDURE — 96372 THER/PROPH/DIAG INJ SC/IM: CPT

## 2024-03-22 PROCEDURE — 85610 PROTHROMBIN TIME: CPT | Mod: QW

## 2024-03-22 RX ORDER — BLOOD-GLUCOSE METER
W/DEVICE KIT MISCELLANEOUS
Qty: 1 | Refills: 0 | Status: ACTIVE | COMMUNITY
Start: 2024-03-22 | End: 1900-01-01

## 2024-03-22 RX ORDER — CYANOCOBALAMIN 1000 UG/ML
1000 INJECTION INTRAMUSCULAR; SUBCUTANEOUS
Qty: 0 | Refills: 0 | Status: COMPLETED | OUTPATIENT
Start: 2024-03-22

## 2024-03-22 RX ADMIN — CYANOCOBALAMIN 0 MCG/ML: 1000 INJECTION, SOLUTION INTRAMUSCULAR at 00:00

## 2024-03-25 LAB
FOLATE SERPL-MCNC: 9.5 NG/ML
VIT B12 SERPL-MCNC: 613 PG/ML

## 2024-03-27 RX ORDER — LEVOTHYROXINE SODIUM 0.03 MG/1
25 TABLET ORAL
Qty: 90 | Refills: 0 | Status: ACTIVE | COMMUNITY
Start: 2022-06-03 | End: 1900-01-01

## 2024-03-28 ENCOUNTER — APPOINTMENT (OUTPATIENT)
Dept: FAMILY MEDICINE | Facility: CLINIC | Age: 39
End: 2024-03-28
Payer: MEDICAID

## 2024-03-28 ENCOUNTER — MED ADMIN CHARGE (OUTPATIENT)
Age: 39
End: 2024-03-28

## 2024-03-28 PROCEDURE — 96372 THER/PROPH/DIAG INJ SC/IM: CPT

## 2024-03-28 RX ADMIN — CYANOCOBALAMIN 1 MCG/ML: 1000 INJECTION, SOLUTION INTRAMUSCULAR at 00:00

## 2024-04-01 RX ORDER — CYANOCOBALAMIN 1000 UG/ML
1000 INJECTION INTRAMUSCULAR; SUBCUTANEOUS
Qty: 0 | Refills: 0 | Status: COMPLETED | OUTPATIENT
Start: 2024-03-28

## 2024-04-02 ENCOUNTER — TRANSCRIPTION ENCOUNTER (OUTPATIENT)
Age: 39
End: 2024-04-02

## 2024-04-02 ENCOUNTER — NON-APPOINTMENT (OUTPATIENT)
Age: 39
End: 2024-04-02

## 2024-04-05 ENCOUNTER — APPOINTMENT (OUTPATIENT)
Dept: FAMILY MEDICINE | Facility: CLINIC | Age: 39
End: 2024-04-05
Payer: MEDICAID

## 2024-04-05 PROCEDURE — 96372 THER/PROPH/DIAG INJ SC/IM: CPT

## 2024-04-05 RX ORDER — CYANOCOBALAMIN 1000 UG/ML
1000 INJECTION INTRAMUSCULAR; SUBCUTANEOUS
Qty: 0 | Refills: 0 | Status: COMPLETED | OUTPATIENT
Start: 2024-04-05

## 2024-04-09 ENCOUNTER — RX RENEWAL (OUTPATIENT)
Age: 39
End: 2024-04-09

## 2024-04-09 RX ORDER — BLOOD SUGAR DIAGNOSTIC
STRIP MISCELLANEOUS
Qty: 100 | Refills: 3 | Status: ACTIVE | COMMUNITY
Start: 2023-09-19 | End: 1900-01-01

## 2024-04-10 ENCOUNTER — TRANSCRIPTION ENCOUNTER (OUTPATIENT)
Age: 39
End: 2024-04-10

## 2024-04-12 ENCOUNTER — APPOINTMENT (OUTPATIENT)
Dept: FAMILY MEDICINE | Facility: CLINIC | Age: 39
End: 2024-04-12
Payer: MEDICAID

## 2024-04-12 ENCOUNTER — NON-APPOINTMENT (OUTPATIENT)
Age: 39
End: 2024-04-12

## 2024-04-12 PROCEDURE — 96372 THER/PROPH/DIAG INJ SC/IM: CPT

## 2024-04-12 RX ORDER — CYANOCOBALAMIN 1000 UG/ML
1000 INJECTION INTRAMUSCULAR; SUBCUTANEOUS
Qty: 0 | Refills: 0 | Status: COMPLETED | OUTPATIENT
Start: 2024-04-12

## 2024-04-19 ENCOUNTER — APPOINTMENT (OUTPATIENT)
Dept: FAMILY MEDICINE | Facility: CLINIC | Age: 39
End: 2024-04-19
Payer: MEDICAID

## 2024-04-19 PROCEDURE — 96372 THER/PROPH/DIAG INJ SC/IM: CPT

## 2024-04-19 RX ORDER — CYANOCOBALAMIN 1000 UG/ML
1000 INJECTION INTRAMUSCULAR; SUBCUTANEOUS
Qty: 0 | Refills: 0 | Status: COMPLETED | OUTPATIENT
Start: 2024-04-19

## 2024-04-26 ENCOUNTER — APPOINTMENT (OUTPATIENT)
Dept: FAMILY MEDICINE | Facility: CLINIC | Age: 39
End: 2024-04-26
Payer: MEDICAID

## 2024-04-26 ENCOUNTER — MED ADMIN CHARGE (OUTPATIENT)
Age: 39
End: 2024-04-26

## 2024-04-26 PROCEDURE — 96372 THER/PROPH/DIAG INJ SC/IM: CPT

## 2024-04-26 PROCEDURE — 36415 COLL VENOUS BLD VENIPUNCTURE: CPT

## 2024-04-26 RX ORDER — CYANOCOBALAMIN 1000 UG/ML
1000 INJECTION INTRAMUSCULAR; SUBCUTANEOUS
Qty: 0 | Refills: 0 | Status: COMPLETED | OUTPATIENT
Start: 2024-04-25

## 2024-04-30 LAB
FOLATE SERPL-MCNC: 9.6 NG/ML
VIT B12 SERPL-MCNC: 919 PG/ML

## 2024-04-30 RX ORDER — SITAGLIPTIN 100 MG/1
100 TABLET, FILM COATED ORAL
Qty: 90 | Refills: 0 | Status: ACTIVE | COMMUNITY
Start: 2019-07-29 | End: 1900-01-01

## 2024-05-02 ENCOUNTER — RX RENEWAL (OUTPATIENT)
Age: 39
End: 2024-05-02

## 2024-05-02 ENCOUNTER — APPOINTMENT (OUTPATIENT)
Dept: FAMILY MEDICINE | Facility: CLINIC | Age: 39
End: 2024-05-02
Payer: MEDICAID

## 2024-05-02 PROCEDURE — 96372 THER/PROPH/DIAG INJ SC/IM: CPT

## 2024-05-02 RX ORDER — METFORMIN HYDROCHLORIDE 1000 MG/1
1000 TABLET, COATED ORAL
Qty: 180 | Refills: 0 | Status: ACTIVE | COMMUNITY
Start: 2017-02-08 | End: 1900-01-01

## 2024-05-10 ENCOUNTER — APPOINTMENT (OUTPATIENT)
Dept: FAMILY MEDICINE | Facility: CLINIC | Age: 39
End: 2024-05-10
Payer: MEDICAID

## 2024-05-10 ENCOUNTER — MED ADMIN CHARGE (OUTPATIENT)
Age: 39
End: 2024-05-10

## 2024-05-10 PROCEDURE — 96372 THER/PROPH/DIAG INJ SC/IM: CPT

## 2024-05-14 RX ORDER — CYANOCOBALAMIN 1000 UG/ML
1000 INJECTION INTRAMUSCULAR; SUBCUTANEOUS
Qty: 0 | Refills: 0 | Status: COMPLETED | OUTPATIENT
Start: 2024-05-10

## 2024-05-17 ENCOUNTER — APPOINTMENT (OUTPATIENT)
Dept: FAMILY MEDICINE | Facility: CLINIC | Age: 39
End: 2024-05-17
Payer: MEDICAID

## 2024-05-17 ENCOUNTER — MED ADMIN CHARGE (OUTPATIENT)
Age: 39
End: 2024-05-17

## 2024-05-17 PROCEDURE — 96372 THER/PROPH/DIAG INJ SC/IM: CPT

## 2024-05-17 RX ORDER — CYANOCOBALAMIN 1000 UG/ML
1000 INJECTION INTRAMUSCULAR; SUBCUTANEOUS
Qty: 0 | Refills: 0 | Status: COMPLETED | OUTPATIENT
Start: 2024-05-15

## 2024-05-24 ENCOUNTER — APPOINTMENT (OUTPATIENT)
Dept: FAMILY MEDICINE | Facility: CLINIC | Age: 39
End: 2024-05-24
Payer: MEDICAID

## 2024-05-24 PROCEDURE — 96372 THER/PROPH/DIAG INJ SC/IM: CPT

## 2024-05-24 RX ORDER — CYANOCOBALAMIN 1000 UG/ML
1000 INJECTION INTRAMUSCULAR; SUBCUTANEOUS
Qty: 0 | Refills: 0 | Status: COMPLETED | OUTPATIENT
Start: 2024-05-23

## 2024-05-29 ENCOUNTER — RESULT REVIEW (OUTPATIENT)
Age: 39
End: 2024-05-29

## 2024-05-29 ENCOUNTER — APPOINTMENT (OUTPATIENT)
Dept: FAMILY MEDICINE | Facility: CLINIC | Age: 39
End: 2024-05-29
Payer: MEDICAID

## 2024-05-29 VITALS
HEIGHT: 71.5 IN | WEIGHT: 168 LBS | DIASTOLIC BLOOD PRESSURE: 66 MMHG | SYSTOLIC BLOOD PRESSURE: 100 MMHG | TEMPERATURE: 98.7 F | OXYGEN SATURATION: 98 % | HEART RATE: 100 BPM | BODY MASS INDEX: 23 KG/M2

## 2024-05-29 DIAGNOSIS — M54.2 CERVICALGIA: ICD-10-CM

## 2024-05-29 DIAGNOSIS — M25.562 PAIN IN LEFT KNEE: ICD-10-CM

## 2024-05-29 DIAGNOSIS — M62.838 OTHER MUSCLE SPASM: ICD-10-CM
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
## 2024-05-29 PROCEDURE — 99204 OFFICE O/P NEW MOD 45 MIN: CPT

## 2024-05-29 PROCEDURE — 96372 THER/PROPH/DIAG INJ SC/IM: CPT

## 2024-05-29 RX ORDER — METHOCARBAMOL 750 MG/1
750 TABLET, FILM COATED ORAL TWICE DAILY
Qty: 20 | Refills: 0 | Status: ACTIVE | COMMUNITY
Start: 2024-05-29 | End: 1900-01-01

## 2024-05-29 RX ORDER — METHOCARBAMOL 750 MG/1
0 TABLET, FILM COATED ORAL
Qty: 0 | Refills: 0 | DISCHARGE
Start: 2024-05-29
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
## 2024-05-29 NOTE — PHYSICAL EXAM
[No Acute Distress] : no acute distress [EOMI] : extraocular movements intact [Normal Outer Ear/Nose] : the outer ears and nose were normal in appearance [No JVD] : no jugular venous distention [No Respiratory Distress] : no respiratory distress  [No Accessory Muscle Use] : no accessory muscle use [Clear to Auscultation] : lungs were clear to auscultation bilaterally [Normal Rate] : normal rate  [Regular Rhythm] : with a regular rhythm [Normal S1, S2] : normal S1 and S2 [No Edema] : there was no peripheral edema [Non-distended] : non-distended [No CVA Tenderness] : no CVA  tenderness [No Rash] : no rash [Coordination Grossly Intact] : coordination grossly intact [Speech Grossly Normal] : speech grossly normal [Normal Affect] : the affect was normal [Normal Mood] : the mood was normal [Normal Insight/Judgement] : insight and judgment were intact [de-identified] : RRR +ve murmur  [de-identified] : +ve C-spine TTP  [de-identified] : +ve minimal tenderness along medial aspect L knee

## 2024-05-29 NOTE — PLAN
[FreeTextEntry1] : C-spine xray  r/o occult Fx    PT consult pending imaging results for both neck pain and L knee pain (see referral)  see med orders   close monitoring

## 2024-06-11 ENCOUNTER — OUTPATIENT (OUTPATIENT)
Dept: OUTPATIENT SERVICES | Facility: HOSPITAL | Age: 39
LOS: 1 days | End: 2024-06-11
Payer: COMMERCIAL

## 2024-06-11 ENCOUNTER — APPOINTMENT (OUTPATIENT)
Dept: RADIOLOGY | Facility: CLINIC | Age: 39
End: 2024-06-11
Payer: COMMERCIAL

## 2024-06-11 DIAGNOSIS — Z95.4 PRESENCE OF OTHER HEART-VALVE REPLACEMENT: Chronic | ICD-10-CM

## 2024-06-11 DIAGNOSIS — M54.2 CERVICALGIA: ICD-10-CM

## 2024-06-11 DIAGNOSIS — Z98.1 ARTHRODESIS STATUS: Chronic | ICD-10-CM

## 2024-06-11 PROCEDURE — 72040 X-RAY EXAM NECK SPINE 2-3 VW: CPT | Mod: 26

## 2024-06-11 PROCEDURE — 72040 X-RAY EXAM NECK SPINE 2-3 VW: CPT

## 2024-06-12 RX ORDER — CYANOCOBALAMIN 1000 UG/ML
1000 INJECTION INTRAMUSCULAR; SUBCUTANEOUS
Qty: 0 | Refills: 0 | Status: COMPLETED | OUTPATIENT
Start: 2024-06-12

## 2024-06-12 RX ADMIN — CYANOCOBALAMIN 0 MCG/ML: 1000 INJECTION INTRAMUSCULAR; SUBCUTANEOUS at 00:00

## 2024-07-10 ENCOUNTER — APPOINTMENT (OUTPATIENT)
Dept: FAMILY MEDICINE | Facility: CLINIC | Age: 39
End: 2024-07-10

## 2024-07-11 ENCOUNTER — RX RENEWAL (OUTPATIENT)
Age: 39
End: 2024-07-11

## 2024-07-12 ENCOUNTER — APPOINTMENT (OUTPATIENT)
Dept: FAMILY MEDICINE | Facility: CLINIC | Age: 39
End: 2024-07-12

## 2024-07-12 PROCEDURE — 36415 COLL VENOUS BLD VENIPUNCTURE: CPT

## 2024-07-13 ENCOUNTER — OUTPATIENT (OUTPATIENT)
Dept: OUTPATIENT SERVICES | Facility: HOSPITAL | Age: 39
LOS: 1 days | End: 2024-07-13
Payer: COMMERCIAL

## 2024-07-13 ENCOUNTER — APPOINTMENT (OUTPATIENT)
Dept: CT IMAGING | Facility: CLINIC | Age: 39
End: 2024-07-13
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
## 2024-07-13 DIAGNOSIS — M54.2 CERVICALGIA: ICD-10-CM

## 2024-07-13 DIAGNOSIS — Z96.1 PRESENCE OF INTRAOCULAR LENS: Chronic | ICD-10-CM

## 2024-07-13 DIAGNOSIS — Z95.4 PRESENCE OF OTHER HEART-VALVE REPLACEMENT: Chronic | ICD-10-CM

## 2024-07-13 DIAGNOSIS — Z98.1 ARTHRODESIS STATUS: Chronic | ICD-10-CM

## 2024-07-13 LAB
25(OH)D3 SERPL-MCNC: 26.2 NG/ML
ALBUMIN SERPL ELPH-MCNC: 4.5 G/DL
ALP BLD-CCNC: 102 U/L
ANION GAP SERPL CALC-SCNC: 15 MMOL/L
APPEARANCE: ABNORMAL
AST SERPL-CCNC: 20 U/L
BASOPHILS # BLD AUTO: 0.06 K/UL
BASOPHILS NFR BLD AUTO: 0.4 %
BILIRUB SERPL-MCNC: 0.6 MG/DL
BILIRUBIN URINE: NEGATIVE
BLOOD URINE: ABNORMAL
BUN SERPL-MCNC: 15 MG/DL
CALCIUM SERPL-MCNC: 9.6 MG/DL
CAST: 2 /LPF
CHLORIDE SERPL-SCNC: 104 MMOL/L
CHOLEST SERPL-MCNC: 156 MG/DL
CO2 SERPL-SCNC: 20 MMOL/L
COLOR: NORMAL
EGFR: 98 ML/MIN/1.73M2
EOSINOPHIL # BLD AUTO: 0.18 K/UL
EOSINOPHIL NFR BLD AUTO: 1.1 %
EPITHELIAL CELLS: 12 /HPF
FOLATE SERPL-MCNC: 10.7 NG/ML
GLUCOSE SERPL-MCNC: 212 MG/DL
HBA1C MFR BLD HPLC: 8.7 %
HCT VFR BLD CALC: 42.9 %
HDLC SERPL-MCNC: 31 MG/DL
IMM GRANULOCYTES NFR BLD AUTO: 0.8 %
KETONES URINE: ABNORMAL MG/DL
LDLC SERPL CALC-MCNC: 86 MG/DL
LEUKOCYTE ESTERASE URINE: NEGATIVE
LYMPHOCYTES # BLD AUTO: 2.07 K/UL
MAN DIFF?: NORMAL
MCHC RBC-ENTMCNC: 28.1 PG
MCHC RBC-ENTMCNC: 31.5 GM/DL
MICROSCOPIC-UA: NORMAL
MONOCYTES # BLD AUTO: 1.01 K/UL
MONOCYTES NFR BLD AUTO: 6.3 %
NEUTROPHILS # BLD AUTO: 12.52 K/UL
NITRITE URINE: NEGATIVE
NONHDLC SERPL-MCNC: 125 MG/DL
PH URINE: 5.5
PLATELET # BLD AUTO: 393 K/UL
POTASSIUM SERPL-SCNC: 4.3 MMOL/L
PROTEIN URINE: NORMAL MG/DL
RBC # BLD: 4.8 M/UL
RBC # FLD: 15.9 %
RED BLOOD CELLS URINE: 5 /HPF
SODIUM SERPL-SCNC: 139 MMOL/L
SPECIFIC GRAVITY URINE: 1.03
T4 SERPL-MCNC: 9.2 UG/DL
TRIGL SERPL-MCNC: 234 MG/DL
TSH SERPL-ACNC: 1.81 UIU/ML
URATE SERPL-MCNC: 6 MG/DL
UROBILINOGEN URINE: 0.2 MG/DL
VIT B12 SERPL-MCNC: 537 PG/ML
WBC # FLD AUTO: 15.96 K/UL
WHITE BLOOD CELLS URINE: 2 /HPF

## 2024-07-13 PROCEDURE — 72125 CT NECK SPINE W/O DYE: CPT

## 2024-07-13 PROCEDURE — 72125 CT NECK SPINE W/O DYE: CPT | Mod: 26

## 2024-07-17 ENCOUNTER — APPOINTMENT (OUTPATIENT)
Dept: FAMILY MEDICINE | Facility: CLINIC | Age: 39
End: 2024-07-17
Payer: MEDICAID

## 2024-07-17 PROCEDURE — P9615: CPT

## 2024-07-18 ENCOUNTER — APPOINTMENT (OUTPATIENT)
Dept: FAMILY MEDICINE | Facility: CLINIC | Age: 39
End: 2024-07-18

## 2024-07-18 LAB
APPEARANCE: CLEAR
BACTERIA: NEGATIVE /HPF
BILIRUBIN URINE: NEGATIVE
BLOOD URINE: ABNORMAL
CAST: 1 /LPF
COLOR: YELLOW
EPITHELIAL CELLS: 4 /HPF
GLUCOSE QUALITATIVE U: NEGATIVE MG/DL
KETONES URINE: NEGATIVE MG/DL
LEUKOCYTE ESTERASE URINE: NEGATIVE
MICROSCOPIC-UA: NORMAL
NITRITE URINE: NEGATIVE
PH URINE: 5.5
PROTEIN URINE: NORMAL MG/DL
RED BLOOD CELLS URINE: 6 /HPF
SPECIFIC GRAVITY URINE: >1.03
URINE CYTOLOGY: NORMAL
UROBILINOGEN URINE: 0.2 MG/DL
WHITE BLOOD CELLS URINE: 1 /HPF

## 2024-07-23 ENCOUNTER — TRANSCRIPTION ENCOUNTER (OUTPATIENT)
Age: 39
End: 2024-07-23

## 2024-07-23 LAB — BACTERIA UR CULT: NORMAL

## 2024-07-24 ENCOUNTER — APPOINTMENT (OUTPATIENT)
Dept: FAMILY MEDICINE | Facility: CLINIC | Age: 39
End: 2024-07-24
Payer: MEDICAID

## 2024-07-24 PROCEDURE — P9615: CPT

## 2024-07-30 LAB — BACTERIA UR CULT: NORMAL

## 2024-07-31 ENCOUNTER — APPOINTMENT (OUTPATIENT)
Dept: FAMILY MEDICINE | Facility: CLINIC | Age: 39
End: 2024-07-31
Payer: COMMERCIAL

## 2024-07-31 VITALS
BODY MASS INDEX: 23 KG/M2 | OXYGEN SATURATION: 98 % | HEIGHT: 71.5 IN | DIASTOLIC BLOOD PRESSURE: 68 MMHG | SYSTOLIC BLOOD PRESSURE: 98 MMHG | WEIGHT: 168 LBS | HEART RATE: 78 BPM | TEMPERATURE: 97.8 F
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
## 2024-07-31 DIAGNOSIS — M47.812 SPONDYLOSIS W/OUT MYELOPATHY OR RADICULOPATHY, CERVICAL REGION: ICD-10-CM

## 2024-07-31 DIAGNOSIS — M48.02 SPINAL STENOSIS, CERVICAL REGION: ICD-10-CM

## 2024-07-31 DIAGNOSIS — G89.4 CHRONIC PAIN SYNDROME: ICD-10-CM

## 2024-07-31 PROCEDURE — 99214 OFFICE O/P EST MOD 30 MIN: CPT
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
## 2024-07-31 NOTE — PHYSICAL EXAM
[No Acute Distress] : no acute distress [EOMI] : extraocular movements intact [Normal Outer Ear/Nose] : the outer ears and nose were normal in appearance [No JVD] : no jugular venous distention [No Respiratory Distress] : no respiratory distress  [No Accessory Muscle Use] : no accessory muscle use [Clear to Auscultation] : lungs were clear to auscultation bilaterally [Normal Rate] : normal rate  [Regular Rhythm] : with a regular rhythm [Normal S1, S2] : normal S1 and S2 [No Edema] : there was no peripheral edema [Non-distended] : non-distended [No CVA Tenderness] : no CVA  tenderness [No Rash] : no rash [Coordination Grossly Intact] : coordination grossly intact [Speech Grossly Normal] : speech grossly normal [Normal Affect] : the affect was normal [Normal Mood] : the mood was normal [Normal Insight/Judgement] : insight and judgment were intact [de-identified] : RRR +ve murmur  [de-identified] : +ve C-spine TTP  [de-identified] : +ve minimal tenderness along medial aspect L knee

## 2024-08-02 ENCOUNTER — APPOINTMENT (OUTPATIENT)
Dept: ORTHOPEDIC SURGERY | Facility: CLINIC | Age: 39
End: 2024-08-02

## 2024-08-05 ENCOUNTER — APPOINTMENT (OUTPATIENT)
Dept: FAMILY MEDICINE | Facility: CLINIC | Age: 39
End: 2024-08-05

## 2024-08-05 PROBLEM — E78.6 LOW HDL (UNDER 40): Status: ACTIVE | Noted: 2024-08-05

## 2024-08-05 PROCEDURE — 99214 OFFICE O/P EST MOD 30 MIN: CPT

## 2024-08-05 NOTE — PLAN
[FreeTextEntry1] : Start OTC vit D3 2KU daily   DECREASE carbs DECREASE sugars repeat A1C in 3M   increase dietary intake of Omega 3 Fish Oil  repeat lipid profile in 3M  restart B12 injections  q 2 weeks  repeat 3M   INR today= 2.1  pt instructed to take 12mg today, resume 9mg qd thereafter  repeat INR 1 week

## 2024-08-05 NOTE — HISTORY OF PRESENT ILLNESS
[Home] : at home, [unfilled] , at the time of the visit. [Medical Office: ([facility])___] : at the medical office located in  [Verbal consent obtained from patient] : the patient, [unfilled] [FreeTextEntry1] : f/u  [de-identified] : 40 yo female for f/u

## 2024-08-05 NOTE — PHYSICAL EXAM
[No Acute Distress] : no acute distress [EOMI] : extraocular movements intact [No Respiratory Distress] : no respiratory distress  [No Accessory Muscle Use] : no accessory muscle use [Normal Affect] : the affect was normal [Normal Mood] : the mood was normal [Normal Insight/Judgement] : insight and judgment were intact

## 2024-08-09 ENCOUNTER — APPOINTMENT (OUTPATIENT)
Dept: FAMILY MEDICINE | Facility: CLINIC | Age: 39
End: 2024-08-09

## 2024-08-09 PROCEDURE — 96372 THER/PROPH/DIAG INJ SC/IM: CPT

## 2024-08-20 ENCOUNTER — APPOINTMENT (OUTPATIENT)
Dept: FAMILY MEDICINE | Facility: CLINIC | Age: 39
End: 2024-08-20
Payer: MEDICAID

## 2024-08-20 VITALS
DIASTOLIC BLOOD PRESSURE: 70 MMHG | OXYGEN SATURATION: 99 % | HEART RATE: 91 BPM | WEIGHT: 164 LBS | HEIGHT: 71.5 IN | BODY MASS INDEX: 22.46 KG/M2 | SYSTOLIC BLOOD PRESSURE: 108 MMHG | TEMPERATURE: 97.9 F

## 2024-08-20 DIAGNOSIS — R79.1 ABNORMAL COAGULATION PROFILE: ICD-10-CM

## 2024-08-20 DIAGNOSIS — J01.80 OTHER ACUTE SINUSITIS: ICD-10-CM

## 2024-08-20 DIAGNOSIS — R05.9 COUGH, UNSPECIFIED: ICD-10-CM

## 2024-08-20 PROCEDURE — 99213 OFFICE O/P EST LOW 20 MIN: CPT

## 2024-08-20 RX ORDER — PREDNISONE 20 MG/1
20 TABLET ORAL
Qty: 3 | Refills: 0 | Status: ACTIVE | COMMUNITY
Start: 2024-08-20 | End: 1900-01-01

## 2024-08-20 RX ORDER — AMOXICILLIN AND CLAVULANATE POTASSIUM 875; 125 MG/1; MG/1
875-125 TABLET, COATED ORAL
Qty: 20 | Refills: 0 | Status: ACTIVE | COMMUNITY
Start: 2024-08-20 | End: 1900-01-01

## 2024-08-20 RX ORDER — AMOXICILLIN AND CLAVULANATE POTASSIUM 250; 125 MG/1; MG/1
0 TABLET, FILM COATED ORAL
Qty: 0 | Refills: 0 | DISCHARGE
Start: 2024-08-20

## 2024-08-20 RX ORDER — PREDNISONE 10 MG
0 TABLET, DOSE PACK ORAL
Qty: 0 | Refills: 0 | DISCHARGE
Start: 2024-08-20

## 2024-08-20 NOTE — PLAN
[FreeTextEntry1] : aggressive hydration!!!!!!!  see med orders   INR= 5.1   HOLD Warfarin today repeat INR check tomorrow and Thursday 8/22/24

## 2024-08-20 NOTE — PHYSICAL EXAM
[No Acute Distress] : no acute distress [EOMI] : extraocular movements intact [No JVD] : no jugular venous distention [No Lymphadenopathy] : no lymphadenopathy [Supple] : supple [No Respiratory Distress] : no respiratory distress  [No Accessory Muscle Use] : no accessory muscle use [Normal Rate] : normal rate  [Regular Rhythm] : with a regular rhythm [Normal S1, S2] : normal S1 and S2 [Non-distended] : non-distended [No CVA Tenderness] : no CVA  tenderness [No Rash] : no rash [Coordination Grossly Intact] : coordination grossly intact [Normal Affect] : the affect was normal [Normal Mood] : the mood was normal [Normal Insight/Judgement] : insight and judgment were intact [de-identified] : dull TMs B/L   +ve PND post pharynx  [de-identified] : TEMO wheeze o/w CTA B/L  [de-identified] : +ve murmur

## 2024-08-20 NOTE — HISTORY OF PRESENT ILLNESS
[FreeTextEntry8] : pt c/o congestion   40 yo female c 10 d hx of productive cough c blood speckled clear to pale yellow phlegm.  no fever  UNCHANGED chronic chills/sweats  +ve B/L ear pain L>R   no sore throat  resolved nasal congestion     no GI upset   reports some relief c OTC Coricidan 
No

## 2024-08-22 ENCOUNTER — APPOINTMENT (OUTPATIENT)
Dept: FAMILY MEDICINE | Facility: CLINIC | Age: 39
End: 2024-08-22

## 2024-08-23 ENCOUNTER — APPOINTMENT (OUTPATIENT)
Dept: ORTHOPEDIC SURGERY | Facility: CLINIC | Age: 39
End: 2024-08-23
Payer: COMMERCIAL

## 2024-08-23 DIAGNOSIS — M48.02 SPINAL STENOSIS, CERVICAL REGION: ICD-10-CM

## 2024-08-23 DIAGNOSIS — M47.812 SPONDYLOSIS W/OUT MYELOPATHY OR RADICULOPATHY, CERVICAL REGION: ICD-10-CM

## 2024-08-23 DIAGNOSIS — Q87.40 MARFAN'S SYNDROME, UNSPECIFIED: ICD-10-CM

## 2024-08-23 PROCEDURE — 99204 OFFICE O/P NEW MOD 45 MIN: CPT

## 2024-08-23 RX ORDER — CYCLOBENZAPRINE HYDROCHLORIDE 10 MG/1
10 TABLET, FILM COATED ORAL 3 TIMES DAILY
Qty: 90 | Refills: 0 | Status: ACTIVE | COMMUNITY
Start: 2024-08-23 | End: 1900-01-01

## 2024-08-23 RX ORDER — CYCLOBENZAPRINE HCL 10 MG
1 TABLET ORAL
Qty: 0 | Refills: 0 | DISCHARGE
Start: 2024-08-23

## 2024-08-24 NOTE — DISCUSSION/SUMMARY
[de-identified] : 39 Y F w/ cervical strain in the setting of pre-existing severe C4-6 FS. PMHx of Marfan syndrome, mechanical valve x2, chronic anticoagulation, complex scoliosis surgery.  Patient was educated and informed on their condition along with the expected outcomes. All questions were answered. Pt is on blood thinner. Treatment options are limited. Prescription given to Cyclobenzaprine. Advised pt to follow up with established pain mgmt physician to consider further injection therapies.  Patient was provided with a referral for cervical physical therapy to work on stretching, strengthening and range of motion.   F/U in 6-8 weeks.   Prior to appointment and during encounter with patient extensive medical records were reviewed including but not limited to, hospital records, out patient records, imaging results, and lab data. During this appointment the patient was examined, diagnoses were discussed and explained in a face to face manner. In addition extensive time was spent reviewing aforementioned diagnostic studies. Counseling including abnormal image results, differential diagnoses, treatment options, risk and benefits, lifestyle changes, current condition, and current medications was performed. Patient's comments, questions, and concerns were address and patient verbalized understanding. Based on this patient's presentation at our office, which is an orthopedic spine surgeon's office, this patient inherently / intrinsically has a risk, however minute, of developing issues such as Cauda equina syndrome, bowel and bladder changes, or progression of motor or neurological deficits such as paralysis which may be permanent.    I, Rachael Pandey, attest that this documentation has been prepared under the direction and in the presence of provider Bethel Morales MD.
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
## 2024-08-24 NOTE — DATA REVIEWED
[FreeTextEntry1] : I have independently reviewed and interpreted these images and reports. Cervical CT scan on 07/13/24 from  imaging.  Appears instrumentation coming up to C7 level from thoracic spine there is a fusion of C6-7 as well which was iatrogenic. Mod DDD C3-4, adv DDD C4-6. Also includes C3-4 mild FS. C4-5 severe RT, mod LT FS. C5-6 mod BL FS. No fxs.   06/11/24 cervical XRs from - Scoliosis fusion construct from approx T1 to mid thoracic spine as visible from cervical xray. C spine- mod-adv DDD C3-6 w/ anterior osteophytes.

## 2024-08-24 NOTE — PHYSICAL EXAM
[de-identified] : Constitutional: - General Appearance: Unremarkable Body Habitus Well Developed Well Nourished Body Habitus No Deformities Well Groomed Ability To communicate: Normal Neurologic: Global sensation is intact to upper and lower extremities. See examination of Neck and/or Spine for exceptions. Orientation to Time, Place and Person is: Normal Mood And Affect is Normal Skin: - Head/Face, Right Upper/Lower Extremity, Left Upper/Lower Extremity: Normal See Examination of Neck and/or Spine for exceptions Cardiovascular: Peripheral Cardiovascular System is Normal Palpation of Lymph Nodes: Normal Palpation of lymph nodes in: Axilla, Cervical, Inguinal Abnormal Palpation of lymph nodes in: None  [] : sensation of right upper extremities not intact [FreeTextEntry3] : well healed old midline scar from lower cervical to lumbar spine.  [FreeTextEntry8] : radiating pain to rhomboid.  [de-identified] : C5, C6, C7 paresthesia's RT.  [TWNoteComboBox7] : forward flexion 20 degrees [de-identified] : extension 10 degrees [de-identified] : left lateral rotation 25 degrees [TWNoteComboBox6] : right lateral rotation 45 degrees
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
## 2024-08-24 NOTE — PHYSICAL EXAM
[de-identified] : Constitutional: - General Appearance: Unremarkable Body Habitus Well Developed Well Nourished Body Habitus No Deformities Well Groomed Ability To communicate: Normal Neurologic: Global sensation is intact to upper and lower extremities. See examination of Neck and/or Spine for exceptions. Orientation to Time, Place and Person is: Normal Mood And Affect is Normal Skin: - Head/Face, Right Upper/Lower Extremity, Left Upper/Lower Extremity: Normal See Examination of Neck and/or Spine for exceptions Cardiovascular: Peripheral Cardiovascular System is Normal Palpation of Lymph Nodes: Normal Palpation of lymph nodes in: Axilla, Cervical, Inguinal Abnormal Palpation of lymph nodes in: None  [] : sensation of right upper extremities not intact [FreeTextEntry3] : well healed old midline scar from lower cervical to lumbar spine.  [FreeTextEntry8] : radiating pain to rhomboid.  [de-identified] : C5, C6, C7 paresthesia's RT.  [TWNoteComboBox7] : forward flexion 20 degrees [de-identified] : extension 10 degrees [de-identified] : left lateral rotation 25 degrees [TWNoteComboBox6] : right lateral rotation 45 degrees

## 2024-08-24 NOTE — DISCUSSION/SUMMARY
[de-identified] : 39 Y F w/ cervical strain in the setting of pre-existing severe C4-6 FS. PMHx of Marfan syndrome, mechanical valve x2, chronic anticoagulation, complex scoliosis surgery.  Patient was educated and informed on their condition along with the expected outcomes. All questions were answered. Pt is on blood thinner. Treatment options are limited. Prescription given to Cyclobenzaprine. Advised pt to follow up with established pain mgmt physician to consider further injection therapies.  Patient was provided with a referral for cervical physical therapy to work on stretching, strengthening and range of motion.   F/U in 6-8 weeks.   Prior to appointment and during encounter with patient extensive medical records were reviewed including but not limited to, hospital records, out patient records, imaging results, and lab data. During this appointment the patient was examined, diagnoses were discussed and explained in a face to face manner. In addition extensive time was spent reviewing aforementioned diagnostic studies. Counseling including abnormal image results, differential diagnoses, treatment options, risk and benefits, lifestyle changes, current condition, and current medications was performed. Patient's comments, questions, and concerns were address and patient verbalized understanding. Based on this patient's presentation at our office, which is an orthopedic spine surgeon's office, this patient inherently / intrinsically has a risk, however minute, of developing issues such as Cauda equina syndrome, bowel and bladder changes, or progression of motor or neurological deficits such as paralysis which may be permanent.    I, Rachael Pandey, attest that this documentation has been prepared under the direction and in the presence of provider Bethel Morales MD.

## 2024-08-26 ENCOUNTER — TRANSCRIPTION ENCOUNTER (OUTPATIENT)
Age: 39
End: 2024-08-26

## 2024-09-02 ENCOUNTER — INPATIENT (INPATIENT)
Facility: HOSPITAL | Age: 39
LOS: 1 days | Discharge: HOME | DRG: 813 | End: 2024-09-04
Attending: INTERNAL MEDICINE | Admitting: INTERNAL MEDICINE
Payer: MEDICAID

## 2024-09-02 ENCOUNTER — TRANSCRIPTION ENCOUNTER (OUTPATIENT)
Age: 39
End: 2024-09-02

## 2024-09-02 VITALS
HEART RATE: 88 BPM | DIASTOLIC BLOOD PRESSURE: 84 MMHG | TEMPERATURE: 98 F | HEIGHT: 72 IN | RESPIRATION RATE: 18 BRPM | OXYGEN SATURATION: 98 % | SYSTOLIC BLOOD PRESSURE: 121 MMHG | WEIGHT: 162.04 LBS

## 2024-09-02 DIAGNOSIS — R79.1 ABNORMAL COAGULATION PROFILE: ICD-10-CM

## 2024-09-02 DIAGNOSIS — Z95.4 PRESENCE OF OTHER HEART-VALVE REPLACEMENT: Chronic | ICD-10-CM

## 2024-09-02 DIAGNOSIS — Z98.1 ARTHRODESIS STATUS: Chronic | ICD-10-CM

## 2024-09-02 DIAGNOSIS — Z96.1 PRESENCE OF INTRAOCULAR LENS: Chronic | ICD-10-CM

## 2024-09-02 LAB
ALBUMIN SERPL ELPH-MCNC: 4 G/DL — SIGNIFICANT CHANGE UP (ref 3.3–5.2)
ALP SERPL-CCNC: 86 U/L — SIGNIFICANT CHANGE UP (ref 40–120)
ALT FLD-CCNC: 8 U/L — SIGNIFICANT CHANGE UP
ANION GAP SERPL CALC-SCNC: 11 MMOL/L — SIGNIFICANT CHANGE UP (ref 5–17)
APPEARANCE UR: CLEAR — SIGNIFICANT CHANGE UP
APTT BLD: 92 SEC — HIGH (ref 24.5–35.6)
AST SERPL-CCNC: 19 U/L — SIGNIFICANT CHANGE UP
BACTERIA # UR AUTO: ABNORMAL /HPF
BASOPHILS # BLD AUTO: 0.03 K/UL — SIGNIFICANT CHANGE UP (ref 0–0.2)
BASOPHILS NFR BLD AUTO: 0.2 % — SIGNIFICANT CHANGE UP (ref 0–2)
BILIRUB SERPL-MCNC: 0.5 MG/DL — SIGNIFICANT CHANGE UP (ref 0.4–2)
BILIRUB UR-MCNC: NEGATIVE — SIGNIFICANT CHANGE UP
BLD GP AB SCN SERPL QL: SIGNIFICANT CHANGE UP
BUN SERPL-MCNC: 12.5 MG/DL — SIGNIFICANT CHANGE UP (ref 8–20)
CALCIUM SERPL-MCNC: 9.7 MG/DL — SIGNIFICANT CHANGE UP (ref 8.4–10.5)
CHLORIDE SERPL-SCNC: 105 MMOL/L — SIGNIFICANT CHANGE UP (ref 96–108)
CO2 SERPL-SCNC: 21 MMOL/L — LOW (ref 22–29)
COLOR SPEC: YELLOW — SIGNIFICANT CHANGE UP
CREAT SERPL-MCNC: 0.56 MG/DL — SIGNIFICANT CHANGE UP (ref 0.5–1.3)
DIFF PNL FLD: ABNORMAL
EGFR: 118 ML/MIN/1.73M2 — SIGNIFICANT CHANGE UP
EOSINOPHIL # BLD AUTO: 0.13 K/UL — SIGNIFICANT CHANGE UP (ref 0–0.5)
EOSINOPHIL NFR BLD AUTO: 0.8 % — SIGNIFICANT CHANGE UP (ref 0–6)
EPI CELLS # UR: PRESENT
GLUCOSE BLDC GLUCOMTR-MCNC: 178 MG/DL — HIGH (ref 70–99)
GLUCOSE SERPL-MCNC: 122 MG/DL — HIGH (ref 70–99)
GLUCOSE UR QL: NEGATIVE MG/DL — SIGNIFICANT CHANGE UP
HCG SERPL-ACNC: <4 MIU/ML — SIGNIFICANT CHANGE UP
HCT VFR BLD CALC: 42.1 % — SIGNIFICANT CHANGE UP (ref 34.5–45)
HGB BLD-MCNC: 13.8 G/DL — SIGNIFICANT CHANGE UP (ref 11.5–15.5)
HYALINE CASTS # UR AUTO: SIGNIFICANT CHANGE UP
IMM GRANULOCYTES NFR BLD AUTO: 0.6 % — SIGNIFICANT CHANGE UP (ref 0–0.9)
INR BLD: 9 RATIO — CRITICAL HIGH (ref 0.85–1.18)
KETONES UR-MCNC: ABNORMAL MG/DL
LEUKOCYTE ESTERASE UR-ACNC: ABNORMAL
LYMPHOCYTES # BLD AUTO: 1.91 K/UL — SIGNIFICANT CHANGE UP (ref 1–3.3)
LYMPHOCYTES # BLD AUTO: 12.4 % — LOW (ref 13–44)
MAGNESIUM SERPL-MCNC: 1.7 MG/DL — SIGNIFICANT CHANGE UP (ref 1.6–2.6)
MCHC RBC-ENTMCNC: 28 PG — SIGNIFICANT CHANGE UP (ref 27–34)
MCHC RBC-ENTMCNC: 32.8 GM/DL — SIGNIFICANT CHANGE UP (ref 32–36)
MCV RBC AUTO: 85.4 FL — SIGNIFICANT CHANGE UP (ref 80–100)
MONOCYTES # BLD AUTO: 1.04 K/UL — HIGH (ref 0–0.9)
MONOCYTES NFR BLD AUTO: 6.7 % — SIGNIFICANT CHANGE UP (ref 2–14)
NEUTROPHILS # BLD AUTO: 12.25 K/UL — HIGH (ref 1.8–7.4)
NEUTROPHILS NFR BLD AUTO: 79.3 % — HIGH (ref 43–77)
NITRITE UR-MCNC: NEGATIVE — SIGNIFICANT CHANGE UP
PH UR: 5.5 — SIGNIFICANT CHANGE UP (ref 5–8)
PLATELET # BLD AUTO: 342 K/UL — SIGNIFICANT CHANGE UP (ref 150–400)
POTASSIUM SERPL-MCNC: 4.5 MMOL/L — SIGNIFICANT CHANGE UP (ref 3.5–5.3)
POTASSIUM SERPL-SCNC: 4.5 MMOL/L — SIGNIFICANT CHANGE UP (ref 3.5–5.3)
PROT SERPL-MCNC: 7.5 G/DL — SIGNIFICANT CHANGE UP (ref 6.6–8.7)
PROT UR-MCNC: 30 MG/DL
PROTHROM AB SERPL-ACNC: 93.7 SEC — HIGH (ref 9.5–13)
RBC # BLD: 4.93 M/UL — SIGNIFICANT CHANGE UP (ref 3.8–5.2)
RBC # FLD: 16 % — HIGH (ref 10.3–14.5)
RBC CASTS # UR COMP ASSIST: SIGNIFICANT CHANGE UP /HPF (ref 0–4)
SODIUM SERPL-SCNC: 137 MMOL/L — SIGNIFICANT CHANGE UP (ref 135–145)
SP GR SPEC: 1.02 — SIGNIFICANT CHANGE UP (ref 1–1.03)
T3 SERPL-MCNC: 115 NG/DL — SIGNIFICANT CHANGE UP (ref 80–200)
T4 AB SER-ACNC: 9.7 UG/DL — SIGNIFICANT CHANGE UP (ref 4.5–12)
TROPONIN T, HIGH SENSITIVITY RESULT: 11 NG/L — SIGNIFICANT CHANGE UP (ref 0–51)
TROPONIN T, HIGH SENSITIVITY RESULT: 13 NG/L — SIGNIFICANT CHANGE UP (ref 0–51)
TSH SERPL-MCNC: 3.2 UIU/ML — SIGNIFICANT CHANGE UP (ref 0.27–4.2)
UROBILINOGEN FLD QL: 1 MG/DL — SIGNIFICANT CHANGE UP (ref 0.2–1)
WBC # BLD: 15.46 K/UL — HIGH (ref 3.8–10.5)
WBC # FLD AUTO: 15.46 K/UL — HIGH (ref 3.8–10.5)
WBC UR QL: SIGNIFICANT CHANGE UP /HPF (ref 0–5)

## 2024-09-02 PROCEDURE — 99223 1ST HOSP IP/OBS HIGH 75: CPT

## 2024-09-02 PROCEDURE — 70450 CT HEAD/BRAIN W/O DYE: CPT | Mod: 26,MC

## 2024-09-02 PROCEDURE — 99285 EMERGENCY DEPT VISIT HI MDM: CPT

## 2024-09-02 PROCEDURE — 71046 X-RAY EXAM CHEST 2 VIEWS: CPT | Mod: 26

## 2024-09-02 RX ORDER — METOPROLOL TARTRATE 100 MG/1
50 TABLET ORAL DAILY
Refills: 0 | Status: DISCONTINUED | OUTPATIENT
Start: 2024-09-02 | End: 2024-09-04

## 2024-09-02 RX ORDER — NORETHINDRONE 0.35 MG/1
5 TABLET ORAL ONCE
Refills: 0 | Status: COMPLETED | OUTPATIENT
Start: 2024-09-02 | End: 2024-09-02

## 2024-09-02 RX ORDER — DEXTROSE 15 G/33 G
25 GEL IN PACKET (GRAM) ORAL ONCE
Refills: 0 | Status: DISCONTINUED | OUTPATIENT
Start: 2024-09-02 | End: 2024-09-04

## 2024-09-02 RX ORDER — LEVOTHYROXINE SODIUM 100 MCG
25 TABLET ORAL DAILY
Refills: 0 | Status: DISCONTINUED | OUTPATIENT
Start: 2024-09-02 | End: 2024-09-04

## 2024-09-02 RX ORDER — ACETAMINOPHEN 325 MG/1
1000 TABLET ORAL ONCE
Refills: 0 | Status: COMPLETED | OUTPATIENT
Start: 2024-09-02 | End: 2024-09-02

## 2024-09-02 RX ORDER — OXYCODONE HYDROCHLORIDE 5 MG/1
10 TABLET ORAL EVERY 4 HOURS
Refills: 0 | Status: DISCONTINUED | OUTPATIENT
Start: 2024-09-02 | End: 2024-09-04

## 2024-09-02 RX ORDER — OXYCODONE HYDROCHLORIDE 5 MG/1
10 TABLET ORAL ONCE
Refills: 0 | Status: DISCONTINUED | OUTPATIENT
Start: 2024-09-02 | End: 2024-09-02

## 2024-09-02 RX ORDER — DEXTROSE 15 G/33 G
15 GEL IN PACKET (GRAM) ORAL ONCE
Refills: 0 | Status: DISCONTINUED | OUTPATIENT
Start: 2024-09-02 | End: 2024-09-04

## 2024-09-02 RX ORDER — GLUCAGON INJECTION, SOLUTION 1 MG/.2ML
1 INJECTION, SOLUTION SUBCUTANEOUS ONCE
Refills: 0 | Status: DISCONTINUED | OUTPATIENT
Start: 2024-09-02 | End: 2024-09-04

## 2024-09-02 RX ORDER — DEXTROSE 15 G/33 G
12.5 GEL IN PACKET (GRAM) ORAL ONCE
Refills: 0 | Status: DISCONTINUED | OUTPATIENT
Start: 2024-09-02 | End: 2024-09-04

## 2024-09-02 RX ADMIN — Medication 1: at 18:42

## 2024-09-02 RX ADMIN — Medication 100 GRAM(S): at 15:51

## 2024-09-02 RX ADMIN — NORETHINDRONE 5 MILLIGRAM(S): 0.35 TABLET ORAL at 20:27

## 2024-09-02 RX ADMIN — OXYCODONE HYDROCHLORIDE 10 MILLIGRAM(S): 5 TABLET ORAL at 18:43

## 2024-09-02 RX ADMIN — OXYCODONE HYDROCHLORIDE 10 MILLIGRAM(S): 5 TABLET ORAL at 14:27

## 2024-09-02 RX ADMIN — ACETAMINOPHEN 400 MILLIGRAM(S): 325 TABLET ORAL at 14:29

## 2024-09-02 RX ADMIN — ACETAMINOPHEN 1000 MILLIGRAM(S): 325 TABLET ORAL at 15:30

## 2024-09-02 RX ADMIN — OXYCODONE HYDROCHLORIDE 10 MILLIGRAM(S): 5 TABLET ORAL at 23:55

## 2024-09-02 RX ADMIN — Medication 80 MILLIGRAM(S): at 20:27

## 2024-09-02 RX ADMIN — OXYCODONE HYDROCHLORIDE 10 MILLIGRAM(S): 5 TABLET ORAL at 19:43

## 2024-09-02 RX ADMIN — METOPROLOL TARTRATE 50 MILLIGRAM(S): 100 TABLET ORAL at 18:43

## 2024-09-02 NOTE — H&P ADULT - NSHPLABSRESULTS_GEN_ALL_CORE
13.8   15.46 )-----------( 342      ( 02 Sep 2024 13:13 )             42.1   09-02    137  |  105  |  12.5  ----------------------------<  122<H>  4.5   |  21.0<L>  |  0.56    Ca    9.7      02 Sep 2024 13:13  Mg     1.7     09-02    TPro  7.5  /  Alb  4.0  /  TBili  0.5  /  DBili  x   /  AST  19  /  ALT  8   /  AlkPhos  86  09-02

## 2024-09-02 NOTE — ED PROVIDER NOTE - CLINICAL SUMMARY MEDICAL DECISION MAKING FREE TEXT BOX
History and physical as noted.  Will check labs as well as priority CT head given headache in the setting of elevated INR.  Neuro intact.  Given description of palpitations, shortness of breath will check thyroid levels given history of hypothyroid as well as electrolytes and troponin.  EKG normal sinus rhythm without ischemic changes or evidence of arrhythmia.  INR noted to be 9.  No life-threatening bleeding.  Will consider oral vitamin K and holding Coumadin dose.  Will discuss with hospitalist. History and physical as noted.  Will check labs as well as priority CT head given headache in the setting of elevated INR.  Neuro intact.  Given description of palpitations, shortness of breath will check thyroid levels given history of hypothyroid as well as electrolytes and troponin.  EKG normal sinus rhythm without ischemic changes or evidence of arrhythmia.  INR noted to be 9.  No life-threatening bleeding.  Will consider oral vitamin K and holding Coumadin dose.  Will discuss with hospitalist     Patient with elevated INR to 9.  No significant bleeding.  Case discussed with hospitalist Dr. Leos, holding off on oral vitamin K.  Will monitor INR.  Patient accepted for admission

## 2024-09-02 NOTE — H&P ADULT - HISTORY OF PRESENT ILLNESS
40 y/o F with PMH of open heart valve replacements (aortic root dilation with AVR, mitral valve replacement), Marfan's syndrome, DM, asthma, MDD and chronic pain presented for elevated INR. The patient reports ***   38 y/o F with PMH of open heart valve replacements (aortic root dilation with AVR, mitral valve replacement), Marfan's syndrome, DM, asthma, MDD and chronic pain presented for elevated INR. The patient reports having rectal bleeding for the last one week when she wipes with toilet paper and bleeding gums starting this morning. Patient states she is symptomatic because she is having palpitations and short of breath on exertion.
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
## 2024-09-02 NOTE — PATIENT PROFILE ADULT - HEALTH LITERACY
Chrissy calling from pharmacy tech from [employer], pt parent calling to get refills for pt of her Norethindrone-ethinyl estradiol (Loestrin Fe 1/20) 1-20 mcg.    Pt may have one 90d with one month refill and then needs to schedule her annual exam in August with Kelly NOE. She is transferring call to pharmacist.     Chrissy coming back on line they are having phone problems with pharmacist and ask if electronic Rx can be sent.        no

## 2024-09-02 NOTE — H&P ADULT - NSHPREVIEWOFSYSTEMS_GEN_ALL_CORE
Review of Systems:  CONSTITUTIONAL: No weakness, fevers or chills  EYES/ENT: No visual changes;  No vertigo or throat pain   NECK: No pain or stiffness  RESPIRATORY: No cough, wheezing, hemoptysis; No shortness of breath,   CARDIOVASCULAR: No chest pain or palpitations  GASTROINTESTINAL: No abdominal or epigastric pain. No nausea, vomiting, or hematemesis; No diarrhea or constipation.   GENITOURINARY: No dysuria, frequency or hematuria  NEUROLOGICAL: No numbness or weakness  SKIN: No itching, burning, rashes, or lesions   All other review of systems is negative unless indicated above Review of Systems:  CONSTITUTIONAL: No weakness, fevers or chills  EYES/ENT: No visual changes;  No vertigo or throat pain   NECK: No pain or stiffness  RESPIRATORY: No cough, wheezing, hemoptysis; No shortness of breath,   CARDIOVASCULAR: +palpitations  GASTROINTESTINAL: No abdominal or epigastric pain. No nausea, vomiting, or hematemesis; No diarrhea or constipation.   GENITOURINARY: No dysuria, frequency or hematuria  NEUROLOGICAL: No numbness or weakness  SKIN: No itching, burning, rashes, or lesions   All other review of systems is negative unless indicated above

## 2024-09-02 NOTE — ED PROVIDER NOTE - PHYSICAL EXAMINATION
PHYSICAL EXAM:   General: well-appearing, appears stated age, not in extremis   HEENT: NC/AT, +b/l lens replacement, EOMI, airway patent, no active gingival bleeding  Cardiovascular: regular rate and rhythm, + S1/S2, no murmurs, rubs, gallops appreciated  Respiratory: clear to auscultation bilaterally, good aeration bilaterally, nonlabored respirations  Abdominal: soft, nontender, nondistended, no rebound, guarding or rigidity  Extremities: no LE edema b/l.   Back; well healed prior surgical scars  Neuro: Alert and oriented x3. no focal motor or sensory deficits  Psychiatric: appropriate mood and affect.   Rectal: chaperoned by RICHARD Nicolas: no hemorrhoids, anal fissue/tear with raw appearing skin no active bleeding at 12 oclock position  -Samaria Lopez MD Attending Physician

## 2024-09-02 NOTE — ED PROVIDER NOTE - NS ED MD DISPO SPECIAL CONSIDERATION1
Current patient location: [address] [place] [address] [address] [facility] [id number]    Is the patient currently in the state of [place]? YES    Visit mode:VIDEO    If the visit is dropped, the patient can be reconnected by: VIDEO VISIT: Text to cell phone:   Telephone Information:   Mobile [phone]       Will anyone else be joining the visit? NO  (If patient encounters technical issues they should call [phone]    How would you like to obtain your AVS? MyChart    Are changes needed to the allergy or medication list? No, Pt stated no changes to allergies, and Pt stated no med changes    Are refills needed on medications prescribed by this physician? NO    Rooming Documentation:  Not applicable      Reason for visit: Medication Therapy Management    Klaudia WELSH      
None

## 2024-09-02 NOTE — H&P ADULT - NSHPPHYSICALEXAM_GEN_ALL_CORE
Vital Signs Last 24 Hrs  T(F): 98.1 (02 Sep 2024 11:00), Max: 98.1 (02 Sep 2024 11:00)  HR: 88 (02 Sep 2024 11:00) (88 - 88)  BP: 121/84 (02 Sep 2024 11:00) (121/84 - 121/84)  RR: 18 (02 Sep 2024 11:00) (18 - 18)  SpO2: 98% (02 Sep 2024 11:00) (98% - 98%)    Physical Exam:  Constitutional: alert and oriented, in no acute distress   Neck: Soft and supple, No LAD, No JVD  Respiratory: Clear to auscultation bilaterally, no wheezes or crackles  Cardiovascular: Regular rate and rhyhtm, no murmurs, gallops, rubs  Gastrointestinal: Soft, non-tender to palpation, +bs  Vascular: 2+ peripheral pulses  Neurological: A/O x 3, no focal neurological deficits  Musculoskeletal: 5/5 strength b/l upper and lower extremities, no lower extremity edema bilaterally Vital Signs Last 24 Hrs  T(F): 98.1 (02 Sep 2024 11:00), Max: 98.1 (02 Sep 2024 11:00)  HR: 88 (02 Sep 2024 11:00) (88 - 88)  BP: 121/84 (02 Sep 2024 11:00) (121/84 - 121/84)  RR: 18 (02 Sep 2024 11:00) (18 - 18)  SpO2: 98% (02 Sep 2024 11:00) (98% - 98%)    Physical Exam:  Constitutional: alert and oriented, in no acute distress, bleeding gums  Neck: Soft and supple  Respiratory: Clear to auscultation bilaterally  Cardiovascular: Regular rate and rhythm  Gastrointestinal: Soft, non-tender to palpation, +bs  Vascular: 2+ peripheral pulses  Neurological: A/O x 3  Musculoskeletal: 5/5 strength b/l upper and lower extremities, no lower extremity edema bilaterally

## 2024-09-02 NOTE — H&P ADULT - ASSESSMENT
38 y/o F with PMH of open heart valve replacements (aortic root dilation with AVR, mitral valve replacement), Marfan's syndrome, DM, asthma, MDD and chronic pain presented for elevated INR.    Elevated INR  - No signs of life threatening bleeding  - Likely due to interaction of Coumadin and Augmentin  - INR 9  - Will hold Coumadin and monitor    Hematochezia  - Likely due to anal fissue  - Monitor CBC    H/O Aortic root dilation with AVR, MVR replacement   - Hold Coumadin  - Aspirin 81mg daily  - Toprol XL 50mg daily  - Lipitor 80mg nightly     DM  - FS with ISS  - Hold home medications     Hypothyroidim  - Continue Synthroid 25mcg daily    Chronic Pain  MDD  - Trazodone 100mg nightly  - Duloxetine 60mg daily    DVT ppx  - Coumadin held for elevated INR      40 y/o F with PMH of open heart valve replacements (aortic root dilation with AVR, mitral valve replacement), Marfan's syndrome, DM, asthma, MDD and chronic pain presented for elevated INR.    Elevated INR  - No signs of life threatening bleeding  - Likely due to interaction of Coumadin and Augmentin  - INR 9  - Will hold Coumadin and monitor    Hematochezia  - Likely due to anal fissue  - Monitor CBC  - GI consult    H/O Aortic root dilation with AVR, MVR replacement   - Hold Coumadin  - Aspirin 81mg daily  - Toprol XL 50mg daily  - Lipitor 80mg nightly     DM  - FS with ISS  - Hold home medications     Hypothyroidim  - Continue Synthroid 25mcg daily    Chronic Pain  MDD  - Trazodone 100mg nightly  - Duloxetine 60mg daily    DVT ppx  - Coumadin held for elevated INR

## 2024-09-02 NOTE — ED ADULT TRIAGE NOTE - CHIEF COMPLAINT QUOTE
pt reports she has an INR > 8 with some rectal bleeding x 5 days. pt AOX3 with stable vital signs. no SOB, dizziness, skin color normal for race. last coumadin was last night, 9mg. sent for eval.

## 2024-09-02 NOTE — PATIENT PROFILE ADULT - FALL HARM RISK - HARM RISK INTERVENTIONS

## 2024-09-03 LAB
A1C WITH ESTIMATED AVERAGE GLUCOSE RESULT: 8 % — HIGH (ref 4–5.6)
ANION GAP SERPL CALC-SCNC: 9 MMOL/L — SIGNIFICANT CHANGE UP (ref 5–17)
BASOPHILS # BLD AUTO: 0.03 K/UL — SIGNIFICANT CHANGE UP (ref 0–0.2)
BASOPHILS NFR BLD AUTO: 0.2 % — SIGNIFICANT CHANGE UP (ref 0–2)
BUN SERPL-MCNC: 11.7 MG/DL — SIGNIFICANT CHANGE UP (ref 8–20)
CALCIUM SERPL-MCNC: 9.4 MG/DL — SIGNIFICANT CHANGE UP (ref 8.4–10.5)
CHLORIDE SERPL-SCNC: 106 MMOL/L — SIGNIFICANT CHANGE UP (ref 96–108)
CO2 SERPL-SCNC: 24 MMOL/L — SIGNIFICANT CHANGE UP (ref 22–29)
CREAT SERPL-MCNC: 0.62 MG/DL — SIGNIFICANT CHANGE UP (ref 0.5–1.3)
EGFR: 115 ML/MIN/1.73M2 — SIGNIFICANT CHANGE UP
EOSINOPHIL # BLD AUTO: 0.19 K/UL — SIGNIFICANT CHANGE UP (ref 0–0.5)
EOSINOPHIL NFR BLD AUTO: 1.1 % — SIGNIFICANT CHANGE UP (ref 0–6)
ESTIMATED AVERAGE GLUCOSE: 183 MG/DL — HIGH (ref 68–114)
GLUCOSE BLDC GLUCOMTR-MCNC: 146 MG/DL — HIGH (ref 70–99)
GLUCOSE BLDC GLUCOMTR-MCNC: 182 MG/DL — HIGH (ref 70–99)
GLUCOSE BLDC GLUCOMTR-MCNC: 207 MG/DL — HIGH (ref 70–99)
GLUCOSE SERPL-MCNC: 144 MG/DL — HIGH (ref 70–99)
HCT VFR BLD CALC: 36.7 % — SIGNIFICANT CHANGE UP (ref 34.5–45)
HCT VFR BLD CALC: 40.6 % — SIGNIFICANT CHANGE UP (ref 34.5–45)
HGB BLD-MCNC: 11.7 G/DL — SIGNIFICANT CHANGE UP (ref 11.5–15.5)
HGB BLD-MCNC: 12.9 G/DL — SIGNIFICANT CHANGE UP (ref 11.5–15.5)
IMM GRANULOCYTES NFR BLD AUTO: 0.5 % — SIGNIFICANT CHANGE UP (ref 0–0.9)
INR BLD: 8.34 RATIO — CRITICAL HIGH (ref 0.85–1.18)
LYMPHOCYTES # BLD AUTO: 1.61 K/UL — SIGNIFICANT CHANGE UP (ref 1–3.3)
LYMPHOCYTES # BLD AUTO: 9.5 % — LOW (ref 13–44)
MCHC RBC-ENTMCNC: 27.6 PG — SIGNIFICANT CHANGE UP (ref 27–34)
MCHC RBC-ENTMCNC: 28 PG — SIGNIFICANT CHANGE UP (ref 27–34)
MCHC RBC-ENTMCNC: 31.8 GM/DL — LOW (ref 32–36)
MCHC RBC-ENTMCNC: 31.9 GM/DL — LOW (ref 32–36)
MCV RBC AUTO: 86.8 FL — SIGNIFICANT CHANGE UP (ref 80–100)
MCV RBC AUTO: 87.8 FL — SIGNIFICANT CHANGE UP (ref 80–100)
MONOCYTES # BLD AUTO: 1.14 K/UL — HIGH (ref 0–0.9)
MONOCYTES NFR BLD AUTO: 6.7 % — SIGNIFICANT CHANGE UP (ref 2–14)
NEUTROPHILS # BLD AUTO: 13.85 K/UL — HIGH (ref 1.8–7.4)
NEUTROPHILS NFR BLD AUTO: 82 % — HIGH (ref 43–77)
PLATELET # BLD AUTO: 286 K/UL — SIGNIFICANT CHANGE UP (ref 150–400)
PLATELET # BLD AUTO: 310 K/UL — SIGNIFICANT CHANGE UP (ref 150–400)
POTASSIUM SERPL-MCNC: 5.1 MMOL/L — SIGNIFICANT CHANGE UP (ref 3.5–5.3)
POTASSIUM SERPL-SCNC: 5.1 MMOL/L — SIGNIFICANT CHANGE UP (ref 3.5–5.3)
PROTHROM AB SERPL-ACNC: 87 SEC — HIGH (ref 9.5–13)
RBC # BLD: 4.18 M/UL — SIGNIFICANT CHANGE UP (ref 3.8–5.2)
RBC # BLD: 4.68 M/UL — SIGNIFICANT CHANGE UP (ref 3.8–5.2)
RBC # FLD: 15.8 % — HIGH (ref 10.3–14.5)
RBC # FLD: 15.9 % — HIGH (ref 10.3–14.5)
SODIUM SERPL-SCNC: 139 MMOL/L — SIGNIFICANT CHANGE UP (ref 135–145)
WBC # BLD: 15.23 K/UL — HIGH (ref 3.8–10.5)
WBC # BLD: 16.91 K/UL — HIGH (ref 3.8–10.5)
WBC # FLD AUTO: 15.23 K/UL — HIGH (ref 3.8–10.5)
WBC # FLD AUTO: 16.91 K/UL — HIGH (ref 3.8–10.5)

## 2024-09-03 PROCEDURE — 99233 SBSQ HOSP IP/OBS HIGH 50: CPT | Mod: GC

## 2024-09-03 RX ORDER — ACETAMINOPHEN 325 MG/1
650 TABLET ORAL ONCE
Refills: 0 | Status: COMPLETED | OUTPATIENT
Start: 2024-09-03 | End: 2024-09-03

## 2024-09-03 RX ORDER — ACETAMINOPHEN 325 MG/1
650 TABLET ORAL EVERY 6 HOURS
Refills: 0 | Status: DISCONTINUED | OUTPATIENT
Start: 2024-09-03 | End: 2024-09-04

## 2024-09-03 RX ORDER — OXYCODONE HYDROCHLORIDE 5 MG/1
1 TABLET ORAL
Refills: 0 | DISCHARGE

## 2024-09-03 RX ORDER — LEVOTHYROXINE SODIUM 100 MCG
1 TABLET ORAL
Refills: 0 | DISCHARGE

## 2024-09-03 RX ORDER — LIDOCAINE HCL 20 MG/ML
5 VIAL (ML) INJECTION
Refills: 0 | Status: DISCONTINUED | OUTPATIENT
Start: 2024-09-03 | End: 2024-09-04

## 2024-09-03 RX ORDER — DULOXETINE HCL 30 MG
1 CAPSULE,DELAYED RELEASE (ENTERIC COATED) ORAL
Refills: 0 | DISCHARGE

## 2024-09-03 RX ORDER — METOPROLOL TARTRATE 100 MG/1
1 TABLET ORAL
Refills: 0 | DISCHARGE

## 2024-09-03 RX ORDER — ONDANSETRON 2 MG/ML
4 INJECTION, SOLUTION INTRAMUSCULAR; INTRAVENOUS EVERY 4 HOURS
Refills: 0 | Status: DISCONTINUED | OUTPATIENT
Start: 2024-09-03 | End: 2024-09-04

## 2024-09-03 RX ORDER — PHYTONADIONE (VIT K1) 1 MG/0.5ML
5 AMPUL (ML) INJECTION DAILY
Refills: 0 | Status: DISCONTINUED | OUTPATIENT
Start: 2024-09-03 | End: 2024-09-04

## 2024-09-03 RX ORDER — GLUCOSAMINE/MSM/CHONDROITIN A 500-83-400
2 TABLET ORAL
Refills: 0 | DISCHARGE

## 2024-09-03 RX ORDER — ASPIRIN 81 MG
1 TABLET, DELAYED RELEASE (ENTERIC COATED) ORAL
Refills: 0 | DISCHARGE

## 2024-09-03 RX ORDER — LIDOCAINE/BENZALKONIUM/ALCOHOL
1 SOLUTION, NON-ORAL TOPICAL
Refills: 0 | Status: DISCONTINUED | OUTPATIENT
Start: 2024-09-03 | End: 2024-09-03

## 2024-09-03 RX ORDER — SITAGLIPTIN 100 MG/1
1 TABLET, FILM COATED ORAL
Refills: 0 | DISCHARGE

## 2024-09-03 RX ORDER — ONDANSETRON 2 MG/ML
4 INJECTION, SOLUTION INTRAMUSCULAR; INTRAVENOUS ONCE
Refills: 0 | Status: COMPLETED | OUTPATIENT
Start: 2024-09-03 | End: 2024-09-03

## 2024-09-03 RX ADMIN — ACETAMINOPHEN 650 MILLIGRAM(S): 325 TABLET ORAL at 17:51

## 2024-09-03 RX ADMIN — OXYCODONE HYDROCHLORIDE 10 MILLIGRAM(S): 5 TABLET ORAL at 00:35

## 2024-09-03 RX ADMIN — ONDANSETRON 4 MILLIGRAM(S): 2 INJECTION, SOLUTION INTRAMUSCULAR; INTRAVENOUS at 17:34

## 2024-09-03 RX ADMIN — OXYCODONE HYDROCHLORIDE 10 MILLIGRAM(S): 5 TABLET ORAL at 14:30

## 2024-09-03 RX ADMIN — ACETAMINOPHEN 650 MILLIGRAM(S): 325 TABLET ORAL at 14:55

## 2024-09-03 RX ADMIN — Medication 80 MILLIGRAM(S): at 21:11

## 2024-09-03 RX ADMIN — METOPROLOL TARTRATE 50 MILLIGRAM(S): 100 TABLET ORAL at 05:08

## 2024-09-03 RX ADMIN — ACETAMINOPHEN 650 MILLIGRAM(S): 325 TABLET ORAL at 06:14

## 2024-09-03 RX ADMIN — ONDANSETRON 4 MILLIGRAM(S): 2 INJECTION, SOLUTION INTRAMUSCULAR; INTRAVENOUS at 01:10

## 2024-09-03 RX ADMIN — Medication 5 MILLILITER(S): at 20:25

## 2024-09-03 RX ADMIN — OXYCODONE HYDROCHLORIDE 10 MILLIGRAM(S): 5 TABLET ORAL at 17:30

## 2024-09-03 RX ADMIN — Medication 25 MICROGRAM(S): at 05:08

## 2024-09-03 RX ADMIN — OXYCODONE HYDROCHLORIDE 10 MILLIGRAM(S): 5 TABLET ORAL at 06:15

## 2024-09-03 RX ADMIN — Medication 5 MILLIGRAM(S): at 09:18

## 2024-09-03 RX ADMIN — ACETAMINOPHEN 650 MILLIGRAM(S): 325 TABLET ORAL at 01:30

## 2024-09-03 RX ADMIN — Medication 1: at 17:30

## 2024-09-03 RX ADMIN — Medication 2: at 09:19

## 2024-09-03 RX ADMIN — ACETAMINOPHEN 650 MILLIGRAM(S): 325 TABLET ORAL at 07:14

## 2024-09-03 RX ADMIN — OXYCODONE HYDROCHLORIDE 10 MILLIGRAM(S): 5 TABLET ORAL at 17:52

## 2024-09-03 RX ADMIN — OXYCODONE HYDROCHLORIDE 10 MILLIGRAM(S): 5 TABLET ORAL at 13:30

## 2024-09-03 RX ADMIN — OXYCODONE HYDROCHLORIDE 10 MILLIGRAM(S): 5 TABLET ORAL at 07:15

## 2024-09-03 RX ADMIN — ACETAMINOPHEN 650 MILLIGRAM(S): 325 TABLET ORAL at 00:58

## 2024-09-03 NOTE — PROGRESS NOTE ADULT - SUBJECTIVE AND OBJECTIVE BOX
HEALTH ISSUES - PROBLEM Dx:    CC- supratherapeutic INR, rectal and gum bleed    INTERVAL HPI/ OVERNIGHT EVENTS:  admitted with bleeding in the setting of A/C    no concerning bleed  however INR elevated  close monitoring  no trauma    REVIEW OF SYSTEMS:    as above    Vital Signs Last 24 Hrs  T(C): 36.5 (03 Sep 2024 16:19), Max: 36.6 (02 Sep 2024 17:08)  T(F): 97.7 (03 Sep 2024 16:19), Max: 97.9 (02 Sep 2024 17:08)  HR: 75 (03 Sep 2024 16:19) (69 - 87)  BP: 103/65 (03 Sep 2024 16:19) (103/65 - 110/73)  BP(mean): --  RR: 18 (03 Sep 2024 16:19) (17 - 18)  SpO2: 99% (03 Sep 2024 16:19) (95% - 100%)    Parameters below as of 03 Sep 2024 16:19  Patient On (Oxygen Delivery Method): room air      PHYSICAL EXAM-      MEDICATIONS  (STANDING):  atorvastatin 80 milliGRAM(s) Oral at bedtime  dextrose 5%. 1000 milliLiter(s) (50 mL/Hr) IV Continuous <Continuous>  dextrose 5%. 1000 milliLiter(s) (100 mL/Hr) IV Continuous <Continuous>  dextrose 50% Injectable 12.5 Gram(s) IV Push once  dextrose 50% Injectable 25 Gram(s) IV Push once  dextrose 50% Injectable 25 Gram(s) IV Push once  glucagon  Injectable 1 milliGRAM(s) IntraMuscular once  insulin lispro (ADMELOG) corrective regimen sliding scale   SubCutaneous three times a day before meals  levothyroxine 25 MICROGram(s) Oral daily  metoprolol tartrate 50 milliGRAM(s) Oral daily  phytonadione   Solution 5 milliGRAM(s) Oral daily    MEDICATIONS  (PRN):  dextrose Oral Gel 15 Gram(s) Oral once PRN Blood Glucose LESS THAN 70 milliGRAM(s)/deciliter  oxyCODONE    IR 10 milliGRAM(s) Oral every 4 hours PRN Moderate Pain (4 - 6)      LABS:                        11.7   16.91 )-----------( 286      ( 03 Sep 2024 03:23 )             36.7     09-03    139  |  106  |  11.7  ----------------------------<  144<H>  5.1   |  24.0  |  0.62    Ca    9.4      03 Sep 2024 03:23  Mg     1.7     09-02    TPro  7.5  /  Alb  4.0  /  TBili  0.5  /  DBili  x   /  AST  19  /  ALT  8   /  AlkPhos  86  09-02    PT/INR - ( 03 Sep 2024 03:23 )   PT: 87.0 sec;   INR: 8.34 ratio         PTT - ( 02 Sep 2024 13:13 )  PTT:92.0 sec  Urinalysis Basic - ( 03 Sep 2024 03:23 )    Color: x / Appearance: x / SG: x / pH: x  Gluc: 144 mg/dL / Ketone: x  / Bili: x / Urobili: x   Blood: x / Protein: x / Nitrite: x   Leuk Esterase: x / RBC: x / WBC x   Sq Epi: x / Non Sq Epi: x / Bacteria: x                Xray Chest 2 Views PA/Lat:   ACC: [id number] EXAM:  XR CHEST PA LAT 2V   ORDERED BY: HELENA GOODMAN     PROCEDURE DATE:  09/02/2024          INTERPRETATION:  Clinical History / Reason for exam: sob    Comparison : Chest radiograph: 4/20/2023    Technique/Positioning: PA/Lateral    Findings:    Support devices: None.    Cardiac/mediastinum/hilum: The cardiac silhouette is normal in size.   Patient is post mitral and aortic valve repair    Skeleton/soft tissues: No evidence of acute osseous abnormality. Median   sternotomy. Spinal postsurgical change.    Lung parenchyma/Pleura: There is no evidence of focal consolidation,   pleural effusion or pneumothorax.    Impression:  No evidence of focal consolidation, pleural effusion or pneumothorax.            --- End of Report ---            LEOPOLDO COLVIN MD; Attending Radiologist  This document has been electronically signed. Sep  2 2024  4:21PM (09-02-24 @ 13:18)    CT scan  Radiology personally reviewed. HEALTH ISSUES - PROBLEM Dx:    CC- supratherapeutic INR, rectal and gum bleed    INTERVAL HPI/ OVERNIGHT EVENTS:  admitted with bleeding in the setting of A/C    no concerning bleed  however INR elevated  close monitoring  no trauma    REVIEW OF SYSTEMS:    as above    Vital Signs Last 24 Hrs  T(C): 36.5 (03 Sep 2024 16:19), Max: 36.6 (02 Sep 2024 17:08)  T(F): 97.7 (03 Sep 2024 16:19), Max: 97.9 (02 Sep 2024 17:08)  HR: 75 (03 Sep 2024 16:19) (69 - 87)  BP: 103/65 (03 Sep 2024 16:19) (103/65 - 110/73)  BP(mean): --  RR: 18 (03 Sep 2024 16:19) (17 - 18)  SpO2: 99% (03 Sep 2024 16:19) (95% - 100%)    Parameters below as of 03 Sep 2024 16:19  Patient On (Oxygen Delivery Method): room air      PHYSICAL EXAM-    GENERAL: Marfan's habitus; comfortable, no gum bleed, states she still has some anal / rectal bright red blood when she wipes  HEAD:  Atraumatic, Normocephalic  EYES: EOMI, PERRLA, conjunctiva and sclera clear  NECK: Supple, No JVD, Normal thyroid  NERVOUS SYSTEM:  Alert & Oriented X3, Motor Strength 5/5 B/L upper and lower extremities  CHEST/LUNG: CTA bilaterally; No rales, rhonchi, wheezing, or rubs  HEART: Regular rate and rhythm; No murmurs, rubs, or gallops  ABDOMEN: Soft, Nontender, Nondistended; Bowel sounds present  EXTREMITIES:  2+ Peripheral Pulses, No clubbing, cyanosis, or edema  SKIN: No rashes or lesions        MEDICATIONS  (STANDING):  atorvastatin 80 milliGRAM(s) Oral at bedtime  dextrose 5%. 1000 milliLiter(s) (50 mL/Hr) IV Continuous <Continuous>  dextrose 5%. 1000 milliLiter(s) (100 mL/Hr) IV Continuous <Continuous>  dextrose 50% Injectable 12.5 Gram(s) IV Push once  dextrose 50% Injectable 25 Gram(s) IV Push once  dextrose 50% Injectable 25 Gram(s) IV Push once  glucagon  Injectable 1 milliGRAM(s) IntraMuscular once  insulin lispro (ADMELOG) corrective regimen sliding scale   SubCutaneous three times a day before meals  levothyroxine 25 MICROGram(s) Oral daily  metoprolol tartrate 50 milliGRAM(s) Oral daily  phytonadione   Solution 5 milliGRAM(s) Oral daily    MEDICATIONS  (PRN):  dextrose Oral Gel 15 Gram(s) Oral once PRN Blood Glucose LESS THAN 70 milliGRAM(s)/deciliter  oxyCODONE    IR 10 milliGRAM(s) Oral every 4 hours PRN Moderate Pain (4 - 6)      LABS:                        11.7   16.91 )-----------( 286      ( 03 Sep 2024 03:23 )             36.7     09-03    139  |  106  |  11.7  ----------------------------<  144<H>  5.1   |  24.0  |  0.62    Ca    9.4      03 Sep 2024 03:23  Mg     1.7     09-02    TPro  7.5  /  Alb  4.0  /  TBili  0.5  /  DBili  x   /  AST  19  /  ALT  8   /  AlkPhos  86  09-02    PT/INR - ( 03 Sep 2024 03:23 )   PT: 87.0 sec;   INR: 8.34 ratio         PTT - ( 02 Sep 2024 13:13 )  PTT:92.0 sec  Urinalysis Basic - ( 03 Sep 2024 03:23 )    Color: x / Appearance: x / SG: x / pH: x  Gluc: 144 mg/dL / Ketone: x  / Bili: x / Urobili: x   Blood: x / Protein: x / Nitrite: x   Leuk Esterase: x / RBC: x / WBC x   Sq Epi: x / Non Sq Epi: x / Bacteria: x      Xray Chest 2 Views PA/Lat:   ACC: [id number] EXAM:  XR CHEST PA LAT 2V   ORDERED BY: HELENA GOODMAN     PROCEDURE DATE:  09/02/2024          INTERPRETATION:  Clinical History / Reason for exam: sob    Comparison : Chest radiograph: 4/20/2023    Technique/Positioning: PA/Lateral    Findings:    Support devices: None.    Cardiac/mediastinum/hilum: The cardiac silhouette is normal in size.   Patient is post mitral and aortic valve repair    Skeleton/soft tissues: No evidence of acute osseous abnormality. Median   sternotomy. Spinal postsurgical change.    Lung parenchyma/Pleura: There is no evidence of focal consolidation,   pleural effusion or pneumothorax.    Impression:  No evidence of focal consolidation, pleural effusion or pneumothorax.            --- End of Report ---            LEOPOLDO COLVIN MD; Attending Radiologist  This document has been electronically signed. Sep  2 2024  4:21PM (09-02-24 @ 13:18)    CT scan  Radiology personally reviewed.

## 2024-09-03 NOTE — CHART NOTE - NSCHARTNOTEFT_GEN_A_CORE
Pt with a history of migraines  Pt states she gets Headaches with nausea when she has fluctuations in INR  Pt c/o h/a with nausea  Pt states she presented to the ER with h/a and nausea. Headache and nausea is not new  Pt had head CT this afternoon  Pt declines another head CT now  VSS NAD  A+OX4 cooperative NAD  speech clear  CN 2-12 grossly intact  No neuro deficits noted  Continue to monitor  Notify PA of any changes in pts condition

## 2024-09-03 NOTE — PHARMACOTHERAPY INTERVENTION NOTE - COMMENTS
Referenced outpatient medical fill records and spoke to patient at bedside to obtain medication list. Of note, patient was prescribed amoxicillin 875 mg and clavulanate potassium 125 mg tablet twice a day for 10 days on 8/20 however, as per patient, stopped taking after two days.

## 2024-09-03 NOTE — PROGRESS NOTE ADULT - ASSESSMENT
40 y/o F presented for elevated INR. Her INR read at home said high. The patient reports having rectal bleeding for the last one week when she wipes with toilet paper and bleeding gums starting this morning. Patient states she is symptomatic because she is having palpitations and short of breath on exertion.   Admitted for elevated INR and rectal bleed and gum bleed.    PMH: marfans syndrome, TIIDM, asthma - denies recent exacerbations, carotid A dissection in 2020, schistocytic anemia, HTN, migraine w aura, mechanical valve replacement - aortic root and mitral valve (2013), septoplasty, jaw reconstruction, cataract removal, lens replacement, spinal fusion x3 (TI-LI 1996 x2, L1-L3 1999)  Med: warfarin (goal INR 2.5-3.5, tests q2wks, last week INR was 3.2), metformin, methadone, oxycodone, Januvia, Cymbalta, pravastatin, atorvastatin, glimepiride, metoprolol ER, ASA, rescue inhaler, co q10      Elevated INR- Iatrogenic while on Coumadin  - No signs of life threatening bleeding  - Likely due to interaction of Coumadin and Augmentin  - INR 9--> 8.3  - Coumadin was held. restart when INR 3.5  - Will give Vit K and reverse INR  - follows with Heme o/p. consulted    Leucocytosis  - Pt was given Augmentin and steroids o/p which per reports she took for 2 days and stopped.  - likely sec to steroids  - Monitor    Hematochezia  - Likely due to anal fissure or hemorrhoids   - H/H good though dropped from 13 to 11.  - close monitoring of H/H  - no overt significant bleed today  - repeat H/H tonight  - hemodynamically stable    H/O Aortic root dilation with AVR, MVR replacement   - Hold Coumadin  - Aspirin 81mg daily  - Toprol XL 50mg daily  - Lipitor 80mg nightly     DM 2 controlled  - FS with ISS  - Hold home medications     Hypothyroidism  - Continue Synthroid 25mcg daily    Chronic Pain  MDD  - Trazodone 100mg nightly  - Duloxetine 60mg daily    DVT ppx  - Coumadin held for elevated INR    Dispo- Home  likely in 2 days when INR therapeutic and h/H stable.    High Acquity and Spent at least 50 mins in evaluating, assessing and medical decision making in providing patient care separate from teaching.   40 y/o F presented for elevated INR. Her INR read at home said high. The patient reports having rectal bleeding for the last one week when she wipes with toilet paper and bleeding gums starting this morning. Patient states she is symptomatic because she is having palpitations and short of breath on exertion.   Admitted for elevated INR and rectal bleed and gum bleed.    PMH: marfans syndrome, TIIDM, asthma - denies recent exacerbations, carotid A dissection in 2020, schistocytic anemia, HTN, migraine w aura, mechanical valve replacement - aortic root and mitral valve (2013), septoplasty, jaw reconstruction, cataract removal, lens replacement, spinal fusion x3 (TI-LI 1996 x2, L1-L3 1999)  Med: warfarin (goal INR 2.5-3.5, tests q2wks, last week INR was 3.2), metformin, methadone, oxycodone, Januvia, Cymbalta, pravastatin, atorvastatin, glimepiride, metoprolol ER, ASA, rescue inhaler, co q10    Elevated INR- Iatrogenic while on Coumadin  - No signs of life threatening bleeding  - Likely due to interaction of Coumadin and Augmentin  - INR 9--> 8.3  - Coumadin was held. restart when INR 3.5  - Will give Vit K and reverse INR  - follows with Heme o/p. consulted    Leucocytosis  - Pt was given Augmentin and steroids o/p which per reports she took for 2 days and stopped.  - likely sec to steroids  - Monitor    Hematochezia  - Likely due to anal fissure or hemorrhoids   - H/H good though dropped from 13 to 11.  - close monitoring of H/H  - no overt significant bleed today  - repeat H/H tonight  - hemodynamically stable    H/O Aortic root dilation with AVR, MVR replacement   - Hold Coumadin  - Aspirin 81mg daily  - Toprol XL 50mg daily  - Lipitor 80mg nightly     DM 2 controlled  - FS with ISS  - Hold home medications     Hypothyroidism  - Continue Synthroid 25mcg daily    Chronic Pain  MDD  - Trazodone 100mg nightly  - Duloxetine 60mg daily    DVT ppx  - Coumadin held for elevated INR    Dispo- Home  likely in 2 days when INR therapeutic and h/H stable.    High Acquity and Spent at least 50 mins in evaluating, assessing and medical decision making in providing patient care separate from teaching.

## 2024-09-04 ENCOUNTER — TRANSCRIPTION ENCOUNTER (OUTPATIENT)
Age: 39
End: 2024-09-04

## 2024-09-04 ENCOUNTER — EMERGENCY (EMERGENCY)
Facility: HOSPITAL | Age: 39
LOS: 1 days | End: 2024-09-04
Attending: EMERGENCY MEDICINE
Payer: MEDICAID

## 2024-09-04 VITALS
HEIGHT: 72 IN | RESPIRATION RATE: 18 BRPM | DIASTOLIC BLOOD PRESSURE: 85 MMHG | TEMPERATURE: 97 F | OXYGEN SATURATION: 98 % | SYSTOLIC BLOOD PRESSURE: 126 MMHG | HEART RATE: 125 BPM

## 2024-09-04 VITALS — WEIGHT: 162.04 LBS

## 2024-09-04 DIAGNOSIS — Z95.4 PRESENCE OF OTHER HEART-VALVE REPLACEMENT: Chronic | ICD-10-CM

## 2024-09-04 DIAGNOSIS — Z96.1 PRESENCE OF INTRAOCULAR LENS: Chronic | ICD-10-CM

## 2024-09-04 DIAGNOSIS — Z98.1 ARTHRODESIS STATUS: Chronic | ICD-10-CM

## 2024-09-04 LAB
CULTURE RESULTS: SIGNIFICANT CHANGE UP
GLUCOSE BLDC GLUCOMTR-MCNC: 153 MG/DL — HIGH (ref 70–99)
GLUCOSE BLDC GLUCOMTR-MCNC: 192 MG/DL — HIGH (ref 70–99)
HCT VFR BLD CALC: 39.8 % — SIGNIFICANT CHANGE UP (ref 34.5–45)
HGB BLD-MCNC: 12.5 G/DL — SIGNIFICANT CHANGE UP (ref 11.5–15.5)
INR BLD: 1.52 RATIO — HIGH (ref 0.85–1.18)
MCHC RBC-ENTMCNC: 27.7 PG — SIGNIFICANT CHANGE UP (ref 27–34)
MCHC RBC-ENTMCNC: 31.4 GM/DL — LOW (ref 32–36)
MCV RBC AUTO: 88.1 FL — SIGNIFICANT CHANGE UP (ref 80–100)
PLATELET # BLD AUTO: 302 K/UL — SIGNIFICANT CHANGE UP (ref 150–400)
PROTHROM AB SERPL-ACNC: 16.7 SEC — HIGH (ref 9.5–13)
RBC # BLD: 4.52 M/UL — SIGNIFICANT CHANGE UP (ref 3.8–5.2)
RBC # FLD: 15.9 % — HIGH (ref 10.3–14.5)
SPECIMEN SOURCE: SIGNIFICANT CHANGE UP
WBC # BLD: 14.52 K/UL — HIGH (ref 3.8–10.5)
WBC # FLD AUTO: 14.52 K/UL — HIGH (ref 3.8–10.5)

## 2024-09-04 PROCEDURE — 86850 RBC ANTIBODY SCREEN: CPT

## 2024-09-04 PROCEDURE — 82962 GLUCOSE BLOOD TEST: CPT

## 2024-09-04 PROCEDURE — 85025 COMPLETE CBC W/AUTO DIFF WBC: CPT

## 2024-09-04 PROCEDURE — 84436 ASSAY OF TOTAL THYROXINE: CPT

## 2024-09-04 PROCEDURE — 99284 EMERGENCY DEPT VISIT MOD MDM: CPT

## 2024-09-04 PROCEDURE — 83036 HEMOGLOBIN GLYCOSYLATED A1C: CPT

## 2024-09-04 PROCEDURE — 71046 X-RAY EXAM CHEST 2 VIEWS: CPT

## 2024-09-04 PROCEDURE — 96374 THER/PROPH/DIAG INJ IV PUSH: CPT

## 2024-09-04 PROCEDURE — 81001 URINALYSIS AUTO W/SCOPE: CPT

## 2024-09-04 PROCEDURE — 86901 BLOOD TYPING SEROLOGIC RH(D): CPT

## 2024-09-04 PROCEDURE — 99239 HOSP IP/OBS DSCHRG MGMT >30: CPT | Mod: GC

## 2024-09-04 PROCEDURE — 93005 ELECTROCARDIOGRAM TRACING: CPT

## 2024-09-04 PROCEDURE — 84480 ASSAY TRIIODOTHYRONINE (T3): CPT

## 2024-09-04 PROCEDURE — 36415 COLL VENOUS BLD VENIPUNCTURE: CPT

## 2024-09-04 PROCEDURE — 80048 BASIC METABOLIC PNL TOTAL CA: CPT

## 2024-09-04 PROCEDURE — 87086 URINE CULTURE/COLONY COUNT: CPT

## 2024-09-04 PROCEDURE — 86900 BLOOD TYPING SEROLOGIC ABO: CPT

## 2024-09-04 PROCEDURE — 85027 COMPLETE CBC AUTOMATED: CPT

## 2024-09-04 PROCEDURE — 70450 CT HEAD/BRAIN W/O DYE: CPT | Mod: MC

## 2024-09-04 PROCEDURE — 83735 ASSAY OF MAGNESIUM: CPT

## 2024-09-04 PROCEDURE — 85610 PROTHROMBIN TIME: CPT

## 2024-09-04 PROCEDURE — 85730 THROMBOPLASTIN TIME PARTIAL: CPT

## 2024-09-04 PROCEDURE — 80053 COMPREHEN METABOLIC PANEL: CPT

## 2024-09-04 PROCEDURE — 84484 ASSAY OF TROPONIN QUANT: CPT

## 2024-09-04 PROCEDURE — 93010 ELECTROCARDIOGRAM REPORT: CPT

## 2024-09-04 PROCEDURE — 99285 EMERGENCY DEPT VISIT HI MDM: CPT

## 2024-09-04 PROCEDURE — 84443 ASSAY THYROID STIM HORMONE: CPT

## 2024-09-04 PROCEDURE — 84702 CHORIONIC GONADOTROPIN TEST: CPT

## 2024-09-04 RX ORDER — WARFARIN SODIUM 2 MG
1 TABLET ORAL
Qty: 0 | Refills: 0 | DISCHARGE
Start: 2024-09-04

## 2024-09-04 RX ORDER — MOMETASONE FUROATE AND FORMOTEROL FUMARATE DIHYDRATE 200; 5 UG/1; UG/1
1 AEROSOL RESPIRATORY (INHALATION)
Refills: 0 | DISCHARGE

## 2024-09-04 RX ORDER — WARFARIN SODIUM 2 MG
1.5 TABLET ORAL
Refills: 0 | DISCHARGE

## 2024-09-04 RX ORDER — METHADONE HYDROCHLORIDE 1 G/G
1 POWDER ORAL
Refills: 0 | DISCHARGE

## 2024-09-04 RX ORDER — ENOXAPARIN SODIUM 100 MG/ML
80 INJECTION SUBCUTANEOUS
Qty: 6 | Refills: 0
Start: 2024-09-04 | End: 2024-09-09

## 2024-09-04 RX ORDER — ENOXAPARIN SODIUM 100 MG/ML
70 INJECTION SUBCUTANEOUS EVERY 12 HOURS
Refills: 0 | Status: DISCONTINUED | OUTPATIENT
Start: 2024-09-04 | End: 2024-09-04

## 2024-09-04 RX ORDER — ACETAMINOPHEN 325 MG/1
975 TABLET ORAL ONCE
Refills: 0 | Status: COMPLETED | OUTPATIENT
Start: 2024-09-04 | End: 2024-09-04

## 2024-09-04 RX ORDER — WARFARIN SODIUM 2 MG
10 TABLET ORAL ONCE
Refills: 0 | Status: DISCONTINUED | OUTPATIENT
Start: 2024-09-04 | End: 2024-09-04

## 2024-09-04 RX ORDER — LIDOCAINE/BENZALKONIUM/ALCOHOL
1 SOLUTION, NON-ORAL TOPICAL
Qty: 1 | Refills: 0
Start: 2024-09-04

## 2024-09-04 RX ADMIN — OXYCODONE HYDROCHLORIDE 10 MILLIGRAM(S): 5 TABLET ORAL at 05:57

## 2024-09-04 RX ADMIN — ACETAMINOPHEN 650 MILLIGRAM(S): 325 TABLET ORAL at 01:58

## 2024-09-04 RX ADMIN — Medication 25 MICROGRAM(S): at 05:57

## 2024-09-04 RX ADMIN — Medication 5 MILLILITER(S): at 06:01

## 2024-09-04 RX ADMIN — ENOXAPARIN SODIUM 70 MILLIGRAM(S): 100 INJECTION SUBCUTANEOUS at 11:13

## 2024-09-04 RX ADMIN — METOPROLOL TARTRATE 50 MILLIGRAM(S): 100 TABLET ORAL at 06:38

## 2024-09-04 RX ADMIN — Medication 1: at 12:34

## 2024-09-04 RX ADMIN — ACETAMINOPHEN 650 MILLIGRAM(S): 325 TABLET ORAL at 02:58

## 2024-09-04 RX ADMIN — Medication 1: at 08:07

## 2024-09-04 RX ADMIN — OXYCODONE HYDROCHLORIDE 10 MILLIGRAM(S): 5 TABLET ORAL at 00:01

## 2024-09-04 NOTE — ED ADULT NURSE NOTE - OBJECTIVE STATEMENT
pt comes into ED A&Ox4, c/o INR 1.1. pt states she was dc from [facility] earlier today, felt chest pain this evening, took warfarin, aspirin, Lovenox, and was told to come back to ED. opne heart surgery 10 years ago. denies sob / difficulty breathing. no other complaints of pain or discomfort at this time.

## 2024-09-04 NOTE — DISCHARGE NOTE PROVIDER - NSDCMRMEDTOKEN_GEN_ALL_CORE_FT
aspirin 81 mg oral delayed release tablet: 1 tab(s) orally once a day  atorvastatin 80 mg oral tablet: 1 tab(s) orally once a day (at bedtime)  berberine: one capsule orally once a day at bedtime  cinnamon 500 mg oral capsule: 2 cap(s) orally once a day  DULoxetine 60 mg oral delayed release capsule: 1 cap(s) orally once a day  levothyroxine 25 mcg (0.025 mg) oral tablet: 1 tab(s) orally once a day  melatonin 10 mg oral capsule: 1 cap(s) orally once a day (at bedtime)  metFORMIN 1000 mg oral tablet: 1 tab(s) orally 2 times a day  methadone 10 mg oral tablet: 1 tab(s) orally once a day  metoprolol succinate 50 mg oral tablet, extended release: 1 tab(s) orally 2 times a day  milk thistle oral capsule: 1 cap(s) orally once a day  mometasone-formoterol 200 mcg-5 mcg/inh inhalation aerosol: 1 puff(s) inhaled once a day  norethindrone 5 mg oral tablet: 1 orally once a day  oxyCODONE 20 mg oral tablet: 1 tab(s) orally every 6 hours  SITagliptin (as base) 100 mg oral tablet: 1 tab(s) orally once a day  ubiquinone 200 mg oral capsule: 2 cap(s) orally once a day  warfarin 6 mg oral tablet: 1.5 tab(s) orally once a day   aspirin 81 mg oral delayed release tablet: 1 tab(s) orally once a day  atorvastatin 80 mg oral tablet: 1 tab(s) orally once a day (at bedtime)  DULoxetine 60 mg oral delayed release capsule: 1 cap(s) orally once a day  levothyroxine 25 mcg (0.025 mg) oral tablet: 1 tab(s) orally once a day  Lovenox 80 mg/0.8 mL injectable solution: 80 milligram(s) subcutaneously 2 times a day DISCARD 0.1ml = 10 mg and inject 70 mg each dose  melatonin 10 mg oral capsule: 1 cap(s) orally once a day (at bedtime)  metFORMIN 1000 mg oral tablet: 1 tab(s) orally 2 times a day  metoprolol succinate 50 mg oral tablet, extended release: 1 tab(s) orally 2 times a day  norethindrone 5 mg oral tablet: 1 orally once a day  oxyCODONE 20 mg oral tablet: 1 tab(s) orally every 6 hours  SITagliptin (as base) 100 mg oral tablet: 1 tab(s) orally once a day  ubiquinone 200 mg oral capsule: 2 cap(s) orally once a day  warfarin 10 mg oral tablet: 1 tab(s) orally once a day (at bedtime) take on 9/4/24 and 9/5/24. check INR on 9/6/24 and report to your PMD and follow their instructions

## 2024-09-04 NOTE — DISCHARGE NOTE PROVIDER - ATTENDING DISCHARGE PHYSICAL EXAMINATION:
Vital Signs Last 24 Hrs  T(C): 36.7 (09-04-24 @ 08:39), Max: 36.8 (09-03-24 @ 19:44)  T(F): 98 (09-04-24 @ 08:39), Max: 98.3 (09-03-24 @ 19:44)  HR: 74 (09-04-24 @ 08:39) (74 - 85)  BP: 95/65 (09-04-24 @ 08:39) (95/65 - 117/78)  BP(mean): --  RR: 18 (09-04-24 @ 08:39) (18 - 18)  SpO2: 100% (09-04-24 @ 08:39) (97% - 100%)    GENERAL: Marfan's habitus; comfortable, no gum bleed, states she still has some anal / rectal bright red blood when she wipes  HEAD:  Atraumatic, Normocephalic  EYES: EOMI, PERRLA, conjunctiva and sclera clear  NECK: Supple, No JVD, Normal thyroid  NERVOUS SYSTEM:  Alert & Oriented X3, Motor Strength 5/5 B/L upper and lower extremities  CHEST/LUNG: CTA bilaterally; No rales, rhonchi, wheezing, or rubs  HEART: Regular rate and rhythm; No murmurs, rubs, or gallops  ABDOMEN: Soft, Nontender, Nondistended; Bowel sounds present  EXTREMITIES:  2+ Peripheral Pulses, No clubbing, cyanosis, or edema  SKIN: No rashes or lesions

## 2024-09-04 NOTE — DISCHARGE NOTE PROVIDER - NSDCFUADDINST_GEN_ALL_CORE_FT
check your INR on 9/6/24. and report to your PMD and follow their instructions  until then continue lovenox as prescribed and coumadin 10mg.   once INR 2.5 you can stop lovenox

## 2024-09-04 NOTE — DISCHARGE NOTE PROVIDER - NSDCFUSCHEDAPPT_GEN_ALL_CORE_FT
Jeffy Darby  [facility]  OBGYNGEN [address]   Scheduled Appointment: 09/05/2024    [facility]  FAMILYMED [address]  Scheduled Appointment: 09/11/2024    Bethel Morales  [facility]  ONCORTHO [address] [facility]  Scheduled Appointment: 10/18/2024

## 2024-09-04 NOTE — DIETITIAN INITIAL EVALUATION ADULT - MUSCLE MASS (LOSS OF MUSCLE)
Appearance: Dressed appropriately.  Behavior: Cooperative. Childlike demeanor. Found sleeping in day room.  Motor: No abnormalities.   Speech: Soft volume.  Mood: Ok.  Affect: Neutral, stable.  Thought Process: Repetitive  Associations: Limited  Thought Content: Fixated on various hospitals, mimicking patient's reported problems/behaviors. Now reporting itchiness of belly after other patient reported rash.  Insight: Limited.  Judgment: Fair on interview.   Attention: Fair.  Language: fluent  Gait: intact.      Temples.../Clavicles.../Shoulders... Appearance: Dressed appropriately.  Behavior: Cooperative. Childlike demeanor. Found sleeping in day room.  Motor: No abnormalities.   Speech: Soft volume.  Mood: Ok.  Affect: Neutral, stable.  Thought Process: Repetitive  Associations: Limited  Thought Content: Fixated on various hospitals.  Somatic.  Insight: Limited.  Judgment: Fair on interview.   Attention: Fair.  Language: fluent  Gait: intact.

## 2024-09-04 NOTE — PROGRESS NOTE ADULT - ASSESSMENT
40 yo F who follows Dr Mills for a history of Leukocytosis and symptomatic iron deficiency anemia. This is likely secondary to heavy menstrual periods. She is intolerant of oral iron. She completed a course of IV iron. presented for elevated INR. Her INR read at home said high. The patient reports having rectal bleeding for the last one week when she wipes with toilet paper and bleeding gums starting this morning. Patient states she is symptomatic because she is having palpitations and short of breath on exertion.   Admitted for elevated INR and rectal bleed and gum bleed.    history of  symptomatic iron deficiency anemia.   - follows Dr Mills at [facility]  - was likely secondary to heavy menstrual periods.   - She is intolerant of oral iron. She completed a course of IV iron.  - Hgb 12.5    Elevated INR  - rectal bleed and gum bleed likely due to elevated INR of 9 on admission    - Warfarin held S/P Vit K  - had a sinus infection recently and was on abx   - INR 1.52 today  - INR goal is 2.5- 3.5  - restart warfarin,  bridge therapy with lovenox and l follow-up with PCP outpatient.    Anticipating discharge today 38 yo F who follows Dr Mills for a history of CLL and symptomatic iron deficiency anemia. This is likely secondary to heavy menstrual periods. She is intolerant of oral iron. She completed a course of IV iron. presented for elevated INR. Her INR read at home said high. The patient reports having rectal bleeding for the last one week when she wipes with toilet paper and bleeding gums starting this morning. Patient states she is symptomatic because she is having palpitations and short of breath on exertion.   Admitted for elevated INR and rectal bleed and gum bleed.    history of  symptomatic iron deficiency anemia.   - follows Dr Mills at [facility]  - was likely secondary to heavy menstrual periods.   - She is intolerant of oral iron. She completed a course of IV iron.  - Hgb 12.5    Elevated INR  - rectal bleed and gum bleed likely due to elevated INR of 9 on admission    - Warfarin held S/P Vit K  - had a sinus infection recently and was on abx   - INR 1.52 today  - INR goal is 2.5- 3.5  - restart warfarin, bridge therapy with lovenox and l follow-up with PCP outpatient.    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    -  anticipate WBC to react to surgeries, infections/inflammation  - WBC 14.52K today   - outpt runs 25-37K  - Follow up with Dr Mills on discharge     Anticipating discharge today 40 yo F who follows Dr Mills for a history of CLL and symptomatic iron deficiency anemia. This is likely secondary to heavy menstrual periods. She is intolerant of oral iron. She completed a course of IV iron. presented for elevated INR. Her INR read at home said high. The patient reports having rectal bleeding for the last one week when she wipes with toilet paper and bleeding gums starting this morning. Patient states she is symptomatic because she is having palpitations and short of breath on exertion.   Admitted for elevated INR and rectal bleed and gum bleed.    history of  symptomatic iron deficiency anemia.   - follows Dr Mills at [facility]  - was likely secondary to heavy menstrual periods.   - She is intolerant of oral iron. She completed a course of IV iron.  - Hgb 12.5    Elevated INR  - rectal bleed and gum bleed likely due to elevated INR of 9 on admission    - Warfarin held S/P Vit K  - had a sinus infection recently and was on abx   - INR 1.52 today  - INR goal is 2.5- 3.5  - restart warfarin, bridge therapy with lovenox and l follow-up with PCP outpatient.    Leukocytosis     - on observation   - WBC runs 13-22k  - WBC 14.52K today   - Follow up with Dr Mills on discharge     Anticipating discharge today

## 2024-09-04 NOTE — ED ADULT TRIAGE NOTE - CHIEF COMPLAINT QUOTE
pt biba for abnormal INR. discharged from hospital today with INR 1.5, tested again this evening bc pt felt chest pain and INR 1.1. pt took warfarin 10 mg, 5 aspirin and Lovenox shot at 8pm.   ekg done

## 2024-09-04 NOTE — DISCHARGE NOTE NURSING/CASE MANAGEMENT/SOCIAL WORK - PATIENT PORTAL LINK FT
You can access the FollowMyHealth Patient Portal offered by [facility] by registering at the following website: [email or web address]. By joining [employer]s FollowMyHealth portal, you will also be able to view your health information using other applications (apps) compatible with our system.

## 2024-09-04 NOTE — DISCHARGE NOTE NURSING/CASE MANAGEMENT/SOCIAL WORK - NSDCVIVACCINE_GEN_ALL_CORE_FT
Td (adult) preservative free; 14-Jul-2015 19:00; Jasson Cooney (RN); [employer]; [id number]; IntraMuscular; Deltoid Left.; 0.5 milliLiter(s); VIS (VIS Published: 14-Jul-2015, VIS Presented: 14-Jul-2015);

## 2024-09-04 NOTE — DISCHARGE NOTE PROVIDER - HOSPITAL COURSE
This is a 38 y/o F with a PMHx of open heart valve replacements (aortic root dilation with AVR, mitral valve replacement) on warfarin, Marfan's syndrome, DM, asthma, MDD and chronic pain, that presented to the ED on 9/2 for elevated INR. The patient reports having rectal bleeding for the last one week when she wipes with toilet paper and bleeding gums starting this morning. Patient states she is symptomatic because she is having palpitations and short of breath on exertion. Upon ED presentation, all vital signs were stable, CBC showed leukocytosis at 15.46, chest x-ray showed no acute findings, and EKG showed normal sinus rhythm. She was admitted for elevated INR, rectal bleed, and gum bleed. Warfarin was held and vitamin k was given. Over the course of admission, pt's INR decreased from 9-->8.34--->1.52. Patient will restart warfarin, completing bridging therapy with lovenox and will follow-up with PCP outpatient. Chronic medical conditions were monitored and treated throughout admission. Her symptoms have resolved and she is medically stable for discharge.    This is a 38 y/o F with a PMHx of open heart valve replacements (aortic root dilation with AVR, mitral valve replacement) on warfarin, Marfan's syndrome, DM, asthma, MDD and chronic pain, that presented to the ED on 9/2 for elevated INR. The patient reports having rectal bleeding for the last one week when she wipes with toilet paper and bleeding gums starting this morning. Patient states she is symptomatic because she is having palpitations and short of breath on exertion. Upon ED presentation, all vital signs were stable, CBC showed leukocytosis at 15.46, chest x-ray showed no acute findings, and EKG showed normal sinus rhythm. She was admitted for elevated INR, rectal bleed, and gum bleed. Warfarin was held and vitamin k was given. Over the course of admission, pt's INR decreased from 9-->8.34--->1.52. Patient will restart warfarin, completing bridging therapy with lovenox and will follow-up with PCP outpatient. Chronic medical conditions were monitored and treated throughout admission. Her symptoms have resolved and she is medically stable for discharge.     # Supra therapeutic INR  # now subtherapeutic INR  # Leucocytosis  # hematochezia  # gum bleed  # H/O Aortic root dilation with AVR, MVR replacement   # DM2

## 2024-09-04 NOTE — DIETITIAN INITIAL EVALUATION ADULT - ADD RECOMMEND
Rec Ensure Max BID to provide 150kcal, 30gr to provide an additional 100 kcal, 15g protein per serving each serving  Encourage diet compliance  Discussed coumadin, Vit K interaction and encouraged decreased leafy vegetables.

## 2024-09-04 NOTE — DIETITIAN INITIAL EVALUATION ADULT - PERTINENT MEDS FT
MEDICATIONS  (STANDING):  atorvastatin 80 milliGRAM(s) Oral at bedtime  dextrose 5%. 1000 milliLiter(s) (50 mL/Hr) IV Continuous <Continuous>  dextrose 5%. 1000 milliLiter(s) (100 mL/Hr) IV Continuous <Continuous>  dextrose 50% Injectable 12.5 Gram(s) IV Push once  dextrose 50% Injectable 25 Gram(s) IV Push once  dextrose 50% Injectable 25 Gram(s) IV Push once  enoxaparin Injectable 70 milliGRAM(s) SubCutaneous every 12 hours  glucagon  Injectable 1 milliGRAM(s) IntraMuscular once  insulin lispro (ADMELOG) corrective regimen sliding scale   SubCutaneous three times a day before meals  levothyroxine 25 MICROGram(s) Oral daily  lidocaine 2% Jelly 5 milliLiter(s) IntraUrethral two times a day  metoprolol tartrate 50 milliGRAM(s) Oral daily  warfarin 10 milliGRAM(s) Oral once    MEDICATIONS  (PRN):  acetaminophen     Tablet .. 650 milliGRAM(s) Oral every 6 hours PRN Temp greater or equal to 38C (100.4F), Mild Pain (1 - 3)  dextrose Oral Gel 15 Gram(s) Oral once PRN Blood Glucose LESS THAN 70 milliGRAM(s)/deciliter  ondansetron Injectable 4 milliGRAM(s) IV Push every 4 hours PRN Nausea and/or Vomiting  oxyCODONE    IR 10 milliGRAM(s) Oral every 4 hours PRN Moderate Pain (4 - 6)

## 2024-09-04 NOTE — DIETITIAN INITIAL EVALUATION ADULT - OTHER INFO
40 y/o F presented for elevated INR. Her INR read at home said high. The patient reports having rectal bleeding for the last one week when she wipes with toilet paper and bleeding gums starting this morning. Patient states she is symptomatic because she is having palpitations and short of breath on exertion.   Admitted for elevated INR and rectal bleed and gum bleed.

## 2024-09-04 NOTE — PROGRESS NOTE ADULT - SUBJECTIVE AND OBJECTIVE BOX
38 yo F who follows Dr Mills for a history of Leukocytosis and symptomatic iron deficiency anemia. This is likely secondary to heavy menstrual periods. She is intolerant of oral iron. She completed a course of IV iron.  presented for elevated INR. Her INR read at home said high. The patient reports having rectal bleeding for the last one week when she wipes with toilet paper and bleeding gums starting this morning. Patient states she is symptomatic because she is having palpitations and short of breath on exertion. Admitted for elevated INR and rectal bleed and gum bleed.    PAST MEDICAL & SURGICAL HISTORY:  Marfan syndrome  Fibromyalgia  Depression  Anxiety  Diabetes  Dilated aortic root  Mitral valve prolapse  H/O aortic valve replacement  2013  H/O spinal fusion  2005  H/O artificial lens replacement    Allergies  No Known Allergies    MEDICATIONS  (STANDING):  atorvastatin 80 milliGRAM(s) Oral at bedtime  dextrose 5%. 1000 milliLiter(s) (50 mL/Hr) IV Continuous <Continuous>  dextrose 5%. 1000 milliLiter(s) (100 mL/Hr) IV Continuous <Continuous>  dextrose 50% Injectable 12.5 Gram(s) IV Push once  dextrose 50% Injectable 25 Gram(s) IV Push once  dextrose 50% Injectable 25 Gram(s) IV Push once  enoxaparin Injectable 70 milliGRAM(s) SubCutaneous every 12 hours  glucagon  Injectable 1 milliGRAM(s) IntraMuscular once  insulin lispro (ADMELOG) corrective regimen sliding scale   SubCutaneous three times a day before meals  levothyroxine 25 MICROGram(s) Oral daily  lidocaine 2% Jelly 5 milliLiter(s) IntraUrethral two times a day  metoprolol tartrate 50 milliGRAM(s) Oral daily  warfarin 10 milliGRAM(s) Oral once    MEDICATIONS  (PRN):  acetaminophen     Tablet .. 650 milliGRAM(s) Oral every 6 hours PRN Temp greater or equal to 38C (100.4F), Mild Pain (1 - 3)  dextrose Oral Gel 15 Gram(s) Oral once PRN Blood Glucose LESS THAN 70 milliGRAM(s)/deciliter  ondansetron Injectable 4 milliGRAM(s) IV Push every 4 hours PRN Nausea and/or Vomiting  oxyCODONE    IR 10 milliGRAM(s) Oral every 4 hours PRN Moderate Pain (4 - 6)    Vital Signs Last 24 Hrs  T(C): 36.7 (04 Sep 2024 08:39), Max: 36.8 (03 Sep 2024 19:44)  T(F): 98 (04 Sep 2024 08:39), Max: 98.3 (03 Sep 2024 19:44)  HR: 74 (04 Sep 2024 08:39) (74 - 85)  BP: 95/65 (04 Sep 2024 08:39) (95/65 - 117/78)  BP(mean): --  RR: 18 (04 Sep 2024 08:39) (18 - 18)  SpO2: 100% (04 Sep 2024 08:39) (97% - 100%)    Parameters below as of 04 Sep 2024 08:39  Patient On (Oxygen Delivery Method): room air    Physical Exam:  Constitutional: alert and oriented, in no acute distress   Neck: Soft and supple  Respiratory: Clear to auscultation bilaterally  Cardiovascular: Regular rate and rhythm  Gastrointestinal: Soft  Neurological: A/O x 3  Musculoskeletal:  no lower extremity edema bilaterally                          12.5   14.52 )-----------( 302      ( 04 Sep 2024 05:45 )             39.8     09-03    139  |  106  |  11.7  ----------------------------<  144<H>  5.1   |  24.0  |  0.62    Ca    9.4      03 Sep 2024 03:23         38 yo F who follows Dr Mills for a history of CLL and symptomatic iron deficiency anemia. This is likely secondary to heavy menstrual periods. She is intolerant of oral iron. She completed a course of IV iron.  presented for elevated INR. Her INR read at home said high. The patient reports having rectal bleeding for the last one week when she wipes with toilet paper and bleeding gums starting this morning. Patient states she is symptomatic because she is having palpitations and short of breath on exertion. Admitted for elevated INR and rectal bleed and gum bleed.    PAST MEDICAL & SURGICAL HISTORY:  Marfan syndrome  Fibromyalgia  Depression  Anxiety  CLL  Diabetes  Dilated aortic root  Mitral valve prolapse  H/O aortic valve replacement  2013  H/O spinal fusion  2005  H/O artificial lens replacement    Allergies  No Known Allergies    MEDICATIONS  (STANDING):  atorvastatin 80 milliGRAM(s) Oral at bedtime  dextrose 5%. 1000 milliLiter(s) (50 mL/Hr) IV Continuous <Continuous>  dextrose 5%. 1000 milliLiter(s) (100 mL/Hr) IV Continuous <Continuous>  dextrose 50% Injectable 12.5 Gram(s) IV Push once  dextrose 50% Injectable 25 Gram(s) IV Push once  dextrose 50% Injectable 25 Gram(s) IV Push once  enoxaparin Injectable 70 milliGRAM(s) SubCutaneous every 12 hours  glucagon  Injectable 1 milliGRAM(s) IntraMuscular once  insulin lispro (ADMELOG) corrective regimen sliding scale   SubCutaneous three times a day before meals  levothyroxine 25 MICROGram(s) Oral daily  lidocaine 2% Jelly 5 milliLiter(s) IntraUrethral two times a day  metoprolol tartrate 50 milliGRAM(s) Oral daily  warfarin 10 milliGRAM(s) Oral once    MEDICATIONS  (PRN):  acetaminophen     Tablet .. 650 milliGRAM(s) Oral every 6 hours PRN Temp greater or equal to 38C (100.4F), Mild Pain (1 - 3)  dextrose Oral Gel 15 Gram(s) Oral once PRN Blood Glucose LESS THAN 70 milliGRAM(s)/deciliter  ondansetron Injectable 4 milliGRAM(s) IV Push every 4 hours PRN Nausea and/or Vomiting  oxyCODONE    IR 10 milliGRAM(s) Oral every 4 hours PRN Moderate Pain (4 - 6)    Vital Signs Last 24 Hrs  T(C): 36.7 (04 Sep 2024 08:39), Max: 36.8 (03 Sep 2024 19:44)  T(F): 98 (04 Sep 2024 08:39), Max: 98.3 (03 Sep 2024 19:44)  HR: 74 (04 Sep 2024 08:39) (74 - 85)  BP: 95/65 (04 Sep 2024 08:39) (95/65 - 117/78)  BP(mean): --  RR: 18 (04 Sep 2024 08:39) (18 - 18)  SpO2: 100% (04 Sep 2024 08:39) (97% - 100%)    Parameters below as of 04 Sep 2024 08:39  Patient On (Oxygen Delivery Method): room air    Physical Exam:  Constitutional: alert and oriented, in no acute distress   Neck: Soft and supple  Respiratory: Clear to auscultation bilaterally  Cardiovascular: Regular rate and rhythm  Gastrointestinal: Soft  Neurological: A/O x 3  Musculoskeletal:  no lower extremity edema bilaterally                          12.5   14.52 )-----------( 302      ( 04 Sep 2024 05:45 )             39.8     09-03    139  |  106  |  11.7  ----------------------------<  144<H>  5.1   |  24.0  |  0.62    Ca    9.4      03 Sep 2024 03:23

## 2024-09-04 NOTE — DIETITIAN INITIAL EVALUATION ADULT - PERTINENT LABORATORY DATA
09-03    139  |  106  |  11.7  ----------------------------<  144<H>  5.1   |  24.0  |  0.62    Ca    9.4      03 Sep 2024 03:23  Mg     1.7     09-02    TPro  7.5  /  Alb  4.0  /  TBili  0.5  /  DBili  x   /  AST  19  /  ALT  8   /  AlkPhos  86  09-02  POCT Blood Glucose.: 153 mg/dL (09-04-24 @ 08:04)  A1C with Estimated Average Glucose Result: 8.0 % (09-03-24 @ 03:23)

## 2024-09-04 NOTE — DISCHARGE NOTE NURSING/CASE MANAGEMENT/SOCIAL WORK - NSDCPEFALRISK_GEN_ALL_CORE
For information on Fall & Injury Prevention, visit: [email or web address] OR  [email or web address] OR  https://www.cdc.gov/steadi/patient.html

## 2024-09-04 NOTE — DIETITIAN INITIAL EVALUATION ADULT - ORAL INTAKE PTA/DIET HISTORY
Influenza Vaccination Met with pt who reports decreased po intake and 30# weight loss last year. Pt with nausea and takes protein shakes at home. Pt avoids products with chemicals but agreed to try ensure Max BID. Reviewed diet guidelines with pt.

## 2024-09-04 NOTE — DIETITIAN NUTRITION RISK NOTIFICATION - TREATMENT: THE FOLLOWING DIET HAS BEEN RECOMMENDED
Diet, Consistent Carbohydrate w/Evening Snack:   High Fiber (HIFIBER) (09-03-24 @ 17:06) [Active]

## 2024-09-04 NOTE — DISCHARGE NOTE PROVIDER - CARE PROVIDER_API CALL
Sina Powell  Physician Assistant Services  [address] [place] [id number]  Phone: [phone]  Fax: [phone]  Established Patient  Follow Up Time:

## 2024-09-05 ENCOUNTER — NON-APPOINTMENT (OUTPATIENT)
Age: 39
End: 2024-09-05

## 2024-09-05 ENCOUNTER — APPOINTMENT (OUTPATIENT)
Dept: OBGYN | Facility: CLINIC | Age: 39
End: 2024-09-05

## 2024-09-05 VITALS
HEART RATE: 88 BPM | OXYGEN SATURATION: 99 % | DIASTOLIC BLOOD PRESSURE: 70 MMHG | TEMPERATURE: 98 F | SYSTOLIC BLOOD PRESSURE: 112 MMHG | RESPIRATION RATE: 18 BRPM

## 2024-09-05 LAB
ALBUMIN SERPL ELPH-MCNC: 3.9 G/DL — SIGNIFICANT CHANGE UP (ref 3.3–5.2)
ALP SERPL-CCNC: 238 U/L — HIGH (ref 40–120)
ALT FLD-CCNC: 42 U/L — HIGH
ANION GAP SERPL CALC-SCNC: 11 MMOL/L — SIGNIFICANT CHANGE UP (ref 5–17)
APTT BLD: 51.7 SEC — HIGH (ref 24.5–35.6)
AST SERPL-CCNC: 56 U/L — HIGH
BASOPHILS # BLD AUTO: 0.04 K/UL — SIGNIFICANT CHANGE UP (ref 0–0.2)
BASOPHILS NFR BLD AUTO: 0.3 % — SIGNIFICANT CHANGE UP (ref 0–2)
BILIRUB SERPL-MCNC: 0.6 MG/DL — SIGNIFICANT CHANGE UP (ref 0.4–2)
BUN SERPL-MCNC: 16.1 MG/DL — SIGNIFICANT CHANGE UP (ref 8–20)
CALCIUM SERPL-MCNC: 9.7 MG/DL — SIGNIFICANT CHANGE UP (ref 8.4–10.5)
CHLORIDE SERPL-SCNC: 103 MMOL/L — SIGNIFICANT CHANGE UP (ref 96–108)
CO2 SERPL-SCNC: 23 MMOL/L — SIGNIFICANT CHANGE UP (ref 22–29)
CREAT SERPL-MCNC: 0.46 MG/DL — LOW (ref 0.5–1.3)
EGFR: 123 ML/MIN/1.73M2 — SIGNIFICANT CHANGE UP
EOSINOPHIL # BLD AUTO: 0.16 K/UL — SIGNIFICANT CHANGE UP (ref 0–0.5)
EOSINOPHIL NFR BLD AUTO: 1 % — SIGNIFICANT CHANGE UP (ref 0–6)
GLUCOSE SERPL-MCNC: 259 MG/DL — HIGH (ref 70–99)
HCT VFR BLD CALC: 38.2 % — SIGNIFICANT CHANGE UP (ref 34.5–45)
HGB BLD-MCNC: 12.3 G/DL — SIGNIFICANT CHANGE UP (ref 11.5–15.5)
IMM GRANULOCYTES NFR BLD AUTO: 0.6 % — SIGNIFICANT CHANGE UP (ref 0–0.9)
INR BLD: 1.2 RATIO — HIGH (ref 0.85–1.18)
LYMPHOCYTES # BLD AUTO: 1.87 K/UL — SIGNIFICANT CHANGE UP (ref 1–3.3)
LYMPHOCYTES # BLD AUTO: 12.1 % — LOW (ref 13–44)
MCHC RBC-ENTMCNC: 28 PG — SIGNIFICANT CHANGE UP (ref 27–34)
MCHC RBC-ENTMCNC: 32.2 GM/DL — SIGNIFICANT CHANGE UP (ref 32–36)
MCV RBC AUTO: 87 FL — SIGNIFICANT CHANGE UP (ref 80–100)
MONOCYTES # BLD AUTO: 1.02 K/UL — HIGH (ref 0–0.9)
MONOCYTES NFR BLD AUTO: 6.6 % — SIGNIFICANT CHANGE UP (ref 2–14)
NEUTROPHILS # BLD AUTO: 12.22 K/UL — HIGH (ref 1.8–7.4)
NEUTROPHILS NFR BLD AUTO: 79.4 % — HIGH (ref 43–77)
PLATELET # BLD AUTO: 294 K/UL — SIGNIFICANT CHANGE UP (ref 150–400)
POTASSIUM SERPL-MCNC: 4.3 MMOL/L — SIGNIFICANT CHANGE UP (ref 3.5–5.3)
POTASSIUM SERPL-SCNC: 4.3 MMOL/L — SIGNIFICANT CHANGE UP (ref 3.5–5.3)
PROT SERPL-MCNC: 6.9 G/DL — SIGNIFICANT CHANGE UP (ref 6.6–8.7)
PROTHROM AB SERPL-ACNC: 13.3 SEC — HIGH (ref 9.5–13)
RBC # BLD: 4.39 M/UL — SIGNIFICANT CHANGE UP (ref 3.8–5.2)
RBC # FLD: 15.9 % — HIGH (ref 10.3–14.5)
SODIUM SERPL-SCNC: 137 MMOL/L — SIGNIFICANT CHANGE UP (ref 135–145)
TROPONIN T, HIGH SENSITIVITY RESULT: 11 NG/L — SIGNIFICANT CHANGE UP (ref 0–51)
WBC # BLD: 15.4 K/UL — HIGH (ref 3.8–10.5)
WBC # FLD AUTO: 15.4 K/UL — HIGH (ref 3.8–10.5)

## 2024-09-05 PROCEDURE — 93005 ELECTROCARDIOGRAM TRACING: CPT

## 2024-09-05 PROCEDURE — 85610 PROTHROMBIN TIME: CPT

## 2024-09-05 PROCEDURE — 85025 COMPLETE CBC W/AUTO DIFF WBC: CPT

## 2024-09-05 PROCEDURE — 85730 THROMBOPLASTIN TIME PARTIAL: CPT

## 2024-09-05 PROCEDURE — 84484 ASSAY OF TROPONIN QUANT: CPT

## 2024-09-05 PROCEDURE — 80053 COMPREHEN METABOLIC PANEL: CPT

## 2024-09-05 PROCEDURE — 99285 EMERGENCY DEPT VISIT HI MDM: CPT | Mod: 25

## 2024-09-05 PROCEDURE — 71045 X-RAY EXAM CHEST 1 VIEW: CPT | Mod: 26

## 2024-09-05 PROCEDURE — 71045 X-RAY EXAM CHEST 1 VIEW: CPT

## 2024-09-05 PROCEDURE — 36415 COLL VENOUS BLD VENIPUNCTURE: CPT

## 2024-09-05 RX ORDER — ENOXAPARIN SODIUM 80 MG/.8ML
80 INJECTION, SOLUTION SUBCUTANEOUS TWICE DAILY
Qty: 1 | Refills: 0 | Status: ACTIVE | COMMUNITY
Start: 2024-09-05 | End: 1900-01-01

## 2024-09-05 RX ADMIN — ACETAMINOPHEN 975 MILLIGRAM(S): 325 TABLET ORAL at 00:22

## 2024-09-05 NOTE — ED PROVIDER NOTE - PATIENT PORTAL LINK FT
You can access the FollowMyHealth Patient Portal offered by [facility] by registering at the following website: [email or web address]. By joining [employer]’s FollowMyHealth portal, you will also be able to view your health information using other applications (apps) compatible with our system.

## 2024-09-05 NOTE — ED PROVIDER NOTE - BIRTH SEX
1.  DM Type II (Oral Med) without sign of diabetic retinopathy and no blot heme on dilated retinal examination today OU No Macular Edema:  Discussed the pathophysiology of diabetes and its effect on the eye and risk of blindness. Stressed the importance of strong glucose control. Advised of importance of at least yearly dilated examinations but to contact us immediately for any problems or concerns. 2. ONEIL w/ PEK OU -- Increase the use of ATs TID-QID OU routinely. Can consider plugs or Restasis w/o improvement. 3.  Anterior Blepharitis OU -- Daily Hot compresses and lid scrubs were recommended. 4. Glaucoma Suspect OU (0.7/0.4/ () FHX Stable IOP OU. H/o Minimal thinning by OCT OU. Patient is considered high risk. 5. Pseudophakia OU -- H/o Yag OU. 6.  H/o Macular Hole repair OD (Dr. Audrey Vick to PCP Return for an appointment 6 months for a 10/k check with Dr. Carole Champagne. Female

## 2024-09-05 NOTE — ED PROVIDER NOTE - CONSTITUTIONAL, MLM
Marfanoid body, awake, alert, oriented to person, place, time/situation and in no apparent distress. normal...

## 2024-09-05 NOTE — ED PROVIDER NOTE - CLINICAL SUMMARY MEDICAL DECISION MAKING FREE TEXT BOX
INR is subtherapuetic but the pt is being adequately bridged and anticoagulated by lovenox. Pt took coumadin, is well appearing and already has follow up scheduled tomorrow, stable for dc

## 2024-09-05 NOTE — ED PROVIDER NOTE - OBJECTIVE STATEMENT
39-year-old female past medical history of Marfan syndrome, diabetes, mechanical heart valves on warfarin presents with low INR.  Patient was just admitted here for supratherapeutic INR, having bleeding, Coumadin was held, vitamin K administered, bleeding stopped and her INR decreased to 1.5.  She was started on Lovenox for bridging and restarted on Coumadin.  However patient states she checked her Coumadin at home and it was 1.1 and she was concerned she came here for evaluation. pt took both her nighttime lovenox as well as scheduled 10 mg coumadin dose

## 2024-09-11 ENCOUNTER — APPOINTMENT (OUTPATIENT)
Dept: FAMILY MEDICINE | Facility: CLINIC | Age: 39
End: 2024-09-11
Payer: COMMERCIAL

## 2024-09-11 VITALS
TEMPERATURE: 98.6 F | HEIGHT: 71.5 IN | BODY MASS INDEX: 22.05 KG/M2 | WEIGHT: 161 LBS | DIASTOLIC BLOOD PRESSURE: 66 MMHG | SYSTOLIC BLOOD PRESSURE: 100 MMHG

## 2024-09-11 DIAGNOSIS — F43.10 POST-TRAUMATIC STRESS DISORDER, UNSPECIFIED: ICD-10-CM

## 2024-09-11 DIAGNOSIS — M54.2 CERVICALGIA: ICD-10-CM

## 2024-09-11 DIAGNOSIS — F41.8 OTHER SPECIFIED ANXIETY DISORDERS: ICD-10-CM

## 2024-09-11 DIAGNOSIS — M47.812 SPONDYLOSIS W/OUT MYELOPATHY OR RADICULOPATHY, CERVICAL REGION: ICD-10-CM

## 2024-09-11 PROCEDURE — 99214 OFFICE O/P EST MOD 30 MIN: CPT

## 2024-09-11 RX ORDER — HYDROXYZINE HYDROCHLORIDE 25 MG/1
25 TABLET ORAL 3 TIMES DAILY
Qty: 90 | Refills: 0 | Status: ACTIVE | COMMUNITY
Start: 2024-09-11 | End: 1900-01-01

## 2024-09-11 NOTE — CDI QUERY NOTE - NSCDIOTHERTXTBX_GEN_ALL_CORE_HH
Pt. admitted due to elevated INR.  + rectal and gum bleed.  h/o Coumadin use.  Please specify if there is a link between the rectal/gum bleed and Coumadin.  ( Please note that " in setting of" describes that 2 conditions exist and does not provide the cause and effect relationship of the 2 conditions.)    - High INR with bleeding likely due to Coumadin use  - No link between bleed and Coumadin use  - Other ( please specify)  - Not clinically significant    Documentation    H&P- Elevated INR  - No signs of life threatening bleeding  - Likely due to interaction of Coumadin and Augmentin  - INR 9  - Will hold Coumadin and monitor    Hematochezia  - Likely due to anal fissue  - Monitor CBC    9/3 Hosp- CC- supratherapeutic INR, rectal and gum bleed    INTERVAL HPI/ OVERNIGHT EVENTS:  admitted with bleeding in the setting of A/C    Elevated INR- Iatrogenic while on Coumadin  - No signs of life threatening bleeding  - Likely due to interaction of Coumadin and Augmentin    Heme- Elevated INR  - rectal bleed and gum bleed likely due to elevated INR of 9 on admission    - Warfarin held S/P Vit K    D/C note- She was admitted for elevated INR, rectal bleed, and gum bleed. Warfarin was held and vitamin k was given. Over the course of admission, pt's INR decreased from 9-->8.34--->1.52. Patient will restart warfarin, completing bridging therapy with lovenox and will follow-up with PCP outpatient  PRINCIPAL DISCHARGE DIAGNOSIS  Diagnosis: Elevated INR

## 2024-09-11 NOTE — PHYSICAL EXAM
[No Acute Distress] : no acute distress [EOMI] : extraocular movements intact [No JVD] : no jugular venous distention [No Lymphadenopathy] : no lymphadenopathy [Supple] : supple [No Respiratory Distress] : no respiratory distress  [No Accessory Muscle Use] : no accessory muscle use [Clear to Auscultation] : lungs were clear to auscultation bilaterally [Normal Rate] : normal rate  [Regular Rhythm] : with a regular rhythm [Normal S1, S2] : normal S1 and S2 [Non-distended] : non-distended [No CVA Tenderness] : no CVA  tenderness [No Rash] : no rash [Coordination Grossly Intact] : coordination grossly intact [Normal Affect] : the affect was normal [Normal Insight/Judgement] : insight and judgment were intact [de-identified] : +ve murmur  [de-identified] : +V  anxious mood
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
## 2024-09-12 ENCOUNTER — APPOINTMENT (OUTPATIENT)
Dept: FAMILY MEDICINE | Facility: CLINIC | Age: 39
End: 2024-09-12
Payer: MEDICAID

## 2024-09-12 ENCOUNTER — RX RENEWAL (OUTPATIENT)
Age: 39
End: 2024-09-12

## 2024-09-12 PROCEDURE — 96372 THER/PROPH/DIAG INJ SC/IM: CPT

## 2024-09-12 RX ORDER — LIDOCAINE HYDROCHLORIDE 20 MG/ML
2 JELLY TOPICAL
Qty: 100 | Refills: 0 | Status: ACTIVE | COMMUNITY
Start: 2024-09-11 | End: 1900-01-01

## 2024-09-12 RX ORDER — CYANOCOBALAMIN 1000 UG/ML
1000 INJECTION INTRAMUSCULAR; SUBCUTANEOUS
Qty: 0 | Refills: 0 | Status: COMPLETED | OUTPATIENT
Start: 2024-09-11

## 2024-09-17 ENCOUNTER — EMERGENCY (EMERGENCY)
Facility: HOSPITAL | Age: 39
LOS: 1 days | Discharge: HOME | End: 2024-09-17
Attending: STUDENT IN AN ORGANIZED HEALTH CARE EDUCATION/TRAINING PROGRAM
Payer: MEDICAID

## 2024-09-17 ENCOUNTER — APPOINTMENT (OUTPATIENT)
Dept: FAMILY MEDICINE | Facility: CLINIC | Age: 39
End: 2024-09-17

## 2024-09-17 VITALS
RESPIRATION RATE: 18 BRPM | OXYGEN SATURATION: 98 % | TEMPERATURE: 99 F | HEART RATE: 95 BPM | SYSTOLIC BLOOD PRESSURE: 105 MMHG | DIASTOLIC BLOOD PRESSURE: 67 MMHG

## 2024-09-17 VITALS
WEIGHT: 149.91 LBS | HEART RATE: 101 BPM | RESPIRATION RATE: 18 BRPM | TEMPERATURE: 98 F | HEIGHT: 72 IN | SYSTOLIC BLOOD PRESSURE: 119 MMHG | DIASTOLIC BLOOD PRESSURE: 84 MMHG | OXYGEN SATURATION: 97 %

## 2024-09-17 DIAGNOSIS — Z95.4 PRESENCE OF OTHER HEART-VALVE REPLACEMENT: Chronic | ICD-10-CM

## 2024-09-17 DIAGNOSIS — Z98.1 ARTHRODESIS STATUS: Chronic | ICD-10-CM

## 2024-09-17 DIAGNOSIS — Z96.1 PRESENCE OF INTRAOCULAR LENS: Chronic | ICD-10-CM

## 2024-09-17 LAB
ALBUMIN SERPL ELPH-MCNC: 3.8 G/DL — SIGNIFICANT CHANGE UP (ref 3.3–5.2)
ALP SERPL-CCNC: 87 U/L — SIGNIFICANT CHANGE UP (ref 40–120)
ALT FLD-CCNC: 14 U/L — SIGNIFICANT CHANGE UP
ANION GAP SERPL CALC-SCNC: 9 MMOL/L — SIGNIFICANT CHANGE UP (ref 5–17)
APTT BLD: 69.8 SEC — HIGH (ref 24.5–35.6)
AST SERPL-CCNC: 17 U/L — SIGNIFICANT CHANGE UP
BASOPHILS # BLD AUTO: 0.06 K/UL — SIGNIFICANT CHANGE UP (ref 0–0.2)
BASOPHILS NFR BLD AUTO: 0.4 % — SIGNIFICANT CHANGE UP (ref 0–2)
BILIRUB SERPL-MCNC: 0.4 MG/DL — SIGNIFICANT CHANGE UP (ref 0.4–2)
BUN SERPL-MCNC: 12.7 MG/DL — SIGNIFICANT CHANGE UP (ref 8–20)
CALCIUM SERPL-MCNC: 9.4 MG/DL — SIGNIFICANT CHANGE UP (ref 8.4–10.5)
CHLORIDE SERPL-SCNC: 108 MMOL/L — SIGNIFICANT CHANGE UP (ref 96–108)
CO2 SERPL-SCNC: 24 MMOL/L — SIGNIFICANT CHANGE UP (ref 22–29)
CREAT SERPL-MCNC: 0.55 MG/DL — SIGNIFICANT CHANGE UP (ref 0.5–1.3)
EGFR: 118 ML/MIN/1.73M2 — SIGNIFICANT CHANGE UP
EOSINOPHIL # BLD AUTO: 0.24 K/UL — SIGNIFICANT CHANGE UP (ref 0–0.5)
EOSINOPHIL NFR BLD AUTO: 1.8 % — SIGNIFICANT CHANGE UP (ref 0–6)
GLUCOSE SERPL-MCNC: 112 MG/DL — HIGH (ref 70–99)
HCG SERPL-ACNC: <4 MIU/ML — SIGNIFICANT CHANGE UP
HCT VFR BLD CALC: 39.5 % — SIGNIFICANT CHANGE UP (ref 34.5–45)
HGB BLD-MCNC: 12.7 G/DL — SIGNIFICANT CHANGE UP (ref 11.5–15.5)
IMM GRANULOCYTES NFR BLD AUTO: 0.7 % — SIGNIFICANT CHANGE UP (ref 0–0.9)
INR BLD: 4.67 RATIO — HIGH (ref 0.85–1.18)
LYMPHOCYTES # BLD AUTO: 16 % — SIGNIFICANT CHANGE UP (ref 13–44)
LYMPHOCYTES # BLD AUTO: 2.19 K/UL — SIGNIFICANT CHANGE UP (ref 1–3.3)
MCHC RBC-ENTMCNC: 27.9 PG — SIGNIFICANT CHANGE UP (ref 27–34)
MCHC RBC-ENTMCNC: 32.2 GM/DL — SIGNIFICANT CHANGE UP (ref 32–36)
MCV RBC AUTO: 86.6 FL — SIGNIFICANT CHANGE UP (ref 80–100)
MONOCYTES # BLD AUTO: 1 K/UL — HIGH (ref 0–0.9)
MONOCYTES NFR BLD AUTO: 7.3 % — SIGNIFICANT CHANGE UP (ref 2–14)
NEUTROPHILS # BLD AUTO: 10.13 K/UL — HIGH (ref 1.8–7.4)
NEUTROPHILS NFR BLD AUTO: 73.8 % — SIGNIFICANT CHANGE UP (ref 43–77)
PLATELET # BLD AUTO: 391 K/UL — SIGNIFICANT CHANGE UP (ref 150–400)
POTASSIUM SERPL-MCNC: 4.6 MMOL/L — SIGNIFICANT CHANGE UP (ref 3.5–5.3)
POTASSIUM SERPL-SCNC: 4.6 MMOL/L — SIGNIFICANT CHANGE UP (ref 3.5–5.3)
PROT SERPL-MCNC: 6.7 G/DL — SIGNIFICANT CHANGE UP (ref 6.6–8.7)
PROTHROM AB SERPL-ACNC: 49.6 SEC — HIGH (ref 9.5–13)
RBC # BLD: 4.56 M/UL — SIGNIFICANT CHANGE UP (ref 3.8–5.2)
RBC # FLD: 16.4 % — HIGH (ref 10.3–14.5)
SODIUM SERPL-SCNC: 141 MMOL/L — SIGNIFICANT CHANGE UP (ref 135–145)
TROPONIN T, HIGH SENSITIVITY RESULT: 10 NG/L — SIGNIFICANT CHANGE UP (ref 0–51)
TROPONIN T, HIGH SENSITIVITY RESULT: 12 NG/L — SIGNIFICANT CHANGE UP (ref 0–51)
WBC # BLD: 13.71 K/UL — HIGH (ref 3.8–10.5)
WBC # FLD AUTO: 13.71 K/UL — HIGH (ref 3.8–10.5)

## 2024-09-17 PROCEDURE — 99285 EMERGENCY DEPT VISIT HI MDM: CPT

## 2024-09-17 PROCEDURE — 84484 ASSAY OF TROPONIN QUANT: CPT

## 2024-09-17 PROCEDURE — 85610 PROTHROMBIN TIME: CPT

## 2024-09-17 PROCEDURE — 96374 THER/PROPH/DIAG INJ IV PUSH: CPT

## 2024-09-17 PROCEDURE — 80053 COMPREHEN METABOLIC PANEL: CPT

## 2024-09-17 PROCEDURE — 93010 ELECTROCARDIOGRAM REPORT: CPT

## 2024-09-17 PROCEDURE — 84702 CHORIONIC GONADOTROPIN TEST: CPT

## 2024-09-17 PROCEDURE — 70450 CT HEAD/BRAIN W/O DYE: CPT | Mod: MC

## 2024-09-17 PROCEDURE — 85730 THROMBOPLASTIN TIME PARTIAL: CPT

## 2024-09-17 PROCEDURE — 70450 CT HEAD/BRAIN W/O DYE: CPT | Mod: 26,MC

## 2024-09-17 PROCEDURE — 99285 EMERGENCY DEPT VISIT HI MDM: CPT | Mod: 25

## 2024-09-17 PROCEDURE — 85025 COMPLETE CBC W/AUTO DIFF WBC: CPT

## 2024-09-17 PROCEDURE — 93005 ELECTROCARDIOGRAM TRACING: CPT

## 2024-09-17 PROCEDURE — 71046 X-RAY EXAM CHEST 2 VIEWS: CPT | Mod: 26

## 2024-09-17 PROCEDURE — 71046 X-RAY EXAM CHEST 2 VIEWS: CPT

## 2024-09-17 PROCEDURE — 36415 COLL VENOUS BLD VENIPUNCTURE: CPT

## 2024-09-17 RX ORDER — SODIUM CHLORIDE 9 MG/ML
500 INJECTION INTRAMUSCULAR; INTRAVENOUS; SUBCUTANEOUS ONCE
Refills: 0 | Status: COMPLETED | OUTPATIENT
Start: 2024-09-17 | End: 2024-09-17

## 2024-09-17 RX ORDER — ONDANSETRON 2 MG/ML
4 INJECTION, SOLUTION INTRAMUSCULAR; INTRAVENOUS ONCE
Refills: 0 | Status: COMPLETED | OUTPATIENT
Start: 2024-09-17 | End: 2024-09-17

## 2024-09-17 RX ADMIN — ONDANSETRON 4 MILLIGRAM(S): 2 INJECTION, SOLUTION INTRAMUSCULAR; INTRAVENOUS at 17:00

## 2024-09-17 RX ADMIN — SODIUM CHLORIDE 500 MILLILITER(S): 9 INJECTION INTRAMUSCULAR; INTRAVENOUS; SUBCUTANEOUS at 17:00

## 2024-09-17 NOTE — ED ADULT NURSE REASSESSMENT NOTE - NS ED NURSE REASSESS COMMENT FT1
Assumed care of pt from AC RN at 1930.Pt is A&Ox4. Respirations are even and unlabored. Pt awaiting CT scan results. Plan on going.

## 2024-09-17 NOTE — ED PROVIDER NOTE - CARE PROVIDER_API CALL
Yao De La Torre  Neurology  [address] [place] [address], [facility] [address]  [place] [address]  Phone: [phone]  Fax: [phone]  Follow Up Time: 1-3 Days

## 2024-09-17 NOTE — ED ADULT NURSE NOTE - NSFALLHARMRISKINTERV_ED_ALL_ED

## 2024-09-17 NOTE — ED PROVIDER NOTE - NSFOLLOWUPINSTRUCTIONS_ED_ALL_ED_FT
Please hold tonights dose of coumadin.     Please recheck your INR in the AM and return if it did not improve    please return to the emergency department for signs of bleeding, unsteadiness, dizziness, lightheadedness    please follow with your primary care doctor as well as neurology for your CAT scan findings that were discussed with you.

## 2024-09-17 NOTE — ED PROVIDER NOTE - PATIENT PORTAL LINK FT
You can access the FollowMyHealth Patient Portal offered by [facility] by registering at the following website: [email or web address]. By joining [facility]’s FollowMyHealth portal, you will also be able to view your health information using other applications (apps) compatible with our system.

## 2024-09-17 NOTE — ED PROVIDER NOTE - CLINICAL SUMMARY MEDICAL DECISION MAKING FREE TEXT BOX
History and physical as noted.  Patient with elevated INR and feeling off balance.  Associated with headache and nausea.  Feeling off balance has now resolved.  Given symptom resolution elevated INR, patient does not meet criteria for code stroke.  However headache and nausea persisting in the setting of elevated INR.  Priority CT called.  Will get x-ray given cough and decreased right-sided breath sounds.  Disposition and further interventions pending History and physical as noted.  Patient with elevated INR and feeling off balance.  Associated with headache and nausea.  Feeling off balance has now resolved.  Given symptom resolution elevated INR, patient does not meet criteria for code stroke.  However headache and nausea persisting in the setting of elevated INR.  Priority CT called.  Will get x-ray given cough and decreased right-sided breath sounds.  Disposition and further interventions pending    patient states symptoms improved. all results discussed with patient. offered obs for mri but declined. patient would like to go home and recheck iNR in AM. no s/s bleeding. instructed to hold tonights coumadin and recheck in AM. return to the ER or discuss elevated INR with pcp. patient understands and accepts.

## 2024-09-17 NOTE — ED PROVIDER NOTE - PHYSICAL EXAMINATION
PHYSICAL EXAM:   General: nontoxic appearing  HEENT: NC/AT, n/l lens replacement, , airway patent  Cardiovascular: regular rate and rhythm, + S1/S2, no murmurs, rubs, gallops appreciated  Respiratory: diminihsed R sided breath sounds, nonlabored respirations  Abdominal: soft, nontender, nondistended, no rebound, guarding or rigidity  Extremities: no LE edema b/l.   Neuro: Alert and oriented x3. CN2-12 intact, Strength 5/5 in upper and lower extremities. Sensation intact to light touch in upper and lower extremities. Gait WNL, finger-to-nose and heel to shin WNL.  ____   Psychiatric: appropriate mood and affect.   Skin: multiple ecchymosis, one over R humerus from previous admission  -Samaria Lopez MD Attending Physician

## 2024-09-20 DIAGNOSIS — J45.909 UNSPECIFIED ASTHMA, UNCOMPLICATED: ICD-10-CM

## 2024-09-20 DIAGNOSIS — R79.1 ABNORMAL COAGULATION PROFILE: ICD-10-CM

## 2024-09-20 DIAGNOSIS — G89.29 OTHER CHRONIC PAIN: ICD-10-CM

## 2024-09-20 DIAGNOSIS — R79.89 OTHER SPECIFIED ABNORMAL FINDINGS OF BLOOD CHEMISTRY: ICD-10-CM

## 2024-09-20 DIAGNOSIS — E11.9 TYPE 2 DIABETES MELLITUS WITHOUT COMPLICATIONS: ICD-10-CM

## 2024-09-24 ENCOUNTER — APPOINTMENT (OUTPATIENT)
Dept: FAMILY MEDICINE | Facility: CLINIC | Age: 39
End: 2024-09-24
Payer: MEDICAID

## 2024-09-24 VITALS
OXYGEN SATURATION: 98 % | SYSTOLIC BLOOD PRESSURE: 110 MMHG | HEIGHT: 71 IN | HEART RATE: 102 BPM | WEIGHT: 165 LBS | DIASTOLIC BLOOD PRESSURE: 60 MMHG | BODY MASS INDEX: 23.1 KG/M2

## 2024-09-24 DIAGNOSIS — R79.1 ABNORMAL COAGULATION PROFILE: ICD-10-CM

## 2024-09-24 DIAGNOSIS — D64.9 ANEMIA, UNSPECIFIED: ICD-10-CM

## 2024-09-24 DIAGNOSIS — Q87.40 MARFAN'S SYNDROME, UNSPECIFIED: ICD-10-CM

## 2024-09-24 DIAGNOSIS — R42 DIZZINESS AND GIDDINESS: ICD-10-CM

## 2024-09-24 PROCEDURE — 99214 OFFICE O/P EST MOD 30 MIN: CPT

## 2024-09-24 NOTE — PHYSICAL EXAM
[No Acute Distress] : no acute distress [EOMI] : extraocular movements intact [No JVD] : no jugular venous distention [No Lymphadenopathy] : no lymphadenopathy [Supple] : supple [No Respiratory Distress] : no respiratory distress  [No Accessory Muscle Use] : no accessory muscle use [Clear to Auscultation] : lungs were clear to auscultation bilaterally [Normal Rate] : normal rate  [Regular Rhythm] : with a regular rhythm [Normal S1, S2] : normal S1 and S2 [Non-distended] : non-distended [No CVA Tenderness] : no CVA  tenderness [No Rash] : no rash [Coordination Grossly Intact] : coordination grossly intact [Normal Affect] : the affect was normal [Normal Insight/Judgement] : insight and judgment were intact [de-identified] : +ve murmur  [de-identified] : +ve  anxious mood

## 2024-09-24 NOTE — HISTORY OF PRESENT ILLNESS
[FreeTextEntry1] : hospital follow up [de-identified] : 40 yo female s/p [facility] ED eval on 9/17/24  2n2 supratherapeutic INR=6.3 associated c H/A and dizziness.   Repeat  venous INR  = 4.67 (INR goal 2.5-3.5)  serum CMP,  HCG, CE's, and CBC unremarkable.     CT of Head: NO acute intracranial abnormality  Findings suggestive of mild non-specific white matter disease  EKG:  VR= 98 bpm, GA interval= 212 ms QT interval= 352 ms   Today home INR= 4.4    no active bleeding at this time  no bloody/black stools no hematuria, no hemoptysis, no hematemesis

## 2024-09-24 NOTE — PLAN
[FreeTextEntry1] : INR= 4.4  instructed pt to take 6mg of Warfarin repeat INR tomorrow 9/25/24   improved Hgb= 12.7 on 9/17/24

## 2024-09-25 ENCOUNTER — TRANSCRIPTION ENCOUNTER (OUTPATIENT)
Age: 39
End: 2024-09-25

## 2024-09-26 ENCOUNTER — APPOINTMENT (OUTPATIENT)
Dept: INTERNAL MEDICINE | Facility: CLINIC | Age: 39
End: 2024-09-26

## 2024-09-26 ENCOUNTER — APPOINTMENT (OUTPATIENT)
Dept: FAMILY MEDICINE | Facility: CLINIC | Age: 39
End: 2024-09-26
Payer: MEDICAID

## 2024-09-26 PROCEDURE — 96372 THER/PROPH/DIAG INJ SC/IM: CPT

## 2024-09-26 RX ORDER — CYANOCOBALAMIN 1000 UG/ML
1000 INJECTION INTRAMUSCULAR; SUBCUTANEOUS
Qty: 0 | Refills: 0 | Status: COMPLETED | OUTPATIENT
Start: 2024-09-25

## 2024-10-02 ENCOUNTER — RX RENEWAL (OUTPATIENT)
Age: 39
End: 2024-10-02

## 2024-10-03 ENCOUNTER — APPOINTMENT (OUTPATIENT)
Dept: OBGYN | Facility: CLINIC | Age: 39
End: 2024-10-03
Payer: MEDICAID

## 2024-10-03 VITALS
DIASTOLIC BLOOD PRESSURE: 58 MMHG | WEIGHT: 161 LBS | HEIGHT: 71 IN | BODY MASS INDEX: 22.54 KG/M2 | SYSTOLIC BLOOD PRESSURE: 108 MMHG

## 2024-10-03 DIAGNOSIS — Z01.419 ENCOUNTER FOR GYNECOLOGICAL EXAMINATION (GENERAL) (ROUTINE) W/OUT ABNORMAL FINDINGS: ICD-10-CM

## 2024-10-03 DIAGNOSIS — N91.1 SECONDARY AMENORRHEA: ICD-10-CM

## 2024-10-03 DIAGNOSIS — R23.2 FLUSHING: ICD-10-CM

## 2024-10-03 DIAGNOSIS — R68.89 OTHER GENERAL SYMPTOMS AND SIGNS: ICD-10-CM

## 2024-10-03 DIAGNOSIS — R63.0 ANOREXIA: ICD-10-CM

## 2024-10-03 PROCEDURE — 99459 PELVIC EXAMINATION: CPT

## 2024-10-03 PROCEDURE — 99214 OFFICE O/P EST MOD 30 MIN: CPT | Mod: 25

## 2024-10-03 PROCEDURE — 99396 PREV VISIT EST AGE 40-64: CPT

## 2024-10-03 NOTE — PHYSICAL EXAM
[Chaperone Present] : A chaperone was present in the examining room during all aspects of the physical examination [[id number]] : A chaperone was present during the pelvic exam. [FreeTextEntry2] : ELVIA Amaro [Appropriately responsive] : appropriately responsive [Alert] : alert [No Acute Distress] : no acute distress [Oriented x3] : oriented x3 [Examination Of The Breasts] : a normal appearance [No Masses] : no breast masses were palpable [Labia Majora] : normal [Labia Minora] : normal [Normal] : normal [Uterine Adnexae] : normal

## 2024-10-03 NOTE — HISTORY OF PRESENT ILLNESS
[N] : Patient denies prior pregnancies [Menarche Age: ____] : age at menarche was [unfilled] [No] : Patient does not have concerns regarding sex [Previously active] : previously active [Mammogramdate] : 10/04/23 [TextBox_19] : BR2 [BreastSonogramDate] : NEVER [PapSmeardate] : 08/31/23 [TextBox_31] : NEG [BoneDensityDate] : NEVER [ColonoscopyDate] : NEVER [HPVDate] : 08/31/23 [LMPDate] : 01/2023 [TextBox_78] : POSITIVE [PGHxTotal] : 0 [FreeTextEntry1] : 01/2023

## 2024-10-03 NOTE — DISCUSSION/SUMMARY
[FreeTextEntry1] : 38 y/o G0 LMP 1/2023 presents for annual exam.   #Well Woman Visit 1. Nutrition/Activity: The benefits of balanced diet and physical activity discussed with patient.  2. Health Screening: She was informed of the benefits of a screening colonoscopy/DEXA/Mammo/Pap. - Cervix: We reviewed ASCCP/ACOG guidelines for pap smear screening. Last PAP Aug 2023, NILM HPV NEG, collected today.  - Breast: has appt next week, script in.  3. Sex Health:  The importance of safe-sex practices was discussed with the patient. STD screening was offered to patient, she declines.  4. Contraception: not sexually active, does not desire contraception, not good candidate for hormonal therapy given anticoagulation and cardiac history   #Amenorrhea, sxs of menopause  - reports bleeding has resolved since last visit, now amenorrheic - also reports long lasting history of cold intolerance, hot flashes, night sweats, and decreased appetite - discussed role for menopause labs  - FSH/E2 ordered, will have it drawn next week at PCP visit with the rest of her bloodwork   She verbalized understanding and agreement with above counseling regarding differential diagnosis, evaluation, and plan.  She was given time for questions/concerns which were all answered to her apparent satisfaction. All designated lab work for today drawn in office.   RTO 1yr for next GYN ANNUAL

## 2024-10-04 LAB — HPV HIGH+LOW RISK DNA PNL CVX: DETECTED

## 2024-10-10 LAB — CYTOLOGY CVX/VAG DOC THIN PREP: ABNORMAL

## 2024-10-14 ENCOUNTER — APPOINTMENT (OUTPATIENT)
Dept: FAMILY MEDICINE | Facility: CLINIC | Age: 39
End: 2024-10-14
Payer: COMMERCIAL

## 2024-10-14 ENCOUNTER — RX RENEWAL (OUTPATIENT)
Age: 39
End: 2024-10-14

## 2024-10-14 VITALS
BODY MASS INDEX: 22.82 KG/M2 | HEART RATE: 84 BPM | SYSTOLIC BLOOD PRESSURE: 102 MMHG | DIASTOLIC BLOOD PRESSURE: 70 MMHG | HEIGHT: 71 IN | WEIGHT: 163 LBS | OXYGEN SATURATION: 98 %

## 2024-10-14 DIAGNOSIS — F41.8 OTHER SPECIFIED ANXIETY DISORDERS: ICD-10-CM

## 2024-10-14 DIAGNOSIS — M47.812 SPONDYLOSIS W/OUT MYELOPATHY OR RADICULOPATHY, CERVICAL REGION: ICD-10-CM

## 2024-10-14 DIAGNOSIS — M54.2 CERVICALGIA: ICD-10-CM

## 2024-10-14 DIAGNOSIS — F43.10 POST-TRAUMATIC STRESS DISORDER, UNSPECIFIED: ICD-10-CM

## 2024-10-14 PROCEDURE — 99214 OFFICE O/P EST MOD 30 MIN: CPT

## 2024-10-15 ENCOUNTER — APPOINTMENT (OUTPATIENT)
Dept: MAMMOGRAPHY | Facility: CLINIC | Age: 39
End: 2024-10-15
Payer: MEDICAID

## 2024-10-15 ENCOUNTER — RESULT REVIEW (OUTPATIENT)
Age: 39
End: 2024-10-15

## 2024-10-15 ENCOUNTER — OUTPATIENT (OUTPATIENT)
Dept: OUTPATIENT SERVICES | Facility: HOSPITAL | Age: 39
LOS: 1 days | End: 2024-10-15
Payer: MEDICAID

## 2024-10-15 DIAGNOSIS — Z95.4 PRESENCE OF OTHER HEART-VALVE REPLACEMENT: Chronic | ICD-10-CM

## 2024-10-15 DIAGNOSIS — Z01.419 ENCOUNTER FOR GYNECOLOGICAL EXAMINATION (GENERAL) (ROUTINE) WITHOUT ABNORMAL FINDINGS: ICD-10-CM

## 2024-10-15 DIAGNOSIS — Z00.8 ENCOUNTER FOR OTHER GENERAL EXAMINATION: ICD-10-CM

## 2024-10-15 PROCEDURE — 77063 BREAST TOMOSYNTHESIS BI: CPT | Mod: 26

## 2024-10-15 PROCEDURE — 77067 SCR MAMMO BI INCL CAD: CPT | Mod: 26

## 2024-10-15 PROCEDURE — 77067 SCR MAMMO BI INCL CAD: CPT

## 2024-10-15 PROCEDURE — 77063 BREAST TOMOSYNTHESIS BI: CPT

## 2024-10-18 ENCOUNTER — NON-APPOINTMENT (OUTPATIENT)
Age: 39
End: 2024-10-18

## 2024-10-23 ENCOUNTER — RX RENEWAL (OUTPATIENT)
Age: 39
End: 2024-10-23

## 2024-10-23 ENCOUNTER — APPOINTMENT (OUTPATIENT)
Dept: ORTHOPEDIC SURGERY | Facility: CLINIC | Age: 39
End: 2024-10-23

## 2024-10-23 VITALS — HEIGHT: 71 IN | BODY MASS INDEX: 22.82 KG/M2 | WEIGHT: 163 LBS

## 2024-10-23 DIAGNOSIS — M47.816 SPONDYLOSIS W/OUT MYELOPATHY OR RADICULOPATHY, LUMBAR REGION: ICD-10-CM

## 2024-10-23 DIAGNOSIS — Z98.1 ARTHRODESIS STATUS: ICD-10-CM

## 2024-10-23 DIAGNOSIS — M41.9 SCOLIOSIS, UNSPECIFIED: ICD-10-CM

## 2024-10-23 DIAGNOSIS — G89.29 OTHER CHRONIC PAIN: ICD-10-CM

## 2024-10-23 PROCEDURE — 99213 OFFICE O/P EST LOW 20 MIN: CPT

## 2024-10-23 RX ORDER — HYDROXYZINE HYDROCHLORIDE 25 MG/1
25 TABLET ORAL
Refills: 0 | Status: ACTIVE | COMMUNITY

## 2024-11-05 ENCOUNTER — NON-APPOINTMENT (OUTPATIENT)
Age: 39
End: 2024-11-05

## 2024-11-07 ENCOUNTER — APPOINTMENT (OUTPATIENT)
Dept: FAMILY MEDICINE | Facility: CLINIC | Age: 39
End: 2024-11-07
Payer: MEDICAID

## 2024-11-07 PROCEDURE — 96372 THER/PROPH/DIAG INJ SC/IM: CPT

## 2024-11-08 ENCOUNTER — MED ADMIN CHARGE (OUTPATIENT)
Age: 39
End: 2024-11-08

## 2024-11-08 RX ORDER — CYANOCOBALAMIN 1000 UG/ML
1000 INJECTION, SOLUTION INTRAMUSCULAR; SUBCUTANEOUS
Qty: 0 | Refills: 0 | Status: COMPLETED | OUTPATIENT
Start: 2024-11-06

## 2024-11-11 ENCOUNTER — APPOINTMENT (OUTPATIENT)
Dept: FAMILY MEDICINE | Facility: CLINIC | Age: 39
End: 2024-11-11
Payer: MEDICAID

## 2024-11-11 VITALS
OXYGEN SATURATION: 98 % | HEIGHT: 71 IN | WEIGHT: 165 LBS | SYSTOLIC BLOOD PRESSURE: 96 MMHG | DIASTOLIC BLOOD PRESSURE: 62 MMHG | BODY MASS INDEX: 23.1 KG/M2 | HEART RATE: 86 BPM

## 2024-11-11 DIAGNOSIS — E11.9 TYPE 2 DIABETES MELLITUS W/OUT COMPLICATIONS: ICD-10-CM

## 2024-11-11 DIAGNOSIS — Z95.2 PRESENCE OF PROSTHETIC HEART VALVE: ICD-10-CM

## 2024-11-11 DIAGNOSIS — Z01.818 ENCOUNTER FOR OTHER PREPROCEDURAL EXAMINATION: ICD-10-CM

## 2024-11-11 PROCEDURE — 36415 COLL VENOUS BLD VENIPUNCTURE: CPT

## 2024-11-11 PROCEDURE — 99204 OFFICE O/P NEW MOD 45 MIN: CPT

## 2024-11-11 PROCEDURE — 99214 OFFICE O/P EST MOD 30 MIN: CPT

## 2024-11-11 RX ORDER — AMOXICILLIN 500 MG/1
500 CAPSULE ORAL
Qty: 4 | Refills: 3 | Status: ACTIVE | COMMUNITY
Start: 2024-11-11 | End: 1900-01-01

## 2024-11-12 ENCOUNTER — APPOINTMENT (OUTPATIENT)
Dept: FAMILY MEDICINE | Facility: CLINIC | Age: 39
End: 2024-11-12
Payer: MEDICAID

## 2024-11-12 VITALS
HEART RATE: 75 BPM | WEIGHT: 165 LBS | OXYGEN SATURATION: 92 % | BODY MASS INDEX: 23.1 KG/M2 | SYSTOLIC BLOOD PRESSURE: 100 MMHG | HEIGHT: 71 IN | DIASTOLIC BLOOD PRESSURE: 70 MMHG

## 2024-11-12 DIAGNOSIS — Q87.40 MARFAN'S SYNDROME, UNSPECIFIED: ICD-10-CM

## 2024-11-12 DIAGNOSIS — R79.1 ABNORMAL COAGULATION PROFILE: ICD-10-CM

## 2024-11-12 DIAGNOSIS — J44.9 CHRONIC OBSTRUCTIVE PULMONARY DISEASE, UNSPECIFIED: ICD-10-CM

## 2024-11-12 DIAGNOSIS — R09.02 HYPOXEMIA: ICD-10-CM

## 2024-11-12 LAB
ANION GAP SERPL CALC-SCNC: 15 MMOL/L
BASOPHILS # BLD AUTO: 0.05 K/UL
BASOPHILS NFR BLD AUTO: 0.4 %
BUN SERPL-MCNC: 12 MG/DL
CALCIUM SERPL-MCNC: 9.2 MG/DL
CHLORIDE SERPL-SCNC: 104 MMOL/L
CO2 SERPL-SCNC: 20 MMOL/L
CREAT SERPL-MCNC: 0.61 MG/DL
EGFR: 115 ML/MIN/1.73M2
EOSINOPHIL # BLD AUTO: 0.13 K/UL
EOSINOPHIL NFR BLD AUTO: 0.9 %
ESTIMATED AVERAGE GLUCOSE: 180 MG/DL
GLUCOSE SERPL-MCNC: 147 MG/DL
HBA1C MFR BLD HPLC: 7.9 %
HCT VFR BLD CALC: 37.7 %
HGB BLD-MCNC: 11.8 G/DL
IMM GRANULOCYTES NFR BLD AUTO: 0.5 %
INR PPP: 3.78 RATIO
LYMPHOCYTES # BLD AUTO: 1.62 K/UL
LYMPHOCYTES NFR BLD AUTO: 11.6 %
MAN DIFF?: NORMAL
MCHC RBC-ENTMCNC: 27.1 PG
MCHC RBC-ENTMCNC: 31.3 G/DL
MCV RBC AUTO: 86.7 FL
MONOCYTES # BLD AUTO: 0.94 K/UL
MONOCYTES NFR BLD AUTO: 6.7 %
NEUTROPHILS # BLD AUTO: 11.16 K/UL
NEUTROPHILS NFR BLD AUTO: 79.9 %
PLATELET # BLD AUTO: 381 K/UL
POTASSIUM SERPL-SCNC: 4.4 MMOL/L
PT BLD: 44 SEC
RBC # BLD: 4.35 M/UL
RBC # FLD: 16.8 %
SODIUM SERPL-SCNC: 139 MMOL/L
WBC # FLD AUTO: 13.97 K/UL

## 2024-11-12 PROCEDURE — 99214 OFFICE O/P EST MOD 30 MIN: CPT

## 2024-11-13 ENCOUNTER — NON-APPOINTMENT (OUTPATIENT)
Age: 39
End: 2024-11-13

## 2024-11-14 ENCOUNTER — NON-APPOINTMENT (OUTPATIENT)
Age: 39
End: 2024-11-14

## 2024-11-19 ENCOUNTER — NON-APPOINTMENT (OUTPATIENT)
Age: 39
End: 2024-11-19

## 2024-11-21 ENCOUNTER — APPOINTMENT (OUTPATIENT)
Dept: OBGYN | Facility: CLINIC | Age: 39
End: 2024-11-21
Payer: MEDICAID

## 2024-11-21 VITALS
WEIGHT: 165.5 LBS | HEIGHT: 72 IN | BODY MASS INDEX: 22.42 KG/M2 | SYSTOLIC BLOOD PRESSURE: 104 MMHG | DIASTOLIC BLOOD PRESSURE: 60 MMHG

## 2024-11-21 DIAGNOSIS — R87.610 ATYPICAL SQUAMOUS CELLS OF UNDETERMINED SIGNIFICANCE ON CYTOLOGIC SMEAR OF CERVIX (ASC-US): ICD-10-CM

## 2024-11-21 DIAGNOSIS — R87.810 ATYPICAL SQUAMOUS CELLS OF UNDETERMINED SIGNIFICANCE ON CYTOLOGIC SMEAR OF CERVIX (ASC-US): ICD-10-CM

## 2024-11-21 LAB
HCG UR QL: NEGATIVE
QUALITY CONTROL: YES

## 2024-11-21 PROCEDURE — 81025 URINE PREGNANCY TEST: CPT

## 2024-11-21 PROCEDURE — 57454 BX/CURETT OF CERVIX W/SCOPE: CPT

## 2024-11-26 ENCOUNTER — APPOINTMENT (OUTPATIENT)
Dept: FAMILY MEDICINE | Facility: CLINIC | Age: 39
End: 2024-11-26

## 2024-12-12 ENCOUNTER — APPOINTMENT (OUTPATIENT)
Dept: FAMILY MEDICINE | Facility: CLINIC | Age: 39
End: 2024-12-12

## 2024-12-16 ENCOUNTER — APPOINTMENT (OUTPATIENT)
Dept: DERMATOLOGY | Facility: CLINIC | Age: 39
End: 2024-12-16
Payer: MEDICAID

## 2024-12-16 PROCEDURE — 99203 OFFICE O/P NEW LOW 30 MIN: CPT

## 2024-12-19 ENCOUNTER — RESULT CHARGE (OUTPATIENT)
Age: 39
End: 2024-12-19

## 2024-12-20 ENCOUNTER — APPOINTMENT (OUTPATIENT)
Dept: FAMILY MEDICINE | Facility: CLINIC | Age: 39
End: 2024-12-20
Payer: MEDICAID

## 2024-12-20 VITALS
SYSTOLIC BLOOD PRESSURE: 116 MMHG | OXYGEN SATURATION: 98 % | WEIGHT: 165 LBS | DIASTOLIC BLOOD PRESSURE: 72 MMHG | HEART RATE: 113 BPM | BODY MASS INDEX: 22.35 KG/M2 | HEIGHT: 72 IN

## 2024-12-20 VITALS — HEART RATE: 96 BPM

## 2024-12-20 DIAGNOSIS — K05.6 PERIODONTAL DISEASE, UNSPECIFIED: ICD-10-CM

## 2024-12-20 DIAGNOSIS — Z01.818 ENCOUNTER FOR OTHER PREPROCEDURAL EXAMINATION: ICD-10-CM

## 2024-12-20 PROCEDURE — 99204 OFFICE O/P NEW MOD 45 MIN: CPT

## 2024-12-20 PROCEDURE — 99214 OFFICE O/P EST MOD 30 MIN: CPT

## 2024-12-20 RX ORDER — DULOXETINE HYDROCHLORIDE 20 MG/1
20 CAPSULE, DELAYED RELEASE PELLETS ORAL
Refills: 0 | Status: ACTIVE | COMMUNITY
Start: 2024-12-20

## 2024-12-26 ENCOUNTER — RESULT REVIEW (OUTPATIENT)
Age: 39
End: 2024-12-26

## 2024-12-26 ENCOUNTER — INPATIENT (INPATIENT)
Facility: HOSPITAL | Age: 39
LOS: 4 days | Discharge: HOME | DRG: 301 | End: 2024-12-31
Attending: THORACIC SURGERY (CARDIOTHORACIC VASCULAR SURGERY) | Admitting: THORACIC SURGERY (CARDIOTHORACIC VASCULAR SURGERY)
Payer: MEDICAID

## 2024-12-26 VITALS
DIASTOLIC BLOOD PRESSURE: 71 MMHG | RESPIRATION RATE: 20 BRPM | TEMPERATURE: 98 F | SYSTOLIC BLOOD PRESSURE: 107 MMHG | OXYGEN SATURATION: 92 % | HEART RATE: 87 BPM

## 2024-12-26 DIAGNOSIS — Z95.4 PRESENCE OF OTHER HEART-VALVE REPLACEMENT: Chronic | ICD-10-CM

## 2024-12-26 DIAGNOSIS — Z95.2 PRESENCE OF PROSTHETIC HEART VALVE: Chronic | ICD-10-CM

## 2024-12-26 DIAGNOSIS — I10 ESSENTIAL (PRIMARY) HYPERTENSION: ICD-10-CM

## 2024-12-26 DIAGNOSIS — N93.9 ABNORMAL UTERINE AND VAGINAL BLEEDING, UNSPECIFIED: ICD-10-CM

## 2024-12-26 DIAGNOSIS — F32.9 MAJOR DEPRESSIVE DISORDER, SINGLE EPISODE, UNSPECIFIED: ICD-10-CM

## 2024-12-26 DIAGNOSIS — I71.00 DISSECTION OF UNSPECIFIED SITE OF AORTA: ICD-10-CM

## 2024-12-26 DIAGNOSIS — Z96.1 PRESENCE OF INTRAOCULAR LENS: Chronic | ICD-10-CM

## 2024-12-26 DIAGNOSIS — Z98.1 ARTHRODESIS STATUS: Chronic | ICD-10-CM

## 2024-12-26 DIAGNOSIS — J45.909 UNSPECIFIED ASTHMA, UNCOMPLICATED: ICD-10-CM

## 2024-12-26 DIAGNOSIS — I71.012 DISSECTION OF DESCENDING THORACIC AORTA: ICD-10-CM

## 2024-12-26 DIAGNOSIS — E11.9 TYPE 2 DIABETES MELLITUS WITHOUT COMPLICATIONS: ICD-10-CM

## 2024-12-26 DIAGNOSIS — F41.1 GENERALIZED ANXIETY DISORDER: ICD-10-CM

## 2024-12-26 LAB
A1C WITH ESTIMATED AVERAGE GLUCOSE RESULT: 7.3 % — HIGH (ref 4–5.6)
ALBUMIN SERPL ELPH-MCNC: 3.2 G/DL — LOW (ref 3.3–5.2)
ALBUMIN SERPL ELPH-MCNC: 3.3 G/DL — SIGNIFICANT CHANGE UP (ref 3.3–5.2)
ALBUMIN SERPL ELPH-MCNC: 3.5 G/DL — SIGNIFICANT CHANGE UP (ref 3.3–5.2)
ALP SERPL-CCNC: 77 U/L — SIGNIFICANT CHANGE UP (ref 40–120)
ALP SERPL-CCNC: 81 U/L — SIGNIFICANT CHANGE UP (ref 40–120)
ALP SERPL-CCNC: 85 U/L — SIGNIFICANT CHANGE UP (ref 40–120)
ALT FLD-CCNC: 6 U/L — SIGNIFICANT CHANGE UP
ALT FLD-CCNC: 7 U/L — SIGNIFICANT CHANGE UP
ALT FLD-CCNC: 8 U/L — SIGNIFICANT CHANGE UP
ANION GAP SERPL CALC-SCNC: 11 MMOL/L — SIGNIFICANT CHANGE UP (ref 5–17)
ANION GAP SERPL CALC-SCNC: 11 MMOL/L — SIGNIFICANT CHANGE UP (ref 5–17)
ANION GAP SERPL CALC-SCNC: 15 MMOL/L — SIGNIFICANT CHANGE UP (ref 5–17)
APTT BLD: 50.7 SEC — HIGH (ref 24.5–35.6)
APTT BLD: 55.8 SEC — HIGH (ref 24.5–35.6)
APTT BLD: 56.3 SEC — HIGH (ref 24.5–35.6)
AST SERPL-CCNC: 15 U/L — SIGNIFICANT CHANGE UP
AST SERPL-CCNC: 15 U/L — SIGNIFICANT CHANGE UP
AST SERPL-CCNC: 17 U/L — SIGNIFICANT CHANGE UP
BASOPHILS # BLD AUTO: 0.02 K/UL — SIGNIFICANT CHANGE UP (ref 0–0.2)
BASOPHILS # BLD AUTO: 0.03 K/UL — SIGNIFICANT CHANGE UP (ref 0–0.2)
BASOPHILS # BLD AUTO: 0.04 K/UL — SIGNIFICANT CHANGE UP (ref 0–0.2)
BASOPHILS NFR BLD AUTO: 0.1 % — SIGNIFICANT CHANGE UP (ref 0–2)
BASOPHILS NFR BLD AUTO: 0.2 % — SIGNIFICANT CHANGE UP (ref 0–2)
BASOPHILS NFR BLD AUTO: 0.3 % — SIGNIFICANT CHANGE UP (ref 0–2)
BILIRUB SERPL-MCNC: 0.4 MG/DL — SIGNIFICANT CHANGE UP (ref 0.4–2)
BILIRUB SERPL-MCNC: 0.6 MG/DL — SIGNIFICANT CHANGE UP (ref 0.4–2)
BILIRUB SERPL-MCNC: 0.6 MG/DL — SIGNIFICANT CHANGE UP (ref 0.4–2)
BLD GP AB SCN SERPL QL: SIGNIFICANT CHANGE UP
BLD GP AB SCN SERPL QL: SIGNIFICANT CHANGE UP
BUN SERPL-MCNC: 13.1 MG/DL — SIGNIFICANT CHANGE UP (ref 8–20)
BUN SERPL-MCNC: 15 MG/DL — SIGNIFICANT CHANGE UP (ref 8–20)
BUN SERPL-MCNC: 15.4 MG/DL — SIGNIFICANT CHANGE UP (ref 8–20)
CALCIUM SERPL-MCNC: 8.4 MG/DL — SIGNIFICANT CHANGE UP (ref 8.4–10.5)
CALCIUM SERPL-MCNC: 8.5 MG/DL — SIGNIFICANT CHANGE UP (ref 8.4–10.5)
CALCIUM SERPL-MCNC: 8.6 MG/DL — SIGNIFICANT CHANGE UP (ref 8.4–10.5)
CHLORIDE SERPL-SCNC: 101 MMOL/L — SIGNIFICANT CHANGE UP (ref 96–108)
CHLORIDE SERPL-SCNC: 101 MMOL/L — SIGNIFICANT CHANGE UP (ref 96–108)
CHLORIDE SERPL-SCNC: 103 MMOL/L — SIGNIFICANT CHANGE UP (ref 96–108)
CHOLEST SERPL-MCNC: 118 MG/DL — SIGNIFICANT CHANGE UP
CK SERPL-CCNC: 100 U/L — SIGNIFICANT CHANGE UP (ref 25–170)
CO2 SERPL-SCNC: 18 MMOL/L — LOW (ref 22–29)
CO2 SERPL-SCNC: 19 MMOL/L — LOW (ref 22–29)
CO2 SERPL-SCNC: 21 MMOL/L — LOW (ref 22–29)
CREAT SERPL-MCNC: 0.52 MG/DL — SIGNIFICANT CHANGE UP (ref 0.5–1.3)
CREAT SERPL-MCNC: 0.59 MG/DL — SIGNIFICANT CHANGE UP (ref 0.5–1.3)
CREAT SERPL-MCNC: 0.63 MG/DL — SIGNIFICANT CHANGE UP (ref 0.5–1.3)
EGFR: 114 ML/MIN/1.73M2 — SIGNIFICANT CHANGE UP
EGFR: 116 ML/MIN/1.73M2 — SIGNIFICANT CHANGE UP
EGFR: 120 ML/MIN/1.73M2 — SIGNIFICANT CHANGE UP
EOSINOPHIL # BLD AUTO: 0.07 K/UL — SIGNIFICANT CHANGE UP (ref 0–0.5)
EOSINOPHIL # BLD AUTO: 0.13 K/UL — SIGNIFICANT CHANGE UP (ref 0–0.5)
EOSINOPHIL # BLD AUTO: 0.16 K/UL — SIGNIFICANT CHANGE UP (ref 0–0.5)
EOSINOPHIL NFR BLD AUTO: 0.5 % — SIGNIFICANT CHANGE UP (ref 0–6)
EOSINOPHIL NFR BLD AUTO: 1 % — SIGNIFICANT CHANGE UP (ref 0–6)
EOSINOPHIL NFR BLD AUTO: 1 % — SIGNIFICANT CHANGE UP (ref 0–6)
ESTIMATED AVERAGE GLUCOSE: 163 MG/DL — HIGH (ref 68–114)
GAS PNL BLDV: SIGNIFICANT CHANGE UP
GLUCOSE BLDC GLUCOMTR-MCNC: 141 MG/DL — HIGH (ref 70–99)
GLUCOSE BLDC GLUCOMTR-MCNC: 201 MG/DL — HIGH (ref 70–99)
GLUCOSE SERPL-MCNC: 153 MG/DL — HIGH (ref 70–99)
GLUCOSE SERPL-MCNC: 170 MG/DL — HIGH (ref 70–99)
GLUCOSE SERPL-MCNC: 178 MG/DL — HIGH (ref 70–99)
HCG SERPL-ACNC: <4 MIU/ML — SIGNIFICANT CHANGE UP
HCT VFR BLD CALC: 27.6 % — LOW (ref 34.5–45)
HCT VFR BLD CALC: 31.6 % — LOW (ref 34.5–45)
HCT VFR BLD CALC: 32.9 % — LOW (ref 34.5–45)
HCT VFR BLD CALC: 33.6 % — LOW (ref 34.5–45)
HDLC SERPL-MCNC: 30 MG/DL — LOW
HGB BLD-MCNC: 10.2 G/DL — LOW (ref 11.5–15.5)
HGB BLD-MCNC: 10.5 G/DL — LOW (ref 11.5–15.5)
HGB BLD-MCNC: 10.6 G/DL — LOW (ref 11.5–15.5)
HGB BLD-MCNC: 8.6 G/DL — LOW (ref 11.5–15.5)
IMM GRANULOCYTES NFR BLD AUTO: 0.6 % — SIGNIFICANT CHANGE UP (ref 0–0.9)
IMM GRANULOCYTES NFR BLD AUTO: 0.7 % — SIGNIFICANT CHANGE UP (ref 0–0.9)
IMM GRANULOCYTES NFR BLD AUTO: 0.7 % — SIGNIFICANT CHANGE UP (ref 0–0.9)
INR BLD: 5.18 RATIO — CRITICAL HIGH (ref 0.85–1.16)
INR BLD: 5.31 RATIO — CRITICAL HIGH (ref 0.85–1.16)
LACTATE SERPL-SCNC: 1.2 MMOL/L — SIGNIFICANT CHANGE UP (ref 0.5–2)
LIDOCAIN IGE QN: 42 U/L — SIGNIFICANT CHANGE UP (ref 22–51)
LIPID PNL WITH DIRECT LDL SERPL: 68 MG/DL — SIGNIFICANT CHANGE UP
LYMPHOCYTES # BLD AUTO: 1.05 K/UL — SIGNIFICANT CHANGE UP (ref 1–3.3)
LYMPHOCYTES # BLD AUTO: 1.7 K/UL — SIGNIFICANT CHANGE UP (ref 1–3.3)
LYMPHOCYTES # BLD AUTO: 1.97 K/UL — SIGNIFICANT CHANGE UP (ref 1–3.3)
LYMPHOCYTES # BLD AUTO: 10.3 % — LOW (ref 13–44)
LYMPHOCYTES # BLD AUTO: 14.9 % — SIGNIFICANT CHANGE UP (ref 13–44)
LYMPHOCYTES # BLD AUTO: 7.1 % — LOW (ref 13–44)
MAGNESIUM SERPL-MCNC: 1.4 MG/DL — LOW (ref 1.6–2.6)
MAGNESIUM SERPL-MCNC: 1.5 MG/DL — LOW (ref 1.8–2.6)
MAGNESIUM SERPL-MCNC: 2.2 MG/DL — SIGNIFICANT CHANGE UP (ref 1.6–2.6)
MCHC RBC-ENTMCNC: 26 PG — LOW (ref 27–34)
MCHC RBC-ENTMCNC: 26.3 PG — LOW (ref 27–34)
MCHC RBC-ENTMCNC: 26.6 PG — LOW (ref 27–34)
MCHC RBC-ENTMCNC: 26.8 PG — LOW (ref 27–34)
MCHC RBC-ENTMCNC: 31.2 G/DL — LOW (ref 32–36)
MCHC RBC-ENTMCNC: 31.3 G/DL — LOW (ref 32–36)
MCHC RBC-ENTMCNC: 32.2 G/DL — SIGNIFICANT CHANGE UP (ref 32–36)
MCHC RBC-ENTMCNC: 32.3 G/DL — SIGNIFICANT CHANGE UP (ref 32–36)
MCV RBC AUTO: 81.6 FL — SIGNIFICANT CHANGE UP (ref 80–100)
MCV RBC AUTO: 82.3 FL — SIGNIFICANT CHANGE UP (ref 80–100)
MCV RBC AUTO: 83.2 FL — SIGNIFICANT CHANGE UP (ref 80–100)
MCV RBC AUTO: 86 FL — SIGNIFICANT CHANGE UP (ref 80–100)
MONOCYTES # BLD AUTO: 0.89 K/UL — SIGNIFICANT CHANGE UP (ref 0–0.9)
MONOCYTES # BLD AUTO: 0.93 K/UL — HIGH (ref 0–0.9)
MONOCYTES # BLD AUTO: 1.14 K/UL — HIGH (ref 0–0.9)
MONOCYTES NFR BLD AUTO: 6.3 % — SIGNIFICANT CHANGE UP (ref 2–14)
MONOCYTES NFR BLD AUTO: 6.7 % — SIGNIFICANT CHANGE UP (ref 2–14)
MONOCYTES NFR BLD AUTO: 6.9 % — SIGNIFICANT CHANGE UP (ref 2–14)
NEUTROPHILS # BLD AUTO: 10.11 K/UL — HIGH (ref 1.8–7.4)
NEUTROPHILS # BLD AUTO: 12.52 K/UL — HIGH (ref 1.8–7.4)
NEUTROPHILS # BLD AUTO: 13.33 K/UL — HIGH (ref 1.8–7.4)
NEUTROPHILS NFR BLD AUTO: 76.4 % — SIGNIFICANT CHANGE UP (ref 43–77)
NEUTROPHILS NFR BLD AUTO: 81 % — HIGH (ref 43–77)
NEUTROPHILS NFR BLD AUTO: 85.3 % — HIGH (ref 43–77)
NON HDL CHOLESTEROL: 88 MG/DL — SIGNIFICANT CHANGE UP
NT-PROBNP SERPL-SCNC: 80 PG/ML — SIGNIFICANT CHANGE UP (ref 0–300)
NT-PROBNP SERPL-SCNC: 83 PG/ML — SIGNIFICANT CHANGE UP (ref 0–300)
PHOSPHATE SERPL-MCNC: 3.4 MG/DL — SIGNIFICANT CHANGE UP (ref 2.4–4.7)
PHOSPHATE SERPL-MCNC: 3.8 MG/DL — SIGNIFICANT CHANGE UP (ref 2.4–4.7)
PLATELET # BLD AUTO: 286 K/UL — SIGNIFICANT CHANGE UP (ref 150–400)
PLATELET # BLD AUTO: 319 K/UL — SIGNIFICANT CHANGE UP (ref 150–400)
PLATELET # BLD AUTO: 333 K/UL — SIGNIFICANT CHANGE UP (ref 150–400)
PLATELET # BLD AUTO: 336 K/UL — SIGNIFICANT CHANGE UP (ref 150–400)
POTASSIUM SERPL-MCNC: 3.8 MMOL/L — SIGNIFICANT CHANGE UP (ref 3.5–5.3)
POTASSIUM SERPL-MCNC: 4.2 MMOL/L — SIGNIFICANT CHANGE UP (ref 3.5–5.3)
POTASSIUM SERPL-MCNC: 4.9 MMOL/L — SIGNIFICANT CHANGE UP (ref 3.5–5.3)
POTASSIUM SERPL-SCNC: 3.8 MMOL/L — SIGNIFICANT CHANGE UP (ref 3.5–5.3)
POTASSIUM SERPL-SCNC: 4.2 MMOL/L — SIGNIFICANT CHANGE UP (ref 3.5–5.3)
POTASSIUM SERPL-SCNC: 4.9 MMOL/L — SIGNIFICANT CHANGE UP (ref 3.5–5.3)
PREALB SERPL-MCNC: 26 MG/DL — SIGNIFICANT CHANGE UP (ref 18–38)
PROT SERPL-MCNC: 5.8 G/DL — LOW (ref 6.6–8.7)
PROT SERPL-MCNC: 5.9 G/DL — LOW (ref 6.6–8.7)
PROT SERPL-MCNC: 6.3 G/DL — LOW (ref 6.6–8.7)
PROTHROM AB SERPL-ACNC: 57.6 SEC — HIGH (ref 9.9–13.4)
PROTHROM AB SERPL-ACNC: 59 SEC — HIGH (ref 9.9–13.4)
RBC # BLD: 3.21 M/UL — LOW (ref 3.8–5.2)
RBC # BLD: 3.84 M/UL — SIGNIFICANT CHANGE UP (ref 3.8–5.2)
RBC # BLD: 4.03 M/UL — SIGNIFICANT CHANGE UP (ref 3.8–5.2)
RBC # BLD: 4.04 M/UL — SIGNIFICANT CHANGE UP (ref 3.8–5.2)
RBC # FLD: 16.1 % — HIGH (ref 10.3–14.5)
SODIUM SERPL-SCNC: 131 MMOL/L — LOW (ref 135–145)
SODIUM SERPL-SCNC: 134 MMOL/L — LOW (ref 135–145)
SODIUM SERPL-SCNC: 135 MMOL/L — SIGNIFICANT CHANGE UP (ref 135–145)
TRIGL SERPL-MCNC: 98 MG/DL — SIGNIFICANT CHANGE UP
TROPONIN T, HIGH SENSITIVITY RESULT: 13 NG/L — SIGNIFICANT CHANGE UP (ref 0–51)
TROPONIN T, HIGH SENSITIVITY RESULT: 14 NG/L — SIGNIFICANT CHANGE UP (ref 0–51)
TSH SERPL-MCNC: 4.36 UIU/ML — HIGH (ref 0.27–4.2)
WBC # BLD: 13.23 K/UL — HIGH (ref 3.8–10.5)
WBC # BLD: 14.69 K/UL — HIGH (ref 3.8–10.5)
WBC # BLD: 16.46 K/UL — HIGH (ref 3.8–10.5)
WBC # BLD: 19.24 K/UL — HIGH (ref 3.8–10.5)
WBC # FLD AUTO: 13.23 K/UL — HIGH (ref 3.8–10.5)
WBC # FLD AUTO: 14.69 K/UL — HIGH (ref 3.8–10.5)
WBC # FLD AUTO: 16.46 K/UL — HIGH (ref 3.8–10.5)
WBC # FLD AUTO: 19.24 K/UL — HIGH (ref 3.8–10.5)

## 2024-12-26 PROCEDURE — 93010 ELECTROCARDIOGRAM REPORT: CPT

## 2024-12-26 PROCEDURE — 93306 TTE W/DOPPLER COMPLETE: CPT | Mod: 26

## 2024-12-26 PROCEDURE — 99292 CRITICAL CARE ADDL 30 MIN: CPT

## 2024-12-26 PROCEDURE — 99223 1ST HOSP IP/OBS HIGH 75: CPT | Mod: GC

## 2024-12-26 PROCEDURE — 99291 CRITICAL CARE FIRST HOUR: CPT

## 2024-12-26 PROCEDURE — 93880 EXTRACRANIAL BILAT STUDY: CPT | Mod: 26

## 2024-12-26 PROCEDURE — 71275 CT ANGIOGRAPHY CHEST: CPT | Mod: 26,MC

## 2024-12-26 PROCEDURE — 74174 CTA ABD&PLVS W/CONTRAST: CPT | Mod: 26,MC

## 2024-12-26 PROCEDURE — 71045 X-RAY EXAM CHEST 1 VIEW: CPT | Mod: 26

## 2024-12-26 RX ORDER — DEXTROSE MONOHYDRATE 25 G/50ML
15 INJECTION, SOLUTION INTRAVENOUS ONCE
Refills: 0 | Status: DISCONTINUED | OUTPATIENT
Start: 2024-12-26 | End: 2024-12-28

## 2024-12-26 RX ORDER — FLUTICASONE PROPIONATE AND SALMETEROL 50; 500 UG/1; UG/1
1 POWDER ORAL; RESPIRATORY (INHALATION)
Refills: 0 | Status: DISCONTINUED | OUTPATIENT
Start: 2024-12-26 | End: 2024-12-31

## 2024-12-26 RX ORDER — NICARDIPINE HYDROCHLORIDE 2.5 MG/ML
5 INJECTION INTRAVENOUS
Qty: 40 | Refills: 0 | Status: DISCONTINUED | OUTPATIENT
Start: 2024-12-26 | End: 2024-12-28

## 2024-12-26 RX ORDER — SODIUM CHLORIDE 9 MG/ML
1000 INJECTION, SOLUTION INTRAVENOUS
Refills: 0 | Status: DISCONTINUED | OUTPATIENT
Start: 2024-12-26 | End: 2024-12-28

## 2024-12-26 RX ORDER — GLUCAGON INJECTION, SOLUTION 0.5 MG/.1ML
1 INJECTION, SOLUTION SUBCUTANEOUS ONCE
Refills: 0 | Status: DISCONTINUED | OUTPATIENT
Start: 2024-12-26 | End: 2024-12-28

## 2024-12-26 RX ORDER — PANTOPRAZOLE 40 MG/1
40 TABLET, DELAYED RELEASE ORAL
Refills: 0 | Status: DISCONTINUED | OUTPATIENT
Start: 2024-12-26 | End: 2024-12-31

## 2024-12-26 RX ORDER — DEXTROSE MONOHYDRATE 25 G/50ML
12.5 INJECTION, SOLUTION INTRAVENOUS ONCE
Refills: 0 | Status: DISCONTINUED | OUTPATIENT
Start: 2024-12-26 | End: 2024-12-28

## 2024-12-26 RX ORDER — ATORVASTATIN CALCIUM 40 MG/1
20 TABLET, FILM COATED ORAL AT BEDTIME
Refills: 0 | Status: DISCONTINUED | OUTPATIENT
Start: 2024-12-26 | End: 2024-12-31

## 2024-12-26 RX ORDER — MAGNESIUM SULFATE 500 MG/ML
2 INJECTION, SOLUTION INTRAMUSCULAR; INTRAVENOUS ONCE
Refills: 0 | Status: COMPLETED | OUTPATIENT
Start: 2024-12-26 | End: 2024-12-26

## 2024-12-26 RX ORDER — ESMOLOL HYDROCHLORIDE 10 MG/ML
50 INJECTION, SOLUTION INTRAVENOUS
Qty: 2500 | Refills: 0 | Status: DISCONTINUED | OUTPATIENT
Start: 2024-12-26 | End: 2024-12-28

## 2024-12-26 RX ORDER — HYDROMORPHONE HCL 4 MG
0.5 TABLET ORAL ONCE
Refills: 0 | Status: DISCONTINUED | OUTPATIENT
Start: 2024-12-26 | End: 2024-12-26

## 2024-12-26 RX ORDER — LIDOCAINE 50 MG/G
2 OINTMENT TOPICAL EVERY 24 HOURS
Refills: 0 | Status: DISCONTINUED | OUTPATIENT
Start: 2024-12-26 | End: 2024-12-31

## 2024-12-26 RX ORDER — DEXTROSE MONOHYDRATE 25 G/50ML
25 INJECTION, SOLUTION INTRAVENOUS ONCE
Refills: 0 | Status: DISCONTINUED | OUTPATIENT
Start: 2024-12-26 | End: 2024-12-28

## 2024-12-26 RX ORDER — MORPHINE SULFATE 15 MG
4 TABLET, EXTENDED RELEASE ORAL ONCE
Refills: 0 | Status: DISCONTINUED | OUTPATIENT
Start: 2024-12-26 | End: 2024-12-26

## 2024-12-26 RX ORDER — LEVOTHYROXINE SODIUM 175 UG/1
25 TABLET ORAL DAILY
Refills: 0 | Status: DISCONTINUED | OUTPATIENT
Start: 2024-12-27 | End: 2024-12-31

## 2024-12-26 RX ORDER — MORPHINE SULFATE 15 MG
4 TABLET, EXTENDED RELEASE ORAL EVERY 4 HOURS
Refills: 0 | Status: DISCONTINUED | OUTPATIENT
Start: 2024-12-26 | End: 2024-12-27

## 2024-12-26 RX ORDER — INSULIN LISPRO 100/ML
VIAL (ML) SUBCUTANEOUS AT BEDTIME
Refills: 0 | Status: DISCONTINUED | OUTPATIENT
Start: 2024-12-26 | End: 2024-12-30

## 2024-12-26 RX ORDER — SODIUM CHLORIDE 9 MG/ML
3 INJECTION, SOLUTION INTRAMUSCULAR; INTRAVENOUS; SUBCUTANEOUS EVERY 8 HOURS
Refills: 0 | Status: DISCONTINUED | OUTPATIENT
Start: 2024-12-26 | End: 2024-12-31

## 2024-12-26 RX ORDER — HYDROMORPHONE HCL 4 MG
1 TABLET ORAL ONCE
Refills: 0 | Status: DISCONTINUED | OUTPATIENT
Start: 2024-12-26 | End: 2024-12-26

## 2024-12-26 RX ORDER — MORPHINE SULFATE 15 MG
8 TABLET, EXTENDED RELEASE ORAL ONCE
Refills: 0 | Status: DISCONTINUED | OUTPATIENT
Start: 2024-12-26 | End: 2024-12-26

## 2024-12-26 RX ORDER — INSULIN LISPRO 100/ML
VIAL (ML) SUBCUTANEOUS
Refills: 0 | Status: DISCONTINUED | OUTPATIENT
Start: 2024-12-26 | End: 2024-12-31

## 2024-12-26 RX ORDER — ONDANSETRON 4 MG/1
4 TABLET ORAL ONCE
Refills: 0 | Status: COMPLETED | OUTPATIENT
Start: 2024-12-26 | End: 2024-12-26

## 2024-12-26 RX ORDER — ACETAMINOPHEN 80 MG/.8ML
1000 SOLUTION/ DROPS ORAL ONCE
Refills: 0 | Status: COMPLETED | OUTPATIENT
Start: 2024-12-26 | End: 2024-12-26

## 2024-12-26 RX ADMIN — SODIUM CHLORIDE 3 MILLILITER(S): 9 INJECTION, SOLUTION INTRAMUSCULAR; INTRAVENOUS; SUBCUTANEOUS at 22:42

## 2024-12-26 RX ADMIN — Medication 8 MILLIGRAM(S): at 17:11

## 2024-12-26 RX ADMIN — FLUTICASONE PROPIONATE AND SALMETEROL 1 DOSE(S): 50; 500 POWDER ORAL; RESPIRATORY (INHALATION) at 21:26

## 2024-12-26 RX ADMIN — Medication 4 MILLIGRAM(S): at 18:42

## 2024-12-26 RX ADMIN — Medication 0.5 MILLIGRAM(S): at 20:30

## 2024-12-26 RX ADMIN — LIDOCAINE 2 PATCH: 50 OINTMENT TOPICAL at 22:09

## 2024-12-26 RX ADMIN — ESMOLOL HYDROCHLORIDE 22.3 MICROGRAM(S)/KG/MIN: 10 INJECTION, SOLUTION INTRAVENOUS at 10:49

## 2024-12-26 RX ADMIN — ACETAMINOPHEN 400 MILLIGRAM(S): 80 SOLUTION/ DROPS ORAL at 14:22

## 2024-12-26 RX ADMIN — Medication 0.5 MILLIGRAM(S): at 21:30

## 2024-12-26 RX ADMIN — ONDANSETRON 4 MILLIGRAM(S): 4 TABLET ORAL at 16:05

## 2024-12-26 RX ADMIN — MAGNESIUM SULFATE 25 GRAM(S): 500 INJECTION, SOLUTION INTRAMUSCULAR; INTRAVENOUS at 17:45

## 2024-12-26 RX ADMIN — ATORVASTATIN CALCIUM 20 MILLIGRAM(S): 40 TABLET, FILM COATED ORAL at 22:10

## 2024-12-26 RX ADMIN — Medication 4 MILLIGRAM(S): at 10:57

## 2024-12-26 RX ADMIN — NICARDIPINE HYDROCHLORIDE 25 MG/HR: 2.5 INJECTION INTRAVENOUS at 12:46

## 2024-12-26 RX ADMIN — Medication 0.5 MILLIGRAM(S): at 13:40

## 2024-12-26 RX ADMIN — SODIUM CHLORIDE 3 MILLILITER(S): 9 INJECTION, SOLUTION INTRAMUSCULAR; INTRAVENOUS; SUBCUTANEOUS at 15:57

## 2024-12-26 RX ADMIN — Medication 4 MILLIGRAM(S): at 10:50

## 2024-12-26 RX ADMIN — Medication 8 MILLIGRAM(S): at 16:11

## 2024-12-26 RX ADMIN — Medication 0.5 MILLIGRAM(S): at 14:24

## 2024-12-26 RX ADMIN — Medication 4 MILLIGRAM(S): at 19:42

## 2024-12-26 RX ADMIN — ESMOLOL HYDROCHLORIDE 22.3 MICROGRAM(S)/KG/MIN: 10 INJECTION, SOLUTION INTRAVENOUS at 12:46

## 2024-12-26 RX ADMIN — Medication 1 MILLIGRAM(S): at 11:17

## 2024-12-26 RX ADMIN — ACETAMINOPHEN 400 MILLIGRAM(S): 80 SOLUTION/ DROPS ORAL at 23:45

## 2024-12-26 RX ADMIN — ACETAMINOPHEN 1000 MILLIGRAM(S): 80 SOLUTION/ DROPS ORAL at 15:22

## 2024-12-26 NOTE — H&P ADULT - NSICDXPASTMEDICALHX_GEN_ALL_CORE_FT
PAST MEDICAL HISTORY:  Abnormal uterine bleeding (AUB)     Anxiety     Asthma     Depression     Diabetes     Dilated aortic root     Dissection of right carotid artery     Fibromyalgia     Marfan syndrome

## 2024-12-26 NOTE — ED ADULT TRIAGE NOTE - CHIEF COMPLAINT QUOTE
C/O midsternum chest pain that radiates to the back in-between her shoulder blades and down her right arm that began this am. +SOB. Pt reports weakness in her lower extremities. Skin is pale in color. Hx of aortic aneurysm and marfan syndrome. PT is on coumadin and reports her INR this am was 4.8. Priority CT called. 50mcg of fentanyl given by EMS.

## 2024-12-26 NOTE — H&P ADULT - NSHPPHYSICALEXAM_GEN_ALL_CORE
T(C): 36.7 (12-26-24 @ 10:40), Max: 36.7 (12-26-24 @ 10:18)  HR: 91 (12-26-24 @ 11:39) (75 - 93)  BP: 121/80 (12-26-24 @ 11:39) (107/71 - 154/95)  RR: 18 (12-26-24 @ 11:39) (18 - 20)  SpO2: 100% (12-26-24 @ 11:39) (92% - 100%)    CONSTITUTIONAL: Well groomed, no apparent distress  EYES: PERRLA and symmetric, EOMI, No conjunctival or scleral injection, non-icteric  ENMT: Oral mucosa with moist membranes.   NECK: Supple, symmetric and without tracheal deviation   RESP: No respiratory distress, no use of accessory muscles; CTA b/l, no WRR  CV: RRR, +S1S2, no MRG; no JVD; no peripheral edema  GI: Soft, NT, ND, no rebound, no guarding; no pulsatile masses; no hepatosplenomegaly  MSK: Normal ROM, with 5/5 strength          SKIN: No rashes or ulcers noted; no subcutaneous nodules or induration palpable  NEURO: AO4, moves all extremities against gravity, normal sensation, speech clear, following commands.  PSYCH: Appropriate insight/judgment; A+O x 3, mood and affect appropriate, recent/remote memory intact

## 2024-12-26 NOTE — H&P ADULT - NSHPLABSRESULTS_GEN_ALL_CORE
< from: CT Angio Abdomen and Pelvis w/ IV Cont (12.26.24 @ 10:40) >    PROCEDURE DATE:  12/26/2024          INTERPRETATION:  CLINICAL INFORMATION: Rule outdissection    COMPARISON: 4/11/2023    CONTRAST/COMPLICATIONS:  IV Contrast: Omnipaque 350  90 cc administered   10 cc discarded  Oral Contrast: NONE  .    PROCEDURE:  CT Angiography of the Chest, Abdomen and Pelvis.  Precontrast imaging was performed through the chest followed by arterial   phase imaging of the chest, abdomen and pelvis.  Sagittal and coronal reformats were performed as well as 3D (MIP)   reconstructions.    FINDINGS:  CHEST:  LUNGS AND LARGE AIRWAYS: Patent central airways. No pulmonary nodules.  PLEURA: No pleural effusion.  VESSELS: A new type B aortic dissection is noted. It originates at the   level just distal to the left subclavian artery and extends into the   abdominal aorta, terminating just proximal to the renal arteries.  HEART: Heart size is normal. As noted previously post aortic valve   replacement  MEDIASTINUM AND IRWIN: No lymphadenopathy.  CHEST WALL AND LOWER NECK: Within normal limits.    ABDOMEN AND PELVIS:  LIVER: Within normal limits.  BILE DUCTS: Normal caliber.  GALLBLADDER: Within normal limits.  SPLEEN: Within normal limits.  PANCREAS: Within normal limits.  ADRENALS: Within normal limits.  KIDNEYS/URETERS: Symmetric enhancement. No collecting system obstruction   bilaterally.    BLADDER: Within normal limits.  REPRODUCTIVE ORGANS: Grossly within normal limits    BOWEL: No bowel obstruction.  PERITONEUM/RETROPERITONEUM: Within normal limits.  VESSELS: As noted above, type B aortic dissection. The true lumen of the   mid aspect of the dissection, and the proximal intra-abdominal aorta is   severely narrowed, but then expands just proximal to the termination of   the dissection.  The superior mesenteric artery originates from the true lumen. Of note,   the splenic artery, common hepatic artery originating from the superior   mesenteric artery. The left gastric artery has a separate origin from   originate from the false lumen (5:160). The renal arteries originate from   the true lumen and are patent bilaterally. The inferior mesenteric artery   is widely patent. The common iliac, internal and external iliac, and   common femoral arteries are widely patent bilaterally.  LYMPH NODES: No lymphadenopathy.  ABDOMINAL WALL: Within normal limits.  BONES: Degenerative changes.    IMPRESSION:  New type B aortic dissection as described above. These findings were   conveyed to Dr. Moncada at [facility] at 10:55 AM.    --- End of Report ---    < end of copied text >

## 2024-12-26 NOTE — H&P ADULT - HISTORY OF PRESENT ILLNESS
[age]F with PMH of open heart valve replacements (aortic root dilation with  mechanical AVR and mitral valve replacement) on coumadin, Marfan's syndrome, HTN, DM, right carotid dissection without repair, asthma, abnormal uterine bleeding on progesterone, MDD and chronic pain presenting to [facility] ED for acute onset chest pain and dyspnea starting at ~0900 today. Patient describes pain as tearing chest pain that radiates to her back. Patient had CTA of chest and abdomen in [facility] ED, which shows an acute Type B aortic dissection. CT-ICU consulted for evaluation and management. Patient states she has chronic chest pain, dyspnea, and occasional dizziness on exertion, which she has been seen for by cardiology and PCP. Patient denies lightheadedness, current dizziness, numbness, weakness, fevers, sweats, cough, hemoptysis, abdominal pain, N/V/D, abnormal bleeding, or other symptoms of concern.

## 2024-12-26 NOTE — H&P ADULT - ASSESSMENT
39F with PMH of open heart valve replacements (aortic root dilation with  mechanical AVR and mitral valve replacement) on coumadin, Marfan's syndrome, HTN, DM, right carotid dissection without repair, asthma, abnormal uterine bleeding on progesterone, MDD and chronic pain with acute Type B aortic dissection starting at approximately 0900 today. 39F with PMH of open heart valve replacements (aortic root dilation with mechanical AVR and mitral valve replacement) on coumadin, Marfan's syndrome, HTN, DM, right carotid dissection without repair, asthma, abnormal uterine bleeding on progesterone, MDD and chronic pain with acute Type B aortic dissection starting at approximately 0900 today.

## 2024-12-26 NOTE — ED PROVIDER NOTE - OBJECTIVE STATEMENT
A 40 yo F with pmh Marfan's syndrome aortic root dilation with AVR, mitral valve replacement, on Warfarin, s/p b/l lens replacement, DM, asthma, MDD, and chronic back pain, presents to the ED for lower sternum chest pain, epigastric pain, radiating to mild-back that began this morning. Pt is on coumadin and reports her INR this am was 4.8. 50mcg of fentanyl given by EMS.

## 2024-12-26 NOTE — ED ADULT NURSE NOTE - OBJECTIVE STATEMENT
assumed care of pt at 1040 in , MD Dejesus at bedside for eval. c/o midsternal chest pain radiating to right mid back starting 1 hour ago. also reports SOB and generalized weakness. denies dizziness. pt cool to touch and pale, placed on cardiac monitor and . pmh of aortic aneurysm and marfan syndrome. PT is on coumadin and reports her INR this am was 4.8. Priority CT called. 50mcg of fentanyl given by EMS. rr even and unlabored. anox4. end ote

## 2024-12-26 NOTE — PROCEDURE NOTE - NSPROCDETAILS_GEN_ALL_CORE
location identified, draped/prepped, sterile technique used, needle inserted/introduced/positive blood return obtained via catheter/connected to a pressurized flush line/Seldinger technique/all materials/supplies accounted for at end of procedure Routine  care Routine  care and anticipatory guidance. Follow up with pediatrician in 1-2 days.    Please make sure to feed your  every 3 hours or sooner as baby demands. Breast milk is preferable, either through breast feeding or via pumping of breast milk. If you do not have enough breast milk please supplement with formula. Please seek immediate medical attention if your baby seems to not be feeding well or has persistent vomiting. If baby appears yellow or jaundiced please consult with your pediatrician. You must follow up with your pediatrician in 1-2 days. If your baby has a fever of 100.4F or more you must seek medical care in an emergency room immediately. Please call [facility] or your pediatrician if you should have any other questions or concerns. Deep suctioning and CPAP not required. Respiratory stable.

## 2024-12-26 NOTE — CONSULT NOTE ADULT - ASSESSMENT
39F with PMH of open heart valve replacements (aortic root dilation with mechanical AVR and mitral valve replacement) on coumadin, Marfan's syndrome, HTN, DM, right carotid dissection without repair, asthma, abnormal uterine bleeding on progesterone, MDD and chronic pain presenting to [facility] ED for acute onset chest pain and dyspnea. Patient was admitted to the CTICU for impulse control on 12/26/2024 after presented to the ED for new onset chest pain radiating to the back. CTA revealed new Type B aortic dissection originating just distal to the subclavian takeoff and ending just proximal to the renal arteries. Patient is now maintained on esmolol gtt and cardene gtt with normotensive blood pressure. Patient continues to denies abdominal pain. She also does not have any deficits and peripheral pulses intact at time of vascular surgery evaluation. TTE currently pending and all anticoagulation on hold. Vascular surgery consulted for input on possible operative intervention given continued chest pain radiating to the back despite adequate blood pressure control.    Recommendations:  - Continue impulse control  - Continue management per CTICU  - Vascular surgery to discussed any further intervention  - Further recommendations to follow      Patient discussed with Dr. Trent 39F with PMH of open heart valve replacements (aortic root dilation with mechanical AVR and mitral valve replacement) on coumadin, Marfan's syndrome, HTN, DM, right carotid dissection without repair, asthma, abnormal uterine bleeding on progesterone, MDD and chronic pain presenting to [facility] ED for acute onset chest pain and dyspnea. Patient was admitted to the CTICU for impulse control on 12/26/2024 after presented to the ED for new onset chest pain radiating to the back. CTA revealed new Type B aortic dissection originating just distal to the subclavian takeoff and ending just proximal to the renal arteries. Patient is now maintained on esmolol gtt and cardene gtt with normotensive blood pressure. Patient continues to denies abdominal pain. She also does not have any deficits and peripheral pulses intact at time of vascular surgery evaluation. TTE currently pending and all anticoagulation on hold. Vascular surgery consulted for input on possible operative intervention given continued chest pain radiating to the back despite adequate blood pressure control.    Recommendations:  - Continue impulse control  - Continue management per CTICU  - No acute vascular surgery intervention at this time  - Further recommendations to follow      Patient discussed with Dr. Deluca

## 2024-12-26 NOTE — CONSULT NOTE ADULT - ATTENDING COMMENTS
pt with history of marfans , AVR on coumadin , chronic pain history of multiple spinal surgeries presents with sudden chest pain this morning , found to have type B dissection , no previous history of type b dissection reports continued pain despite  BP control . would recommend rescanning patient in am , currently no ominous findings on CT but she continues to have pain . high risk for complications if she requires intervention in acute setting particularly with marfans

## 2024-12-26 NOTE — ED ADULT NURSE NOTE - NSFALLUNIVINTERV_ED_ALL_ED
Bed/Stretcher in lowest position, wheels locked, appropriate side rails in place/Call bell, personal items and telephone in reach/Instruct patient to call for assistance before getting out of bed/chair/stretcher/Non-slip footwear applied when patient is off stretcher/[place] to call system/Physically safe environment - no spills, clutter or unnecessary equipment/Purposeful proactive rounding/Room/bathroom lighting operational, light cord in reach

## 2024-12-26 NOTE — H&P ADULT - PROBLEM SELECTOR PLAN 1
CTA: Acute type B dissection  CCM  Esmolol gtt for HR control goal < 60  Cardene gtt to maintain SBP < 120  Pulse/neuro checks Q1H  Pain control PRN  Holding anticoagulation  Vascular surgery consult CTA: Acute type B dissection  CCM  Esmolol gtt for HR control goal < 60 as tolerated  Cardene gtt to maintain SBP < 120  Pulse/neuro checks Q1H  Pain control PRN  Holding anticoagulation  Vascular surgery consult CTA: Acute type B dissection  CCM  Esmolol gtt for HR control goal < 60 as tolerated  Cardene gtt to maintain SBP < 120  Pulse/neuro checks Q1H  Pain control PRN  Holding anticoagulation  Vascular surgery consult  Preop labs pending  TTE pending  May require FFP for elevated INR of 5

## 2024-12-26 NOTE — H&P ADULT - NSICDXPASTSURGICALHX_GEN_ALL_CORE_FT
PAST SURGICAL HISTORY:  H/O aortic valve replacement 2013    H/O artificial lens replacement     H/O mitral valve replacement with mechanical valve     H/O spinal fusion 2005

## 2024-12-26 NOTE — H&P ADULT - PROBLEM SELECTOR PLAN 2
Holding home metoprolol while on esmolol gtt to maintain HR < 60 as tolerated  Cardene gtt to maintain SBP < 120

## 2024-12-26 NOTE — ED PROVIDER NOTE - CLINICAL SUMMARY MEDICAL DECISION MAKING FREE TEXT BOX
A 38 yo F with pmh Marfan's syndrome aortic root dilation with AVR, mitral valve replacement, on Warfarin, s/p b/l lens replacement, DM, asthma, MDD, and chronic back pain, presents to the ED for lower sternum chest pain, epigastric pain, radiating to mild-back that began this morning. Pt is on coumadin and reports her INR this am was 4.8. 50mcg of fentanyl given by EMS.  As high risk for dissection, priority CT called in the ambo. Will check cbc, cmp, coags, trop T, lipase, cxr, ECG. Will check vbg/lac, CTA c/ab/p. Symptom management and reassess. > Type B dissection > Admit to CT ICU. A 40 yo F with pmh Marfan's syndrome aortic root dilation with AVR, mitral valve replacement, on Warfarin, s/p b/l lens replacement, DM, asthma, MDD, and chronic back pain, presents to the ED for lower sternum chest pain, epigastric pain, radiating to mild-back that began this morning. Pt is on coumadin and reports her INR this am was 4.8. 50mcg of fentanyl given by EMS.  As high risk for dissection, priority CT called in the ambo. Will check cbc, cmp, coags, trop T, lipase, cxr, ECG. Will check vbg/lac, CTA c/ab/p. Symptom management and reassess. > Type B dissection. Started Esmolol to control -120. Pain mgmt. > Admit to CT ICU.

## 2024-12-26 NOTE — PROVIDER CONTACT NOTE (CRITICAL VALUE NOTIFICATION) - TEST AND RESULT REPORTED:
HOSPITALIST CONSULTATION     REQUESTING PHYSICIAN: Serge Ramirez MD    REASON FOR CONSULTATION: Evaluation, Recommendations and Co-management of Medical Comorbidities.     ASSESSMENT & PLAN :     Nadira Perez is a 67 year old female admitted on 11/18/2021 s/p POD 0 s/p L5-S1 PSF and OLIF w/ Dr. Ramirez.  Doing well.  EBL:  200mL with 0mL returned via cellsaver.   No complications.       PMH, Pre Op eval reviewed.  Reports some incisional pain.  Otherwise doing well.     Comorbid conditions of hypertension, hyperlipidemia, neuropathy, prediabetes, hypothyroidism, history of nephrolithiasis, history of breast cancer, dermatitis, history of squamous cell carcinoma, degenerative disc disease, scoliosis, carpal tunnel syndrome, knee pain and osteoporosis:     # Orthopedic Surgery:     Post Op Management per Primary team.   Hemodynamics: stable currently.   Continue on gentle IV fluids, until adequate PO.    Post op 24 hr capnography per protocol.   Pain control- per Primary team and/or Pain team.    Minimize use of narcotics as able.   Consider bowel regimen with narcotics.   Encourage Incentive spirometry to prevent atelectasis.  DVT prophylaxis- per Primary team.  Activity, antibiotics, wound and/or drain care - per Primary team.     Cardiovascular:  Currently asymptomatic.  Vitals: Stable.  History of PVCs.  Preop EKG: Normal sinus with LVH.    Hypertension: PTA amlodipine, metoprolol, lisinopril: Continue with hold parameters.  Hold PTA diuretics.  Pain control  Monitor blood pressure    Hyperlipidemia:  Continue PTA Lipitor    Hematology:   Anemia: Follow hemoglobin for anemia of acute blood loss.  Transfuse to keep above 7.  Preop hemoglobin 11.5.    Neuropathy: PT and gabapentin.  Continue.    Hypothyroidism: Continue PTA levothyroxine.  Prediabetes.    Oncology: History of breast cancer s/p bilateral mastectomy, radiation and chemotherapy.   Doing  well.    Hypokalemia: Acute on chronic.  Potassium 2.6.  Follow-up.  Continue PTA potassium supplement.  RN managed potassium, standard protocol    Skin: Dermatitis - continue topical cream.   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     ENT: History of parathyroidectomy and patient reports right recurrent laryngeal nerve injury for which she had thyroplasty. She has some dysphagia issues.   ~ Bilateral hearing loss with hearing aids.      Rest per primary.     Thank you for the consultation. Please page with any question. Hospitalist team to follow.      Poncho Ovalles MD   Hospitalist, Internal Medicine      CHIEF COMPLAINT: Incisional pain.    HISTORY OF PRESENT ILLNESS: Obtained from the patient and chart review including Pre op evaluation, procedure note.    67 year old year old female  with above discussed medical problems s/p above procedure admitted on 11/18/2021  for post op care and monitoring  (for further details for indication of surgery and operative note, please refer to Serge Raimrez MD note).   No documented hypotension, hypoxemia or other significant complications intra or post operative.   Medicine consulted to evaluate, recommend and/or co manage medical co morbidities.  Currently: Hemodynamics stable. Incisional Pain +. Denies any chest pain, cough, shortness of breath or LH or palpitations. Denies any nausea, vomiting or pain abdomen. No fever or chills. Denies any dysuria.  Denies any new rash. No new focal neuro deficit. No other concern.   Medical issues as discussed above.     PAST MEDICAL HISTORY:   Past Medical History:   Diagnosis Date     Arthritis      Bone disease      Breast cancer (H)      H/O kyphoplasty      Hearing problem      History of kidney stones      History of radiation therapy      Hyperlipemia      Hypertension      Hypopotassemia      Kidney problem      Lymph edema      Medullary sponge kidney      Osteopenia      PONV (postoperative  nausea and vomiting)      Reduced vision      Squamous cell skin cancer     vulva secondary to HPV     Thyroid disease        PAST SURGICAL HISTORY:   Past Surgical History:   Procedure Laterality Date     ABDOMEN SURGERY      ovarian cyst, mesh     ARTHRODESIS WRIST Right      ARTHRODESIS WRIST  02/14/2013    Procedure: ARTHRODESIS WRIST;  left wrist scaphoid excision, four bone fusion, iliac crest bone graft  ( Mac with block);  Surgeon: Av Mendez MD;  [facility]     BIOPSY      skin, vaginal     CATARACT IOL, RT/LT Right 03/13/2018     CATARACT IOL, RT/LT Left 02/20/2018     COLONOSCOPY  12/24/2013    Procedure: COMBINED COLONOSCOPY, SINGLE BIOPSY/POLYPECTOMY BY BIOPSY;  COLONOSCOPY;  Surgeon: Dom Alvarez MD;  Location:  GI     ESOPHAGOSCOPY, GASTROSCOPY, DUODENOSCOPY (EGD), COMBINED N/A 11/23/2016    Procedure: COMBINED ESOPHAGOSCOPY, GASTROSCOPY, DUODENOSCOPY (EGD);  Surgeon: Quinten Feliciano MD;  Location:  GI     EXTERNAL EAR SURGERY      right     EYE SURGERY      radial keratomy     GRAFT BONE FROM ILIAC CREST  02/14/2013    Procedure: GRAFT BONE FROM ILIAC CREST;  mac with block and local infilitration;  Surgeon: Av Mendez MD;  [facility]     HC BREATH HYDROGEN TEST N/A 10/14/2016    Procedure: HYDROGEN BREATH TEST;  Surgeon: Cheri Barron MD;  Location:  GI     HERNIA REPAIR      umbilical age 18 mos.     HYSTERECTOMY TOTAL ABDOMINAL  05/03/2000     MASTECTOMY MODIFIED RADICAL Bilateral     bilateral; right breast prophylactic     PARATHYROIDECTOMY  09/23/2004    R SUPERIOR     PARATHYROIDECTOMY  09/23/2004    parathyroid resection, subtotal     REMOVE HARDWARE HAND  09/24/2013    Procedure: REMOVE HARDWARE HAND;  Left Hand Screw Removal        RHINOPLASTY  1968     thyr proc skin closed cosmetic manner by subcuticular stitch  01/23/2009     THYROPLASTY  10/09/2009     TONSILLECTOMY  1977     WRIST SURGERY      wrist arthrodesis       FH:  reviewed.     Family History   Problem Relation Age of Onset     Neurologic Disorder Mother         Anuerysm of Cerebral Artery, Dementia     Diabetes Mother      Thyroid Disease Mother         ,     Cerebrovascular Disease Mother      Dementia Mother      Osteoporosis Mother      Heart Disease Father         AAA     Hypertension Father      Circulatory Brother         Perihperal Neurophathy     Diabetes Maternal Grandmother      Asthma Maternal Grandmother      Chronic Obstructive Pulmonary Disease Maternal Grandfather         father     Asthma Maternal Grandfather      Diabetes Maternal Aunt         x2     Melanoma Maternal Aunt      Glaucoma Maternal Aunt      Breast Cancer Cousin      Dementia Other      Cancer Other         malignant melanoma     Hypertension Other      Hypertension Other      Cerebrovascular Disease Other      Cerebrovascular Disease Other      Obesity Other      Respiratory Other      Cancer Other      Diabetes Other      Asthma Other      Macular Degeneration No family hx of      Coronary Artery Disease No family hx of      Hyperlipidemia No family hx of      Kidney Disease No family hx of      Thrombosis No family hx of      Arthritis No family hx of      Depression No family hx of      Mental Illness No family hx of      Substance Abuse No family hx of      Cystic Fibrosis No family hx of      Early Death No family hx of      Coronary Artery Disease Early Onset No family hx of      Heart Failure No family hx of      Bleeding Diathesis No family hx of      Ovarian Cancer No family hx of      Uterine Cancer No family hx of      Prostate Cancer No family hx of      Colorectal Cancer No family hx of      Pancreatic Cancer No family hx of      Lung Cancer No family hx of      Other Cancer No family hx of      Autoimmune Disease No family hx of      Unknown/Adopted No family hx of      Genetic Disorder No family hx of      Bleeding Disorder No family hx of      Clotting Disorder No family hx of       Anesthesia Reaction No family hx of         SH: reviewed.     Social History     Socioeconomic History     Marital status:      Spouse name: None     Number of children: None     Years of education: None     Highest education level: None   Occupational History     None   Tobacco Use     Smoking status: Never Smoker     Smokeless tobacco: Never Used   Substance and Sexual Activity     Alcohol use: Not Currently     Alcohol/week: 7.0 standard drinks     Types: 7 Glasses of wine per week     Comment: Rare to occasional     Drug use: Not Currently     Sexual activity: Not Currently     Partners: Male     Birth control/protection: Abstinence   Other Topics Concern      Service No     Blood Transfusions Not Asked     Caffeine Concern No     Occupational Exposure No     Hobby Hazards No     Sleep Concern No     Stress Concern No     Weight Concern No     Special Diet No     Back Care No     Exercise Yes     Comment: walks 4-6x  week for 20-30 min. each     Bike Helmet Not Asked     Seat Belt Yes     Self-Exams Yes     Parent/sibling w/ CABG, MI or angioplasty before 65F 55M? No   Social History Narrative    .  Recently retired. 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        She lives with a renter.         She exercises 50 minutes three times a week.     Social Determinants of Health     Financial Resource Strain: Not on file   Food Insecurity: Not on file   Transportation Needs: Not on file   Physical Activity: Not on file   Stress: Not on file   Social Connections: Not on file   Intimate Partner Violence: Not on file   Housing Stability: Not on file       ALLERGIES:   Allergies   Allergen Reactions     Erythromycin Nausea     Penicillins Hives     Around age 2 - doesn't recall full reaction, mother told her it was hives.     Has tolerated cephalsporins.          HOME MEDICATIONS:     Prior to Admission medications     Medication Sig Start Date End Date Taking? Authorizing Provider   Acetaminophen (APAP 500 PO) Take 1,000 mg by mouth 3 times daily    Yes Reported, Patient   amLODIPine (NORVASC) 10 MG tablet Take 1 tablet (10 mg) by mouth daily  Patient taking differently: Take 10 mg by mouth daily Takes around 10 AM. 10/31/21  Yes Matilde Quiñonez APRN CNP   Ascorbic Acid (VITAMIN C) 500 MG CHEW Take 1 tablet by mouth daily 3/1/20  Yes Reported, Patient   atorvastatin (LIPITOR) 80 MG tablet Take 1 tablet (80 mg) by mouth daily  Patient taking differently: Take 80 mg by mouth every evening  10/31/21  Yes Matilde Quiñonez APRN CNP   Cholecalciferol (VITAMIN D3) 50 MCG (2000 UT) CAPS Take 6,000 Units by mouth daily  Patient taking differently: Take 6,000 Units by mouth daily (with lunch)  10/31/21  Yes Matilde Quiñonez APRN CNP   CRANBERRY EXTRACT PO Take 650 mg by mouth daily ~10 am   Yes Reported, Patient   cyclobenzaprine (FLEXERIL) 10 MG tablet Take 1 tablet (10 mg) by mouth 3 times daily as needed for muscle spasms 10/31/21  Yes Matilde Quiñonez APRN CNP   diclofenac (VOLTAREN) 1 % topical gel Apply 4 grams to knees or 2 grams to hands four times daily using enclosed dosing card.  Patient taking differently: Apply 4 grams to knees or 2 grams to hands four times daily as needed using enclosed dosing card. 10/31/21  Yes Matilde Quiñonez APRN CNP   gabapentin (NEURONTIN) 300 MG capsule Take 1-2 capsules by mouth every 8 hours  Patient taking differently: Take 600 mg by mouth 3 times daily  10/31/21  Yes Matilde Quiñonez APRN CNP   HYDROcodone-acetaminophen (NORCO) 5-325 MG tablet Take 1 tablet by mouth every 6 hours as needed for severe pain 9/23/21  Yes Matilde Quiñonez APRN CNP   ibuprofen (ADVIL/MOTRIN) 200 MG tablet Take 600 mg by mouth 3 times daily    Yes Reported, Patient   levothyroxine (SYNTHROID/LEVOTHROID) 112 MCG tablet TAKE 1/2 TABLET BY MOUTH DAILY 10/31/21  Yes Matilde Quiñonez APRN CNP  INR 5.31   lisinopril (ZESTRIL) 40 MG tablet Take 1 tablet (40 mg) by mouth daily  Patient taking differently: Take 20 mg by mouth 2 times daily Takes 1/2 tablet at 2 PM and the other 1/2 tablet around 10 PM 10/31/21  Yes Matilde Quiñonez APRN CNP   melatonin 5 MG tablet Take 10 mg by mouth At Bedtime  1/1/20  Yes Reported, Patient   metoprolol succinate ER (TOPROL-XL) 50 MG 24 hr tablet Take 1 tablet (50 mg) by mouth daily  Patient taking differently: Take 50 mg by mouth every morning  10/31/21  Yes Matilde Quiñonez APRN CNP   Multiple Vitamin (MULTI-VITAMIN) per tablet Take 1 tablet by mouth daily (with lunch)    Yes Reported, Patient   ondansetron (ZOFRAN-ODT) 4 MG ODT tab Take 1 tablet (4 mg) by mouth every 12 hours as needed for nausea 2/23/21  Yes Matilde Quiñonez APRN CNP   potassium chloride ER (KLOR-CON M) 20 MEQ CR tablet Take 20 meq three times daily.  Patient taking differently: Take 20 mEq by mouth 3 times daily Take 20 meq three times daily. 11/2/21  Yes Matilde Quiñonez APRN CNP   spironolactone (ALDACTONE) 25 MG tablet Take 1 tablet (25 mg) by mouth daily 11/17/21 2/15/22 Yes Matilde Quiñonez APRN CNP   terbinafine (LAMISIL AT) 1 % external cream Apply topically 2 times daily For fungal infection not resolved with other antifungals (e.g. Clotrimazole)  Patient taking differently: Apply topically 2 times daily as needed (toenail fungus) For fungal infection not resolved with other antifungals (e.g. Clotrimazole) 10/31/21  Yes Matilde Quiñonez APRN CNP   tiZANidine (ZANAFLEX) 2 MG tablet Take 1-2 tablets (2-4 mg) by mouth 3 times daily as needed for muscle spasms  Patient taking differently: Take 2-4 mg by mouth 3 times daily as needed for muscle spasms Uses this or flexeril - prefers tizanidine. 10/31/21  Yes Matilde Quiñonez APRN CNP   triamcinolone (KENALOG) 0.1 % external ointment Apply topically 2 times daily  Patient taking differently: Apply topically 2 times daily as needed   10/31/21  Yes Matilde Quiñonez APRN CNP   triamterene-HCTZ (MAXZIDE-25) 37.5-25 MG tablet Take 1 tablet by mouth daily 10/31/21  Yes Matilde Quiñonez APRN CNP   Vaginal Lubricant (REPHRESH) GEL Place 2 g vaginally every 3 days 10/31/21  Yes Matilde Quiñonez APRN CNP       CURRENT MEDICATIONS:    Current Facility-Administered Medications   Medication     [START ON 11/21/2021] acetaminophen (TYLENOL) tablet 650 mg     acetaminophen (TYLENOL) tablet 975 mg     acetaminophen (TYLENOL) tablet 975 mg     benzocaine-menthol (CEPACOL) 15-3.6 MG lozenge 1 lozenge     bisacodyl (DULCOLAX) Suppository 10 mg     [START ON 11/19/2021] ceFAZolin (ANCEF) 1 g vial to attach to  ml bag for ADULT or 50 ml bag for PEDS     dextrose 5% and 0.45% NaCl + KCl 20 mEq/L infusion     famotidine (PEPCID) tablet 20 mg    Or     famotidine (PEPCID) infusion 20 mg     fentaNYL (PF) (SUBLIMAZE) injection 25 mcg     [START ON 11/19/2021] gabapentin (NEURONTIN) capsule 100 mg     gabapentin (NEURONTIN) capsule 300 mg     gabapentin (NEURONTIN) capsule 600 mg     HYDROmorphone (PF) (DILAUDID) injection 0.5 mg     hydrOXYzine (ATARAX) tablet 10 mg     ketamine (KETALAR) 2 mg/mL in sodium chloride 0.9 % 50 mL ANALGESIA infusion     lidocaine (LMX4) cream     lidocaine 1 % 0.1-1 mL     [START ON 11/19/2021] melatonin tablet 1 mg     methocarbamol (ROBAXIN) injection 500 mg     methocarbamol (ROBAXIN) tablet 1,000 mg     naloxone (NARCAN) injection 0.2 mg    Or     naloxone (NARCAN) injection 0.4 mg    Or     naloxone (NARCAN) injection 0.2 mg    Or     naloxone (NARCAN) injection 0.4 mg     ondansetron (ZOFRAN-ODT) ODT tab 4 mg    Or     ondansetron (ZOFRAN) injection 4 mg     oxyCODONE (ROXICODONE) tablet 5-10 mg     [START ON 11/19/2021] polyethylene glycol (MIRALAX) Packet 17 g     potassium chloride 10 mEq in 100 mL sterile water intermittent infusion (premix)     prochlorperazine (COMPAZINE) injection 5 mg    Or     prochlorperazine  "(COMPAZINE) tablet 5 mg     scopolamine (TRANSDERM) 72 hr patch 1 patch    And     scopolamine (TRANSDERM-SCOP) Patch in Place     senna-docusate (SENOKOT-S/PERICOLACE) 8.6-50 MG per tablet 2 tablet     sodium chloride (PF) 0.9% PF flush 3 mL     sodium chloride (PF) 0.9% PF flush 3 mL         ROS: 10 point ROS neg other than the symptoms noted above in the HPI.    PHYSICAL EXAMINATION:     BP (!) 136/94   Pulse 85   Temp 98.8  F (37.1  C) (Oral)   Resp 12   Ht 1.702 m (5' 7\")   Wt 74 kg (163 lb 2.3 oz)   SpO2 98%   BMI 25.55 kg/m    Temp (24hrs), Av.5  F (36.9  C), Min:97.6  F (36.4  C), Max:99.5  F (37.5  C)      BMI= Body mass index is 25.55 kg/m .      Intake/Output Summary (Last 24 hours) at 2021  Last data filed at 2021  Gross per 24 hour   Intake 2440 ml   Output 1080 ml   Net 1360 ml       General: Awake, interactive, NAD.   HEENT: AT/NC. No icterus. Moist MM.    Neck: Supple.    Heart/CVS: Normal S1 and S2. Regular.   Chest/Respi: Normal work of breathing. CTA BL.   Abdomen/GI: Soft, non distended, non tender. No g/r.  Extremities/MSK: Distal pulses 2+, well perfused. Rest per ortho.   Neuro: Alert and oriented x4. Grossly non focal.   Skin:  No new rash over exposed areas.       LABORATORY DATA: reviewed.     Recent Results (from the past 24 hour(s))   Glucose by meter    Collection Time: 21  9:04 AM   Result Value Ref Range    GLUCOSE BY METER POCT 122 (H) 70 - 99 mg/dL   Blood gas arterial with oxyhemoglobin    Collection Time: 21  6:59 PM   Result Value Ref Range    pH Arterial 7.39 7.35 - 7.45    pCO2 Arterial 47 (H) 35 - 45 mm Hg    pO2 Arterial 124 (H) 80 - 105 mm Hg    Bicarbonate Arterial 29 (H) 21 - 28 mmol/L    Oxyhemoglobin Arterial 97 92 - 100 %    Base Excess/Deficit (+/-) 2.9 (H) -9.0 - 1.8 mmol/L    FIO2 21     Potassium 2.6 (LL) 3.4 - 5.3 mmol/L   EKG 12-lead, complete    Collection Time: 21  8:24 PM   Result Value Ref Range    Systolic " Blood Pressure  mmHg    Diastolic Blood Pressure  mmHg    Ventricular Rate 78 BPM    Atrial Rate 78 BPM    AL Interval 170 ms    QRS Duration 106 ms     ms    QTc 487 ms    P Axis 74 degrees    R AXIS 124 degrees    T Axis 64 degrees    Interpretation ECG       Sinus rhythm  Right axis deviation  Incomplete right bundle branch block  Right ventricular hypertrophy with repolarization abnormality  Abnormal ECG  When compared with ECG of 04-NOV-2021 13:40,  Incomplete right bundle branch block is now Present  ST now depressed in Anterior leads  T wave inversion now evident in Anterior leads         Recent Results (from the past 24 hour(s))   XR Surgery ELINOR L/T 5 Min Fluoro    Narrative    This exam was marked as non-reportable because it will not be read by a   radiologist or a [facility] non-radiologist provider.         XR Surgery ELINOR L/T 5 Min Fluoro    Narrative    This exam was marked as non-reportable because it will not be read by a   radiologist or a [facility] non-radiologist provider.                 Poncho Ovalles MD        This note was in part completed with Dragon, a speech recognition software. Some grammatical and transcription errors may have occurred. If you have any concern, please contact me for clarification.

## 2024-12-26 NOTE — H&P ADULT - PROBLEM SELECTOR PROBLEM 2
AMBULATORY CASE MANAGEMENT NOTE    Name and Relationship of Patient/Support Person:  -     Care Coordination      At 1020 AM 12-4-23 Cm was contacted by the patients significant other with concerns of possible Medication misuse and voiced concerns regarding significant other exhibiting erratic behavior. CM did not speak with patient directly nor was any information shared about the patient with the caller because he was not on her consent to contact. PCP made aware.     Per chart review the patient will be admitted for treatment    CM reached out to the care team at [facility] and shared information with the team in order to help develop and define a plan of care for the patient.     Education Documentation  No documentation found.        ANNMARIE RAMIREZ  Ambulatory Case Management    12/4/2023, 15:11 EST  
HTN (hypertension)

## 2024-12-26 NOTE — ED PROVIDER NOTE - ATTENDING CONTRIBUTION TO CARE
I, Tru Dejesus, performed a face to face bedside interview with this patient regarding history of present illness, and completed an independent physical examination. I personally made/approved the management plan and take responsibility for the patient management. I have communicated the patient’s plan of care and disposition with the resident  39 year old female with PMH Marfan syndrome, AVR/MVR presents with acute onset severe, sharp epigastric abd/chest pin radiating to back  Gen: NAD, well appearing  CV: RRR  Pul: CTA b/l  Abd: Soft, non-distended, non-tender  Neuro: no focal deficits  Pt started on esmolol gtt and admitted to CT ICU for dissection

## 2024-12-27 LAB
A1C WITH ESTIMATED AVERAGE GLUCOSE RESULT: 7.3 % — HIGH (ref 4–5.6)
ALBUMIN SERPL ELPH-MCNC: 3.7 G/DL — SIGNIFICANT CHANGE UP (ref 3.3–5.2)
ALP SERPL-CCNC: 86 U/L — SIGNIFICANT CHANGE UP (ref 40–120)
ALT FLD-CCNC: 6 U/L — SIGNIFICANT CHANGE UP
ANION GAP SERPL CALC-SCNC: 10 MMOL/L — SIGNIFICANT CHANGE UP (ref 5–17)
APPEARANCE UR: CLEAR — SIGNIFICANT CHANGE UP
APTT BLD: 49.1 SEC — HIGH (ref 24.5–35.6)
AST SERPL-CCNC: 11 U/L — SIGNIFICANT CHANGE UP
BACTERIA # UR AUTO: NEGATIVE /HPF — SIGNIFICANT CHANGE UP
BASOPHILS # BLD AUTO: 0.02 K/UL — SIGNIFICANT CHANGE UP (ref 0–0.2)
BASOPHILS NFR BLD AUTO: 0.1 % — SIGNIFICANT CHANGE UP (ref 0–2)
BILIRUB DIRECT SERPL-MCNC: 0.1 MG/DL — SIGNIFICANT CHANGE UP (ref 0–0.3)
BILIRUB INDIRECT FLD-MCNC: 0.5 MG/DL — SIGNIFICANT CHANGE UP (ref 0.2–1)
BILIRUB SERPL-MCNC: 0.6 MG/DL — SIGNIFICANT CHANGE UP (ref 0.4–2)
BILIRUB UR-MCNC: NEGATIVE — SIGNIFICANT CHANGE UP
BUN SERPL-MCNC: 10.2 MG/DL — SIGNIFICANT CHANGE UP (ref 8–20)
CALCIUM SERPL-MCNC: 8.3 MG/DL — LOW (ref 8.4–10.5)
CAST: 0 /LPF — SIGNIFICANT CHANGE UP (ref 0–4)
CHLORIDE SERPL-SCNC: 106 MMOL/L — SIGNIFICANT CHANGE UP (ref 96–108)
CO2 SERPL-SCNC: 20 MMOL/L — LOW (ref 22–29)
COLOR SPEC: YELLOW — SIGNIFICANT CHANGE UP
CREAT SERPL-MCNC: 0.52 MG/DL — SIGNIFICANT CHANGE UP (ref 0.5–1.3)
DIFF PNL FLD: ABNORMAL
EGFR: 120 ML/MIN/1.73M2 — SIGNIFICANT CHANGE UP
EOSINOPHIL # BLD AUTO: 0.19 K/UL — SIGNIFICANT CHANGE UP (ref 0–0.5)
EOSINOPHIL NFR BLD AUTO: 1.3 % — SIGNIFICANT CHANGE UP (ref 0–6)
ESTIMATED AVERAGE GLUCOSE: 163 MG/DL — HIGH (ref 68–114)
GLUCOSE BLDC GLUCOMTR-MCNC: 189 MG/DL — HIGH (ref 70–99)
GLUCOSE BLDC GLUCOMTR-MCNC: 220 MG/DL — HIGH (ref 70–99)
GLUCOSE BLDC GLUCOMTR-MCNC: 223 MG/DL — HIGH (ref 70–99)
GLUCOSE BLDC GLUCOMTR-MCNC: 296 MG/DL — HIGH (ref 70–99)
GLUCOSE SERPL-MCNC: 186 MG/DL — HIGH (ref 70–99)
GLUCOSE UR QL: NEGATIVE MG/DL — SIGNIFICANT CHANGE UP
HCT VFR BLD CALC: 32.6 % — LOW (ref 34.5–45)
HGB BLD-MCNC: 10.4 G/DL — LOW (ref 11.5–15.5)
IMM GRANULOCYTES NFR BLD AUTO: 0.6 % — SIGNIFICANT CHANGE UP (ref 0–0.9)
INR BLD: 2.86 RATIO — HIGH (ref 0.85–1.16)
KETONES UR-MCNC: NEGATIVE MG/DL — SIGNIFICANT CHANGE UP
LEUKOCYTE ESTERASE UR-ACNC: NEGATIVE — SIGNIFICANT CHANGE UP
LYMPHOCYTES # BLD AUTO: 1.42 K/UL — SIGNIFICANT CHANGE UP (ref 1–3.3)
LYMPHOCYTES # BLD AUTO: 9.8 % — LOW (ref 13–44)
MAGNESIUM SERPL-MCNC: 2.2 MG/DL — SIGNIFICANT CHANGE UP (ref 1.6–2.6)
MCHC RBC-ENTMCNC: 26.4 PG — LOW (ref 27–34)
MCHC RBC-ENTMCNC: 31.9 G/DL — LOW (ref 32–36)
MCV RBC AUTO: 82.7 FL — SIGNIFICANT CHANGE UP (ref 80–100)
MONOCYTES # BLD AUTO: 1.13 K/UL — HIGH (ref 0–0.9)
MONOCYTES NFR BLD AUTO: 7.8 % — SIGNIFICANT CHANGE UP (ref 2–14)
MRSA PCR RESULT.: SIGNIFICANT CHANGE UP
NEUTROPHILS # BLD AUTO: 11.64 K/UL — HIGH (ref 1.8–7.4)
NEUTROPHILS NFR BLD AUTO: 80.4 % — HIGH (ref 43–77)
NITRITE UR-MCNC: NEGATIVE — SIGNIFICANT CHANGE UP
PH UR: 6 — SIGNIFICANT CHANGE UP (ref 5–8)
PHOSPHATE SERPL-MCNC: 3.6 MG/DL — SIGNIFICANT CHANGE UP (ref 2.4–4.7)
PLATELET # BLD AUTO: 332 K/UL — SIGNIFICANT CHANGE UP (ref 150–400)
POTASSIUM SERPL-MCNC: 4.5 MMOL/L — SIGNIFICANT CHANGE UP (ref 3.5–5.3)
POTASSIUM SERPL-SCNC: 4.5 MMOL/L — SIGNIFICANT CHANGE UP (ref 3.5–5.3)
PROT SERPL-MCNC: 6.2 G/DL — LOW (ref 6.6–8.7)
PROT UR-MCNC: NEGATIVE MG/DL — SIGNIFICANT CHANGE UP
PROTHROM AB SERPL-ACNC: 32.8 SEC — HIGH (ref 9.9–13.4)
RBC # BLD: 3.94 M/UL — SIGNIFICANT CHANGE UP (ref 3.8–5.2)
RBC # FLD: 16.2 % — HIGH (ref 10.3–14.5)
RBC CASTS # UR COMP ASSIST: 2 /HPF — SIGNIFICANT CHANGE UP (ref 0–4)
S AUREUS DNA NOSE QL NAA+PROBE: SIGNIFICANT CHANGE UP
SODIUM SERPL-SCNC: 136 MMOL/L — SIGNIFICANT CHANGE UP (ref 135–145)
SP GR SPEC: 1.02 — SIGNIFICANT CHANGE UP (ref 1–1.03)
SQUAMOUS # UR AUTO: 1 /HPF — SIGNIFICANT CHANGE UP (ref 0–5)
UROBILINOGEN FLD QL: 0.2 MG/DL — SIGNIFICANT CHANGE UP (ref 0.2–1)
WBC # BLD: 14.48 K/UL — HIGH (ref 3.8–10.5)
WBC # FLD AUTO: 14.48 K/UL — HIGH (ref 3.8–10.5)
WBC UR QL: 2 /HPF — SIGNIFICANT CHANGE UP (ref 0–5)

## 2024-12-27 PROCEDURE — 99232 SBSQ HOSP IP/OBS MODERATE 35: CPT | Mod: GC

## 2024-12-27 PROCEDURE — 99292 CRITICAL CARE ADDL 30 MIN: CPT

## 2024-12-27 PROCEDURE — 93010 ELECTROCARDIOGRAM REPORT: CPT

## 2024-12-27 PROCEDURE — 71275 CT ANGIOGRAPHY CHEST: CPT | Mod: 26

## 2024-12-27 PROCEDURE — 71045 X-RAY EXAM CHEST 1 VIEW: CPT | Mod: 26

## 2024-12-27 PROCEDURE — 99291 CRITICAL CARE FIRST HOUR: CPT

## 2024-12-27 PROCEDURE — 74174 CTA ABD&PLVS W/CONTRAST: CPT | Mod: 26

## 2024-12-27 RX ORDER — ACETAMINOPHEN 80 MG/.8ML
1000 SOLUTION/ DROPS ORAL ONCE
Refills: 0 | Status: COMPLETED | OUTPATIENT
Start: 2024-12-27 | End: 2024-12-26

## 2024-12-27 RX ORDER — POTASSIUM CHLORIDE 600 MG/1
40 TABLET, FILM COATED, EXTENDED RELEASE ORAL ONCE
Refills: 0 | Status: COMPLETED | OUTPATIENT
Start: 2024-12-27 | End: 2024-12-27

## 2024-12-27 RX ORDER — ACETAMINOPHEN 80 MG/.8ML
1000 SOLUTION/ DROPS ORAL ONCE
Refills: 0 | Status: COMPLETED | OUTPATIENT
Start: 2024-12-27 | End: 2024-12-27

## 2024-12-27 RX ORDER — METOPROLOL TARTRATE 50 MG
50 TABLET ORAL EVERY 8 HOURS
Refills: 0 | Status: DISCONTINUED | OUTPATIENT
Start: 2024-12-27 | End: 2024-12-28

## 2024-12-27 RX ORDER — METOPROLOL TARTRATE 50 MG
50 TABLET ORAL
Refills: 0 | Status: DISCONTINUED | OUTPATIENT
Start: 2024-12-27 | End: 2024-12-27

## 2024-12-27 RX ORDER — OXYCODONE HCL 15 MG
20 TABLET ORAL
Refills: 0 | Status: DISCONTINUED | OUTPATIENT
Start: 2024-12-27 | End: 2024-12-31

## 2024-12-27 RX ORDER — METHADONE HYDROCHLORIDE 10 MG/1
10 TABLET ORAL DAILY
Refills: 0 | Status: DISCONTINUED | OUTPATIENT
Start: 2024-12-27 | End: 2024-12-31

## 2024-12-27 RX ORDER — HYDROMORPHONE HCL 4 MG
0.5 TABLET ORAL ONCE
Refills: 0 | Status: DISCONTINUED | OUTPATIENT
Start: 2024-12-27 | End: 2024-12-27

## 2024-12-27 RX ORDER — WARFARIN SODIUM 10 MG/1
8 TABLET ORAL ONCE
Refills: 0 | Status: COMPLETED | OUTPATIENT
Start: 2024-12-27 | End: 2024-12-27

## 2024-12-27 RX ORDER — MORPHINE SULFATE 15 MG
4 TABLET, EXTENDED RELEASE ORAL ONCE
Refills: 0 | Status: DISCONTINUED | OUTPATIENT
Start: 2024-12-27 | End: 2024-12-27

## 2024-12-27 RX ORDER — NORETHINDRONE 0.35 MG
10 TABLET ORAL DAILY
Refills: 0 | Status: DISCONTINUED | OUTPATIENT
Start: 2024-12-27 | End: 2024-12-31

## 2024-12-27 RX ADMIN — Medication 4 MILLIGRAM(S): at 14:25

## 2024-12-27 RX ADMIN — ACETAMINOPHEN 1000 MILLIGRAM(S): 80 SOLUTION/ DROPS ORAL at 00:45

## 2024-12-27 RX ADMIN — ATORVASTATIN CALCIUM 20 MILLIGRAM(S): 40 TABLET, FILM COATED ORAL at 22:06

## 2024-12-27 RX ADMIN — Medication 2: at 08:00

## 2024-12-27 RX ADMIN — SODIUM CHLORIDE 3 MILLILITER(S): 9 INJECTION, SOLUTION INTRAMUSCULAR; INTRAVENOUS; SUBCUTANEOUS at 22:17

## 2024-12-27 RX ADMIN — ACETAMINOPHEN 400 MILLIGRAM(S): 80 SOLUTION/ DROPS ORAL at 16:21

## 2024-12-27 RX ADMIN — Medication 50 MILLIGRAM(S): at 16:20

## 2024-12-27 RX ADMIN — POTASSIUM CHLORIDE 40 MILLIEQUIVALENT(S): 600 TABLET, FILM COATED, EXTENDED RELEASE ORAL at 06:31

## 2024-12-27 RX ADMIN — ACETAMINOPHEN 1000 MILLIGRAM(S): 80 SOLUTION/ DROPS ORAL at 16:55

## 2024-12-27 RX ADMIN — WARFARIN SODIUM 8 MILLIGRAM(S): 10 TABLET ORAL at 22:06

## 2024-12-27 RX ADMIN — Medication 6: at 11:27

## 2024-12-27 RX ADMIN — ACETAMINOPHEN 400 MILLIGRAM(S): 80 SOLUTION/ DROPS ORAL at 22:07

## 2024-12-27 RX ADMIN — ACETAMINOPHEN 400 MILLIGRAM(S): 80 SOLUTION/ DROPS ORAL at 11:20

## 2024-12-27 RX ADMIN — Medication 4 MILLIGRAM(S): at 13:50

## 2024-12-27 RX ADMIN — ACETAMINOPHEN 1000 MILLIGRAM(S): 80 SOLUTION/ DROPS ORAL at 11:54

## 2024-12-27 RX ADMIN — PANTOPRAZOLE 40 MILLIGRAM(S): 40 TABLET, DELAYED RELEASE ORAL at 05:46

## 2024-12-27 RX ADMIN — Medication 4 MILLIGRAM(S): at 01:02

## 2024-12-27 RX ADMIN — Medication 50 MILLIGRAM(S): at 22:07

## 2024-12-27 RX ADMIN — LIDOCAINE 2 PATCH: 50 OINTMENT TOPICAL at 10:11

## 2024-12-27 RX ADMIN — Medication 4 MILLIGRAM(S): at 05:47

## 2024-12-27 RX ADMIN — Medication 20 MILLIGRAM(S): at 22:58

## 2024-12-27 RX ADMIN — Medication 4: at 16:25

## 2024-12-27 RX ADMIN — Medication 20 MILLIGRAM(S): at 23:45

## 2024-12-27 RX ADMIN — ACETAMINOPHEN 1000 MILLIGRAM(S): 80 SOLUTION/ DROPS ORAL at 22:50

## 2024-12-27 RX ADMIN — LIDOCAINE 2 PATCH: 50 OINTMENT TOPICAL at 22:58

## 2024-12-27 RX ADMIN — Medication 4 MILLIGRAM(S): at 17:58

## 2024-12-27 RX ADMIN — Medication 4 MILLIGRAM(S): at 00:02

## 2024-12-27 RX ADMIN — Medication 0.5 MILLIGRAM(S): at 02:55

## 2024-12-27 RX ADMIN — Medication 4 MILLIGRAM(S): at 06:47

## 2024-12-27 RX ADMIN — FLUTICASONE PROPIONATE AND SALMETEROL 1 DOSE(S): 50; 500 POWDER ORAL; RESPIRATORY (INHALATION) at 20:46

## 2024-12-27 RX ADMIN — Medication 50 MILLIGRAM(S): at 11:20

## 2024-12-27 RX ADMIN — SODIUM CHLORIDE 3 MILLILITER(S): 9 INJECTION, SOLUTION INTRAMUSCULAR; INTRAVENOUS; SUBCUTANEOUS at 05:16

## 2024-12-27 RX ADMIN — SODIUM CHLORIDE 3 MILLILITER(S): 9 INJECTION, SOLUTION INTRAMUSCULAR; INTRAVENOUS; SUBCUTANEOUS at 14:12

## 2024-12-27 RX ADMIN — Medication 4 MILLIGRAM(S): at 13:18

## 2024-12-27 RX ADMIN — Medication 4 MILLIGRAM(S): at 14:55

## 2024-12-27 RX ADMIN — METHADONE HYDROCHLORIDE 10 MILLIGRAM(S): 10 TABLET ORAL at 22:06

## 2024-12-27 RX ADMIN — Medication 0.5 MILLIGRAM(S): at 03:55

## 2024-12-27 RX ADMIN — Medication 10 MILLIGRAM(S): at 12:07

## 2024-12-27 RX ADMIN — Medication 4 MILLIGRAM(S): at 17:25

## 2024-12-27 RX ADMIN — Medication 20 MILLIGRAM(S): at 18:23

## 2024-12-27 RX ADMIN — ESMOLOL HYDROCHLORIDE 22.3 MICROGRAM(S)/KG/MIN: 10 INJECTION, SOLUTION INTRAVENOUS at 13:15

## 2024-12-27 RX ADMIN — Medication 20 MILLIGRAM(S): at 18:58

## 2024-12-27 RX ADMIN — Medication 4 MILLIGRAM(S): at 09:55

## 2024-12-27 RX ADMIN — LEVOTHYROXINE SODIUM 25 MICROGRAM(S): 175 TABLET ORAL at 05:46

## 2024-12-27 RX ADMIN — LIDOCAINE 2 PATCH: 50 OINTMENT TOPICAL at 06:57

## 2024-12-27 RX ADMIN — FLUTICASONE PROPIONATE AND SALMETEROL 1 DOSE(S): 50; 500 POWDER ORAL; RESPIRATORY (INHALATION) at 08:30

## 2024-12-27 RX ADMIN — Medication 4 MILLIGRAM(S): at 09:22

## 2024-12-28 LAB
ALBUMIN SERPL ELPH-MCNC: 3.4 G/DL — SIGNIFICANT CHANGE UP (ref 3.3–5.2)
ALP SERPL-CCNC: 74 U/L — SIGNIFICANT CHANGE UP (ref 40–120)
ALT FLD-CCNC: 5 U/L — SIGNIFICANT CHANGE UP
ANION GAP SERPL CALC-SCNC: 10 MMOL/L — SIGNIFICANT CHANGE UP (ref 5–17)
ANION GAP SERPL CALC-SCNC: 9 MMOL/L — SIGNIFICANT CHANGE UP (ref 5–17)
APTT BLD: 48.9 SEC — HIGH (ref 24.5–35.6)
AST SERPL-CCNC: 12 U/L — SIGNIFICANT CHANGE UP
BILIRUB SERPL-MCNC: 0.4 MG/DL — SIGNIFICANT CHANGE UP (ref 0.4–2)
BUN SERPL-MCNC: 10.9 MG/DL — SIGNIFICANT CHANGE UP (ref 8–20)
BUN SERPL-MCNC: 9.4 MG/DL — SIGNIFICANT CHANGE UP (ref 8–20)
CALCIUM SERPL-MCNC: 8.2 MG/DL — LOW (ref 8.4–10.5)
CALCIUM SERPL-MCNC: 8.6 MG/DL — SIGNIFICANT CHANGE UP (ref 8.4–10.5)
CHLORIDE SERPL-SCNC: 106 MMOL/L — SIGNIFICANT CHANGE UP (ref 96–108)
CHLORIDE SERPL-SCNC: 109 MMOL/L — HIGH (ref 96–108)
CK MB CFR SERPL CALC: 1.9 NG/ML — SIGNIFICANT CHANGE UP (ref 0–6.7)
CK SERPL-CCNC: 180 U/L — HIGH (ref 25–170)
CO2 SERPL-SCNC: 20 MMOL/L — LOW (ref 22–29)
CO2 SERPL-SCNC: 21 MMOL/L — LOW (ref 22–29)
CREAT SERPL-MCNC: 0.56 MG/DL — SIGNIFICANT CHANGE UP (ref 0.5–1.3)
CREAT SERPL-MCNC: 0.59 MG/DL — SIGNIFICANT CHANGE UP (ref 0.5–1.3)
EGFR: 116 ML/MIN/1.73M2 — SIGNIFICANT CHANGE UP
EGFR: 118 ML/MIN/1.73M2 — SIGNIFICANT CHANGE UP
GLUCOSE BLDC GLUCOMTR-MCNC: 180 MG/DL — HIGH (ref 70–99)
GLUCOSE BLDC GLUCOMTR-MCNC: 192 MG/DL — HIGH (ref 70–99)
GLUCOSE BLDC GLUCOMTR-MCNC: 201 MG/DL — HIGH (ref 70–99)
GLUCOSE BLDC GLUCOMTR-MCNC: 250 MG/DL — HIGH (ref 70–99)
GLUCOSE SERPL-MCNC: 186 MG/DL — HIGH (ref 70–99)
GLUCOSE SERPL-MCNC: 187 MG/DL — HIGH (ref 70–99)
HCT VFR BLD CALC: 30.7 % — LOW (ref 34.5–45)
HCT VFR BLD CALC: 30.9 % — LOW (ref 34.5–45)
HGB BLD-MCNC: 9.6 G/DL — LOW (ref 11.5–15.5)
HGB BLD-MCNC: 9.8 G/DL — LOW (ref 11.5–15.5)
INR BLD: 2.55 RATIO — HIGH (ref 0.85–1.16)
LACTATE SERPL-SCNC: 1 MMOL/L — SIGNIFICANT CHANGE UP (ref 0.5–2)
MAGNESIUM SERPL-MCNC: 1.8 MG/DL — SIGNIFICANT CHANGE UP (ref 1.6–2.6)
MAGNESIUM SERPL-MCNC: 2 MG/DL — SIGNIFICANT CHANGE UP (ref 1.6–2.6)
MCHC RBC-ENTMCNC: 26.3 PG — LOW (ref 27–34)
MCHC RBC-ENTMCNC: 26.4 PG — LOW (ref 27–34)
MCHC RBC-ENTMCNC: 31.3 G/DL — LOW (ref 32–36)
MCHC RBC-ENTMCNC: 31.7 G/DL — LOW (ref 32–36)
MCV RBC AUTO: 83.3 FL — SIGNIFICANT CHANGE UP (ref 80–100)
MCV RBC AUTO: 84.1 FL — SIGNIFICANT CHANGE UP (ref 80–100)
PLATELET # BLD AUTO: 303 K/UL — SIGNIFICANT CHANGE UP (ref 150–400)
PLATELET # BLD AUTO: 316 K/UL — SIGNIFICANT CHANGE UP (ref 150–400)
POTASSIUM SERPL-MCNC: 4.3 MMOL/L — SIGNIFICANT CHANGE UP (ref 3.5–5.3)
POTASSIUM SERPL-MCNC: 4.4 MMOL/L — SIGNIFICANT CHANGE UP (ref 3.5–5.3)
POTASSIUM SERPL-SCNC: 4.3 MMOL/L — SIGNIFICANT CHANGE UP (ref 3.5–5.3)
POTASSIUM SERPL-SCNC: 4.4 MMOL/L — SIGNIFICANT CHANGE UP (ref 3.5–5.3)
PROT SERPL-MCNC: 6 G/DL — LOW (ref 6.6–8.7)
PROTHROM AB SERPL-ACNC: 28.5 SEC — HIGH (ref 9.9–13.4)
RBC # BLD: 3.65 M/UL — LOW (ref 3.8–5.2)
RBC # BLD: 3.71 M/UL — LOW (ref 3.8–5.2)
RBC # FLD: 16.3 % — HIGH (ref 10.3–14.5)
RBC # FLD: 16.3 % — HIGH (ref 10.3–14.5)
SODIUM SERPL-SCNC: 136 MMOL/L — SIGNIFICANT CHANGE UP (ref 135–145)
SODIUM SERPL-SCNC: 139 MMOL/L — SIGNIFICANT CHANGE UP (ref 135–145)
TROPONIN T, HIGH SENSITIVITY RESULT: 10 NG/L — SIGNIFICANT CHANGE UP (ref 0–51)
WBC # BLD: 13.06 K/UL — HIGH (ref 3.8–10.5)
WBC # BLD: 13.54 K/UL — HIGH (ref 3.8–10.5)
WBC # FLD AUTO: 13.06 K/UL — HIGH (ref 3.8–10.5)
WBC # FLD AUTO: 13.54 K/UL — HIGH (ref 3.8–10.5)

## 2024-12-28 PROCEDURE — 99291 CRITICAL CARE FIRST HOUR: CPT

## 2024-12-28 PROCEDURE — 71045 X-RAY EXAM CHEST 1 VIEW: CPT | Mod: 26

## 2024-12-28 PROCEDURE — 74018 RADEX ABDOMEN 1 VIEW: CPT | Mod: 26

## 2024-12-28 PROCEDURE — 99292 CRITICAL CARE ADDL 30 MIN: CPT

## 2024-12-28 RX ORDER — DULOXETINE HYDROCHLORIDE 30 MG/1
60 CAPSULE, DELAYED RELEASE ORAL DAILY
Refills: 0 | Status: DISCONTINUED | OUTPATIENT
Start: 2024-12-28 | End: 2024-12-31

## 2024-12-28 RX ORDER — ASPIRIN 81 MG
81 TABLET, DELAYED RELEASE (ENTERIC COATED) ORAL DAILY
Refills: 0 | Status: DISCONTINUED | OUTPATIENT
Start: 2024-12-28 | End: 2024-12-31

## 2024-12-28 RX ORDER — MAGNESIUM SULFATE 500 MG/ML
2 INJECTION, SOLUTION INTRAMUSCULAR; INTRAVENOUS ONCE
Refills: 0 | Status: COMPLETED | OUTPATIENT
Start: 2024-12-28 | End: 2024-12-28

## 2024-12-28 RX ORDER — KETOROLAC TROMETHAMINE 30 MG/ML
30 INJECTION INTRAMUSCULAR; INTRAVENOUS ONCE
Refills: 0 | Status: DISCONTINUED | OUTPATIENT
Start: 2024-12-28 | End: 2024-12-28

## 2024-12-28 RX ORDER — ONDANSETRON 4 MG/1
4 TABLET ORAL ONCE
Refills: 0 | Status: COMPLETED | OUTPATIENT
Start: 2024-12-28 | End: 2024-12-28

## 2024-12-28 RX ORDER — ACETAMINOPHEN 80 MG/.8ML
975 SOLUTION/ DROPS ORAL EVERY 6 HOURS
Refills: 0 | Status: COMPLETED | OUTPATIENT
Start: 2024-12-28 | End: 2024-12-30

## 2024-12-28 RX ORDER — WARFARIN SODIUM 10 MG/1
10 TABLET ORAL ONCE
Refills: 0 | Status: DISCONTINUED | OUTPATIENT
Start: 2024-12-28 | End: 2024-12-28

## 2024-12-28 RX ORDER — METOPROLOL TARTRATE 50 MG
25 TABLET ORAL ONCE
Refills: 0 | Status: COMPLETED | OUTPATIENT
Start: 2024-12-28 | End: 2024-12-28

## 2024-12-28 RX ORDER — WARFARIN SODIUM 10 MG/1
9 TABLET ORAL ONCE
Refills: 0 | Status: COMPLETED | OUTPATIENT
Start: 2024-12-28 | End: 2024-12-28

## 2024-12-28 RX ORDER — ACETAMINOPHEN 80 MG/.8ML
1000 SOLUTION/ DROPS ORAL ONCE
Refills: 0 | Status: COMPLETED | OUTPATIENT
Start: 2024-12-28 | End: 2024-12-28

## 2024-12-28 RX ORDER — METOPROLOL TARTRATE 50 MG
50 TABLET ORAL
Refills: 0 | Status: DISCONTINUED | OUTPATIENT
Start: 2024-12-28 | End: 2024-12-31

## 2024-12-28 RX ORDER — POLYETHYLENE GLYCOL 3350 17 G/DOSE
17 POWDER (GRAM) ORAL DAILY
Refills: 0 | Status: DISCONTINUED | OUTPATIENT
Start: 2024-12-28 | End: 2024-12-31

## 2024-12-28 RX ORDER — ACETAMINOPHEN 80 MG/.8ML
975 SOLUTION/ DROPS ORAL EVERY 6 HOURS
Refills: 0 | Status: DISCONTINUED | OUTPATIENT
Start: 2024-12-28 | End: 2024-12-28

## 2024-12-28 RX ORDER — LOSARTAN POTASSIUM 100 MG/1
50 TABLET, FILM COATED ORAL DAILY
Refills: 0 | Status: DISCONTINUED | OUTPATIENT
Start: 2024-12-28 | End: 2024-12-31

## 2024-12-28 RX ADMIN — Medication 20 MILLIGRAM(S): at 12:26

## 2024-12-28 RX ADMIN — MAGNESIUM SULFATE 25 GRAM(S): 500 INJECTION, SOLUTION INTRAMUSCULAR; INTRAVENOUS at 21:19

## 2024-12-28 RX ADMIN — ACETAMINOPHEN 975 MILLIGRAM(S): 80 SOLUTION/ DROPS ORAL at 16:27

## 2024-12-28 RX ADMIN — Medication 50 MILLIGRAM(S): at 07:35

## 2024-12-28 RX ADMIN — SODIUM CHLORIDE 3 MILLILITER(S): 9 INJECTION, SOLUTION INTRAMUSCULAR; INTRAVENOUS; SUBCUTANEOUS at 21:10

## 2024-12-28 RX ADMIN — LOSARTAN POTASSIUM 50 MILLIGRAM(S): 100 TABLET, FILM COATED ORAL at 11:59

## 2024-12-28 RX ADMIN — ACETAMINOPHEN 400 MILLIGRAM(S): 80 SOLUTION/ DROPS ORAL at 03:45

## 2024-12-28 RX ADMIN — ACETAMINOPHEN 1000 MILLIGRAM(S): 80 SOLUTION/ DROPS ORAL at 04:30

## 2024-12-28 RX ADMIN — ONDANSETRON 4 MILLIGRAM(S): 4 TABLET ORAL at 21:16

## 2024-12-28 RX ADMIN — FLUTICASONE PROPIONATE AND SALMETEROL 1 DOSE(S): 50; 500 POWDER ORAL; RESPIRATORY (INHALATION) at 08:43

## 2024-12-28 RX ADMIN — ATORVASTATIN CALCIUM 20 MILLIGRAM(S): 40 TABLET, FILM COATED ORAL at 21:16

## 2024-12-28 RX ADMIN — ONDANSETRON 4 MILLIGRAM(S): 4 TABLET ORAL at 10:01

## 2024-12-28 RX ADMIN — SODIUM CHLORIDE 3 MILLILITER(S): 9 INJECTION, SOLUTION INTRAMUSCULAR; INTRAVENOUS; SUBCUTANEOUS at 13:45

## 2024-12-28 RX ADMIN — Medication 4: at 12:02

## 2024-12-28 RX ADMIN — METHADONE HYDROCHLORIDE 10 MILLIGRAM(S): 10 TABLET ORAL at 16:30

## 2024-12-28 RX ADMIN — Medication 10 MILLIGRAM(S): at 12:00

## 2024-12-28 RX ADMIN — LIDOCAINE 2 PATCH: 50 OINTMENT TOPICAL at 21:17

## 2024-12-28 RX ADMIN — Medication 4: at 07:35

## 2024-12-28 RX ADMIN — Medication 2: at 17:54

## 2024-12-28 RX ADMIN — WARFARIN SODIUM 9 MILLIGRAM(S): 10 TABLET ORAL at 21:17

## 2024-12-28 RX ADMIN — Medication 20 MILLIGRAM(S): at 11:55

## 2024-12-28 RX ADMIN — SODIUM CHLORIDE 3 MILLILITER(S): 9 INJECTION, SOLUTION INTRAMUSCULAR; INTRAVENOUS; SUBCUTANEOUS at 07:03

## 2024-12-28 RX ADMIN — LEVOTHYROXINE SODIUM 25 MICROGRAM(S): 175 TABLET ORAL at 07:35

## 2024-12-28 RX ADMIN — ACETAMINOPHEN 975 MILLIGRAM(S): 80 SOLUTION/ DROPS ORAL at 16:54

## 2024-12-28 RX ADMIN — Medication 50 MILLIGRAM(S): at 17:55

## 2024-12-28 RX ADMIN — DULOXETINE HYDROCHLORIDE 60 MILLIGRAM(S): 30 CAPSULE, DELAYED RELEASE ORAL at 11:59

## 2024-12-28 RX ADMIN — PANTOPRAZOLE 40 MILLIGRAM(S): 40 TABLET, DELAYED RELEASE ORAL at 07:49

## 2024-12-28 RX ADMIN — Medication 15 MILLILITER(S): at 21:16

## 2024-12-28 RX ADMIN — LIDOCAINE 2 PATCH: 50 OINTMENT TOPICAL at 07:50

## 2024-12-28 RX ADMIN — FLUTICASONE PROPIONATE AND SALMETEROL 1 DOSE(S): 50; 500 POWDER ORAL; RESPIRATORY (INHALATION) at 21:27

## 2024-12-28 RX ADMIN — LIDOCAINE 2 PATCH: 50 OINTMENT TOPICAL at 12:25

## 2024-12-28 RX ADMIN — Medication 25 MILLIGRAM(S): at 16:04

## 2024-12-28 RX ADMIN — Medication 17 GRAM(S): at 12:10

## 2024-12-28 RX ADMIN — Medication 81 MILLIGRAM(S): at 11:48

## 2024-12-29 LAB
ALBUMIN SERPL ELPH-MCNC: 3.2 G/DL — LOW (ref 3.3–5.2)
ALP SERPL-CCNC: 74 U/L — SIGNIFICANT CHANGE UP (ref 40–120)
ALT FLD-CCNC: 5 U/L — SIGNIFICANT CHANGE UP
ANION GAP SERPL CALC-SCNC: 10 MMOL/L — SIGNIFICANT CHANGE UP (ref 5–17)
APTT BLD: 53.2 SEC — HIGH (ref 24.5–35.6)
AST SERPL-CCNC: 11 U/L — SIGNIFICANT CHANGE UP
BILIRUB DIRECT SERPL-MCNC: 0.1 MG/DL — SIGNIFICANT CHANGE UP (ref 0–0.3)
BILIRUB INDIRECT FLD-MCNC: 0.2 MG/DL — SIGNIFICANT CHANGE UP (ref 0.2–1)
BILIRUB SERPL-MCNC: 0.3 MG/DL — LOW (ref 0.4–2)
BUN SERPL-MCNC: 15.4 MG/DL — SIGNIFICANT CHANGE UP (ref 8–20)
CALCIUM SERPL-MCNC: 8.8 MG/DL — SIGNIFICANT CHANGE UP (ref 8.4–10.5)
CHLORIDE SERPL-SCNC: 106 MMOL/L — SIGNIFICANT CHANGE UP (ref 96–108)
CO2 SERPL-SCNC: 23 MMOL/L — SIGNIFICANT CHANGE UP (ref 22–29)
CREAT SERPL-MCNC: 0.64 MG/DL — SIGNIFICANT CHANGE UP (ref 0.5–1.3)
EGFR: 114 ML/MIN/1.73M2 — SIGNIFICANT CHANGE UP
GLUCOSE BLDC GLUCOMTR-MCNC: 209 MG/DL — HIGH (ref 70–99)
GLUCOSE BLDC GLUCOMTR-MCNC: 258 MG/DL — HIGH (ref 70–99)
GLUCOSE BLDC GLUCOMTR-MCNC: 319 MG/DL — HIGH (ref 70–99)
GLUCOSE BLDC GLUCOMTR-MCNC: 353 MG/DL — HIGH (ref 70–99)
GLUCOSE SERPL-MCNC: 264 MG/DL — HIGH (ref 70–99)
HCT VFR BLD CALC: 28.7 % — LOW (ref 34.5–45)
HGB BLD-MCNC: 8.9 G/DL — LOW (ref 11.5–15.5)
INR BLD: 2.73 RATIO — HIGH (ref 0.85–1.16)
MAGNESIUM SERPL-MCNC: 2.4 MG/DL — SIGNIFICANT CHANGE UP (ref 1.6–2.6)
MCHC RBC-ENTMCNC: 26.2 PG — LOW (ref 27–34)
MCHC RBC-ENTMCNC: 31 G/DL — LOW (ref 32–36)
MCV RBC AUTO: 84.4 FL — SIGNIFICANT CHANGE UP (ref 80–100)
PLATELET # BLD AUTO: 300 K/UL — SIGNIFICANT CHANGE UP (ref 150–400)
POTASSIUM SERPL-MCNC: 4.2 MMOL/L — SIGNIFICANT CHANGE UP (ref 3.5–5.3)
POTASSIUM SERPL-SCNC: 4.2 MMOL/L — SIGNIFICANT CHANGE UP (ref 3.5–5.3)
PROT SERPL-MCNC: 6.1 G/DL — LOW (ref 6.6–8.7)
PROTHROM AB SERPL-ACNC: 31.4 SEC — HIGH (ref 9.9–13.4)
RBC # BLD: 3.4 M/UL — LOW (ref 3.8–5.2)
RBC # FLD: 16.5 % — HIGH (ref 10.3–14.5)
SODIUM SERPL-SCNC: 139 MMOL/L — SIGNIFICANT CHANGE UP (ref 135–145)
WBC # BLD: 11.7 K/UL — HIGH (ref 3.8–10.5)
WBC # FLD AUTO: 11.7 K/UL — HIGH (ref 3.8–10.5)

## 2024-12-29 PROCEDURE — 99291 CRITICAL CARE FIRST HOUR: CPT

## 2024-12-29 PROCEDURE — 71045 X-RAY EXAM CHEST 1 VIEW: CPT | Mod: 26

## 2024-12-29 RX ORDER — GLIMEPIRIDE 4 MG/1
1 TABLET ORAL
Refills: 0 | DISCHARGE

## 2024-12-29 RX ORDER — ATORVASTATIN CALCIUM 40 MG/1
1 TABLET, FILM COATED ORAL
Refills: 0 | DISCHARGE

## 2024-12-29 RX ORDER — LIDOCAINE 50 MG/G
10 OINTMENT TOPICAL
Refills: 0 | DISCHARGE

## 2024-12-29 RX ORDER — ECONAZOLE NITRATE 10 MG/G
1 AEROSOL, FOAM TOPICAL
Refills: 0 | DISCHARGE

## 2024-12-29 RX ORDER — MEDROXYPROGESTERONE ACETATE 10 MG/1
1 TABLET ORAL
Refills: 0 | DISCHARGE

## 2024-12-29 RX ORDER — NORETHINDRONE 0.35 MG
2 TABLET ORAL
Refills: 0 | DISCHARGE

## 2024-12-29 RX ORDER — ALBUTEROL SULFATE 90 UG/1
2 INHALANT RESPIRATORY (INHALATION)
Refills: 0 | DISCHARGE

## 2024-12-29 RX ORDER — ASCORBIC ACID 1000 MG
1 TABLET ORAL
Refills: 0 | DISCHARGE

## 2024-12-29 RX ORDER — LACTULOSE 10 G/15ML
30 SOLUTION ORAL; RECTAL EVERY 6 HOURS
Refills: 0 | Status: DISCONTINUED | OUTPATIENT
Start: 2024-12-29 | End: 2024-12-29

## 2024-12-29 RX ORDER — B COMPLEX, C NO.20/FOLIC ACID 1 MG
1 CAPSULE ORAL
Refills: 0 | DISCHARGE

## 2024-12-29 RX ORDER — METHADONE HYDROCHLORIDE 10 MG/1
1 TABLET ORAL
Refills: 0 | DISCHARGE

## 2024-12-29 RX ORDER — WARFARIN SODIUM 10 MG/1
9 TABLET ORAL ONCE
Refills: 0 | Status: COMPLETED | OUTPATIENT
Start: 2024-12-29 | End: 2024-12-29

## 2024-12-29 RX ORDER — MOMETASONE FUROATE AND FORMOTEROL FUMARATE DIHYDRATE 100; 5 UG/1; UG/1
2 AEROSOL RESPIRATORY (INHALATION)
Refills: 0 | DISCHARGE

## 2024-12-29 RX ORDER — CYCLOBENZAPRINE HCL 10 MG
10 TABLET ORAL THREE TIMES A DAY
Refills: 0 | Status: DISCONTINUED | OUTPATIENT
Start: 2024-12-29 | End: 2024-12-31

## 2024-12-29 RX ORDER — CYCLOBENZAPRINE HCL 10 MG
1 TABLET ORAL
Refills: 0 | DISCHARGE

## 2024-12-29 RX ORDER — BISACODYL 5 MG
10 TABLET, DELAYED RELEASE (ENTERIC COATED) ORAL DAILY
Refills: 0 | Status: DISCONTINUED | OUTPATIENT
Start: 2024-12-29 | End: 2024-12-29

## 2024-12-29 RX ORDER — METHOCARBAMOL 500 MG
500 TABLET ORAL THREE TIMES A DAY
Refills: 0 | Status: DISCONTINUED | OUTPATIENT
Start: 2024-12-29 | End: 2024-12-29

## 2024-12-29 RX ADMIN — Medication 4: at 07:55

## 2024-12-29 RX ADMIN — LIDOCAINE 2 PATCH: 50 OINTMENT TOPICAL at 09:46

## 2024-12-29 RX ADMIN — LOSARTAN POTASSIUM 50 MILLIGRAM(S): 100 TABLET, FILM COATED ORAL at 06:30

## 2024-12-29 RX ADMIN — ACETAMINOPHEN 975 MILLIGRAM(S): 80 SOLUTION/ DROPS ORAL at 07:30

## 2024-12-29 RX ADMIN — Medication 6: at 11:23

## 2024-12-29 RX ADMIN — WARFARIN SODIUM 9 MILLIGRAM(S): 10 TABLET ORAL at 21:30

## 2024-12-29 RX ADMIN — FLUTICASONE PROPIONATE AND SALMETEROL 1 DOSE(S): 50; 500 POWDER ORAL; RESPIRATORY (INHALATION) at 21:35

## 2024-12-29 RX ADMIN — ACETAMINOPHEN 975 MILLIGRAM(S): 80 SOLUTION/ DROPS ORAL at 22:30

## 2024-12-29 RX ADMIN — Medication 81 MILLIGRAM(S): at 11:34

## 2024-12-29 RX ADMIN — ATORVASTATIN CALCIUM 20 MILLIGRAM(S): 40 TABLET, FILM COATED ORAL at 21:34

## 2024-12-29 RX ADMIN — Medication 20 MILLIGRAM(S): at 06:30

## 2024-12-29 RX ADMIN — Medication 8: at 17:50

## 2024-12-29 RX ADMIN — FLUTICASONE PROPIONATE AND SALMETEROL 1 DOSE(S): 50; 500 POWDER ORAL; RESPIRATORY (INHALATION) at 11:06

## 2024-12-29 RX ADMIN — Medication 10 MILLIGRAM(S): at 11:23

## 2024-12-29 RX ADMIN — SODIUM CHLORIDE 3 MILLILITER(S): 9 INJECTION, SOLUTION INTRAMUSCULAR; INTRAVENOUS; SUBCUTANEOUS at 13:21

## 2024-12-29 RX ADMIN — ACETAMINOPHEN 975 MILLIGRAM(S): 80 SOLUTION/ DROPS ORAL at 07:17

## 2024-12-29 RX ADMIN — Medication 20 MILLIGRAM(S): at 01:00

## 2024-12-29 RX ADMIN — Medication 50 MILLIGRAM(S): at 17:49

## 2024-12-29 RX ADMIN — Medication 20 MILLIGRAM(S): at 00:22

## 2024-12-29 RX ADMIN — LEVOTHYROXINE SODIUM 25 MICROGRAM(S): 175 TABLET ORAL at 06:30

## 2024-12-29 RX ADMIN — LIDOCAINE 2 PATCH: 50 OINTMENT TOPICAL at 21:34

## 2024-12-29 RX ADMIN — ACETAMINOPHEN 975 MILLIGRAM(S): 80 SOLUTION/ DROPS ORAL at 21:30

## 2024-12-29 RX ADMIN — DULOXETINE HYDROCHLORIDE 60 MILLIGRAM(S): 30 CAPSULE, DELAYED RELEASE ORAL at 11:23

## 2024-12-29 RX ADMIN — SODIUM CHLORIDE 3 MILLILITER(S): 9 INJECTION, SOLUTION INTRAMUSCULAR; INTRAVENOUS; SUBCUTANEOUS at 21:07

## 2024-12-29 RX ADMIN — Medication 20 MILLIGRAM(S): at 17:45

## 2024-12-29 RX ADMIN — Medication 50 MILLIGRAM(S): at 06:30

## 2024-12-29 RX ADMIN — Medication 20 MILLIGRAM(S): at 18:45

## 2024-12-29 RX ADMIN — ACETAMINOPHEN 975 MILLIGRAM(S): 80 SOLUTION/ DROPS ORAL at 13:15

## 2024-12-29 RX ADMIN — Medication 20 MILLIGRAM(S): at 07:10

## 2024-12-29 RX ADMIN — Medication 20 MILLIGRAM(S): at 23:31

## 2024-12-29 RX ADMIN — Medication 6: at 21:36

## 2024-12-29 RX ADMIN — PANTOPRAZOLE 40 MILLIGRAM(S): 40 TABLET, DELAYED RELEASE ORAL at 06:30

## 2024-12-29 RX ADMIN — METHADONE HYDROCHLORIDE 10 MILLIGRAM(S): 10 TABLET ORAL at 17:49

## 2024-12-29 RX ADMIN — LIDOCAINE 2 PATCH: 50 OINTMENT TOPICAL at 07:17

## 2024-12-29 RX ADMIN — SODIUM CHLORIDE 3 MILLILITER(S): 9 INJECTION, SOLUTION INTRAMUSCULAR; INTRAVENOUS; SUBCUTANEOUS at 07:08

## 2024-12-30 LAB
ANION GAP SERPL CALC-SCNC: 12 MMOL/L — SIGNIFICANT CHANGE UP (ref 5–17)
APTT BLD: 60 SEC — HIGH (ref 24.5–35.6)
BUN SERPL-MCNC: 16.9 MG/DL — SIGNIFICANT CHANGE UP (ref 8–20)
CALCIUM SERPL-MCNC: 9 MG/DL — SIGNIFICANT CHANGE UP (ref 8.4–10.5)
CHLORIDE SERPL-SCNC: 103 MMOL/L — SIGNIFICANT CHANGE UP (ref 96–108)
CO2 SERPL-SCNC: 22 MMOL/L — SIGNIFICANT CHANGE UP (ref 22–29)
CREAT SERPL-MCNC: 0.73 MG/DL — SIGNIFICANT CHANGE UP (ref 0.5–1.3)
EGFR: 106 ML/MIN/1.73M2 — SIGNIFICANT CHANGE UP
GLUCOSE BLDC GLUCOMTR-MCNC: 199 MG/DL — HIGH (ref 70–99)
GLUCOSE BLDC GLUCOMTR-MCNC: 279 MG/DL — HIGH (ref 70–99)
GLUCOSE BLDC GLUCOMTR-MCNC: 287 MG/DL — HIGH (ref 70–99)
GLUCOSE BLDC GLUCOMTR-MCNC: 300 MG/DL — HIGH (ref 70–99)
GLUCOSE SERPL-MCNC: 288 MG/DL — HIGH (ref 70–99)
HCT VFR BLD CALC: 30.6 % — LOW (ref 34.5–45)
HGB BLD-MCNC: 9.5 G/DL — LOW (ref 11.5–15.5)
INR BLD: 4.34 RATIO — HIGH (ref 0.85–1.16)
MAGNESIUM SERPL-MCNC: 1.7 MG/DL — SIGNIFICANT CHANGE UP (ref 1.6–2.6)
MCHC RBC-ENTMCNC: 26.5 PG — LOW (ref 27–34)
MCHC RBC-ENTMCNC: 31 G/DL — LOW (ref 32–36)
MCV RBC AUTO: 85.5 FL — SIGNIFICANT CHANGE UP (ref 80–100)
PLATELET # BLD AUTO: 327 K/UL — SIGNIFICANT CHANGE UP (ref 150–400)
POTASSIUM SERPL-MCNC: 4.5 MMOL/L — SIGNIFICANT CHANGE UP (ref 3.5–5.3)
POTASSIUM SERPL-SCNC: 4.5 MMOL/L — SIGNIFICANT CHANGE UP (ref 3.5–5.3)
PROTHROM AB SERPL-ACNC: 48.3 SEC — HIGH (ref 9.9–13.4)
RBC # BLD: 3.58 M/UL — LOW (ref 3.8–5.2)
RBC # FLD: 16.5 % — HIGH (ref 10.3–14.5)
SODIUM SERPL-SCNC: 137 MMOL/L — SIGNIFICANT CHANGE UP (ref 135–145)
WBC # BLD: 10.51 K/UL — HIGH (ref 3.8–10.5)
WBC # FLD AUTO: 10.51 K/UL — HIGH (ref 3.8–10.5)

## 2024-12-30 PROCEDURE — 99232 SBSQ HOSP IP/OBS MODERATE 35: CPT

## 2024-12-30 PROCEDURE — 99232 SBSQ HOSP IP/OBS MODERATE 35: CPT | Mod: GC

## 2024-12-30 PROCEDURE — 99254 IP/OBS CNSLTJ NEW/EST MOD 60: CPT

## 2024-12-30 RX ORDER — MAGNESIUM SULFATE 500 MG/ML
2 INJECTION, SOLUTION INTRAMUSCULAR; INTRAVENOUS ONCE
Refills: 0 | Status: COMPLETED | OUTPATIENT
Start: 2024-12-30 | End: 2024-12-30

## 2024-12-30 RX ORDER — WARFARIN SODIUM 10 MG/1
6 TABLET ORAL ONCE
Refills: 0 | Status: COMPLETED | OUTPATIENT
Start: 2024-12-30 | End: 2024-12-30

## 2024-12-30 RX ORDER — INSULIN GLARGINE-YFGN 100 [IU]/ML
10 INJECTION, SOLUTION SUBCUTANEOUS AT BEDTIME
Refills: 0 | Status: DISCONTINUED | OUTPATIENT
Start: 2024-12-30 | End: 2024-12-31

## 2024-12-30 RX ORDER — INSULIN LISPRO 100/ML
3 VIAL (ML) SUBCUTANEOUS
Refills: 0 | Status: DISCONTINUED | OUTPATIENT
Start: 2024-12-30 | End: 2024-12-31

## 2024-12-30 RX ADMIN — ACETAMINOPHEN 975 MILLIGRAM(S): 80 SOLUTION/ DROPS ORAL at 14:35

## 2024-12-30 RX ADMIN — Medication 81 MILLIGRAM(S): at 08:30

## 2024-12-30 RX ADMIN — ACETAMINOPHEN 975 MILLIGRAM(S): 80 SOLUTION/ DROPS ORAL at 03:43

## 2024-12-30 RX ADMIN — ATORVASTATIN CALCIUM 20 MILLIGRAM(S): 40 TABLET, FILM COATED ORAL at 21:32

## 2024-12-30 RX ADMIN — LOSARTAN POTASSIUM 50 MILLIGRAM(S): 100 TABLET, FILM COATED ORAL at 05:42

## 2024-12-30 RX ADMIN — Medication 2: at 09:37

## 2024-12-30 RX ADMIN — Medication 20 MILLIGRAM(S): at 00:31

## 2024-12-30 RX ADMIN — Medication 6: at 17:23

## 2024-12-30 RX ADMIN — LEVOTHYROXINE SODIUM 25 MICROGRAM(S): 175 TABLET ORAL at 05:43

## 2024-12-30 RX ADMIN — LIDOCAINE 2 PATCH: 50 OINTMENT TOPICAL at 07:56

## 2024-12-30 RX ADMIN — ACETAMINOPHEN 975 MILLIGRAM(S): 80 SOLUTION/ DROPS ORAL at 02:43

## 2024-12-30 RX ADMIN — ACETAMINOPHEN 975 MILLIGRAM(S): 80 SOLUTION/ DROPS ORAL at 13:35

## 2024-12-30 RX ADMIN — Medication 6: at 12:43

## 2024-12-30 RX ADMIN — SODIUM CHLORIDE 3 MILLILITER(S): 9 INJECTION, SOLUTION INTRAMUSCULAR; INTRAVENOUS; SUBCUTANEOUS at 05:43

## 2024-12-30 RX ADMIN — FLUTICASONE PROPIONATE AND SALMETEROL 1 DOSE(S): 50; 500 POWDER ORAL; RESPIRATORY (INHALATION) at 09:38

## 2024-12-30 RX ADMIN — LIDOCAINE 2 PATCH: 50 OINTMENT TOPICAL at 09:00

## 2024-12-30 RX ADMIN — INSULIN GLARGINE-YFGN 10 UNIT(S): 100 INJECTION, SOLUTION SUBCUTANEOUS at 21:32

## 2024-12-30 RX ADMIN — LIDOCAINE 2 PATCH: 50 OINTMENT TOPICAL at 21:33

## 2024-12-30 RX ADMIN — ACETAMINOPHEN 975 MILLIGRAM(S): 80 SOLUTION/ DROPS ORAL at 09:30

## 2024-12-30 RX ADMIN — ACETAMINOPHEN 975 MILLIGRAM(S): 80 SOLUTION/ DROPS ORAL at 08:30

## 2024-12-30 RX ADMIN — Medication 10 MILLIGRAM(S): at 09:35

## 2024-12-30 RX ADMIN — PANTOPRAZOLE 40 MILLIGRAM(S): 40 TABLET, DELAYED RELEASE ORAL at 05:43

## 2024-12-30 RX ADMIN — METHADONE HYDROCHLORIDE 10 MILLIGRAM(S): 10 TABLET ORAL at 09:34

## 2024-12-30 RX ADMIN — Medication 50 MILLIGRAM(S): at 17:22

## 2024-12-30 RX ADMIN — MAGNESIUM SULFATE 25 GRAM(S): 500 INJECTION, SOLUTION INTRAMUSCULAR; INTRAVENOUS at 18:36

## 2024-12-30 RX ADMIN — SODIUM CHLORIDE 3 MILLILITER(S): 9 INJECTION, SOLUTION INTRAMUSCULAR; INTRAVENOUS; SUBCUTANEOUS at 21:36

## 2024-12-30 RX ADMIN — Medication 3 UNIT(S): at 17:23

## 2024-12-30 RX ADMIN — DULOXETINE HYDROCHLORIDE 60 MILLIGRAM(S): 30 CAPSULE, DELAYED RELEASE ORAL at 09:34

## 2024-12-30 RX ADMIN — SODIUM CHLORIDE 3 MILLILITER(S): 9 INJECTION, SOLUTION INTRAMUSCULAR; INTRAVENOUS; SUBCUTANEOUS at 13:08

## 2024-12-30 RX ADMIN — Medication 50 MILLIGRAM(S): at 05:42

## 2024-12-30 NOTE — CONSULT NOTE ADULT - REASON FOR ADMISSION
Medical optimization for acute Type B Aortic Dissection
Medical optimization for acute Type B Aortic Dissection

## 2024-12-30 NOTE — CONSULT NOTE ADULT - SUBJECTIVE AND OBJECTIVE BOX
SURGERY CONSULT  ==============================================================================================================  HPI: 39F with PMH of open heart valve replacements (aortic root dilation with mechanical AVR and mitral valve replacement) on coumadin, Marfan's syndrome, HTN, DM, right carotid dissection without repair, asthma, abnormal uterine bleeding on progesterone, MDD and chronic pain presenting to [facility] ED for acute onset chest pain and dyspnea starting at ~0900 today. Patient describes pain as tearing chest pain that radiates to her back. Patient had CTA of chest and abdomen in [facility] ED, which shows an acute Type B aortic dissection. CT-ICU consulted for evaluation and management. Patient states she has chronic chest pain, dyspnea, and occasional dizziness on exertion, which she has been seen for by cardiology and PCP. Patient denies lightheadedness, current dizziness, numbness, weakness, fevers, sweats, cough, hemoptysis, abdominal pain, N/V/D, abnormal bleeding, or other symptoms of concern. (26 Dec 2024 11:23)    Consult HPI:  Please note admission HPI above. Patient was admitted to the CTICU for impulse control on 12/26/2024 after presented to the ED for new onset chest pain radiating to the back. CTA revealed new Type B aortic dissection originating just distal to the subclavian takeoff and ending just proximal to the renal arteries. Patient is now maintained on esmolol gtt and cardene gtt with normotensive blood pressure. Patient continues to denies abdominal pain. She also does not have any deficits and peripheral pulse intact at time of vascular surgery evaluation. TTE currently pending and all anticoagulation on hold. Vascular surgery consulted for input on possible operative intervention given continued chest pain radiating to the back despite adequate blood pressure control.      PAST MEDICAL & SURGICAL HISTORY:  Marfan syndrome      Fibromyalgia      Depression      Anxiety      Diabetes      Dilated aortic root      Asthma      Abnormal uterine bleeding (AUB)      Dissection of right carotid artery      Descending thoracic aortic dissection      H/O aortic valve replacement  2013      H/O spinal fusion  2005      H/O artificial lens replacement      H/O mitral valve replacement with mechanical valve        Home Meds: Home Medications:  aspirin 81 mg oral delayed release tablet: 1 tab(s) orally once a day (03 Sep 2024 11:25)  atorvastatin 80 mg oral tablet: 1 tab(s) orally once a day (at bedtime) (03 Sep 2024 11:25)  DULoxetine 60 mg oral delayed release capsule: 1 cap(s) orally once a day (03 Sep 2024 11:25)  levothyroxine 25 mcg (0.025 mg) oral tablet: 1 tab(s) orally once a day (03 Sep 2024 11:25)  melatonin 10 mg oral capsule: 1 cap(s) orally once a day (at bedtime) (03 Sep 2024 11:25)  metFORMIN 1000 mg oral tablet: 1 tab(s) orally 2 times a day (03 Sep 2024 11:25)  metoprolol succinate 50 mg oral tablet, extended release: 1 tab(s) orally 2 times a day (03 Sep 2024 11:25)  norethindrone 5 mg oral tablet: 1 orally once a day (03 Sep 2024 11:25)  oxyCODONE 20 mg oral tablet: 1 tab(s) orally every 6 hours (03 Sep 2024 11:25)  SITagliptin (as base) 100 mg oral tablet: 1 tab(s) orally once a day (03 Sep 2024 11:25)  ubiquinone 200 mg oral capsule: 2 cap(s) orally once a day (03 Sep 2024 11:25)  warfarin 10 mg oral tablet: 1 tab(s) orally once a day (at bedtime) take on 9/4/24 and 9/5/24. check INR on 9/6/24 and report to your PMD and follow their instructions (04 Sep 2024 12:26)    Allergies: Allergies    No Known Allergies    Intolerances      Soc:   Advanced Directives: Presumed Full Code     CURRENT MEDICATIONS:   --------------------------------------------------------------------------------------  Neurologic Medications  ondansetron Injectable 4 milliGRAM(s) IV Push once    Respiratory Medications  fluticasone propionate/ salmeterol 250-50 MICROgram(s) Diskus 1 Dose(s) Inhalation two times a day    Cardiovascular Medications  esmolol  Infusion 50 MICROgram(s)/kG/Min IV Continuous <Continuous>  niCARdipine Infusion 5 mG/Hr IV Continuous <Continuous>    Gastrointestinal Medications  dextrose 5%. 1000 milliLiter(s) IV Continuous <Continuous>  dextrose 5%. 1000 milliLiter(s) IV Continuous <Continuous>  pantoprazole    Tablet 40 milliGRAM(s) Oral before breakfast  sodium chloride 0.9% lock flush 3 milliLiter(s) IV Push every 8 hours    Genitourinary Medications    Hematologic/Oncologic Medications    Antimicrobial/Immunologic Medications    Endocrine/Metabolic Medications  atorvastatin 20 milliGRAM(s) Oral at bedtime  dextrose 50% Injectable 25 Gram(s) IV Push once  dextrose 50% Injectable 12.5 Gram(s) IV Push once  dextrose 50% Injectable 25 Gram(s) IV Push once  dextrose Oral Gel 15 Gram(s) Oral once PRN Blood Glucose LESS THAN 70 milliGRAM(s)/deciliter  glucagon  Injectable 1 milliGRAM(s) IntraMuscular once  insulin lispro (ADMELOG) corrective regimen sliding scale   SubCutaneous three times a day before meals  insulin lispro (ADMELOG) corrective regimen sliding scale   SubCutaneous at bedtime    Topical/Other Medications    --------------------------------------------------------------------------------------    VITAL SIGNS, INS/OUTS (last 24 hours):  --------------------------------------------------------------------------------------  ICU Vital Signs Last 24 Hrs  T(C): 36.5 (26 Dec 2024 16:05), Max: 36.7 (26 Dec 2024 10:18)  T(F): 97.7 (26 Dec 2024 16:05), Max: 98.1 (26 Dec 2024 12:00)  HR: 88 (26 Dec 2024 15:15) (75 - 95)  BP: 110/60 (26 Dec 2024 15:15) (94/55 - 154/95)  BP(mean): 76 (26 Dec 2024 15:15) (67 - 97)  ABP: --  ABP(mean): --  RR: 12 (26 Dec 2024 15:15) (7 - 21)  SpO2: 100% (26 Dec 2024 15:15) (92% - 100%)    O2 Parameters below as of 26 Dec 2024 14:00  Patient On (Oxygen Delivery Method): room air          I&O's Summary    26 Dec 2024 07:01  -  26 Dec 2024 17:06  --------------------------------------------------------  IN: 430.5 mL / OUT: 0 mL / NET: 430.5 mL      --------------------------------------------------------------------------------------    EXAM:  Constitutional: patient appears comfortable resting in bed, in no apparent distress  Respiratory: respirations are unlabored, no accessory muscle use, no conversational dyspnea  Gastrointestinal: abdomen is soft & non-distended, non-tender, no rebound tenderness / guarding, no pulsatile mass appreciated  Neurological: GCS15, A&O x 3, no neurologic deficits, equal 2+ peripheral pulses.  Skin: mucous membranes moist, no diaphoresis, pallor, cyanosis or jaundice    LABS  --------------------------------------------------------------------------------------  Labs:  CAPILLARY BLOOD GLUCOSE      POCT Blood Glucose.: 141 mg/dL (26 Dec 2024 17:02)                          10.6   19.24 )-----------( 336      ( 26 Dec 2024 14:15 )             32.9       Auto Neutrophil %: 85.3 % (12-26-24 @ 13:39)  Auto Immature Granulocyte %: 0.7 % (12-26-24 @ 13:39)  Auto Immature Granulocyte %: 0.7 % (12-26-24 @ 10:41)  Auto Neutrophil %: 76.4 % (12-26-24 @ 10:41)    12-26    134[L]  |  101  |  15.0  ----------------------------<  178[H]  4.2   |  18.0[L]  |  0.52      Calcium: 8.5 mg/dL (12-26-24 @ 14:58)      LFTs:             5.8  | 0.6  | 15       ------------------[81      ( 26 Dec 2024 14:58 )  3.3  | x    | 6           Lipase:x      Amylase:x         Lactate, Blood: 1.2 mmol/L (12-26-24 @ 13:39)  Blood Gas Venous - Lactate: 2.40 mmol/L (12-26-24 @ 10:41)      Coags:     59.0   ----< 5.31    ( 26 Dec 2024 14:58 )     56.3          
HPI:  39F with PMH of open heart valve replacements (aortic root dilation with  mechanical AVR and mitral valve replacement) on coumadin, Marfan's syndrome, HTN, DM, right carotid dissection without repair, asthma, abnormal uterine bleeding on progesterone, MDD and chronic pain presenting to [facility] ED for acute onset chest pain and dyspnea starting at ~0900 today. Patient describes pain as tearing chest pain that radiates to her back. Patient had CTA of chest and abdomen in [facility] ED, which shows an acute Type B aortic dissection. CT-ICU consulted for evaluation and management. Patient states she has chronic chest pain, dyspnea, and occasional dizziness on exertion, which she has been seen for by cardiology and PCP. Patient denies lightheadedness, current dizziness, numbness, weakness, fevers, sweats, cough, hemoptysis, abdominal pain, N/V/D, abnormal bleeding, or other symptoms of concern. (26 Dec 2024 11:23)    Consult for diabetes management, a1c 7.3%  Home regimen: Januvia 100mg daily, metformin 1000mg bid, glimepiride 0.5mg daily (managed by PCP)  Checks FS 1-2x daily at home  Reports adherence to diabetic diet at home, but does not eat a lot with meals    PAST MEDICAL & SURGICAL HISTORY:  Marfan syndrome  Fibromyalgia  Depression  Anxiety  Diabetes  Dilated aortic root  Asthma  Abnormal uterine bleeding (AUB)  Dissection of right carotid artery  Descending thoracic aortic dissection  H/O aortic valve replacement  H/O spinal fusion  2005  H/O artificial lens replacement  H/O mitral valve replacement with mechanical valve    FAMILY HISTORY:  Family history of diabetes mellitus (DM), father    SOCIAL HISTORY: Denies alcohol, smoking, drug use    REVIEW OF SYSTEMS:  Constitutional: No fever, no chills, no change in weight.  Eyes: No eye swelling, no blurry vision, no redness, no loss of vision.  Neck: No neck pain, no change in voice.  Lungs: No shortness of breath, no wheezing, no cough  CV: No chest pain, no palpitations, no pain with walking.  GI: No nausea, no vomiting, no constipation, no diarrhea, no abdominal pain  : No urinary frequency, no blood in urine, no urinary burning, no difficulty voiding.  Musculoskeletal: No muscle pain, no joint pain, no swelling.  Skin: No rash, no infections.  Neurologic: No headaches, no weakness, no burning or pain in feet, no tremor.  Endocrine: No heat intolerance, no cold intolerance, no increased sweating, no shakiness between meals.  Psych: No depression, no anxiety, no trouble concentrating    MEDICATIONS  (STANDING):  aspirin enteric coated 81 milliGRAM(s) Oral daily  atorvastatin 20 milliGRAM(s) Oral at bedtime  DULoxetine 60 milliGRAM(s) Oral daily  fluticasone propionate/ salmeterol 250-50 MICROgram(s) Diskus 1 Dose(s) Inhalation two times a day  insulin lispro (ADMELOG) corrective regimen sliding scale   SubCutaneous three times a day before meals  insulin lispro (ADMELOG) corrective regimen sliding scale   SubCutaneous at bedtime  levothyroxine 25 MICROGram(s) Oral daily  lidocaine   4% Patch 2 Patch Transdermal every 24 hours  losartan 50 milliGRAM(s) Oral daily  methadone    Tablet 10 milliGRAM(s) Oral daily  metoprolol tartrate 50 milliGRAM(s) Oral two times a day  norethindrone acetate 10 milliGRAM(s) Oral daily  pantoprazole    Tablet 40 milliGRAM(s) Oral before breakfast  polyethylene glycol 3350 17 Gram(s) Oral daily  sodium chloride 0.9% lock flush 3 milliLiter(s) IV Push every 8 hours    MEDICATIONS  (PRN):  cyclobenzaprine 10 milliGRAM(s) Oral three times a day PRN Muscle Spasm  oxyCODONE    IR 20 milliGRAM(s) Oral four times a day PRN Severe Pain (7 - 10)    Allergies  No Known Allergies    PHYSICAL EXAM:  General: No apparent distress  Respiratory: Lungs clear bilaterally  Cardiac: +S1, S2, no m/r/g  GI: +BS, soft, non tender, non distended  Extremities: No peripheral edema  Neuro: A+O X3    Vital Signs Last 24 Hrs  T(C): 36.6 (30 Dec 2024 11:55), Max: 36.9 (29 Dec 2024 16:20)  T(F): 97.9 (30 Dec 2024 11:55), Max: 98.5 (29 Dec 2024 16:20)  HR: 87 (30 Dec 2024 11:55) (79 - 97)  BP: 118/73 (30 Dec 2024 11:55) (101/59 - 136/73)  BP(mean): --  RR: 18 (30 Dec 2024 11:55) (18 - 18)  SpO2: 99% (30 Dec 2024 11:55) (97% - 100%)    Parameters below as of 30 Dec 2024 11:55  Patient On (Oxygen Delivery Method): room air    LABS:                        8.9    11.70 )-----------( 300      ( 29 Dec 2024 02:00 )             28.7     12-29    139  |  106  |  15.4  ----------------------------<  264[H]  4.2   |  23.0  |  0.64    Ca    8.8      29 Dec 2024 02:00  Mg     2.4     12-29    TPro  6.1[L]  /  Alb  3.2[L]  /  TBili  0.3[L]  /  DBili  0.1  /  AST  11  /  ALT  5   /  AlkPhos  74  12-29    Urinalysis Basic - ( 29 Dec 2024 02:00 )    Color: x / Appearance: x / SG: x / pH: x  Gluc: 264 mg/dL / Ketone: x  / Bili: x / Urobili: x   Blood: x / Protein: x / Nitrite: x   Leuk Esterase: x / RBC: x / WBC x   Sq Epi: x / Non Sq Epi: x / Bacteria: x    LIVER FUNCTIONS - ( 29 Dec 2024 06:30 )  Alb: 3.2 g/dL / Pro: 6.1 g/dL / ALK PHOS: 74 U/L / ALT: 5 U/L / AST: 11 U/L / GGT: x           POCT Blood Glucose.: 300 mg/dL (12-30-24 @ 12:26)  POCT Blood Glucose.: 199 mg/dL (12-30-24 @ 09:01)  POCT Blood Glucose.: 353 mg/dL (12-29-24 @ 21:05)  POCT Blood Glucose.: 319 mg/dL (12-29-24 @ 16:56)  POCT Blood Glucose.: 258 mg/dL (12-29-24 @ 11:19)  POCT Blood Glucose.: 209 mg/dL (12-29-24 @ 07:42)  POCT Blood Glucose.: 180 mg/dL (12-28-24 @ 21:31)  POCT Blood Glucose.: 192 mg/dL (12-28-24 @ 16:36)  POCT Blood Glucose.: 250 mg/dL (12-28-24 @ 11:49)  POCT Blood Glucose.: 201 mg/dL (12-28-24 @ 07:33)  POCT Blood Glucose.: 223 mg/dL (12-27-24 @ 22:10)  POCT Blood Glucose.: 220 mg/dL (12-27-24 @ 16:24)

## 2024-12-30 NOTE — CONSULT NOTE ADULT - ASSESSMENT
39F with PMH of open heart valve replacements (aortic root dilation with mechanical AVR and mitral valve replacement) on coumadin, Marfan's syndrome, HTN, DM, right carotid dissection without repair, asthma, abnormal uterine bleeding on progesterone, MDD and chronic pain who was admitted on 12/26/24 with acute Type B aortic dissection requiring esmolol and cardene gtt.     Consulted for diabetes management  Home regimen: januvia 100mg daily, metformin 1000mg bid, glimepiride 0.5mg daily  Current a1c: 7.3%  Outpatient Endocrinologist: PCP    1. Moderately controlled DM2 with hyperglycemia  - Start lantus 10 units qhs  - Start admelog 3 units TID with meals  - Continue moderate correction scale  - Glycemic goal 120-180  - On discharge, can likely resume prior home regimen    2. Hypothyroid  - Clinically euthyroid  - TSH 4.36, check free thyroxine  - Continue home dose levothyroxine    3. Acute type B dissection  - Continue Metoprolol, losartan  - On warfarin  - Pending repeat imaging

## 2024-12-30 NOTE — CHART NOTE - NSCHARTNOTEFT_GEN_A_CORE
pt seen and examined , reports pain is much improved , appears comfortable sitting with her friends , d/w patient and Dr Biswas will plan for rescan tomorrow if stable plan for dc

## 2024-12-31 ENCOUNTER — TRANSCRIPTION ENCOUNTER (OUTPATIENT)
Age: 39
End: 2024-12-31

## 2024-12-31 VITALS
RESPIRATION RATE: 18 BRPM | HEART RATE: 104 BPM | SYSTOLIC BLOOD PRESSURE: 106 MMHG | OXYGEN SATURATION: 99 % | DIASTOLIC BLOOD PRESSURE: 65 MMHG

## 2024-12-31 LAB
ANION GAP SERPL CALC-SCNC: 11 MMOL/L — SIGNIFICANT CHANGE UP (ref 5–17)
APTT BLD: 61.6 SEC — HIGH (ref 24.5–35.6)
BUN SERPL-MCNC: 15.2 MG/DL — SIGNIFICANT CHANGE UP (ref 8–20)
CALCIUM SERPL-MCNC: 9.1 MG/DL — SIGNIFICANT CHANGE UP (ref 8.4–10.5)
CHLORIDE SERPL-SCNC: 103 MMOL/L — SIGNIFICANT CHANGE UP (ref 96–108)
CO2 SERPL-SCNC: 22 MMOL/L — SIGNIFICANT CHANGE UP (ref 22–29)
CREAT SERPL-MCNC: 0.53 MG/DL — SIGNIFICANT CHANGE UP (ref 0.5–1.3)
EGFR: 119 ML/MIN/1.73M2 — SIGNIFICANT CHANGE UP
GLUCOSE BLDC GLUCOMTR-MCNC: 211 MG/DL — HIGH (ref 70–99)
GLUCOSE BLDC GLUCOMTR-MCNC: 248 MG/DL — HIGH (ref 70–99)
GLUCOSE BLDC GLUCOMTR-MCNC: 259 MG/DL — HIGH (ref 70–99)
GLUCOSE SERPL-MCNC: 193 MG/DL — HIGH (ref 70–99)
HCT VFR BLD CALC: 29.4 % — LOW (ref 34.5–45)
HGB BLD-MCNC: 9.5 G/DL — LOW (ref 11.5–15.5)
INR BLD: 3.08 RATIO — HIGH (ref 0.85–1.16)
INR PPP: 3.3 RATIO
MAGNESIUM SERPL-MCNC: 1.9 MG/DL — SIGNIFICANT CHANGE UP (ref 1.6–2.6)
MCHC RBC-ENTMCNC: 26.6 PG — LOW (ref 27–34)
MCHC RBC-ENTMCNC: 32.3 G/DL — SIGNIFICANT CHANGE UP (ref 32–36)
MCV RBC AUTO: 82.4 FL — SIGNIFICANT CHANGE UP (ref 80–100)
PLATELET # BLD AUTO: 349 K/UL — SIGNIFICANT CHANGE UP (ref 150–400)
POTASSIUM SERPL-MCNC: 4.6 MMOL/L — SIGNIFICANT CHANGE UP (ref 3.5–5.3)
POTASSIUM SERPL-SCNC: 4.6 MMOL/L — SIGNIFICANT CHANGE UP (ref 3.5–5.3)
PROTHROM AB SERPL-ACNC: 35.3 SEC — HIGH (ref 9.9–13.4)
RBC # BLD: 3.57 M/UL — LOW (ref 3.8–5.2)
RBC # FLD: 16.5 % — HIGH (ref 10.3–14.5)
SODIUM SERPL-SCNC: 136 MMOL/L — SIGNIFICANT CHANGE UP (ref 135–145)
T4 FREE SERPL-MCNC: 1.3 NG/DL — SIGNIFICANT CHANGE UP (ref 0.9–1.7)
WBC # BLD: 11.72 K/UL — HIGH (ref 3.8–10.5)
WBC # FLD AUTO: 11.72 K/UL — HIGH (ref 3.8–10.5)

## 2024-12-31 PROCEDURE — 99285 EMERGENCY DEPT VISIT HI MDM: CPT | Mod: 25

## 2024-12-31 PROCEDURE — C8929: CPT

## 2024-12-31 PROCEDURE — 80053 COMPREHEN METABOLIC PANEL: CPT

## 2024-12-31 PROCEDURE — 80076 HEPATIC FUNCTION PANEL: CPT

## 2024-12-31 PROCEDURE — 85018 HEMOGLOBIN: CPT

## 2024-12-31 PROCEDURE — 85014 HEMATOCRIT: CPT

## 2024-12-31 PROCEDURE — 83036 HEMOGLOBIN GLYCOSYLATED A1C: CPT

## 2024-12-31 PROCEDURE — 84295 ASSAY OF SERUM SODIUM: CPT

## 2024-12-31 PROCEDURE — 94760 N-INVAS EAR/PLS OXIMETRY 1: CPT

## 2024-12-31 PROCEDURE — 84439 ASSAY OF FREE THYROXINE: CPT

## 2024-12-31 PROCEDURE — 99232 SBSQ HOSP IP/OBS MODERATE 35: CPT

## 2024-12-31 PROCEDURE — 83880 ASSAY OF NATRIURETIC PEPTIDE: CPT

## 2024-12-31 PROCEDURE — 93971 EXTREMITY STUDY: CPT

## 2024-12-31 PROCEDURE — 96374 THER/PROPH/DIAG INJ IV PUSH: CPT

## 2024-12-31 PROCEDURE — ZZZZZ: CPT

## 2024-12-31 PROCEDURE — 82248 BILIRUBIN DIRECT: CPT

## 2024-12-31 PROCEDURE — 81001 URINALYSIS AUTO W/SCOPE: CPT

## 2024-12-31 PROCEDURE — 99233 SBSQ HOSP IP/OBS HIGH 50: CPT

## 2024-12-31 PROCEDURE — 85610 PROTHROMBIN TIME: CPT

## 2024-12-31 PROCEDURE — 86850 RBC ANTIBODY SCREEN: CPT

## 2024-12-31 PROCEDURE — 84484 ASSAY OF TROPONIN QUANT: CPT

## 2024-12-31 PROCEDURE — 99232 SBSQ HOSP IP/OBS MODERATE 35: CPT | Mod: GC

## 2024-12-31 PROCEDURE — 93005 ELECTROCARDIOGRAM TRACING: CPT

## 2024-12-31 PROCEDURE — 87640 STAPH A DNA AMP PROBE: CPT

## 2024-12-31 PROCEDURE — 82550 ASSAY OF CK (CPK): CPT

## 2024-12-31 PROCEDURE — 71045 X-RAY EXAM CHEST 1 VIEW: CPT

## 2024-12-31 PROCEDURE — 82803 BLOOD GASES ANY COMBINATION: CPT

## 2024-12-31 PROCEDURE — 85027 COMPLETE CBC AUTOMATED: CPT

## 2024-12-31 PROCEDURE — 84702 CHORIONIC GONADOTROPIN TEST: CPT

## 2024-12-31 PROCEDURE — 86900 BLOOD TYPING SEROLOGIC ABO: CPT

## 2024-12-31 PROCEDURE — 84443 ASSAY THYROID STIM HORMONE: CPT

## 2024-12-31 PROCEDURE — 83735 ASSAY OF MAGNESIUM: CPT

## 2024-12-31 PROCEDURE — 84100 ASSAY OF PHOSPHORUS: CPT

## 2024-12-31 PROCEDURE — 93880 EXTRACRANIAL BILAT STUDY: CPT

## 2024-12-31 PROCEDURE — 36415 COLL VENOUS BLD VENIPUNCTURE: CPT

## 2024-12-31 PROCEDURE — 93971 EXTREMITY STUDY: CPT | Mod: 26,LT

## 2024-12-31 PROCEDURE — 94010 BREATHING CAPACITY TEST: CPT

## 2024-12-31 PROCEDURE — 83605 ASSAY OF LACTIC ACID: CPT

## 2024-12-31 PROCEDURE — 85025 COMPLETE CBC W/AUTO DIFF WBC: CPT

## 2024-12-31 PROCEDURE — 71275 CT ANGIOGRAPHY CHEST: CPT | Mod: MC

## 2024-12-31 PROCEDURE — 84134 ASSAY OF PREALBUMIN: CPT

## 2024-12-31 PROCEDURE — 71275 CT ANGIOGRAPHY CHEST: CPT | Mod: 26

## 2024-12-31 PROCEDURE — 74174 CTA ABD&PLVS W/CONTRAST: CPT | Mod: MC

## 2024-12-31 PROCEDURE — 85730 THROMBOPLASTIN TIME PARTIAL: CPT

## 2024-12-31 PROCEDURE — 94640 AIRWAY INHALATION TREATMENT: CPT

## 2024-12-31 PROCEDURE — 83690 ASSAY OF LIPASE: CPT

## 2024-12-31 PROCEDURE — 82553 CREATINE MB FRACTION: CPT

## 2024-12-31 PROCEDURE — 36430 TRANSFUSION BLD/BLD COMPNT: CPT

## 2024-12-31 PROCEDURE — 82435 ASSAY OF BLOOD CHLORIDE: CPT

## 2024-12-31 PROCEDURE — 74018 RADEX ABDOMEN 1 VIEW: CPT

## 2024-12-31 PROCEDURE — 80061 LIPID PANEL: CPT

## 2024-12-31 PROCEDURE — 82947 ASSAY GLUCOSE BLOOD QUANT: CPT

## 2024-12-31 PROCEDURE — 82330 ASSAY OF CALCIUM: CPT

## 2024-12-31 PROCEDURE — 84132 ASSAY OF SERUM POTASSIUM: CPT

## 2024-12-31 PROCEDURE — 74174 CTA ABD&PLVS W/CONTRAST: CPT | Mod: 26

## 2024-12-31 PROCEDURE — 87641 MR-STAPH DNA AMP PROBE: CPT

## 2024-12-31 PROCEDURE — 86901 BLOOD TYPING SEROLOGIC RH(D): CPT

## 2024-12-31 PROCEDURE — 96375 TX/PRO/DX INJ NEW DRUG ADDON: CPT

## 2024-12-31 PROCEDURE — 82962 GLUCOSE BLOOD TEST: CPT

## 2024-12-31 PROCEDURE — P9059: CPT

## 2024-12-31 PROCEDURE — 80048 BASIC METABOLIC PNL TOTAL CA: CPT

## 2024-12-31 RX ORDER — METOPROLOL TARTRATE 50 MG
1 TABLET ORAL
Qty: 60 | Refills: 1
Start: 2024-12-31 | End: 2025-02-28

## 2024-12-31 RX ORDER — WARFARIN SODIUM 10 MG/1
2 TABLET ORAL
Qty: 60 | Refills: 1
Start: 2024-12-31 | End: 2025-02-28

## 2024-12-31 RX ORDER — MAGNESIUM SULFATE 500 MG/ML
2 INJECTION, SOLUTION INTRAMUSCULAR; INTRAVENOUS ONCE
Refills: 0 | Status: COMPLETED | OUTPATIENT
Start: 2024-12-31 | End: 2024-12-31

## 2024-12-31 RX ORDER — METOPROLOL TARTRATE 50 MG
1 TABLET ORAL
Refills: 0 | DISCHARGE

## 2024-12-31 RX ORDER — INSULIN LISPRO 100/ML
5 VIAL (ML) SUBCUTANEOUS
Refills: 0 | Status: DISCONTINUED | OUTPATIENT
Start: 2024-12-31 | End: 2024-12-31

## 2024-12-31 RX ORDER — LOSARTAN POTASSIUM 100 MG/1
1 TABLET, FILM COATED ORAL
Qty: 30 | Refills: 1
Start: 2024-12-31 | End: 2025-02-28

## 2024-12-31 RX ORDER — INSULIN GLARGINE-YFGN 100 [IU]/ML
12 INJECTION, SOLUTION SUBCUTANEOUS AT BEDTIME
Refills: 0 | Status: DISCONTINUED | OUTPATIENT
Start: 2024-12-31 | End: 2024-12-31

## 2024-12-31 RX ORDER — WARFARIN SODIUM 10 MG/1
3 TABLET ORAL
Refills: 0 | DISCHARGE

## 2024-12-31 RX ORDER — WARFARIN SODIUM 10 MG/1
8 TABLET ORAL ONCE
Refills: 0 | Status: DISCONTINUED | OUTPATIENT
Start: 2024-12-31 | End: 2024-12-31

## 2024-12-31 RX ADMIN — Medication 50 MILLIGRAM(S): at 17:31

## 2024-12-31 RX ADMIN — Medication 81 MILLIGRAM(S): at 12:46

## 2024-12-31 RX ADMIN — Medication 20 MILLIGRAM(S): at 01:25

## 2024-12-31 RX ADMIN — LEVOTHYROXINE SODIUM 25 MICROGRAM(S): 175 TABLET ORAL at 05:40

## 2024-12-31 RX ADMIN — Medication 3 UNIT(S): at 08:51

## 2024-12-31 RX ADMIN — METHADONE HYDROCHLORIDE 10 MILLIGRAM(S): 10 TABLET ORAL at 12:46

## 2024-12-31 RX ADMIN — Medication 5 UNIT(S): at 17:33

## 2024-12-31 RX ADMIN — LIDOCAINE 2 PATCH: 50 OINTMENT TOPICAL at 06:57

## 2024-12-31 RX ADMIN — Medication 6: at 17:33

## 2024-12-31 RX ADMIN — PANTOPRAZOLE 40 MILLIGRAM(S): 40 TABLET, DELAYED RELEASE ORAL at 05:40

## 2024-12-31 RX ADMIN — Medication 10 MILLIGRAM(S): at 12:46

## 2024-12-31 RX ADMIN — Medication 4: at 08:51

## 2024-12-31 RX ADMIN — SODIUM CHLORIDE 3 MILLILITER(S): 9 INJECTION, SOLUTION INTRAMUSCULAR; INTRAVENOUS; SUBCUTANEOUS at 05:41

## 2024-12-31 RX ADMIN — MAGNESIUM SULFATE 25 GRAM(S): 500 INJECTION, SOLUTION INTRAMUSCULAR; INTRAVENOUS at 12:45

## 2024-12-31 RX ADMIN — Medication 5 UNIT(S): at 12:45

## 2024-12-31 RX ADMIN — DULOXETINE HYDROCHLORIDE 60 MILLIGRAM(S): 30 CAPSULE, DELAYED RELEASE ORAL at 12:47

## 2024-12-31 RX ADMIN — Medication 20 MILLIGRAM(S): at 05:41

## 2024-12-31 RX ADMIN — Medication 20 MILLIGRAM(S): at 06:41

## 2024-12-31 RX ADMIN — LOSARTAN POTASSIUM 50 MILLIGRAM(S): 100 TABLET, FILM COATED ORAL at 05:41

## 2024-12-31 RX ADMIN — Medication 50 MILLIGRAM(S): at 05:40

## 2024-12-31 RX ADMIN — Medication 20 MILLIGRAM(S): at 00:25

## 2024-12-31 RX ADMIN — Medication 4: at 12:45

## 2024-12-31 NOTE — DISCHARGE NOTE NURSING/CASE MANAGEMENT/SOCIAL WORK - NSDCPEFALRISK_GEN_ALL_CORE
For information on Fall & Injury Prevention, visit: [email or web address] OR  [email or web address] OR  https://www.cdc.gov/steadi/patient.html

## 2024-12-31 NOTE — PROGRESS NOTE ADULT - ASSESSMENT
39F with PMH of open heart valve replacements (aortic root dilation with mechanical AVR and mitral valve replacement) on coumadin, Marfan's syndrome, HTN, DM, right carotid dissection without repair, asthma, abnormal uterine bleeding on progesterone, MDD and chronic pain presented with new type B aortic dissection. Repeat scan earlier this admission unchanged, has been transitioned off IV esmolol and cardene, BP now being managed with losartan and metoprolol.     Plan:  -no acute surgical intervention  -continue BP control  -repeat CTA performed, upon review appears unchanged, pending final read  -follow-up outpatient in dissection clinic  -continue care per primary team  -discussed with attending surgeon
39F with PMH of open heart valve replacements (aortic root dilation with mechanical AVR and mitral valve replacement) on coumadin, Marfan's syndrome, HTN, DM, right carotid dissection without repair, asthma, abnormal uterine bleeding on progesterone, MDD and chronic pain who was admitted on 12/26/24 with acute Type B aortic dissection requiring esmolol and cardene gtt.     Consulted for diabetes management  Home regimen: januvia 100mg daily, metformin 1000mg bid, glimepiride 0.5mg daily  Current a1c: 7.3%  Outpatient Endocrinologist: PCP    1. Moderately controlled DM2 with hyperglycemia  - Glucoses above goal  - Increase admelog to 5 units TID  - Increase lantus to 12 units qhs  - Glycemic goal 120-180  - On discharge, can likely resume prior home regimen    2. Hypothyroid  - Clinically euthyroid  - TSH 4.36, free thyroxine pending  - Continue home dose levothyroxine    3. Acute type B dissection  - Continue Metoprolol, losartan  - On warfarin  - Pending repeat imaging today  
39F with PMH of open heart valve replacements (aortic root dilation with mechanical AVR and mitral valve replacement) on coumadin, Marfan's syndrome, HTN, DM, right carotid dissection without repair, asthma, abnormal uterine bleeding on progesterone, MDD and chronic pain who was admitted on 12/26/24 with acuteType B aortic dissection requiring esmolol and cardene gtt. 

## 2024-12-31 NOTE — PROGRESS NOTE ADULT - PROBLEM SELECTOR PLAN 7
Continue home progesterone with patiens own meds
Continue home progesterone with patient's own meds
Continue home progesterone with patiens own meds

## 2024-12-31 NOTE — PROGRESS NOTE ADULT - PROVIDER SPECIALTY LIST ADULT
Critical Care
Endocrinology
Vascular Surgery
Critical Care
Critical Care
CT Surgery
Vascular Surgery
CT Surgery
CT Surgery

## 2024-12-31 NOTE — PROGRESS NOTE ADULT - REASON FOR ADMISSION
Medical optimization for acute Type B Aortic Dissection

## 2024-12-31 NOTE — PROGRESS NOTE ADULT - PROBLEM SELECTOR PLAN 4
Continuous pulse oximetry  Advair BID

## 2024-12-31 NOTE — DISCHARGE NOTE NURSING/CASE MANAGEMENT/SOCIAL WORK - FINANCIAL ASSISTANCE
[facility] provides services at a reduced cost to those who are determined to be eligible through [facility]’s financial assistance program. Information regarding [facility]’s financial assistance program can be found by going to [email or web address] or by calling [phone].

## 2024-12-31 NOTE — PHYSICAL THERAPY INITIAL EVALUATION ADULT - GENERAL OBSERVATIONS, REHAB EVAL
Pt received in semifowler position with
Pt received in semifowler position with monitor, pleasant and agreeable to PT c/o L knee buckling during amb.

## 2024-12-31 NOTE — PHYSICAL THERAPY INITIAL EVALUATION ADULT - PERTINENT HX OF CURRENT PROBLEM, REHAB EVAL
39F with PMH of open heart valve replacements (aortic root dilation with mechanical AVR and mitral valve replacement) on coumadin, Marfan's syndrome, HTN, DM, right carotid dissection without repair, asthma, abnormal uterine bleeding on progesterone, MDD and chronic pain presenting to [facility] ED for acute onset chest pain and dyspnea starting at ~0900 today. Patient describes pain as tearing chest pain that radiates to her back. Patient had CTA of chest and abdomen in [facility] ED, which shows an acute Type B aortic dissection. CT-ICU consulted for evaluation and management.
39F with PMH of open heart valve replacements (aortic root dilation with mechanical AVR and mitral valve replacement) on coumadin, Marfan's syndrome, HTN, DM, right carotid dissection without repair, asthma, abnormal uterine bleeding on progesterone, MDD and chronic pain presented with new type B aortic dissection. Repeat scan earlier this admission unchanged, has been transitioned off IV esmolol and cardene, BP now being managed with losartan and metoprolol.

## 2024-12-31 NOTE — DISCHARGE NOTE PROVIDER - NSDCFUSCHEDAPPT_GEN_ALL_CORE_FT
[facility] [address]  Scheduled Appointment: 01/10/2025

## 2024-12-31 NOTE — DISCHARGE NOTE NURSING/CASE MANAGEMENT/SOCIAL WORK - PATIENT PORTAL LINK FT
You can access the FollowMyHealth Patient Portal offered by [facility] by registering at the following website: [email or web address]. By joining [employer]’s FollowMyHealth portal, you will also be able to view your health information using other applications (apps) compatible with our system.

## 2024-12-31 NOTE — DISCHARGE NOTE PROVIDER - HOSPITAL COURSE
39F with PMH of open heart valve replacements (aortic root dilation with mechanical AVR and mitral valve replacement) on coumadin, Marfan's syndrome, HTN, DM, right carotid dissection without repair, asthma, abnormal uterine bleeding on progesterone, MDD and chronic pain who was admitted on 12/26/24 with acute Type B aortic dissection requiring esmolol and cardene gtt. IV meds transitioned to PO. repeat CTA 12/31 unchanged per vascular read. Pt hemodynamically stable, ambulating well. Pt stable for [facility] as per Dr. Soto. will return to office for f /u with  repeat CTA C/A/P in one month.  Pt hyperglycemic in hospital, seen by endo, recommended pt go home on insulin for a few days as bridge while holding MFN (s/p IV contrast), pt refused states she will be fine when she resumes home meds.

## 2024-12-31 NOTE — DISCHARGE NOTE PROVIDER - NSDCFUADDAPPT_GEN_ALL_CORE_FT
Follow up with Dr. Soto and Dr. Donato in one month. Please call [phone]. You will need to have a repeat cat scan in one month prior to follow up. Dr. Soto office will arrange.     The cardiac surgery office is located at [facility], first floor. Take a left at the end of the lobby until the end of that sutton (past the elevator bank). Make a left and the office is on your right across from the elevators.

## 2024-12-31 NOTE — DISCHARGE NOTE NURSING/CASE MANAGEMENT/SOCIAL WORK - NSDCFUADDAPPT_GEN_ALL_CORE_FT
Follow up with Dr. Soto and Dr. Donato in one month. Please call [phone]. You will need to have a repeat cat scan in one month prior to follow up. Dr. Soto office will arrange.     The cardiac surgery office is located at [facility], first floor. Take a left at the end of the lobby until the end of that sutton (past the elevator bank). Make a left and the office is on your right across from the elevators.

## 2024-12-31 NOTE — DISCHARGE NOTE PROVIDER - CARE PROVIDER_API CALL
Brittany Reza, [place]me[facility] [place]  Thoracic and Cardiac Surgery  [address] [place] [id number]  Phone: [phone]  Fax: [phone]  Follow Up Time:

## 2024-12-31 NOTE — PROGRESS NOTE ADULT - PROBLEM SELECTOR PROBLEM 6
IRWIN (generalized anxiety disorder)

## 2024-12-31 NOTE — PHYSICAL THERAPY INITIAL EVALUATION ADULT - ADDITIONAL COMMENTS
Pt AxOX4 states she lives at home alone with 0STE and 0 inside. Pt was independent PTA without use of DME.
Pt AxOX4 states she lives at home alone with 0STE and 0 inside. Pt was independent PTA without use of DME.

## 2024-12-31 NOTE — PROGRESS NOTE ADULT - NS ATTEND AMEND GEN_ALL_CORE FT
pt seen pain improved was sleeping comfortably in am , awaiting repeat CT scan this am
Patient was seen and examined at bedside with NP present. The case was discussed in details. Agree with above assessment and recommendations.

## 2024-12-31 NOTE — DISCHARGE NOTE PROVIDER - NSDCFUADDINST_GEN_ALL_CORE_FT
Please call the Cardiothoracic Surgery office at [phone] if you are experiencing any shortness of breath, chest pain, fevers or chills, persistent nausea, vomiting or if you have any questions about your medications. If the symptoms are severe, call 911 and go to the nearest hospital. You can also call [phone] for an emergency [facility] ambulance, which will take you to the closest [facility].    If you need any assistance for making any appointments for a new consult or referral in any specialty, please call our [facility] Clinical Coordination Center at [phone].

## 2024-12-31 NOTE — PROGRESS NOTE ADULT - PROBLEM SELECTOR PLAN 3
ISS  AC/HS  Now hyperglycemic  Will consult endocrinology in AM
ISS  AC/HS
ISS  AC/HS  Now hyperglycemic  Endocrine consult appreciated  Continue basal/bolus insulin  Will resume home regimen on DC

## 2024-12-31 NOTE — PROGRESS NOTE ADULT - PROBLEM SELECTOR PROBLEM 1
Descending thoracic aortic dissection

## 2024-12-31 NOTE — DISCHARGE NOTE PROVIDER - NSDCCPCAREPLAN_GEN_ALL_CORE_FT
PRINCIPAL DISCHARGE DIAGNOSIS  Diagnosis: Descending thoracic aortic dissection  Assessment and Plan of Treatment: continue your medications as prescribed  follow up with your cardiologist and primary care doctor in 1-2 weeks  you will need to have repeat imaging in one month and then follow up with Dr. Soto and Dr. Donato. Dr. Soto' office will arrange. Please call [phone] for appointment.  please return to ED if you have worsening chest/back/abdominal pain/numbness/tingling of extremities.  continue coumadin for your mechanical valve, goal 3-3.5, notify your cardiologist/primary care doctor for any bleeding.      SECONDARY DISCHARGE DIAGNOSES  Diagnosis: Type 2 diabetes mellitus  Assessment and Plan of Treatment: resume your Januvia and glimepiride  you need to hold your metformin for 48 hours given the fact that you had IV contrast today. monitor your blood sugars, if high, please contact your primary care doctor for adjustment to your regimen    Diagnosis: Phlebitis  Assessment and Plan of Treatment: keep arm elevated, warm soaks as needed, keep blister site dry, if blister pops, cover with clean dry bandage to prevent infecction. if site develops redness or worsening pain, please call Dr. Soto' office.

## 2024-12-31 NOTE — DISCHARGE NOTE PROVIDER - NSDCMRMEDTOKEN_GEN_ALL_CORE_FT
aspirin 81 mg oral delayed release tablet: 1 tab(s) orally once a day  atorvastatin 20 mg oral tablet: 1 tab(s) orally once a day (at bedtime)  cyclobenzaprine 10 mg oral tablet: 1 tab(s) orally 3 times a day as needed for  muscle spasm  Dulera 200 mcg-5 mcg/inh inhalation aerosol: 2 puff(s) inhaled 2 times a day  DULoxetine 60 mg oral delayed release capsule: 1 cap(s) orally once a day  Econazole Nitrate 1% topical cream: Apply topically to affected area once a day  glimepiride 1 mg oral tablet: 1 tab(s) orally once a day  levothyroxine 25 mcg (0.025 mg) oral tablet: 1 tab(s) orally once a day  lidocaine 2% topical gel with applicator: Apply topically to affected area 2 times a day urethral/mucoal  medroxyPROGESTERone 5 mg oral tablet: 1 tab(s) orally once a day  metFORMIN 1000 mg oral tablet: 1 tab(s) orally 2 times a day  methadone 5 mg oral tablet: 1 tab(s) orally 3 times a day  metoprolol succinate 25 mg oral tablet, extended release: 1 tab(s) orally 2 times a day  multivitamin: 1 tab(s) orally once a day  norethindrone acetate 5 mg oral tablet: 2 tab(s) orally once a day  oxyCODONE 20 mg oral tablet: 1 tab(s) orally every 6 hours  SITagliptin (as base) 100 mg oral tablet: 1 tab(s) orally once a day  Ventolin 90 mcg/inh inhalation aerosol: 2 puff(s) inhaled every 4 hours as needed for  shortness of breath and/or wheezing  Vitamin C 500 mg oral tablet, chewable: 1 tab(s) chewed 2 times a day  warfarin 3 mg oral tablet: 3 tab(s) orally once a day   aspirin 81 mg oral delayed release tablet: 1 tab(s) orally once a day  atorvastatin 20 mg oral tablet: 1 tab(s) orally once a day (at bedtime)  cyclobenzaprine 10 mg oral tablet: 1 tab(s) orally 3 times a day as needed for  muscle spasm  Dulera 200 mcg-5 mcg/inh inhalation aerosol: 2 puff(s) inhaled 2 times a day  DULoxetine 60 mg oral delayed release capsule: 1 cap(s) orally once a day  Econazole Nitrate 1% topical cream: Apply topically to affected area once a day  glimepiride 1 mg oral tablet: 1 tab(s) orally once a day  levothyroxine 25 mcg (0.025 mg) oral tablet: 1 tab(s) orally once a day  lidocaine 2% topical gel with applicator: Apply topically to affected area 2 times a day urethral/mucoal  losartan 50 mg oral tablet: 1 tab(s) orally once a day  medroxyPROGESTERone 5 mg oral tablet: 1 tab(s) orally once a day  metFORMIN 1000 mg oral tablet: 1 tab(s) orally 2 times a day resume friday 1/3/25  methadone 5 mg oral tablet: 1 tab(s) orally 3 times a day  metoprolol tartrate 50 mg oral tablet: 1 tab(s) orally 2 times a day  multivitamin: 1 tab(s) orally once a day  norethindrone acetate 5 mg oral tablet: 2 tab(s) orally once a day  oxyCODONE 20 mg oral tablet: 1 tab(s) orally every 6 hours  SITagliptin (as base) 100 mg oral tablet: 1 tab(s) orally once a day  Ventolin 90 mcg/inh inhalation aerosol: 2 puff(s) inhaled every 4 hours as needed for  shortness of breath and/or wheezing  Vitamin C 500 mg oral tablet, chewable: 1 tab(s) chewed 2 times a day  warfarin 4 mg oral tablet: 2 tab(s) orally once a day (at bedtime)

## 2024-12-31 NOTE — PROGRESS NOTE ADULT - PROBLEM SELECTOR PLAN 2
Esmolol gtt for HR control goal < 60 as tolerated - d/c'd 12/27  Cardene gtt to maintain SBP < 120 - D/c'd 12/26  Continue Metoprol, losartan
Esmolol gtt for HR control goal < 60 as tolerated - d/c'd 12/27  Cardene gtt to maintain SBP < 120 - D/c'd 12/26  Continue Metoprol, losartan
Esmolol gtt for HR control goal < 60 as tolerated - d/c'd 12/27  Cardene gtt to maintain SBP < 120 - D/c'd 12/26  Continue Metoprol 50 BID

## 2024-12-31 NOTE — PROGRESS NOTE ADULT - PROBLEM SELECTOR PLAN 1
CTA: Acute type B dissection  CCM  Esmolol gtt for HR control goal < 60 as tolerated - d/c'd 12/27  Cardene gtt to maintain SBP < 120 - D/c'd 12/26  Continue Metoprol 50 BID  Pulse/neuro checks Q1H  Pain control PRN  Restated anticoagulation on 12/27 - Warfarin 9mg  s/p 1 unit of FFP for elevated INR of 5, last INR 2.83  Repeat CTA C/A/P with no changes to level of dissection.      Vascular surgery consulted - recs appreciated
CTA: Acute type B dissection  Continue Metoprol , losartan  Pain control PRN  Restarted anticoagulation on 12/27 - Warfarin - trend INR  Repeat CTA C/A/P with no changes to level of dissection.  Vascular surgery consulted - recs appreciated  Pending repeat CTA today
CTA: Acute type B dissection  CCM  Continue Metoprol , losartan  Pain control PRN  Restarted anticoagulation on 12/27 - Warfarin 9mg  Repeat CTA C/A/P with no changes to level of dissection.  Will likely need additional repeat prior to discharge    Vascular surgery consulted - recs appreciated

## 2024-12-31 NOTE — PROGRESS NOTE ADULT - SUBJECTIVE AND OBJECTIVE BOX
CRITICAL CARE ATTENDING - CTICU    ICU Vital Signs Last 24 Hrs  T(C): 36.5 (27 Dec 2024 07:00), Max: 36.7 (26 Dec 2024 10:18)  T(F): 97.7 (27 Dec 2024 07:00), Max: 98.1 (26 Dec 2024 12:00)  HR: 84 (27 Dec 2024 07:00) (72 - 103)  BP: 111/59 (27 Dec 2024 07:00) (94/55 - 156/79)  BP(mean): 75 (27 Dec 2024 07:00) (67 - 107)  ABP: 100/44 (27 Dec 2024 07:00) (100/44 - 162/71)  ABP(mean): 63 (27 Dec 2024 07:00) (20 - 106)  RR: 12 (27 Dec 2024 07:00) (7 - 22)  SpO2: 99% (27 Dec 2024 07:00) (92% - 100%)    O2 Parameters below as of 27 Dec 2024 07:00  Patient On (Oxygen Delivery Method): room air    MEDICATIONS  (STANDING):  atorvastatin 20 milliGRAM(s) Oral at bedtime  esmolol  Infusion 50 MICROgram(s)/kG/Min (22.3 mL/Hr) IV Continuous <Continuous>  fluticasone propionate/ salmeterol 250-50 MICROgram(s) Diskus 1 Dose(s) Inhalation two times a day  glucagon  Injectable 1 milliGRAM(s) IntraMuscular once  insulin lispro (ADMELOG) corrective regimen sliding scale   SubCutaneous three times a day before meals  insulin lispro (ADMELOG) corrective regimen sliding scale   SubCutaneous at bedtime  levothyroxine 25 MICROGram(s) Oral daily  lidocaine   4% Patch 2 Patch Transdermal every 24 hours  niCARdipine Infusion 5 mG/Hr (25 mL/Hr) IV Continuous <Continuous>  pantoprazole    Tablet 40 milliGRAM(s) Oral before breakfast  sodium chloride 0.9% lock flush 3 milliLiter(s) IV Push every 8 hours    MEDICATIONS  (PRN):  dextrose Oral Gel 15 Gram(s) Oral once PRN Blood Glucose LESS THAN 70 milliGRAM(s)/deciliter  morphine  - Injectable 4 milliGRAM(s) IV Push every 4 hours PRN Severe Pain (7 - 10)                                   10.4   14.48 )-----------( 332      ( 27 Dec 2024 04:45 )             32.6   12-27    136  |  106  |  10.2  ----------------------------<  186[H]  4.5   |  20.0[L]  |  0.52    Ca    8.3[L]      27 Dec 2024 04:45  Phos  3.6     12-27  Mg     2.2     12-27    TPro  6.2[L]  /  Alb  3.7  /  TBili  0.6  /  DBili  0.1  /  AST  11  /  ALT  6   /  AlkPhos  86  12-27  PT/INR - ( 27 Dec 2024 06:30 )   PT: 32.8 sec;   INR: 2.86 ratio  PTT - ( 27 Dec 2024 06:30 )  PTT:49.1 sec  Daily     I&O's Summary    26 Dec 2024 07:01  -  27 Dec 2024 07:00  --------------------------------------------------------  IN: 1516.8 mL / OUT: 750 mL / NET: 766.8 mL      Critically Ill patient  : [ ] preoperative ,   [ ] post operative  [x] Non Operative     Requires :  [x ] Arterial Line   [ ] Central Line  [ ] PA catheter  [ ] IABP  [ ] ECMO  [ ] LVAD  [ ] Ventilator  [ ] pacemaker [ ] Impella.  [x] NC                       [x ] ABG's     [x ] Pulse Oxymetry Monitoring  Bedside evaluation , monitoring , treatment of hemodynamics , fluids , IVP/ IVCD meds.    Diagnosis:     Admitted - Chest Pain / Back Pain     Acute Type B Aortic Dissection     s/p AVR / MVR - m     Hypertension     Hypertensive Emergency     Hemodynamic lability,  instability. Requires IVCD [ ] vasopressors [ x]  esmolol   [ x] vasodilator  [ ]IVSS fluid  to maintain MAP, perfusion, C.I.     Respiratory insuffiencey     Requires Supplemental Oxygen Therapy     Hypoxemia - Requires  [ x] Nasal Canula     TEVAR Evaluation     Hyponatremia     Metabolic Acidosis     Requires bedside physical therapy, mobilization and total [facility] care.     Coagulopathy on coumadin       Discussed with CT surgeon, Physician's Assistant - Nurse Practitioner- Critical care medicine team.   Discussed at  AM rounds.   Chart, labs , films reviewed.    Cumulative Critical Care Time Given Today : 35 min
Patient seen and examined at bedside. No acute events overnight, pain has improved     Vitals:  Vital Signs Last 24 Hrs  T(C): 36.9 (31 Dec 2024 07:29), Max: 36.9 (31 Dec 2024 07:29)  T(F): 98.5 (31 Dec 2024 07:29), Max: 98.5 (31 Dec 2024 07:29)  HR: 87 (31 Dec 2024 07:29) (87 - 89)  BP: 106/67 (31 Dec 2024 07:29) (103/60 - 127/62)  BP(mean): --  RR: 20 (31 Dec 2024 07:29) (18 - 20)  SpO2: 100% (31 Dec 2024 07:29) (97% - 100%)    Parameters below as of 31 Dec 2024 07:29  Patient On (Oxygen Delivery Method): room air        Labs:  12-31    136  |  103  |  15.2  ----------------------------<  193[H]  4.6   |  22.0  |  0.53    Ca    9.1      31 Dec 2024 09:42  Mg     1.9     12-31                              9.5    11.72 )-----------( 349      ( 31 Dec 2024 09:42 )             29.4       Exam:  Gen: pt lying in bed, alert, in NAD  Resp: unlabored on room air  CVS: RRR  Abd: soft, NT, ND  Ext: moving all extremities spontaneously, sensation intact
CRITICAL CARE ATTENDING - CTICU    MEDICATIONS  (STANDING):  acetaminophen   IVPB .. 1000 milliGRAM(s) IV Intermittent once  acetaminophen   IVPB .. 1000 milliGRAM(s) IV Intermittent once  atorvastatin 20 milliGRAM(s) Oral at bedtime  dextrose 5%. 1000 milliLiter(s) (50 mL/Hr) IV Continuous <Continuous>  dextrose 5%. 1000 milliLiter(s) (100 mL/Hr) IV Continuous <Continuous>  dextrose 50% Injectable 25 Gram(s) IV Push once  dextrose 50% Injectable 12.5 Gram(s) IV Push once  dextrose 50% Injectable 25 Gram(s) IV Push once  esmolol  Infusion 50 MICROgram(s)/kG/Min (22.3 mL/Hr) IV Continuous <Continuous>  fluticasone propionate/ salmeterol 250-50 MICROgram(s) Diskus 1 Dose(s) Inhalation two times a day  glucagon  Injectable 1 milliGRAM(s) IntraMuscular once  insulin lispro (ADMELOG) corrective regimen sliding scale   SubCutaneous three times a day before meals  insulin lispro (ADMELOG) corrective regimen sliding scale   SubCutaneous at bedtime  levothyroxine 25 MICROGram(s) Oral daily  lidocaine   4% Patch 2 Patch Transdermal every 24 hours  metoprolol tartrate 50 milliGRAM(s) Oral every 8 hours  niCARdipine Infusion 5 mG/Hr (25 mL/Hr) IV Continuous <Continuous>  norethindrone acetate 10 milliGRAM(s) Oral daily  pantoprazole    Tablet 40 milliGRAM(s) Oral before breakfast  sodium chloride 0.9% lock flush 3 milliLiter(s) IV Push every 8 hours                                    10.4   14.48 )-----------( 332      ( 27 Dec 2024 04:45 )             32.6           136  |  106  |  10.2  ----------------------------<  186[H]  4.5   |  20.0[L]  |  0.52    Ca    8.3[L]      27 Dec 2024 04:45  Phos  3.6       Mg     2.2         TPro  6.2[L]  /  Alb  3.7  /  TBili  0.6  /  DBili  0.1  /  AST  11  /  ALT  6   /  AlkPhos  86        PT/INR - ( 27 Dec 2024 06:30 )   PT: 32.8 sec;   INR: 2.86 ratio         PTT - ( 27 Dec 2024 06:30 )  PTT:49.1 sec        Daily     Daily Weight in k.3 (27 Dec 2024 04:15)       @ 07:01  -   @ 07:00  --------------------------------------------------------  IN: 1516.8 mL / OUT: 750 mL / NET: 766.8 mL        Critically Ill patient  : [ x] preoperative  ? TAVR ?   [ ] post operative    Requires :  [x ] Arterial Line   [ ] Central Line  [ ] PA catheter  [ ] IABP  [ ] ECMO  [ ] LVAD  [ ] Ventilator  [ ] pacemaker [ ] Impella.                      [ x] ABG's     [ x] Pulse Oxymetry Monitoring  Bedside evaluation , monitoring , treatment of hemodynamics , fluids , IVP/ IVCD meds.        Diagnosis:     Admitted Chest Pain / back Pain     Type B Aortic Dissection     Hypertension     Hypertensive Emergency     Hemodynamic lability,  instability. Requires IVCD [ ] vasopressors [ x] Esmolol   [x ] vasodilator  [ ]IVSS fluid  to maintain MAP, perfusion, C.I.    Pre Op - ?  TAVR ?    Metabolic Acidosis     Requires chest PT, pulmonary toilet, suctioning to maintain SaO2,  patent airway and treat atelectasis.     DM ll    s/p AVR / MVR m     Coagulopathy  2 to coumadin     Repeat CTA today                         Discussed with CT surgeon, Physician's Assistant - Nurse Practitioner- Critical care medicine team.   Discussed at  AM / PM rounds.   Chart, labs , films reviewed.    Cumulative Critical Care Time Given Today : 60 min
CRITICAL CARE ATTENDING - CTICU    MEDICATIONS  (STANDING):  atorvastatin 20 milliGRAM(s) Oral at bedtime  dextrose 5%. 1000 milliLiter(s) (50 mL/Hr) IV Continuous <Continuous>  dextrose 5%. 1000 milliLiter(s) (100 mL/Hr) IV Continuous <Continuous>  dextrose 50% Injectable 25 Gram(s) IV Push once  dextrose 50% Injectable 12.5 Gram(s) IV Push once  dextrose 50% Injectable 25 Gram(s) IV Push once  esmolol  Infusion 50 MICROgram(s)/kG/Min (22.3 mL/Hr) IV Continuous <Continuous>  fluticasone propionate/ salmeterol 250-50 MICROgram(s) Diskus 1 Dose(s) Inhalation two times a day  glucagon  Injectable 1 milliGRAM(s) IntraMuscular once  insulin lispro (ADMELOG) corrective regimen sliding scale   SubCutaneous three times a day before meals  insulin lispro (ADMELOG) corrective regimen sliding scale   SubCutaneous at bedtime  niCARdipine Infusion 5 mG/Hr (25 mL/Hr) IV Continuous <Continuous>  pantoprazole    Tablet 40 milliGRAM(s) Oral before breakfast  sodium chloride 0.9% lock flush 3 milliLiter(s) IV Push every 8 hours                                    10.6   19.24 )-----------( 336      ( 26 Dec 2024 14:15 )             32.9       12-26    131[L]  |  101  |  15.4  ----------------------------<  153[H]  4.9   |  19.0[L]  |  0.59    Ca    8.4      26 Dec 2024 10:41  Mg     1.4     12-26    TPro  5.9[L]  /  Alb  3.2[L]  /  TBili  0.4  /  DBili  x   /  AST  17  /  ALT  7   /  AlkPhos  77  12-26      PT/INR - ( 26 Dec 2024 10:41 )   PT: 57.6 sec;   INR: 5.18 ratio         PTT - ( 26 Dec 2024 10:41 )  PTT:55.8 sec        Daily     Daily       12-26 @ 07:01  -  12-26 @ 15:53  --------------------------------------------------------  IN: 430.5 mL / OUT: 0 mL / NET: 430.5 mL        Critically Ill patient  : [ ] preoperative ,   [ ] post operative  [x] Non Operative     Requires :  [x ] Arterial Line   [ ] Central Line  [ ] PA catheter  [ ] IABP  [ ] ECMO  [ ] LVAD  [ ] Ventilator  [ ] pacemaker [ ] Impella.  [x] NC                       [x ] ABG's     [x ] Pulse Oxymetry Monitoring  Bedside evaluation , monitoring , treatment of hemodynamics , fluids , IVP/ IVCD meds.        Diagnosis:     Admitted - Chest Pain / Back Pain     Acute Type B Aortic Dissection     s/p AVR / MVR - m     Hypertension     Hypertensive Emergency     Hemodynamic lability,  instability. Requires IVCD [ ] vasopressors [ x]  esmolol   [ x] vasodilator  [ ]IVSS fluid  to maintain MAP, perfusion, C.I.     Respiratory insuffiencey     Requires Supplemental Oxygen Therapy     Hypoxemia - Requires  [ x] Nasal Canula     TEVAR Evaluation     Hyponatremia     Metabolic Acidosis     Requires bedside physical therapy, mobilization and total [facility] care.     Coagulopathy on coumadin                   Discussed with CT surgeon, Physician's Assistant - Nurse Practitioner- Critical care medicine team.   Discussed at  AM / PM rounds.   Chart, labs , films reviewed.    Cumulative Critical Care Time Given Today : 105 min
CRITICAL CARE ATTENDING - CTICU  ICU Vital Signs Last 24 Hrs  T(C): 36.6 (28 Dec 2024 08:00), Max: 37.2 (28 Dec 2024 04:00)  T(F): 97.9 (28 Dec 2024 08:00), Max: 98.9 (28 Dec 2024 04:00)  HR: 78 (28 Dec 2024 08:30) (70 - 111)  BP: 122/75 (27 Dec 2024 19:30) (99/56 - 125/72)  BP(mean): 85 (27 Dec 2024 19:30) (68 - 85)  ABP: 119/49 (28 Dec 2024 08:30) (98/48 - 158/69)  ABP(mean): 75 (28 Dec 2024 08:30) (40 - 104)  RR: 12 (28 Dec 2024 08:30) (10 - 26)  SpO2: 100% (28 Dec 2024 08:30) (94% - 100%)    O2 Parameters below as of 28 Dec 2024 07:30  Patient On (Oxygen Delivery Method): room air    MEDICATIONS  (STANDING):  atorvastatin 20 milliGRAM(s) Oral at bedtime  esmolol  Infusion 50 MICROgram(s)/kG/Min (22.3 mL/Hr) IV Continuous <Continuous>  fluticasone propionate/ salmeterol 250-50 MICROgram(s) Diskus 1 Dose(s) Inhalation two times a day  glucagon  Injectable 1 milliGRAM(s) IntraMuscular once  insulin lispro (ADMELOG) corrective regimen sliding scale   SubCutaneous three times a day before meals  insulin lispro (ADMELOG) corrective regimen sliding scale   SubCutaneous at bedtime  levothyroxine 25 MICROGram(s) Oral daily  lidocaine   4% Patch 2 Patch Transdermal every 24 hours  methadone    Tablet 10 milliGRAM(s) Oral daily  metoprolol tartrate 50 milliGRAM(s) Oral every 8 hours  niCARdipine Infusion 5 mG/Hr (25 mL/Hr) IV Continuous <Continuous>  norethindrone acetate 10 milliGRAM(s) Oral daily  ondansetron Injectable 4 milliGRAM(s) IV Push once  pantoprazole    Tablet 40 milliGRAM(s) Oral before breakfast  sodium chloride 0.9% lock flush 3 milliLiter(s) IV Push every 8 hours    MEDICATIONS  (PRN):  dextrose Oral Gel 15 Gram(s) Oral once PRN Blood Glucose LESS THAN 70 milliGRAM(s)/deciliter  oxyCODONE    IR 20 milliGRAM(s) Oral four times a day PRN Severe Pain (7 - 10)                          9.8    13.06 )-----------( 303      ( 28 Dec 2024 02:10 )             30.9   12-28    139  |  109[H]  |  9.4  ----------------------------<  187[H]  4.4   |  20.0[L]  |  0.59    Ca    8.2[L]      28 Dec 2024 02:10  Phos  3.6       Mg     2.0         TPro  6.2[L]  /  Alb  3.7  /  TBili  0.6  /  DBili  0.1  /  AST  11  /  ALT  6   /  AlkPhos  86  12-      Daily     Daily Weight in k.3 (27 Dec 2024 04:15)    I&O's Summary    27 Dec 2024 07:01  -  28 Dec 2024 07:00  --------------------------------------------------------  IN: 1240.5 mL / OUT: 1350 mL / NET: -109.5 mL    28 Dec 2024 07:01  -  28 Dec 2024 09:30  --------------------------------------------------------  IN: 240 mL / OUT: 0 mL / NET: 240 mL        Critically Ill patient  : [ x] preoperative  ? TAVR ?   [ ] post operative    Requires :  [x ] Arterial Line   [ ] Central Line  [ ] PA catheter  [ ] IABP  [ ] ECMO  [ ] LVAD  [ ] Ventilator  [ ] pacemaker [ ] Impella.                      [ x] ABG's     [ x] Pulse Oxymetry Monitoring  Bedside evaluation , monitoring , treatment of hemodynamics , fluids , IVP/ IVCD meds.        Diagnosis:     Admitted Chest Pain / back Pain     Type B Aortic Dissection     Hypertension     Hypertensive Emergency     Hemodynamic lability,  instability. Requires IVCD [ ] vasopressors [ x] Esmolol   [x ] vasodilator  [ ]IVSS fluid  to maintain MAP, perfusion, C.I.    Metabolic Acidosis     Requires chest PT, pulmonary toilet, suctioning to maintain SaO2,  patent airway and treat atelectasis.     DM ll    s/p AVR / MVR m     Coagulopathy  2 to coumadin     Repeat CTA is unchanged       Discussed with CT surgeon, Physician's Assistant - Nurse Practitioner- Critical care medicine team.   Discussed at  AM.   Chart, labs , films reviewed.    Cumulative Critical Care Time Given Today : 30 min
CRITICAL CARE ATTENDING - CTICU  ICU Vital Signs Last 24 Hrs  T(C): 36.8 (29 Dec 2024 08:00), Max: 36.8 (28 Dec 2024 12:00)  T(F): 98.2 (29 Dec 2024 08:00), Max: 98.2 (28 Dec 2024 12:00)  HR: 79 (29 Dec 2024 09:00) (73 - 89)  BP: 110/64 (29 Dec 2024 09:00) (96/80 - 131/69)  BP(mean): 77 (29 Dec 2024 09:00) (72 - 90)  ABP: 127/49 (28 Dec 2024 15:00) (112/42 - 130/52)  ABP(mean): 77 (28 Dec 2024 15:00) (66 - 80)  RR: 15 (29 Dec 2024 09:00) (11 - 24)  SpO2: 100% (29 Dec 2024 09:00) (98% - 100%)    O2 Parameters below as of 29 Dec 2024 09:00  Patient On (Oxygen Delivery Method): room air    MEDICATIONS  (STANDING):  acetaminophen     Tablet .. 975 milliGRAM(s) Oral every 6 hours  aspirin enteric coated 81 milliGRAM(s) Oral daily  atorvastatin 20 milliGRAM(s) Oral at bedtime  bisacodyl Suppository 10 milliGRAM(s) Rectal daily  DULoxetine 60 milliGRAM(s) Oral daily  fluticasone propionate/ salmeterol 250-50 MICROgram(s) Diskus 1 Dose(s) Inhalation two times a day  insulin lispro (ADMELOG) corrective regimen sliding scale   SubCutaneous three times a day before meals  insulin lispro (ADMELOG) corrective regimen sliding scale   SubCutaneous at bedtime  lactulose Syrup 30 Gram(s) Oral every 6 hours  levothyroxine 25 MICROGram(s) Oral daily  lidocaine   4% Patch 2 Patch Transdermal every 24 hours  losartan 50 milliGRAM(s) Oral daily  methadone    Tablet 10 milliGRAM(s) Oral daily  metoprolol tartrate 50 milliGRAM(s) Oral two times a day  norethindrone acetate 10 milliGRAM(s) Oral daily  pantoprazole    Tablet 40 milliGRAM(s) Oral before breakfast  polyethylene glycol 3350 17 Gram(s) Oral daily  sodium chloride 0.9% lock flush 3 milliLiter(s) IV Push every 8 hours  warfarin 9 milliGRAM(s) Oral once    MEDICATIONS  (PRN):  oxyCODONE    IR 20 milliGRAM(s) Oral four times a day PRN Severe Pain (7 - 10)                                   8.9    11.70 )-----------( 300      ( 29 Dec 2024 02:00 )             28.7       139  |  106  |  15.4  ----------------------------<  264[H]  4.2   |  23.0  |  0.64    Ca    8.8      29 Dec 2024 02:00  Mg     2.4         TPro  6.1[L]  /  Alb  3.2[L]  /  TBili  0.3[L]  /  DBili  0.1  /  AST  11  /  ALT  5   /  AlkPhos  74        Daily     Daily Weight in k.3 (27 Dec 2024 04:15)    I&O's Summary    28 Dec 2024 07:01  -  29 Dec 2024 07:00  --------------------------------------------------------  IN: 1160 mL / OUT: 1100 mL / NET: 60 mL    Critically Ill patient  : [ x] preoperative  ? TAVR ?   [ ] post operative    Requires :  [x ] Arterial Line   [ ] Central Line  [ ] PA catheter  [ ] IABP  [ ] ECMO  [ ] LVAD  [ ] Ventilator  [ ] pacemaker [ ] Impella.                      [ x] ABG's     [ x] Pulse Oxymetry Monitoring  Bedside evaluation , monitoring , treatment of hemodynamics , fluids , IVP/ IVCD meds.    Diagnosis:     Admitted Chest Pain / back Pain     Type B Aortic Dissection     Hypertension     Hypertensive Emergency     Hemodynamic lability,  instability. Requires IVCD [ ] vasopressors [ x] Esmolol   [x ] vasodilator  [ ]IVSS fluid  to maintain MAP, perfusion, C.I.    Metabolic Acidosis     Requires chest PT, pulmonary toilet, suctioning to maintain SaO2,  patent airway and treat atelectasis.     DM ll    s/p AVR / MVR m     Coagulopathy secondary to coumadin, re-dosed with 9 mg    Continued Losartan and Lopressor for anti-impulse therapy       Repeat CTA is unchanged     Discussed with CT surgeon, Physician's Assistant - Nurse Practitioner- Critical care medicine team.   Discussed at  AM.   Chart, labs , films reviewed.    Cumulative Critical Care Time Given Today : 30 min
INTERVAL EVENTS:  Follow up diabetes management. Complained of abdominal pain and nausea this morning, but has since improved and was able to tolerate breakfast.    MEDICATIONS  (STANDING):  aspirin enteric coated 81 milliGRAM(s) Oral daily  atorvastatin 20 milliGRAM(s) Oral at bedtime  DULoxetine 60 milliGRAM(s) Oral daily  fluticasone propionate/ salmeterol 250-50 MICROgram(s) Diskus 1 Dose(s) Inhalation two times a day  insulin glargine Injectable (LANTUS) 12 Unit(s) SubCutaneous at bedtime  insulin lispro (ADMELOG) corrective regimen sliding scale   SubCutaneous three times a day before meals  insulin lispro Injectable (ADMELOG) 5 Unit(s) SubCutaneous three times a day before meals  levothyroxine 25 MICROGram(s) Oral daily  lidocaine   4% Patch 2 Patch Transdermal every 24 hours  losartan 50 milliGRAM(s) Oral daily  methadone    Tablet 10 milliGRAM(s) Oral daily  metoprolol tartrate 50 milliGRAM(s) Oral two times a day  norethindrone acetate 10 milliGRAM(s) Oral daily  pantoprazole    Tablet 40 milliGRAM(s) Oral before breakfast  polyethylene glycol 3350 17 Gram(s) Oral daily  sodium chloride 0.9% lock flush 3 milliLiter(s) IV Push every 8 hours    MEDICATIONS  (PRN):  cyclobenzaprine 10 milliGRAM(s) Oral three times a day PRN Muscle Spasm  oxyCODONE    IR 20 milliGRAM(s) Oral four times a day PRN Severe Pain (7 - 10)    Allergies  No Known Allergies    Vital Signs Last 24 Hrs  T(C): 36.9 (31 Dec 2024 07:29), Max: 36.9 (31 Dec 2024 07:29)  T(F): 98.5 (31 Dec 2024 07:29), Max: 98.5 (31 Dec 2024 07:29)  HR: 87 (31 Dec 2024 07:29) (87 - 89)  BP: 106/67 (31 Dec 2024 07:29) (103/60 - 127/62)  BP(mean): --  RR: 20 (31 Dec 2024 07:29) (18 - 20)  SpO2: 100% (31 Dec 2024 07:29) (97% - 100%)    Parameters below as of 31 Dec 2024 07:29  Patient On (Oxygen Delivery Method): room air    PHYSICAL EXAM:  General: No apparent distress  Respiratory: Lungs clear bilaterally  Cardiac: +S1, S2, no m/r/g  GI: +BS, soft, non tender, non distended  Extremities: No peripheral edema  Neuro: A+O X3    LABS:                        9.5    11.72 )-----------( 349      ( 31 Dec 2024 09:42 )             29.4     12-31    136  |  103  |  15.2  ----------------------------<  193[H]  4.6   |  22.0  |  0.53    Ca    9.1      31 Dec 2024 09:42  Mg     1.9     12-31      Urinalysis Basic - ( 31 Dec 2024 09:42 )    Color: x / Appearance: x / SG: x / pH: x  Gluc: 193 mg/dL / Ketone: x  / Bili: x / Urobili: x   Blood: x / Protein: x / Nitrite: x   Leuk Esterase: x / RBC: x / WBC x   Sq Epi: x / Non Sq Epi: x / Bacteria: x    POCT Blood Glucose.: 211 mg/dL (12-31-24 @ 08:02)  POCT Blood Glucose.: 279 mg/dL (12-30-24 @ 21:05)  POCT Blood Glucose.: 287 mg/dL (12-30-24 @ 17:03)  POCT Blood Glucose.: 300 mg/dL (12-30-24 @ 12:26)    Thyroid Stimulating Hormone, Serum: 4.36 uIU/mL (12-26-24 @ 14:58)  
Subjective - patient seen and evaluated bedside. Sitting comfortably in bed. C/o left arm swelling. Denies CP, SOB, HA, dizziness, n/v/d    Review of Systems: negative x 10 systems except as noted above    Brief summary:  39yFemale with Type B aortic dissection.     Significant/[id number] events: no acute events      PAST MEDICAL & SURGICAL HISTORY:  Marfan syndrome      Fibromyalgia      Depression      Anxiety      Diabetes      Dilated aortic root      Asthma      Abnormal uterine bleeding (AUB)      Dissection of right carotid artery      Descending thoracic aortic dissection      H/O aortic valve replacement        H/O spinal fusion        H/O artificial lens replacement      H/O mitral valve replacement with mechanical valve            aspirin enteric coated 81 milliGRAM(s) Oral daily  atorvastatin 20 milliGRAM(s) Oral at bedtime  cyclobenzaprine 10 milliGRAM(s) Oral three times a day PRN  DULoxetine 60 milliGRAM(s) Oral daily  fluticasone propionate/ salmeterol 250-50 MICROgram(s) Diskus 1 Dose(s) Inhalation two times a day  insulin glargine Injectable (LANTUS) 10 Unit(s) SubCutaneous at bedtime  insulin lispro (ADMELOG) corrective regimen sliding scale   SubCutaneous three times a day before meals  insulin lispro Injectable (ADMELOG) 3 Unit(s) SubCutaneous three times a day before meals  levothyroxine 25 MICROGram(s) Oral daily  lidocaine   4% Patch 2 Patch Transdermal every 24 hours  losartan 50 milliGRAM(s) Oral daily  methadone    Tablet 10 milliGRAM(s) Oral daily  metoprolol tartrate 50 milliGRAM(s) Oral two times a day  norethindrone acetate 10 milliGRAM(s) Oral daily  oxyCODONE    IR 20 milliGRAM(s) Oral four times a day PRN  pantoprazole    Tablet 40 milliGRAM(s) Oral before breakfast  polyethylene glycol 3350 17 Gram(s) Oral daily  sodium chloride 0.9% lock flush 3 milliLiter(s) IV Push every 8 hours  MEDICATIONS  (PRN):  cyclobenzaprine 10 milliGRAM(s) Oral three times a day PRN Muscle Spasm  oxyCODONE    IR 20 milliGRAM(s) Oral four times a day PRN Severe Pain (7 - 10)      Daily     Daily Weight in k.3 (30 Dec 2024 06:47)                              9.5    10.51 )-----------( 327      ( 30 Dec 2024 15:40 )             30.6       137  |  103  |  16.9  ----------------------------<  288[H]  4.5   |  22.0  |  0.73    Ca    9.0      30 Dec 2024 15:40  Mg     1.7         TPro  6.1[L]  /  Alb  3.2[L]  /  TBili  0.3[L]  /  DBili  0.1  /  AST  11  /  ALT  5   /  AlkPhos  74        PT/INR - ( 30 Dec 2024 15:40 )   PT: 48.3 sec;   INR: 4.34 ratio         PTT - ( 30 Dec 2024 15:40 )  PTT:60.0 sec      Objective:  T(C): 36.6 (24 @ 20:00), Max: 36.8 (24 @ 01:30)  HR: 89 (24 @ 20:00) (79 - 94)  BP: 103/60 (24 @ 20:00) (101/59 - 127/62)  RR: 18 (24 @ 20:00) (18 - 18)  SpO2: 97% (24 @ 20:00) (97% - 100%)  Wt(kg): --CAPILLARY BLOOD GLUCOSE      POCT Blood Glucose.: 279 mg/dL (30 Dec 2024 21:05)  POCT Blood Glucose.: 287 mg/dL (30 Dec 2024 17:03)  POCT Blood Glucose.: 300 mg/dL (30 Dec 2024 12:26)  POCT Blood Glucose.: 199 mg/dL (30 Dec 2024 09:01)  I&O's Summary    29 Dec 2024 07:01  -  30 Dec 2024 07:00  --------------------------------------------------------  IN: 1200 mL / OUT: 300 mL / NET: 900 mL    30 Dec 2024 07:01  -  31 Dec 2024 00:37  --------------------------------------------------------  IN: 720 mL / OUT: 0 mL / NET: 720 mL        Physical Exam  General: NAD  Neuro: A+O x 3, non-focal, speech clear and intact  Psych: Appropriate affect  HEENT:  NCAT, No conjuctival edema or icterus, no thrush.  Pulm: CTA, equal bilaterally  CV: RRR,  +S1S2 +murmur  Abd: soft, NT, ND, +BS  Ext: +DP Pulses b/l, 1+nonpitting edema to LUE near IV infiltration site. no signs of infection. small 1cm fluid blister noted  Skin: Warm, dry, intact          Imaging:    < from: Xray Chest 1 View- PORTABLE-Urgent (Xray Chest 1 View- PORTABLE-Urgent .) (12.29.24 @ 05:44) >  IMPRESSION:  1.  No radiographic evidence of acute cardiopulmonary disease.  2. No evidence of bowel obstruction.  3. Increased fecal loading throughout the colon.    --- End of Report ---    < end of copied text >              
Vascular Surgery Follow up  brief PA note  Patient seen and examined  Labs/vitals reviewed  TYpe B aortic dissection, with history of chronic pain, marfans    -- Awaiting repeat CT scan, ordered for today  - Currently no immediate plans for surgical intervention   - blood pressure management per CTICU  
Subjective - patient seen and evaluated bedside. Sitting comfortably in bed. Admits to back pain/ abd pain - improving. Denies CP, SOB, HA, dizziness, n/v/d    Review of Systems: negative x 10 systems except as noted above    Brief summary:  39yFemale with acute type b aortic dissection    Significant/[id number] events: no acute events      PAST MEDICAL & SURGICAL HISTORY:  Marfan syndrome      Fibromyalgia      Depression      Anxiety      Diabetes      Dilated aortic root      Asthma      Abnormal uterine bleeding (AUB)      Dissection of right carotid artery      Descending thoracic aortic dissection      H/O aortic valve replacement  2013      H/O spinal fusion  2005      H/O artificial lens replacement      H/O mitral valve replacement with mechanical valve            acetaminophen     Tablet .. 975 milliGRAM(s) Oral every 6 hours  aspirin enteric coated 81 milliGRAM(s) Oral daily  atorvastatin 20 milliGRAM(s) Oral at bedtime  cyclobenzaprine 10 milliGRAM(s) Oral three times a day PRN  DULoxetine 60 milliGRAM(s) Oral daily  fluticasone propionate/ salmeterol 250-50 MICROgram(s) Diskus 1 Dose(s) Inhalation two times a day  insulin lispro (ADMELOG) corrective regimen sliding scale   SubCutaneous three times a day before meals  insulin lispro (ADMELOG) corrective regimen sliding scale   SubCutaneous at bedtime  levothyroxine 25 MICROGram(s) Oral daily  lidocaine   4% Patch 2 Patch Transdermal every 24 hours  losartan 50 milliGRAM(s) Oral daily  methadone    Tablet 10 milliGRAM(s) Oral daily  metoprolol tartrate 50 milliGRAM(s) Oral two times a day  norethindrone acetate 10 milliGRAM(s) Oral daily  oxyCODONE    IR 20 milliGRAM(s) Oral four times a day PRN  pantoprazole    Tablet 40 milliGRAM(s) Oral before breakfast  polyethylene glycol 3350 17 Gram(s) Oral daily  sodium chloride 0.9% lock flush 3 milliLiter(s) IV Push every 8 hours  MEDICATIONS  (PRN):  cyclobenzaprine 10 milliGRAM(s) Oral three times a day PRN Muscle Spasm  oxyCODONE    IR 20 milliGRAM(s) Oral four times a day PRN Severe Pain (7 - 10)      Daily     Daily                               8.9    11.70 )-----------( 300      ( 29 Dec 2024 02:00 )             28.7   12-29    139  |  106  |  15.4  ----------------------------<  264[H]  4.2   |  23.0  |  0.64    Ca    8.8      29 Dec 2024 02:00  Mg     2.4     12-29    TPro  6.1[L]  /  Alb  3.2[L]  /  TBili  0.3[L]  /  DBili  0.1  /  AST  11  /  ALT  5   /  AlkPhos  74  12-29    CARDIAC MARKERS ( 28 Dec 2024 17:07 )  x     / x     / x     / x     / 1.9 ng/mL    PT/INR - ( 29 Dec 2024 02:00 )   PT: 31.4 sec;   INR: 2.73 ratio         PTT - ( 29 Dec 2024 02:00 )  PTT:53.2 sec      Objective:  T(C): 36.8 (12-29-24 @ 23:30), Max: 36.9 (12-29-24 @ 16:20)  HR: 89 (12-29-24 @ 23:30) (75 - 97)  BP: 106/65 (12-29-24 @ 23:30) (96/80 - 136/73)  RR: 18 (12-29-24 @ 23:30) (13 - 22)  SpO2: 98% (12-29-24 @ 23:30) (98% - 100%)  Wt(kg): --CAPILLARY BLOOD GLUCOSE      POCT Blood Glucose.: 353 mg/dL (29 Dec 2024 21:05)  POCT Blood Glucose.: 319 mg/dL (29 Dec 2024 16:56)  POCT Blood Glucose.: 258 mg/dL (29 Dec 2024 11:19)  POCT Blood Glucose.: 209 mg/dL (29 Dec 2024 07:42)  I&O's Summary    28 Dec 2024 07:01  -  29 Dec 2024 07:00  --------------------------------------------------------  IN: 1160 mL / OUT: 1100 mL / NET: 60 mL    29 Dec 2024 07:01  -  30 Dec 2024 00:49  --------------------------------------------------------  IN: 720 mL / OUT: 300 mL / NET: 420 mL        Physical Exam  General: NAD  Neuro: A+O x 3, non-focal, speech clear and intact  Psych: Appropriate affect  HEENT:  NCAT, No conjuctival edema or icterus, no thrush.  Pulm: CTA, equal bilaterally  CV: RRR,  +S1S2  Abd: soft, NT, ND, +BS  Ext: +DP Pulses b/l, no edema  Skin: Warm, dry, intact          Imaging:      < from: Xray Chest 1 View- PORTABLE-Urgent (Xray Chest 1 View- PORTABLE-Urgent .) (12.29.24 @ 05:44) >  IMPRESSION:  1.  No radiographic evidence of acute cardiopulmonary disease.  2. No evidence of bowel obstruction.  3. Increased fecal loading throughout the colon.    < end of copied text >            
Subjective:   39yFemale who was admitted on 12/26/24 with acuteType B aortic dissection requiring esmolol and cardene gtt. Denies chest pain, palpitations, SOB, PATTON, cough, N/V/D/C.  No acute diess overnight. Esmolol gtt has been off since 12/27.     PAST MEDICAL & SURGICAL HISTORY:  Marfan syndrome      Fibromyalgia      Depression      Anxiety      Diabetes      Dilated aortic root      Asthma      Abnormal uterine bleeding (AUB)      Dissection of right carotid artery      Descending thoracic aortic dissection      H/O aortic valve replacement  2013      H/O spinal fusion  2005      H/O artificial lens replacement      H/O mitral valve replacement with mechanical valve          Medications:  acetaminophen     Tablet .. 975 milliGRAM(s) Oral every 6 hours  aspirin enteric coated 81 milliGRAM(s) Oral daily  atorvastatin 20 milliGRAM(s) Oral at bedtime  DULoxetine 60 milliGRAM(s) Oral daily  fluticasone propionate/ salmeterol 250-50 MICROgram(s) Diskus 1 Dose(s) Inhalation two times a day  insulin lispro (ADMELOG) corrective regimen sliding scale   SubCutaneous three times a day before meals  insulin lispro (ADMELOG) corrective regimen sliding scale   SubCutaneous at bedtime  levothyroxine 25 MICROGram(s) Oral daily  lidocaine   4% Patch 2 Patch Transdermal every 24 hours  losartan 50 milliGRAM(s) Oral daily  methadone    Tablet 10 milliGRAM(s) Oral daily  metoprolol tartrate 50 milliGRAM(s) Oral two times a day  norethindrone acetate 10 milliGRAM(s) Oral daily  oxyCODONE    IR 20 milliGRAM(s) Oral four times a day PRN  pantoprazole    Tablet 40 milliGRAM(s) Oral before breakfast  polyethylene glycol 3350 17 Gram(s) Oral daily  sodium chloride 0.9% lock flush 3 milliLiter(s) IV Push every 8 hours      MEDICATIONS  (PRN):  oxyCODONE    IR 20 milliGRAM(s) Oral four times a day PRN Severe Pain (7 - 10)      Daily Review:                              8.9    11.70 )-----------( 300      ( 29 Dec 2024 02:00 )             28.7   12-29    139  |  106  |  15.4  ----------------------------<  264[H]  4.2   |  23.0  |  0.64    Ca    8.8      29 Dec 2024 02:00  Mg     2.4     12-29    TPro  6.0[L]  /  Alb  3.4  /  TBili  0.4  /  DBili  x   /  AST  12  /  ALT  5   /  AlkPhos  74  12-28    CARDIAC MARKERS ( 28 Dec 2024 17:07 )  x     / x     / x     / x     / 1.9 ng/mL    PT/INR - ( 29 Dec 2024 02:00 )   PT: 31.4 sec;   INR: 2.73 ratio         PTT - ( 29 Dec 2024 02:00 )  PTT:53.2 sec    T(C): 36.5 (12-28-24 @ 20:05), Max: 36.8 (12-28-24 @ 12:00)  HR: 76 (12-29-24 @ 04:00) (72 - 89)  BP: 118/57 (12-29-24 @ 04:00) (96/80 - 131/69)  RR: 14 (12-29-24 @ 04:00) (11 - 24)  SpO2: 100% (12-29-24 @ 04:00) (98% - 100%)  Wt(kg): --    CAPILLARY BLOOD GLUCOSE      POCT Blood Glucose.: 180 mg/dL (28 Dec 2024 21:31)  POCT Blood Glucose.: 192 mg/dL (28 Dec 2024 16:36)  POCT Blood Glucose.: 250 mg/dL (28 Dec 2024 11:49)  POCT Blood Glucose.: 201 mg/dL (28 Dec 2024 07:33)      I&O's Summary    27 Dec 2024 07:01  -  28 Dec 2024 07:00  --------------------------------------------------------  IN: 1240.5 mL / OUT: 1350 mL / NET: -109.5 mL    28 Dec 2024 07:01  -  29 Dec 2024 04:56  --------------------------------------------------------  IN: 1160 mL / OUT: 1100 mL / NET: 60 mL          PHYSICAL EXAM:  GENERAL: No acute distress, well-developed  NECK: no JVD  CHEST/LUNG: CTAB; No wheezes, rales, or rhonchi  HEART: RRR; No murmurs, rubs, or gallops  ABDOMEN: Soft, non-tender, non-distended; normal bowel sounds  EXTREMITIES:  2+ peripheral pulses b/l, No clubbing, cyanosis, or edema  NEUROLOGY: AAO x 4    < from: CT Angio Chest Aorta w/wo IV Cont (12.27.24 @ 14:07) >  CT angiogram of the chest, abdomen and pelvis were obtained following   administration of intravenous contrast. Noncontrast images of the chest   were also obtained. Approximately 90 cc of Omnipaque 350 was administered   and 10 cc was discarded. Coronal, sagittal and MIP images were submitted   for review.    No hilar or mediastinal adenopathy is noted.    Heart is normal in size. Patient is status post repair of ascending   aorta, aortic and mitral valves. No pericardial effusion is noted. Once   again, aortic dissection is noted from the aortic arch just distal to the   origin of the left subclavian artery extending into the descending   thoracic and proximal abdominal aorta. The dissection ends at the level   of the SMA. Appearance is unchanged when compared to previous exam.    No endobronchial lesions are noted. Lungs are clear. Very small left   pleural effusion is noted.    Liver, spleen, pancreas, gallbladder and both adrenal glands are   unremarkable.    Both kidneys enhance with contrast. No hydronephrosis is noted.    No retroperitoneal adenopathy is noted.    Stool is noted within the large bowel.    Examination of the pelvis demonstrate no free fluid.    Patient is status post spine surgery.    IMPRESSION: No significant change in the aortic dissection from the level   of the distal aortic arch to the level of the SMA when compared to   previous exam.    < end of copied text >  < from: TTE W or WO Ultrasound Enhancing Agent (12.26.24 @ 12:45) >  CONCLUSIONS:      1. Technically difficult and limited image quality.   2. Left ventricular cavity is normal in size. Left ventricular wall thickness is mildly increased. Left ventricular systolic function is normal with an ejection fraction visually estimated at 60 to 65 %.   3. Mildly enlarged right ventricular cavity size and normal right ventricular systolic function.   4. The right atrium is normal in size.   5. Mechanical valve present. in the mitral position.   6. Trace mitral regurgitation.   7. Mitral valve replacement present with a mean PG of 3 mmHg.   8. Patient has aortic valve replacement. Unable to obtain velocities due to difficult windows and patient positioning during exam.   9. Moderate tricuspid regurgitation.  10. Estimated pulmonary artery systolic pressure is 26 mmHg, normal pulmonary artery pressure.    < end of copied text >

## 2025-01-02 ENCOUNTER — NON-APPOINTMENT (OUTPATIENT)
Age: 40
End: 2025-01-02

## 2025-01-03 ENCOUNTER — NON-APPOINTMENT (OUTPATIENT)
Age: 40
End: 2025-01-03

## 2025-01-03 RX ORDER — METOPROLOL TARTRATE 50 MG/1
50 TABLET ORAL
Qty: 180 | Refills: 3 | Status: ACTIVE | COMMUNITY
Start: 2025-01-03

## 2025-01-03 RX ORDER — LOSARTAN POTASSIUM 50 MG/1
50 TABLET, FILM COATED ORAL
Qty: 90 | Refills: 1 | Status: ACTIVE | COMMUNITY
Start: 2025-01-03

## 2025-01-03 RX ORDER — CYCLOBENZAPRINE HYDROCHLORIDE 10 MG/1
10 TABLET, FILM COATED ORAL 3 TIMES DAILY
Qty: 90 | Refills: 0 | Status: ACTIVE | COMMUNITY
Start: 2025-01-03

## 2025-01-04 ENCOUNTER — TRANSCRIPTION ENCOUNTER (OUTPATIENT)
Age: 40
End: 2025-01-04

## 2025-01-04 ENCOUNTER — EMERGENCY (EMERGENCY)
Facility: HOSPITAL | Age: 40
LOS: 1 days | Discharge: HOME | End: 2025-01-04
Attending: EMERGENCY MEDICINE
Payer: MEDICAID

## 2025-01-04 VITALS
DIASTOLIC BLOOD PRESSURE: 65 MMHG | HEART RATE: 91 BPM | TEMPERATURE: 98 F | SYSTOLIC BLOOD PRESSURE: 110 MMHG | OXYGEN SATURATION: 99 % | RESPIRATION RATE: 16 BRPM | WEIGHT: 164.02 LBS | HEIGHT: 72 IN

## 2025-01-04 DIAGNOSIS — Z95.4 PRESENCE OF OTHER HEART-VALVE REPLACEMENT: Chronic | ICD-10-CM

## 2025-01-04 DIAGNOSIS — Z98.1 ARTHRODESIS STATUS: Chronic | ICD-10-CM

## 2025-01-04 DIAGNOSIS — Z96.1 PRESENCE OF INTRAOCULAR LENS: Chronic | ICD-10-CM

## 2025-01-04 DIAGNOSIS — Z95.2 PRESENCE OF PROSTHETIC HEART VALVE: Chronic | ICD-10-CM

## 2025-01-04 LAB
ALBUMIN SERPL ELPH-MCNC: 3.3 G/DL — SIGNIFICANT CHANGE UP (ref 3.3–5.2)
ALP SERPL-CCNC: 70 U/L — SIGNIFICANT CHANGE UP (ref 40–120)
ALT FLD-CCNC: 9 U/L — SIGNIFICANT CHANGE UP
ANION GAP SERPL CALC-SCNC: 11 MMOL/L — SIGNIFICANT CHANGE UP (ref 5–17)
APTT BLD: 54.7 SEC — HIGH (ref 24.5–35.6)
AST SERPL-CCNC: 17 U/L — SIGNIFICANT CHANGE UP
BASOPHILS # BLD AUTO: 0.03 K/UL — SIGNIFICANT CHANGE UP (ref 0–0.2)
BASOPHILS NFR BLD AUTO: 0.3 % — SIGNIFICANT CHANGE UP (ref 0–2)
BILIRUB SERPL-MCNC: 0.3 MG/DL — LOW (ref 0.4–2)
BUN SERPL-MCNC: 17.4 MG/DL — SIGNIFICANT CHANGE UP (ref 8–20)
CALCIUM SERPL-MCNC: 8.7 MG/DL — SIGNIFICANT CHANGE UP (ref 8.4–10.5)
CHLORIDE SERPL-SCNC: 99 MMOL/L — SIGNIFICANT CHANGE UP (ref 96–108)
CO2 SERPL-SCNC: 23 MMOL/L — SIGNIFICANT CHANGE UP (ref 22–29)
CREAT SERPL-MCNC: 0.7 MG/DL — SIGNIFICANT CHANGE UP (ref 0.5–1.3)
EGFR: 111 ML/MIN/1.73M2 — SIGNIFICANT CHANGE UP
EGFR: 111 ML/MIN/1.73M2 — SIGNIFICANT CHANGE UP
EOSINOPHIL # BLD AUTO: 0.32 K/UL — SIGNIFICANT CHANGE UP (ref 0–0.5)
EOSINOPHIL NFR BLD AUTO: 3.4 % — SIGNIFICANT CHANGE UP (ref 0–6)
GLUCOSE SERPL-MCNC: 107 MG/DL — HIGH (ref 70–99)
HCG SERPL-ACNC: <4 MIU/ML — SIGNIFICANT CHANGE UP
HCT VFR BLD CALC: 28 % — LOW (ref 34.5–45)
HGB BLD-MCNC: 8.7 G/DL — LOW (ref 11.5–15.5)
IMM GRANULOCYTES NFR BLD AUTO: 0.8 % — SIGNIFICANT CHANGE UP (ref 0–0.9)
INR BLD: 3.91 RATIO — HIGH (ref 0.85–1.16)
LYMPHOCYTES # BLD AUTO: 1.37 K/UL — SIGNIFICANT CHANGE UP (ref 1–3.3)
LYMPHOCYTES # BLD AUTO: 14.5 % — SIGNIFICANT CHANGE UP (ref 13–44)
MCHC RBC-ENTMCNC: 25.9 PG — LOW (ref 27–34)
MCHC RBC-ENTMCNC: 31.1 G/DL — LOW (ref 32–36)
MCV RBC AUTO: 83.3 FL — SIGNIFICANT CHANGE UP (ref 80–100)
MONOCYTES # BLD AUTO: 0.72 K/UL — SIGNIFICANT CHANGE UP (ref 0–0.9)
MONOCYTES NFR BLD AUTO: 7.6 % — SIGNIFICANT CHANGE UP (ref 2–14)
NEUTROPHILS # BLD AUTO: 6.94 K/UL — SIGNIFICANT CHANGE UP (ref 1.8–7.4)
NEUTROPHILS NFR BLD AUTO: 73.4 % — SIGNIFICANT CHANGE UP (ref 43–77)
PLATELET # BLD AUTO: 354 K/UL — SIGNIFICANT CHANGE UP (ref 150–400)
POTASSIUM SERPL-MCNC: 3.9 MMOL/L — SIGNIFICANT CHANGE UP (ref 3.5–5.3)
POTASSIUM SERPL-SCNC: 3.9 MMOL/L — SIGNIFICANT CHANGE UP (ref 3.5–5.3)
PROT SERPL-MCNC: 6.6 G/DL — SIGNIFICANT CHANGE UP (ref 6.6–8.7)
PROTHROM AB SERPL-ACNC: 43.6 SEC — HIGH (ref 9.9–13.4)
RBC # BLD: 3.36 M/UL — LOW (ref 3.8–5.2)
RBC # FLD: 16.3 % — HIGH (ref 10.3–14.5)
SODIUM SERPL-SCNC: 133 MMOL/L — LOW (ref 135–145)
TROPONIN T, HIGH SENSITIVITY RESULT: 15 NG/L — SIGNIFICANT CHANGE UP (ref 0–51)
WBC # BLD: 9.46 K/UL — SIGNIFICANT CHANGE UP (ref 3.8–10.5)
WBC # FLD AUTO: 9.46 K/UL — SIGNIFICANT CHANGE UP (ref 3.8–10.5)

## 2025-01-04 PROCEDURE — 99285 EMERGENCY DEPT VISIT HI MDM: CPT

## 2025-01-04 PROCEDURE — 70450 CT HEAD/BRAIN W/O DYE: CPT | Mod: 26,MC

## 2025-01-04 PROCEDURE — 74174 CTA ABD&PLVS W/CONTRAST: CPT | Mod: 26,MC

## 2025-01-04 PROCEDURE — 0042T: CPT | Mod: MC

## 2025-01-04 PROCEDURE — 93010 ELECTROCARDIOGRAM REPORT: CPT

## 2025-01-04 PROCEDURE — 71275 CT ANGIOGRAPHY CHEST: CPT | Mod: 26,MC

## 2025-01-04 PROCEDURE — 70498 CT ANGIOGRAPHY NECK: CPT | Mod: 26,MC

## 2025-01-04 PROCEDURE — 70496 CT ANGIOGRAPHY HEAD: CPT | Mod: 26,MC

## 2025-01-04 RX ORDER — METOPROLOL SUCCINATE 50 MG/1
50 TABLET, EXTENDED RELEASE ORAL
Refills: 0 | Status: DISCONTINUED | OUTPATIENT
Start: 2025-01-04 | End: 2025-01-12

## 2025-01-04 RX ORDER — METHADONE HCL 10 MG
10 TABLET ORAL DAILY
Refills: 0 | Status: COMPLETED | OUTPATIENT
Start: 2025-01-04 | End: 2025-01-11

## 2025-01-04 RX ORDER — ACETAMINOPHEN 500 MG/5ML
650 LIQUID (ML) ORAL EVERY 6 HOURS
Refills: 0 | Status: DISCONTINUED | OUTPATIENT
Start: 2025-01-04 | End: 2025-01-12

## 2025-01-04 RX ORDER — OXYCODONE HYDROCHLORIDE 30 MG/1
20 TABLET ORAL ONCE
Refills: 0 | Status: DISCONTINUED | OUTPATIENT
Start: 2025-01-04 | End: 2025-01-04

## 2025-01-04 RX ORDER — LEVOTHYROXINE SODIUM 300 MCG
25 TABLET ORAL DAILY
Refills: 0 | Status: DISCONTINUED | OUTPATIENT
Start: 2025-01-04 | End: 2025-01-12

## 2025-01-04 RX ORDER — CYCLOBENZAPRINE HYDROCHLORIDE 15 MG/1
10 CAPSULE, EXTENDED RELEASE ORAL THREE TIMES A DAY
Refills: 0 | Status: DISCONTINUED | OUTPATIENT
Start: 2025-01-04 | End: 2025-01-12

## 2025-01-04 RX ORDER — LOSARTAN POTASSIUM 100 MG/1
50 TABLET, FILM COATED ORAL DAILY
Refills: 0 | Status: DISCONTINUED | OUTPATIENT
Start: 2025-01-04 | End: 2025-01-12

## 2025-01-04 RX ORDER — DULOXETINE 20 MG/1
60 CAPSULE, DELAYED RELEASE ORAL DAILY
Refills: 0 | Status: DISCONTINUED | OUTPATIENT
Start: 2025-01-04 | End: 2025-01-12

## 2025-01-04 RX ADMIN — OXYCODONE HYDROCHLORIDE 20 MILLIGRAM(S): 30 TABLET ORAL at 22:58

## 2025-01-05 VITALS
SYSTOLIC BLOOD PRESSURE: 111 MMHG | OXYGEN SATURATION: 99 % | HEART RATE: 84 BPM | RESPIRATION RATE: 18 BRPM | TEMPERATURE: 98 F | DIASTOLIC BLOOD PRESSURE: 58 MMHG

## 2025-01-05 DIAGNOSIS — H53.8 OTHER VISUAL DISTURBANCES: ICD-10-CM

## 2025-01-05 DIAGNOSIS — R20.0 ANESTHESIA OF SKIN: ICD-10-CM

## 2025-01-05 DIAGNOSIS — I71.00 DISSECTION OF UNSPECIFIED SITE OF AORTA: ICD-10-CM

## 2025-01-05 PROBLEM — I71.012 DISSECTION OF DESCENDING THORACIC AORTA: Chronic | Status: ACTIVE | Noted: 2024-12-26

## 2025-01-05 PROBLEM — N93.9 ABNORMAL UTERINE AND VAGINAL BLEEDING, UNSPECIFIED: Chronic | Status: ACTIVE | Noted: 2024-12-26

## 2025-01-05 PROBLEM — J45.909 UNSPECIFIED ASTHMA, UNCOMPLICATED: Chronic | Status: ACTIVE | Noted: 2024-12-26

## 2025-01-05 PROBLEM — I77.71 DISSECTION OF CAROTID ARTERY: Chronic | Status: ACTIVE | Noted: 2024-12-26

## 2025-01-05 LAB — TROPONIN T, HIGH SENSITIVITY RESULT: 12 NG/L — SIGNIFICANT CHANGE UP (ref 0–51)

## 2025-01-05 PROCEDURE — 70498 CT ANGIOGRAPHY NECK: CPT | Mod: MC

## 2025-01-05 PROCEDURE — 99285 EMERGENCY DEPT VISIT HI MDM: CPT | Mod: 25

## 2025-01-05 PROCEDURE — 85610 PROTHROMBIN TIME: CPT

## 2025-01-05 PROCEDURE — 85730 THROMBOPLASTIN TIME PARTIAL: CPT

## 2025-01-05 PROCEDURE — 80053 COMPREHEN METABOLIC PANEL: CPT

## 2025-01-05 PROCEDURE — 93321 DOPPLER ECHO F-UP/LMTD STD: CPT

## 2025-01-05 PROCEDURE — 74174 CTA ABD&PLVS W/CONTRAST: CPT | Mod: MC

## 2025-01-05 PROCEDURE — 72131 CT LUMBAR SPINE W/O DYE: CPT | Mod: 26,MC

## 2025-01-05 PROCEDURE — 85025 COMPLETE CBC W/AUTO DIFF WBC: CPT

## 2025-01-05 PROCEDURE — 0042T: CPT | Mod: MC

## 2025-01-05 PROCEDURE — 84484 ASSAY OF TROPONIN QUANT: CPT

## 2025-01-05 PROCEDURE — 70496 CT ANGIOGRAPHY HEAD: CPT | Mod: MC

## 2025-01-05 PROCEDURE — 70450 CT HEAD/BRAIN W/O DYE: CPT | Mod: MC

## 2025-01-05 PROCEDURE — G0378: CPT

## 2025-01-05 PROCEDURE — 93005 ELECTROCARDIOGRAM TRACING: CPT

## 2025-01-05 PROCEDURE — 93325 DOPPLER ECHO COLOR FLOW MAPG: CPT | Mod: 26

## 2025-01-05 PROCEDURE — 36415 COLL VENOUS BLD VENIPUNCTURE: CPT

## 2025-01-05 PROCEDURE — 99223 1ST HOSP IP/OBS HIGH 75: CPT

## 2025-01-05 PROCEDURE — 71275 CT ANGIOGRAPHY CHEST: CPT | Mod: MC

## 2025-01-05 PROCEDURE — 93321 DOPPLER ECHO F-UP/LMTD STD: CPT | Mod: 26

## 2025-01-05 PROCEDURE — 84702 CHORIONIC GONADOTROPIN TEST: CPT

## 2025-01-05 PROCEDURE — 93308 TTE F-UP OR LMTD: CPT | Mod: 26

## 2025-01-05 PROCEDURE — 99282 EMERGENCY DEPT VISIT SF MDM: CPT

## 2025-01-05 PROCEDURE — 82962 GLUCOSE BLOOD TEST: CPT

## 2025-01-05 PROCEDURE — 93325 DOPPLER ECHO COLOR FLOW MAPG: CPT

## 2025-01-05 PROCEDURE — 93308 TTE F-UP OR LMTD: CPT

## 2025-01-05 PROCEDURE — 99236 HOSP IP/OBS SAME DATE HI 85: CPT

## 2025-01-05 RX ADMIN — Medication 10 MILLIGRAM(S): at 11:01

## 2025-01-05 RX ADMIN — Medication 650 MILLIGRAM(S): at 02:25

## 2025-01-05 RX ADMIN — Medication 25 MICROGRAM(S): at 06:02

## 2025-01-05 RX ADMIN — METOPROLOL SUCCINATE 50 MILLIGRAM(S): 50 TABLET, EXTENDED RELEASE ORAL at 06:02

## 2025-01-05 RX ADMIN — DULOXETINE 60 MILLIGRAM(S): 20 CAPSULE, DELAYED RELEASE ORAL at 11:01

## 2025-01-05 RX ADMIN — Medication 650 MILLIGRAM(S): at 07:59

## 2025-01-05 RX ADMIN — LOSARTAN POTASSIUM 50 MILLIGRAM(S): 100 TABLET, FILM COATED ORAL at 06:02

## 2025-01-05 RX ADMIN — CYCLOBENZAPRINE HYDROCHLORIDE 10 MILLIGRAM(S): 15 CAPSULE, EXTENDED RELEASE ORAL at 06:02

## 2025-01-05 RX ADMIN — Medication 40 MILLIGRAM(S): at 06:02

## 2025-01-07 ENCOUNTER — APPOINTMENT (OUTPATIENT)
Dept: FAMILY MEDICINE | Facility: CLINIC | Age: 40
End: 2025-01-07
Payer: MEDICAID

## 2025-01-07 VITALS
WEIGHT: 165 LBS | DIASTOLIC BLOOD PRESSURE: 50 MMHG | HEIGHT: 72 IN | OXYGEN SATURATION: 97 % | HEART RATE: 107 BPM | SYSTOLIC BLOOD PRESSURE: 96 MMHG | BODY MASS INDEX: 22.35 KG/M2

## 2025-01-07 VITALS — DIASTOLIC BLOOD PRESSURE: 54 MMHG | SYSTOLIC BLOOD PRESSURE: 88 MMHG

## 2025-01-07 DIAGNOSIS — I71.012 DISSECTION OF DESCENDING THORACIC AORTA: ICD-10-CM

## 2025-01-07 DIAGNOSIS — R79.1 ABNORMAL COAGULATION PROFILE: ICD-10-CM

## 2025-01-07 DIAGNOSIS — I10 ESSENTIAL (PRIMARY) HYPERTENSION: ICD-10-CM

## 2025-01-07 DIAGNOSIS — E87.1 HYPO-OSMOLALITY AND HYPONATREMIA: ICD-10-CM

## 2025-01-07 DIAGNOSIS — D64.9 ANEMIA, UNSPECIFIED: ICD-10-CM

## 2025-01-07 PROCEDURE — 99496 TRANSJ CARE MGMT HIGH F2F 7D: CPT

## 2025-01-07 PROCEDURE — 36415 COLL VENOUS BLD VENIPUNCTURE: CPT

## 2025-01-07 RX ORDER — METOPROLOL TARTRATE 25 MG/1
25 TABLET ORAL
Qty: 60 | Refills: 3 | Status: ACTIVE | COMMUNITY
Start: 2025-01-07 | End: 1900-01-01

## 2025-01-08 LAB
ALBUMIN SERPL ELPH-MCNC: 3.8 G/DL
ALP BLD-CCNC: 87 U/L
ALT SERPL-CCNC: 7 U/L
ANION GAP SERPL CALC-SCNC: 11 MMOL/L
AST SERPL-CCNC: 18 U/L
BASOPHILS # BLD AUTO: 0.02 K/UL
BASOPHILS NFR BLD AUTO: 0.2 %
BILIRUB SERPL-MCNC: 0.5 MG/DL
BUN SERPL-MCNC: 16 MG/DL
CALCIUM SERPL-MCNC: 8.9 MG/DL
CHLORIDE SERPL-SCNC: 104 MMOL/L
CO2 SERPL-SCNC: 23 MMOL/L
CREAT SERPL-MCNC: 0.7 MG/DL
EGFR: 111 ML/MIN/1.73M2
EOSINOPHIL # BLD AUTO: 0.16 K/UL
EOSINOPHIL NFR BLD AUTO: 2 %
GLUCOSE SERPL-MCNC: 103 MG/DL
HCT VFR BLD CALC: 32.1 %
HGB BLD-MCNC: 9.8 G/DL
IMM GRANULOCYTES NFR BLD AUTO: 1 %
INR PPP: 2.33 RATIO
LYMPHOCYTES # BLD AUTO: 0.97 K/UL
LYMPHOCYTES NFR BLD AUTO: 12.1 %
MAN DIFF?: NORMAL
MCHC RBC-ENTMCNC: 26 PG
MCHC RBC-ENTMCNC: 30.5 G/DL
MCV RBC AUTO: 85.1 FL
MONOCYTES # BLD AUTO: 0.81 K/UL
MONOCYTES NFR BLD AUTO: 10.1 %
NEUTROPHILS # BLD AUTO: 5.97 K/UL
NEUTROPHILS NFR BLD AUTO: 74.6 %
PLATELET # BLD AUTO: 389 K/UL
POTASSIUM SERPL-SCNC: 4 MMOL/L
PROT SERPL-MCNC: 6.4 G/DL
PT BLD: 27.5 SEC
RBC # BLD: 3.77 M/UL
RBC # FLD: 16.6 %
SODIUM SERPL-SCNC: 138 MMOL/L
WBC # FLD AUTO: 8.01 K/UL

## 2025-01-15 ENCOUNTER — APPOINTMENT (OUTPATIENT)
Dept: FAMILY MEDICINE | Facility: CLINIC | Age: 40
End: 2025-01-15
Payer: MEDICAID

## 2025-01-15 VITALS — SYSTOLIC BLOOD PRESSURE: 92 MMHG | HEART RATE: 96 BPM | DIASTOLIC BLOOD PRESSURE: 58 MMHG

## 2025-01-15 VITALS
OXYGEN SATURATION: 96 % | WEIGHT: 165 LBS | HEIGHT: 72 IN | BODY MASS INDEX: 22.35 KG/M2 | DIASTOLIC BLOOD PRESSURE: 60 MMHG | HEART RATE: 103 BPM | SYSTOLIC BLOOD PRESSURE: 98 MMHG

## 2025-01-15 DIAGNOSIS — I95.9 HYPOTENSION, UNSPECIFIED: ICD-10-CM

## 2025-01-15 DIAGNOSIS — I71.20 THORACIC AORTIC ANEURYSM, WITHOUT RUPTURE, UNSPECIFIED: ICD-10-CM

## 2025-01-15 DIAGNOSIS — R53.1 WEAKNESS: ICD-10-CM

## 2025-01-15 DIAGNOSIS — R00.0 TACHYCARDIA, UNSPECIFIED: ICD-10-CM

## 2025-01-15 PROCEDURE — 99214 OFFICE O/P EST MOD 30 MIN: CPT

## 2025-01-24 NOTE — HISTORY OF PRESENT ILLNESS
[FreeTextEntry1] : Follow Up B-12 Injection  [de-identified] : 36 yo female for f/u on B12 def.   pt feels well.   Reports mild UE discomfort.   Symptoms typically representative of a subtherapeutic INR in this patient. \par Denies CP today STABLE SOB no dizziness no palpitations    \par \par presents today for 1st B12 injection  of 4 weekly injections  Yes...

## 2025-01-26 ENCOUNTER — EMERGENCY (EMERGENCY)
Facility: HOSPITAL | Age: 40
LOS: 1 days | Discharge: HOME | End: 2025-01-26
Attending: EMERGENCY MEDICINE
Payer: MEDICAID

## 2025-01-26 VITALS
DIASTOLIC BLOOD PRESSURE: 84 MMHG | TEMPERATURE: 98 F | RESPIRATION RATE: 20 BRPM | SYSTOLIC BLOOD PRESSURE: 132 MMHG | WEIGHT: 160.06 LBS | OXYGEN SATURATION: 98 % | HEIGHT: 72 IN | HEART RATE: 93 BPM

## 2025-01-26 VITALS — DIASTOLIC BLOOD PRESSURE: 74 MMHG | SYSTOLIC BLOOD PRESSURE: 131 MMHG | HEART RATE: 96 BPM

## 2025-01-26 DIAGNOSIS — Z98.1 ARTHRODESIS STATUS: Chronic | ICD-10-CM

## 2025-01-26 DIAGNOSIS — Z95.2 PRESENCE OF PROSTHETIC HEART VALVE: Chronic | ICD-10-CM

## 2025-01-26 DIAGNOSIS — Z95.4 PRESENCE OF OTHER HEART-VALVE REPLACEMENT: Chronic | ICD-10-CM

## 2025-01-26 DIAGNOSIS — I71.012 DISSECTION OF DESCENDING THORACIC AORTA: ICD-10-CM

## 2025-01-26 DIAGNOSIS — Z96.1 PRESENCE OF INTRAOCULAR LENS: Chronic | ICD-10-CM

## 2025-01-26 LAB
ALBUMIN SERPL ELPH-MCNC: 3.5 G/DL — SIGNIFICANT CHANGE UP (ref 3.3–5.2)
ALP SERPL-CCNC: 84 U/L — SIGNIFICANT CHANGE UP (ref 40–120)
ALT FLD-CCNC: 6 U/L — SIGNIFICANT CHANGE UP
ANION GAP SERPL CALC-SCNC: 13 MMOL/L — SIGNIFICANT CHANGE UP (ref 5–17)
APTT BLD: 63.8 SEC — HIGH (ref 24.5–35.6)
AST SERPL-CCNC: 15 U/L — SIGNIFICANT CHANGE UP
BASOPHILS # BLD AUTO: 0.04 K/UL — SIGNIFICANT CHANGE UP (ref 0–0.2)
BASOPHILS NFR BLD AUTO: 0.3 % — SIGNIFICANT CHANGE UP (ref 0–2)
BILIRUB SERPL-MCNC: 0.4 MG/DL — SIGNIFICANT CHANGE UP (ref 0.4–2)
BLD GP AB SCN SERPL QL: SIGNIFICANT CHANGE UP
BUN SERPL-MCNC: 13.5 MG/DL — SIGNIFICANT CHANGE UP (ref 8–20)
CALCIUM SERPL-MCNC: 9.1 MG/DL — SIGNIFICANT CHANGE UP (ref 8.4–10.5)
CHLORIDE SERPL-SCNC: 105 MMOL/L — SIGNIFICANT CHANGE UP (ref 96–108)
CO2 SERPL-SCNC: 22 MMOL/L — SIGNIFICANT CHANGE UP (ref 22–29)
CREAT SERPL-MCNC: 0.63 MG/DL — SIGNIFICANT CHANGE UP (ref 0.5–1.3)
EGFR: 114 ML/MIN/1.73M2 — SIGNIFICANT CHANGE UP
EOSINOPHIL # BLD AUTO: 0.16 K/UL — SIGNIFICANT CHANGE UP (ref 0–0.5)
EOSINOPHIL NFR BLD AUTO: 1.3 % — SIGNIFICANT CHANGE UP (ref 0–6)
GLUCOSE SERPL-MCNC: 75 MG/DL — SIGNIFICANT CHANGE UP (ref 70–99)
HCT VFR BLD CALC: 33.5 % — LOW (ref 34.5–45)
HGB BLD-MCNC: 10.3 G/DL — LOW (ref 11.5–15.5)
IMM GRANULOCYTES NFR BLD AUTO: 0.5 % — SIGNIFICANT CHANGE UP (ref 0–0.9)
INR BLD: 5.34 RATIO — CRITICAL HIGH (ref 0.85–1.16)
LYMPHOCYTES # BLD AUTO: 1.73 K/UL — SIGNIFICANT CHANGE UP (ref 1–3.3)
LYMPHOCYTES # BLD AUTO: 13.6 % — SIGNIFICANT CHANGE UP (ref 13–44)
MCHC RBC-ENTMCNC: 25.1 PG — LOW (ref 27–34)
MCHC RBC-ENTMCNC: 30.7 G/DL — LOW (ref 32–36)
MCV RBC AUTO: 81.5 FL — SIGNIFICANT CHANGE UP (ref 80–100)
MONOCYTES # BLD AUTO: 0.92 K/UL — HIGH (ref 0–0.9)
MONOCYTES NFR BLD AUTO: 7.2 % — SIGNIFICANT CHANGE UP (ref 2–14)
NEUTROPHILS # BLD AUTO: 9.8 K/UL — HIGH (ref 1.8–7.4)
NEUTROPHILS NFR BLD AUTO: 77.1 % — HIGH (ref 43–77)
NT-PROBNP SERPL-SCNC: 194 PG/ML — SIGNIFICANT CHANGE UP (ref 0–300)
PLATELET # BLD AUTO: 353 K/UL — SIGNIFICANT CHANGE UP (ref 150–400)
POTASSIUM SERPL-MCNC: 4.3 MMOL/L — SIGNIFICANT CHANGE UP (ref 3.5–5.3)
POTASSIUM SERPL-SCNC: 4.3 MMOL/L — SIGNIFICANT CHANGE UP (ref 3.5–5.3)
PROT SERPL-MCNC: 6.7 G/DL — SIGNIFICANT CHANGE UP (ref 6.6–8.7)
PROTHROM AB SERPL-ACNC: 59.4 SEC — HIGH (ref 9.9–13.4)
RBC # BLD: 4.11 M/UL — SIGNIFICANT CHANGE UP (ref 3.8–5.2)
RBC # FLD: 16.9 % — HIGH (ref 10.3–14.5)
SODIUM SERPL-SCNC: 140 MMOL/L — SIGNIFICANT CHANGE UP (ref 135–145)
TROPONIN T, HIGH SENSITIVITY RESULT: 12 NG/L — SIGNIFICANT CHANGE UP (ref 0–51)
TROPONIN T, HIGH SENSITIVITY RESULT: 15 NG/L — SIGNIFICANT CHANGE UP (ref 0–51)
WBC # BLD: 12.71 K/UL — HIGH (ref 3.8–10.5)
WBC # FLD AUTO: 12.71 K/UL — HIGH (ref 3.8–10.5)

## 2025-01-26 PROCEDURE — 74174 CTA ABD&PLVS W/CONTRAST: CPT | Mod: 26

## 2025-01-26 PROCEDURE — 36415 COLL VENOUS BLD VENIPUNCTURE: CPT

## 2025-01-26 PROCEDURE — 80053 COMPREHEN METABOLIC PANEL: CPT

## 2025-01-26 PROCEDURE — 99285 EMERGENCY DEPT VISIT HI MDM: CPT

## 2025-01-26 PROCEDURE — 93010 ELECTROCARDIOGRAM REPORT: CPT

## 2025-01-26 PROCEDURE — 93005 ELECTROCARDIOGRAM TRACING: CPT

## 2025-01-26 PROCEDURE — 99285 EMERGENCY DEPT VISIT HI MDM: CPT | Mod: 25

## 2025-01-26 PROCEDURE — 86901 BLOOD TYPING SEROLOGIC RH(D): CPT

## 2025-01-26 PROCEDURE — 74174 CTA ABD&PLVS W/CONTRAST: CPT | Mod: MC

## 2025-01-26 PROCEDURE — 85730 THROMBOPLASTIN TIME PARTIAL: CPT

## 2025-01-26 PROCEDURE — 82962 GLUCOSE BLOOD TEST: CPT

## 2025-01-26 PROCEDURE — 85025 COMPLETE CBC W/AUTO DIFF WBC: CPT

## 2025-01-26 PROCEDURE — 99222 1ST HOSP IP/OBS MODERATE 55: CPT

## 2025-01-26 PROCEDURE — 71275 CT ANGIOGRAPHY CHEST: CPT | Mod: 26

## 2025-01-26 PROCEDURE — 86900 BLOOD TYPING SEROLOGIC ABO: CPT

## 2025-01-26 PROCEDURE — 85610 PROTHROMBIN TIME: CPT

## 2025-01-26 PROCEDURE — 83880 ASSAY OF NATRIURETIC PEPTIDE: CPT

## 2025-01-26 PROCEDURE — 84484 ASSAY OF TROPONIN QUANT: CPT

## 2025-01-26 PROCEDURE — 96374 THER/PROPH/DIAG INJ IV PUSH: CPT | Mod: XU

## 2025-01-26 PROCEDURE — 86850 RBC ANTIBODY SCREEN: CPT

## 2025-01-26 PROCEDURE — 71275 CT ANGIOGRAPHY CHEST: CPT | Mod: MC

## 2025-01-26 RX ORDER — DULOXETINE 20 MG/1
60 CAPSULE, DELAYED RELEASE ORAL DAILY
Refills: 0 | Status: DISCONTINUED | OUTPATIENT
Start: 2025-01-26 | End: 2025-02-03

## 2025-01-26 RX ORDER — OXYCODONE HYDROCHLORIDE 30 MG/1
20 TABLET ORAL ONCE
Refills: 0 | Status: DISCONTINUED | OUTPATIENT
Start: 2025-01-26 | End: 2025-01-26

## 2025-01-26 RX ORDER — ESMOLOL HCL 100MG/10ML
50 SYRINGE (ML) INTRAVENOUS
Qty: 2500 | Refills: 0 | Status: DISCONTINUED | OUTPATIENT
Start: 2025-01-26 | End: 2025-01-26

## 2025-01-26 RX ORDER — DULOXETINE 20 MG/1
20 CAPSULE, DELAYED RELEASE ORAL DAILY
Refills: 0 | Status: DISCONTINUED | OUTPATIENT
Start: 2025-01-26 | End: 2025-02-03

## 2025-01-26 RX ORDER — LOSARTAN POTASSIUM 100 MG
25 TABLET ORAL DAILY
Refills: 0 | Status: DISCONTINUED | OUTPATIENT
Start: 2025-01-26 | End: 2025-02-03

## 2025-01-26 RX ORDER — ACETAMINOPHEN 160 MG/5ML
1000 SUSPENSION ORAL ONCE
Refills: 0 | Status: COMPLETED | OUTPATIENT
Start: 2025-01-26 | End: 2025-01-26

## 2025-01-26 RX ORDER — METOPROLOL SUCCINATE 25 MG
50 TABLET, EXTENDED RELEASE 24 HR ORAL DAILY
Refills: 0 | Status: DISCONTINUED | OUTPATIENT
Start: 2025-01-26 | End: 2025-02-03

## 2025-01-26 RX ADMIN — DULOXETINE 20 MILLIGRAM(S): 20 CAPSULE, DELAYED RELEASE ORAL at 20:12

## 2025-01-26 RX ADMIN — OXYCODONE HYDROCHLORIDE 20 MILLIGRAM(S): 30 TABLET ORAL at 22:28

## 2025-01-26 RX ADMIN — DULOXETINE 60 MILLIGRAM(S): 20 CAPSULE, DELAYED RELEASE ORAL at 20:12

## 2025-01-26 RX ADMIN — Medication 50 MILLIGRAM(S): at 19:32

## 2025-01-26 RX ADMIN — ACETAMINOPHEN 400 MILLIGRAM(S): 160 SUSPENSION ORAL at 19:30

## 2025-01-26 RX ADMIN — Medication 25 MILLIGRAM(S): at 19:32

## 2025-01-26 NOTE — ED PROVIDER NOTE - OBJECTIVE STATEMENT
38yo F PMHx PMH of open heart valve replacements (aortic root dilation with mechanical AVR and mitral valve replacement) on coumadin, Marfan's syndrome, HTN, DM, right carotid dissection without repair, asthma, abnormal uterine bleeding on progesterone, MDD and chronic pain presents to the ED for chest pain. She says the pain radiates to her back started 40yo F PMHx PMH of open heart valve replacements (aortic root dilation with mechanical AVR and mitral valve replacement) on coumadin, Marfan's syndrome, HTN, DM, right carotid dissection without repair, asthma, abnormal uterine bleeding on progesterone, MDD and chronic pain presents to the ED for chest pain on right side. She says the pain radiates to her back started today but she was dealing with pain for a few days. +tingling in her right hand. no nausea no vomitting.

## 2025-01-26 NOTE — CONSULT NOTE ADULT - ASSESSMENT
ASSESSMENT: This patient is a 39y old female with PMH of open heart valve replacements (aortic root dilation with mechanical AVR and mitral valve replacement) on coumadin, Marfan's syndrome, HTN, DM, right carotid dissection without repair, asthma, abnormal uterine bleeding on progesterone, MDD and chronic pain p/w stable type B aortic dissection.    PLAN:    - No urgent vascular intervention  - Recommend SBP <120  - Needs to follow up in dissection clinic with Dr. Deluca    Seen at bedside with Dr. Carter
39y Female with 39F with PMH of open heart valve replacements (aortic root dilation with mechanical AVR and mitral valve replacement) on coumadin, Marfan's syndrome, HTN, DM, right carotid dissection without repair, asthma, abnormal uterine bleeding on progesterone, MDD and chronic pain .  Known to CT surgery for December recent admission for Type B Dissection, BP controlled on PO meds, repeat CTA prior to discharge stable on 12/31.  Presented back  to ED 1/4 with progressive LLE weakness, numbness tingling of thigh and L eye blurry vision x 45 mins.  Workup including CT and neuro consult unfounded and self resolved> pt discharged home.  Presents today to the ER with c/o right chest/ mid back pain radiating to her right side the goes down the underside of her right arm, as well as intermittent abdominal cramping.  Pt states that the pain started on Friday and was mild Friday and Saturday, but worsened today.  She states that it was unrelieved by her regular home pain meds, so she came to the ER.  Denies n/v diarrhea, fever, chills, or SOB.  NAD noted.  She states that pain is not reproducible to touch.  CTA to rule out dissection was done in the ER.  CT surg called to evaluate.

## 2025-01-26 NOTE — CONSULT NOTE ADULT - PROBLEM SELECTOR RECOMMENDATION 9
Pt with Marfans and history of prior bental mechanical AVR/MVR.  Known to CT surgery service for type b dissection.  Now presents with CP radiating to mid back, her right side, and down the underside of her right arm.  CTA reviewed by Dr. Soto and is stable from prior imaging.    No intervention from a ct surgery standpoint.  BP control (currently ).  Pt reports that she does take her medications at home.  Continue home meds.  Of note, pt is on chronic pain medication at home.  Per pt INR 5 on last home check.  ER INR pending.  May need dose adjustment (discussed with ER attending).  Vascular consult called by ED.  If there is further concern workup other reasons for chest pain or musculoskeletal complaints.    Discussed with Dr. Soto, as well as ER attending.

## 2025-01-26 NOTE — ED ADULT NURSE NOTE - CHIEF COMPLAINT QUOTE
" I have pain to my right side chest and under her breast" pt denies any falls/trauma, hx of dissection 12/26 and 10 yrs. ago, pt also stating that her INR is also high

## 2025-01-26 NOTE — ED ADULT TRIAGE NOTE - CHIEF COMPLAINT QUOTE
" I have pain to my right side" pt denies any falls/trauma, hx of dissection 12/26 and 10 yrs. ago " I have pain to my right side chest and under her breast" pt denies any falls/trauma, hx of dissection 12/26 and 10 yrs. ago, pt also stating that her INR is also high

## 2025-01-26 NOTE — ED PROVIDER NOTE - ATTENDING CONTRIBUTION TO CARE
Dr. Koch : I have personally seen and examined this patient at the bedside. I have fully participated in the care of this patient. I have reviewed all pertinent clinical information, including history, physical exam, plan and the Resident's note and agree except as noted.     40yo F PMHx of open heart valve replacements (aortic root dilation with mechanical AVR and mitral valve replacement) on coumadin, Marfan's syndrome, HTN, DM, right carotid dissection without repair, asthma, abnormal uterine bleeding on progesterone, MDD and chronic pain, recent type B DISSECTION 12/26 presents to the ED for chest pain on right side. She says the pain radiates to her back started today but she was dealing with pain for a few days. +tingling in her right hand.       Denies f/c/n/v/sob/palpitations/cough/abd.pain/d/c/dysuria/hematuria. no sick contacts/recent travel.    PE:  Head: atraumatic, normacephalic  Face: atraumatic, no crepitus no orbiral/maxillary/mandibular ttp  throat: uvula midline no exudates  eyes: perrla eomi  heart: rrr s1s2  radial pulse 2+ bl dorsalis pedis 2+ bl  lungs: ctab  abd: soft, nt nd +bs no rebound/guarding no cva ttp  skin: warm  LE: no swelling, no calf ttp  back: no midline cervical/thoracic/lumbar ttp      -->priority ct given hxof type b dissection repeat bp currently 140s systolic on arrival labs CT surgery consulted/vascular consulted ; ct stable type b dissection no new changes- pending ct surg consult recs

## 2025-01-26 NOTE — ED PROVIDER NOTE - PHYSICAL EXAMINATION
Gen: well appearing, no acute distress, +marfanoid body  Head: normocephalic, atraumatic  EENT: EOMI, moist mucous membranes, no scleral icterus  Lung: no increased work of breathing, clear to auscultation bilaterally, no wheezing, rales, rhonchi, speaking in full sentences  CV: regular rate, regular rhythm, normal s1/s2, 2+ radial pulses bilaterally  Abd: soft, non-tender, non-distended,    MSK: No edema, no visible deformities, full range of motion in all 4 extremities  Neuro: Awake, alert, no focal neurologic deficits  Skin: No obvious rash, no jaundice  Psych: normal affect, normal speech Gen: well appearing, no acute distress, +marfanoid body  Head: normocephalic, atraumatic  EENT: EOMI, moist mucous membranes, no scleral icterus  Lung: no increased work of breathing, clear to auscultation bilaterally, no wheezing, rales, rhonchi, speaking in full sentences  CV: regular rate, regular rhythm, normal s1/s2, systolic murmur, 2+ radial pulses bilaterally  Abd: soft, non-tender, non-distended,    MSK: No edema, no visible deformities, full range of motion in all 4 extremities  Neuro: Awake, alert, no focal neurologic deficits  Skin: No obvious rash, no jaundice  Psych: normal affect, normal speech

## 2025-01-26 NOTE — ED ADULT NURSE REASSESSMENT NOTE - NS ED NURSE REASSESS COMMENT FT1
Assuming care from RN Prasanna, plan of care, reason for hospital visit were reviewed, introduced to patient, updated patient on plan of care. Education deemed successful after teach back shows proficiency, VS recorded in the EMR. Assumed care of pt at 0730, pt is AOx3 NAD noted, pt resting comfortably in bed, side rails up and wheels locked. Pt breathing even and unlabored, Pt maintained on CM NSR, pt denies any SOB or HA. POC explained, pt verbalized understanding and has no acute complaints at this time.

## 2025-01-26 NOTE — ED PROVIDER NOTE - PROGRESS NOTE DETAILS
Given the significant and immediate threats to this patient based on initial presentation, the benefits of emergency contrast-enhanced CT imaging without obtaining GFR/creatinine serum level results greatly outweigh the potential risk of harm due to contrast-induced nephropathy. Priority CT scan called. Patient will be placed on esmolol drip for BP control. consulted CT surgery and Vascular surgery. Nalini Ford, DO: Patient evaluated by CT surgery and vascular, cleared from their perspective, no acute intervention. Discussed with patient to hold dose 1 of coumadin for elevated INR, f/u with dissection clinic. Stable and appropriate for dc home. Nalini Ford, DO: Patient evaluated by CT surgery and vascular, cleared from their perspective, no acute intervention. Discussed with patient to hold dose 1 of coumadin for elevated INR, f/u with dissection clinic, has appointment 2/14/25. Stable and appropriate for dc home.

## 2025-01-26 NOTE — CONSULT NOTE ADULT - ATTENDING COMMENTS
Young 39 years old Female patient with Marfan and type B aortic dissection as of December 2024. Presents with back pain and RUE pain. CTA was reviewed. Dissection is mostly unchanged however the false lumen is somewhat more prominent in the proximal abd aorta. Patient is complaining of lower extremity claudication after the dissection. She has chronic abd cramping but no post-prandial pain. No acute surgical intervention is warranted at this point. Blood pressure control. She has an appt with the aortic clinic coming up. She is on aspirin 81 mg daily and statin.

## 2025-01-26 NOTE — ED PROVIDER NOTE - CLINICAL SUMMARY MEDICAL DECISION MAKING FREE TEXT BOX
40yo F PMH of open heart valve replacements (aortic root dilation with mechanical AVR and mitral valve replacement) on coumadin, Marfan's syndrome, HTN, DM, right carotid dissection without repair, asthma, abnormal uterine bleeding on progesterone, MDD and chronic pain presenting here with chest pain that radiates to her back. 38yo F PMH of open heart valve replacements (aortic root dilation with mechanical AVR and mitral valve replacement) on coumadin, Marfan's syndrome, HTN, DM, right carotid dissection without repair, asthma, abnormal uterine bleeding on progesterone, MDD and chronic pain presenting here with chest pain that radiates to her back since this morning. hemodynamically stable. saturating well on room air. will order cardiac labs.CT angio, EKG consult CT surg. and Vascular.

## 2025-01-26 NOTE — CONSULT NOTE ADULT - SUBJECTIVE AND OBJECTIVE BOX
VASCULAR SURGERY CONSULT     HPI: 38yo F PMHx PMH of open heart valve replacements (aortic root dilation with mechanical AVR and mitral valve replacement) on coumadin, Marfan's syndrome, HTN, DM, right carotid dissection without repair, asthma, abnormal uterine bleeding on progesterone, MDD and chronic pain presents to the ED for chest pain on right side. She says the pain radiates to her back started today but she was dealing with pain for a few days. +tingling in her right hand. no nausea no vomitting.        ROS: 10-system review is otherwise negative except HPI above.      PAST MEDICAL & SURGICAL HISTORY:  Marfan syndrome      Fibromyalgia      Depression      Anxiety      Diabetes      Dilated aortic root      Asthma      Abnormal uterine bleeding (AUB)      Dissection of right carotid artery      Descending thoracic aortic dissection      H/O aortic valve replacement  2013      H/O spinal fusion  2005      H/O artificial lens replacement      H/O mitral valve replacement with mechanical valve        FAMILY HISTORY:  Family history of diabetes mellitus (DM)      Family history not pertinent as reviewed with the patient.    SOCIAL HISTORY:  Denies any toxic habits    ALLERGIES: NKA No Known Allergies      HOME MEDICATIONS: ***  Home Medications:  aspirin 81 mg oral delayed release tablet: 1 tab(s) orally once a day (29 Dec 2024 15:10)  atorvastatin 20 mg oral tablet: 1 tab(s) orally once a day (at bedtime) (29 Dec 2024 15:06)  cyclobenzaprine 10 mg oral tablet: 1 tab(s) orally 3 times a day as needed for  muscle spasm (29 Dec 2024 15:06)  Dulera 200 mcg-5 mcg/inh inhalation aerosol: 2 puff(s) inhaled 2 times a day (29 Dec 2024 15:06)  DULoxetine 60 mg oral delayed release capsule: 1 cap(s) orally once a day (29 Dec 2024 15:10)  Econazole Nitrate 1% topical cream: Apply topically to affected area once a day (29 Dec 2024 15:06)  glimepiride 1 mg oral tablet: 1 tab(s) orally once a day (29 Dec 2024 15:06)  levothyroxine 25 mcg (0.025 mg) oral tablet: 1 tab(s) orally once a day (29 Dec 2024 15:10)  lidocaine 2% topical gel with applicator: Apply topically to affected area 2 times a day urethral/mucoal (29 Dec 2024 15:06)  medroxyPROGESTERone 5 mg oral tablet: 1 tab(s) orally once a day (29 Dec 2024 15:06)  metFORMIN 1000 mg oral tablet: 1 tab(s) orally 2 times a day resume friday 1/3/25 (31 Dec 2024 15:52)  methadone 5 mg oral tablet: 1 tab(s) orally 3 times a day (29 Dec 2024 15:06)  multivitamin: 1 tab(s) orally once a day (29 Dec 2024 15:06)  norethindrone acetate 5 mg oral tablet: 2 tab(s) orally once a day (29 Dec 2024 15:06)  oxyCODONE 20 mg oral tablet: 1 tab(s) orally every 6 hours (29 Dec 2024 15:10)  SITagliptin (as base) 100 mg oral tablet: 1 tab(s) orally once a day (29 Dec 2024 15:10)  Ventolin 90 mcg/inh inhalation aerosol: 2 puff(s) inhaled every 4 hours as needed for  shortness of breath and/or wheezing (29 Dec 2024 15:06)  Vitamin C 500 mg oral tablet, chewable: 1 tab(s) chewed 2 times a day (29 Dec 2024 15:06)      --------------------------------------------------------------------------------------------  Vital Signs Last 24 Hrs  T(C): 36.8 (26 Jan 2025 19:19), Max: 36.8 (26 Jan 2025 19:19)  T(F): 98.2 (26 Jan 2025 19:19), Max: 98.2 (26 Jan 2025 19:19)  HR: 82 (26 Jan 2025 19:34) (82 - 93)  BP: 136/80 (26 Jan 2025 19:34) (105/57 - 143/78)  BP(mean): --  RR: 19 (26 Jan 2025 19:19) (19 - 20)  SpO2: 96% (26 Jan 2025 19:19) (96% - 98%)    Parameters below as of 26 Jan 2025 19:19  Patient On (Oxygen Delivery Method): room air        PHYSICAL EXAM:   General: NAD, Lying in bed comfortably  Neuro: A+Ox3  HEENT: EOMI, PERDENISA, MMM  Cardio: RRR  Resp: Non labored breathing  Vasc: palpable radial pulse b/l. Palpable DP b/l. Abd nontender  --------------------------------------------------------------------------------------------    LABS                 10.3   12.71  )----------(  353       ( 26 Jan 2025 17:20 )               33.5      140    |  105    |  13.5   ----------------------------<  75         ( 26 Jan 2025 17:20 )  4.3     |  22.0   |  0.63     Ca    9.1        ( 26 Jan 2025 17:20 )    TPro  6.7    /  Alb  3.5    /  TBili  0.4    /  DBili  x      /  AST  15     /  ALT  6      /  AlkPhos  84     ( 26 Jan 2025 17:20 )    LIVER FUNCTIONS - ( 26 Jan 2025 17:20 )  Alb: 3.5 g/dL / Pro: 6.7 g/dL / ALK PHOS: 84 U/L / ALT: 6 U/L / AST: 15 U/L / GGT: x           PT/INR -  59.4 sec / 5.34 ratio   ( 26 Jan 2025 17:20 )       PTT -  63.8 sec   ( 26 Jan 2025 17:20 )  CAPILLARY BLOOD GLUCOSE        Urinalysis Basic - ( 26 Jan 2025 17:20 )    Color: x / Appearance: x / SG: x / pH: x  Gluc: 75 mg/dL / Ketone: x  / Bili: x / Urobili: x   Blood: x / Protein: x / Nitrite: x   Leuk Esterase: x / RBC: x / WBC x   Sq Epi: x / Non Sq Epi: x / Bacteria: x          --------------------------------------------------------------------------------------------  IMAGING  < from: CT Angio Chest Aorta w/wo IV Cont (01.26.25 @ 17:43) >    ACC: [id number] EXAM:  CT ANGIO ABD PELV (W)AW IC   ORDERED BY: GALINDO ROQUE     ACC: [id number] EXAM:  CT ANGIO CHEST AORTA WAWIC   ORDERED BY: GALINDO ROQUE     PROCEDURE DATE:  01/26/2025          INTERPRETATION:  CLINICAL INFORMATION: Chest pain. Evaluate for aortic   dissection    COMPARISON: CT chest abdomen pelvis 1/4/2025. CT abdomen pelvis 4/11/2023.    CONTRAST/COMPLICATIONS:  IV Contrast: Omnipaque 350  90 cc administered   10 cc discarded  Oral Contrast: NONE    PROCEDURE:  CT Angiography of the Chest, Abdomen and Pelvis.  Precontrast imaging was performed through the chest followed by arterial   phase imaging of the chest, abdomen and pelvis.  Sagittal and coronal reformats were performed as well as 3D (MIP)   reconstructions.    FINDINGS:    VASCULAR:    Status post aortic valve, mitral valve replacement and ascending aortic   graft repair. Stable type B dissection of the thoracic aorta extending   from left subclavian artery origin through the abdominal aorta up to mid   infrarenal segment.    Fenestration within the dissection flap is noted, with contrast   opacification of both true and false lumens. There is redemonstration of   marked narrowing of the true lumen. Visceral arteries arise from the   opacified false lumen including common origin celiac trunk/SMA, renal   arteries, and tiny left gastric artery arising from the aorta. ASTRID arises   from the abdominal aorta beyond the region of the dissection, and is   opacified with contrast.    Ascending thoracic aorta measures 3.1 cm, stable. Aortic arch measures   2.6 cm, stable. Proximal descending thoracic aorta measures 4.0 cm,   stable. Mid descending thoracic aorta measures 2.7 cm, stable. Aorta at   the diaphragmatic hiatus measures 2.3 cm, stable. Infrarenal abdominal   aorta measures 1.4 cm, stable.    Imaged great vessels are opacified with contrast, though are limited in   evaluation due to streak artifact from contrast bolus and spinal   hardware. Iliac arteries are without significantstenosis, with scattered   minimal plaque.    Main pulmonary artery is mildly enlarged measuring 3.1 cm. No central   pulmonary embolus. Central vein patency of the abdomen/pelvis is not   assessed on this study due to timing of contrast.    NON-VASCULAR:    CHEST:  LUNGS AND LARGE AIRWAYS: Central airways are patent. No large focal   consolidation. Minimal lung scarring noted. Sub-4 mm pulmonary nodules   are unchanged.  PLEURA: No pleural effusion or pneumothorax.  VESSELS: See above 'vascular'section.  HEART: Heart size is qualitatively within normal limits. Coronary artery   calcifications. Aortic and mitral valve prosthesis. No significant   pericardial effusion. Retained epicardial leads are noted.  MEDIASTINUM AND IRWIN: No enlarged lymph nodes of the thorax. Esophagus is   nondistended.  CHEST WALL AND LOWER NECK: No chest wall hematoma. Small bilateral breast   nodules are unchanged. Correlated with prior mammogram.    ABDOMEN AND PELVIS:  LIVER: Borderline enlarged, 18 cm in cranial caudal dimension. Diffuse   steatosis.  BILE DUCTS: No distention  GALLBLADDER: Unremarkable CT appearance  SPLEEN: Spleen size within normal limits  PANCREAS: No acute peripancreatic inflammation  ADRENALS: Unremarkable  KIDNEYS/URETERS: No hydronephrosis or obstructing ureteral calculus.   Bilateral renal cortical scarring.    BLADDER: Minimally distended.  REPRODUCTIVE ORGANS: Uterus and adnexa are suboptimally characterized on   CT. Posterior parauterine nodular densities adjacent to the rectum,   unclear if these represent pedunculated fibroids, other solid lesions or   unopacified small bowel loops, images 284-298 series 5. Suggest pelvic   MRI for further evaluation.    BOWEL: Stomach is underdistended. No small bowel distention. No CT   evidence of acute appendicitis. Mild cerebral burden of the colon and   overall under distention limits evaluation of the colonic mucosa.  PERITONEUM/RETROPERITONEUM: No ascites. Posterior parauterine nodular   densities adjacent to the rectum,unclear if these represent pedunculated   fibroids, other solid lesions or unopacified small bowel loops, images   284-298 series 5. Suggest pelvic MRI for further evaluation.  VESSELS: See above 'vascular' section.  LYMPH NODES: No enlarged lymph nodes by CT size criteria.  ABDOMINAL WALL: Atrophic left rectus muscle. Tiny fat-containing   umbilical hernia.  BONES: Right iliac wing postsurgical changes. Extensive spinal fixation   hardware. Central canal and neural foramina are not adequately assessed   on this study.    IMPRESSION:    Status post aortic valve, mitral valve replacement and ascending aortic   graft repair. Stable type B dissection of the thoracic aorta extending   from left subclavian artery origin through the abdominal aortaup to mid   infrarenal segment.    Coronary artery calcifications.    Posterior parauterine nodular densities adjacent to the rectum, unclear   if these represent pedunculated fibroids, other solid lesions or   unopacified small bowel loops, images 284-298 series 5. Suggest pelvic   MRI for further evaluation.    --- End of Report ---            ELIJAH MATA M.D., ATTENDING RADIOLOGIST  This document has been electronically signed. Jan 26 2025  6:36PM    < end of copied text >          
Surgeon: Brittany    Consult requesting by: ER    HISTORY OF PRESENT ILLNESS:  39y Female with 39F with PMH of open heart valve replacements (aortic root dilation with mechanical AVR and mitral valve replacement) on coumadin, Marfan's syndrome, HTN, DM, right carotid dissection without repair, asthma, abnormal uterine bleeding on progesterone, MDD and chronic pain .  Known to CT surgery for December recent admission for Type B Dissection, BP controlled on PO meds, repeat CTA prior to discharge stable on 12/31.  Presented back  to ED 1/4 with progressive LLE weakness, numbness tingling of thigh and L eye blurry vision x 45 mins.  Workup including CT and neuro consult unfounded and self resolved> pt discharged home.  Presents today to the ER with c/o right chest/ mid back pain radiating to her right side the goes down the underside of her right arm, as well as intermittent abdominal cramping.  Pt states that the pain started on Friday and was mild Friday and Saturday, but worsened today.  She states that it was unrelieved by her regular home pain meds, so she came to the ER.  Denies n/v diarrhea, fever, chills, or SOB.  NAD noted.  She states that pain is not reproducible to touch.  CTA to rule out dissection was done in the ER.  CT surg called to evaluate.        PAST MEDICAL & SURGICAL HISTORY:  Marfan syndrome      Fibromyalgia      Depression      Anxiety      Diabetes      Dilated aortic root      Asthma      Abnormal uterine bleeding (AUB)      Dissection of right carotid artery      Descending thoracic aortic dissection      H/O aortic valve replacement  2013      H/O spinal fusion  2005      H/O artificial lens replacement      H/O mitral valve replacement with mechanical valve          MEDICATIONS  (STANDING):  acetaminophen   IVPB .. 1000 milliGRAM(s) IV Intermittent Once  losartan 25 milliGRAM(s) Oral daily  metoprolol succinate ER 50 milliGRAM(s) Oral daily    MEDICATIONS  (PRN):    Antiplatelet therapy:                           Last dose/amt:    Allergies    No Known Allergies    Intolerances        SOCIAL HISTORY:  Smoker: [ ] Yes  [x ] No        PACK YEARS:                         WHEN QUIT?  ETOH use: [ ] Yes  [x ] No              FREQUENCY / QUANTITY:  Ilicit Drug use:  [ ] Yes  [x ] No  Occupation: none  Live with: self  Assisted device use: walker    FAMILY HISTORY:  Family history of diabetes mellitus (DM)        Review of Systems  CONSTITUTIONAL:  Fevers[ ] chills[ ] sweats[ ] fatigue[ x] weight loss[ ] weight gain [ ]                                     NEGATIVE [ ]   NEURO:  parathesias[ ] seizures [ ]  syncope [ ]  confusion [ ]                                                                                NEGATIVE[x ]   EYES: glasses[ ]  blurry vision[ ]  discharge[ ] pain[ ] glaucoma [ ]                                                                          NEGATIVE[ x]   ENMT:  difficulty hearing [ ]  vertigo[ ]  dysphagia[ ] epistaxis[ ] recent dental work [ ]                                    NEGATIVE[x ]   CV:  chest pain[x ] palpitations[ ] PATTON [ ] diaphoresis [ ]                                                                                           NEGATIVE[ ]   RESPIRATORY:  wheezing[ ] SOB[ ] cough [ ] sputum[ ] hemoptysis[ ]                                                                  NEGATIVE[ x]   GI:  nausea[ ]  vommiting [ ]  diarrhea[ ] constipation [ ] melena [ ]  *abdominal cramping                                       NEGATIVE[ ]   : hematuria[ ]  dysuria[ ] urgency[ ] incontinence[ ]                                                                                            NEGATIVE[x ]   MUSKULOSKELETAL:  arthritis[ ]  joint swelling [ ] muscle weakness [ ]  Right  side and right underarm pain                NEGATIVE[ ]   SKIN/BREAST:  rash[ ] itching [ ]  hair loss[ ] masses[ ]                                                                                              NEGATIVE[x ]   PSYCH:  dementia [ ] depresion [x ] anxiety[x ]                                                                                                               NEGATIVE[ ]   HEME/LYMPH:  bruises easily[ ] enlarged lymph nodes[ ] tender lymph nodes[ ]                                               NEGATIVE[x ]   ENDOCRINE:  cold intolerance[ ] heat intolerance[ ] polydipsia[ ]                                                                          NEGATIVE[x ]     PHYSICAL EXAM  Vital Signs Last 24 Hrs  T(C): 36.8 (26 Jan 2025 19:19), Max: 36.8 (26 Jan 2025 19:19)  T(F): 98.2 (26 Jan 2025 19:19), Max: 98.2 (26 Jan 2025 19:19)  HR: 82 (26 Jan 2025 19:19) (82 - 93)  BP: 105/57 (26 Jan 2025 19:19) (105/57 - 143/78)  BP(mean): --  RR: 19 (26 Jan 2025 19:19) (19 - 20)  SpO2: 96% (26 Jan 2025 19:19) (96% - 98%)    Parameters below as of 26 Jan 2025 19:19  Patient On (Oxygen Delivery Method): room air        CONSTITUTIONAL:                                                                            Neuro: WNL[x ] Normal exam oriented to person/place & time with no focal motor or sensory  deficits. Other                     Eyes: WNL[x ]   Normal exam of conjunctiva & lids, pupils equally reactive. Other     ENT: WNL[x ]    Normal exam of nasal/oral mucosa with absence of cyanosis. Other  Neck: WNL[x ]  Normal exam of jugular veins, trachea & thyroid. Other  Chest: WNL[x ] Normal lung exam with good air movement absence of wheezes, rales, or rhonchi: Other                                                                                CV:  Auscultation: normal [x ] S3[ ] S4[ ] Irregular [ ] Rub[ ] Clicks[ ]    Murmurs none:[x ]systolic [ ]  diastolic [ ] holosystolic [ ]  Carotids: No Bruits[x ] Other                 Abdominal Aorta: normal [x ] nonpalpable[ ]Other                                                                                      GI:           WNL[x ] Normal exam of abdomen, liver & spleen with no noted masses or tenderness. Other                                                                                                        Extremities: WNL[x ] Normal no evidence of cyanosis or deformity Edema: none[x ]trace[ ]1+[ ]2+[ ]3+[ ]4+[ ]  Lower Extremity Pulses: Right[ pp] Left[pp ]Varicosities[- ]  Upper extremities:  No edema and pulses equal bilaterally  SKIN :WNL[x ] Normal exam to inspection & palpation. Other:                                                          LABS:

## 2025-01-26 NOTE — ED PROVIDER NOTE - NSICDXPASTMEDICALHX_GEN_ALL_CORE_FT
PAST MEDICAL HISTORY:  Abnormal uterine bleeding (AUB)     Anxiety     Asthma     Depression     Descending thoracic aortic dissection     Diabetes     Dilated aortic root     Dissection of right carotid artery     Fibromyalgia     Marfan syndrome

## 2025-01-26 NOTE — ED PROVIDER NOTE - NSFOLLOWUPINSTRUCTIONS_ED_ALL_ED_FT
- You were evaluated in the emergency department for chest pain.   - Your EKG, labs, and CT imaging did not show a reason for your chest pain.   - Your INR was elevated. Hold your Coumadin for 1 dose. Then resume as prescribed.   - Follow up with the Dissection [facility] and cardiology      Feel better and good luck!

## 2025-01-28 ENCOUNTER — APPOINTMENT (OUTPATIENT)
Dept: FAMILY MEDICINE | Facility: CLINIC | Age: 40
End: 2025-01-28
Payer: MEDICAID

## 2025-01-28 VITALS — SYSTOLIC BLOOD PRESSURE: 102 MMHG | OXYGEN SATURATION: 98 % | DIASTOLIC BLOOD PRESSURE: 64 MMHG | HEART RATE: 101 BPM

## 2025-01-28 VITALS — SYSTOLIC BLOOD PRESSURE: 122 MMHG | DIASTOLIC BLOOD PRESSURE: 62 MMHG

## 2025-01-28 DIAGNOSIS — I95.9 HYPOTENSION, UNSPECIFIED: ICD-10-CM

## 2025-01-28 DIAGNOSIS — R79.1 ABNORMAL COAGULATION PROFILE: ICD-10-CM

## 2025-01-28 DIAGNOSIS — R00.0 TACHYCARDIA, UNSPECIFIED: ICD-10-CM

## 2025-01-28 DIAGNOSIS — R53.1 WEAKNESS: ICD-10-CM

## 2025-01-28 DIAGNOSIS — Q87.40 MARFAN'S SYNDROME, UNSPECIFIED: ICD-10-CM

## 2025-01-28 DIAGNOSIS — I71.20 THORACIC AORTIC ANEURYSM, WITHOUT RUPTURE, UNSPECIFIED: ICD-10-CM

## 2025-01-28 PROCEDURE — 99214 OFFICE O/P EST MOD 30 MIN: CPT

## 2025-02-04 ENCOUNTER — APPOINTMENT (OUTPATIENT)
Dept: CT IMAGING | Facility: CLINIC | Age: 40
End: 2025-02-04

## 2025-02-12 NOTE — CONSULT NOTE ADULT - CONSULT REQUESTED BY NAME
PHYSICAL THERAPY DAILY NOTE    PT Individual Minutes  Time In: 1431  Time Out: 1524  Minutes: 53                 Total Treatment Time:  53 Minutes    Pt. Seen for: PM, Therapeutic Exercise, and Transfer Training     Subjective: Pt. Reports nausea has improved this PM.  States she was unsuccessful attempting to have a BM this AM.         Objective:  Restrictions/Precautions: Fall Risk, Weight Bearing   Right Lower Extremity Weight Bearing: Weight Bearing As Tolerated (for transfers only;  static standing allowed)            GROSS ASSESSMENT   Pt. Up in w/c upon arrival.        COGNITION Daily Assessment        Overall Cognitive Status: WFL        BED/MAT MOBILITY Daily Assessment     Sit to Supine: Minimal assistance (for R LE)  Scooting: Supervision        TRANSFERS Daily Assessment   Pt. Utilizes a pivot movement on her L LE during pivot to/from chair    Toilet transfer w/ RW CGA Sit to Stand: Minimal Assistance  Stand to Sit: Minimal Assistance;Contact guard assistance  Bed to Chair: Contact guard assistance (w/ RW)          GAIT Daily Assessment     N/A       STEPS/STAIRS Daily Assessment     N/A         BALANCE Daily Assessment    Static Sitting: Good:  Pt. able to maintain balance w/o UE support;  exhibits some postural sway  Dynamic Sitting: Good - accepts moderate challenge;  can maintain balance while picking object off the floor  Static Standing: Fair:  Pt. requires UE support;  may need occasional min A  Dynamic Standing: Poor - unable to accept challenge or move without LOB        WHEELCHAIR MOBILITY Daily Assessment    Wheelchair Parts Management: Yes  Left Leg Rest Level of Assistance: Dependent/Total  Right Leg Rest Level of Assistance: Dependent/Total  Left Brakes Level of Assistance: Supervision  Right Brakes Level of Assistance: Supervision  Propulsion: Yes   Propulsion: Manual  Level: Level Tile  Method: RUE;LUE  Level of Assistance: Minimal assistance                 LOWER EXTREMITY EXERCISES  ED

## 2025-02-14 NOTE — CONSULT NOTE ADULT - NS ATTEND OPT1 GEN_ALL_CORE
Continue follow up with Psychiatry.   I attest my time as attending is greater than 50% of the total combined time spent on qualifying patient care activities by the PA/NP and attending. No/Not applicable

## 2025-02-21 ENCOUNTER — APPOINTMENT (OUTPATIENT)
Dept: CARDIOTHORACIC SURGERY | Facility: CLINIC | Age: 40
End: 2025-02-21
Payer: MEDICAID

## 2025-02-21 VITALS
OXYGEN SATURATION: 98 % | RESPIRATION RATE: 16 BRPM | DIASTOLIC BLOOD PRESSURE: 75 MMHG | BODY MASS INDEX: 21.94 KG/M2 | TEMPERATURE: 97.9 F | WEIGHT: 162 LBS | HEIGHT: 72 IN | SYSTOLIC BLOOD PRESSURE: 113 MMHG | HEART RATE: 94 BPM

## 2025-02-21 VITALS — SYSTOLIC BLOOD PRESSURE: 107 MMHG | DIASTOLIC BLOOD PRESSURE: 72 MMHG

## 2025-02-21 DIAGNOSIS — I71.00 DISSECTION OF UNSPECIFIED SITE OF AORTA: ICD-10-CM

## 2025-02-21 PROCEDURE — 99204 OFFICE O/P NEW MOD 45 MIN: CPT

## 2025-02-24 ENCOUNTER — APPOINTMENT (OUTPATIENT)
Dept: FAMILY MEDICINE | Facility: CLINIC | Age: 40
End: 2025-02-24
Payer: MEDICAID

## 2025-02-24 VITALS
HEIGHT: 72 IN | OXYGEN SATURATION: 95 % | DIASTOLIC BLOOD PRESSURE: 70 MMHG | HEART RATE: 91 BPM | WEIGHT: 162 LBS | SYSTOLIC BLOOD PRESSURE: 110 MMHG | BODY MASS INDEX: 21.94 KG/M2

## 2025-02-24 DIAGNOSIS — I71.20 THORACIC AORTIC ANEURYSM, WITHOUT RUPTURE, UNSPECIFIED: ICD-10-CM

## 2025-02-24 DIAGNOSIS — Q87.40 MARFAN'S SYNDROME, UNSPECIFIED: ICD-10-CM

## 2025-02-24 DIAGNOSIS — J84.9 INTERSTITIAL PULMONARY DISEASE, UNSPECIFIED: ICD-10-CM

## 2025-02-24 DIAGNOSIS — R79.1 ABNORMAL COAGULATION PROFILE: ICD-10-CM

## 2025-02-24 DIAGNOSIS — I10 ESSENTIAL (PRIMARY) HYPERTENSION: ICD-10-CM

## 2025-02-24 DIAGNOSIS — E11.9 TYPE 2 DIABETES MELLITUS W/OUT COMPLICATIONS: ICD-10-CM

## 2025-02-24 DIAGNOSIS — J44.9 CHRONIC OBSTRUCTIVE PULMONARY DISEASE, UNSPECIFIED: ICD-10-CM

## 2025-02-24 PROCEDURE — 99214 OFFICE O/P EST MOD 30 MIN: CPT

## 2025-02-26 ENCOUNTER — RX RENEWAL (OUTPATIENT)
Age: 40
End: 2025-02-26

## 2025-02-26 ENCOUNTER — NON-APPOINTMENT (OUTPATIENT)
Age: 40
End: 2025-02-26

## 2025-03-14 ENCOUNTER — APPOINTMENT (OUTPATIENT)
Dept: VASCULAR SURGERY | Facility: CLINIC | Age: 40
End: 2025-03-14

## 2025-03-25 ENCOUNTER — NON-APPOINTMENT (OUTPATIENT)
Age: 40
End: 2025-03-25

## 2025-03-25 ENCOUNTER — EMERGENCY (EMERGENCY)
Facility: HOSPITAL | Age: 40
LOS: 1 days | Discharge: HOME | End: 2025-03-25
Attending: EMERGENCY MEDICINE
Payer: MEDICAID

## 2025-03-25 VITALS
HEART RATE: 96 BPM | TEMPERATURE: 98 F | OXYGEN SATURATION: 97 % | DIASTOLIC BLOOD PRESSURE: 75 MMHG | SYSTOLIC BLOOD PRESSURE: 115 MMHG | HEIGHT: 72 IN | RESPIRATION RATE: 16 BRPM | WEIGHT: 179.9 LBS

## 2025-03-25 VITALS
RESPIRATION RATE: 16 BRPM | DIASTOLIC BLOOD PRESSURE: 71 MMHG | HEART RATE: 90 BPM | SYSTOLIC BLOOD PRESSURE: 132 MMHG | OXYGEN SATURATION: 100 %

## 2025-03-25 DIAGNOSIS — Z95.2 PRESENCE OF PROSTHETIC HEART VALVE: Chronic | ICD-10-CM

## 2025-03-25 DIAGNOSIS — I71.012 DISSECTION OF DESCENDING THORACIC AORTA: ICD-10-CM

## 2025-03-25 DIAGNOSIS — Z95.4 PRESENCE OF OTHER HEART-VALVE REPLACEMENT: Chronic | ICD-10-CM

## 2025-03-25 DIAGNOSIS — Z96.1 PRESENCE OF INTRAOCULAR LENS: Chronic | ICD-10-CM

## 2025-03-25 DIAGNOSIS — Z98.1 ARTHRODESIS STATUS: Chronic | ICD-10-CM

## 2025-03-25 LAB
ALBUMIN SERPL ELPH-MCNC: 3.4 G/DL — SIGNIFICANT CHANGE UP (ref 3.3–5.2)
ALP SERPL-CCNC: 67 U/L — SIGNIFICANT CHANGE UP (ref 40–120)
ALT FLD-CCNC: 5 U/L — SIGNIFICANT CHANGE UP
ANION GAP SERPL CALC-SCNC: 12 MMOL/L — SIGNIFICANT CHANGE UP (ref 5–17)
APPEARANCE UR: CLEAR — SIGNIFICANT CHANGE UP
APTT BLD: 56.4 SEC — HIGH (ref 24.5–35.6)
AST SERPL-CCNC: 15 U/L — SIGNIFICANT CHANGE UP
BASOPHILS # BLD AUTO: 0.02 K/UL — SIGNIFICANT CHANGE UP (ref 0–0.2)
BASOPHILS NFR BLD AUTO: 0.2 % — SIGNIFICANT CHANGE UP (ref 0–2)
BILIRUB SERPL-MCNC: 0.5 MG/DL — SIGNIFICANT CHANGE UP (ref 0.4–2)
BILIRUB UR-MCNC: NEGATIVE — SIGNIFICANT CHANGE UP
BLD GP AB SCN SERPL QL: SIGNIFICANT CHANGE UP
BUN SERPL-MCNC: 12.3 MG/DL — SIGNIFICANT CHANGE UP (ref 8–20)
CALCIUM SERPL-MCNC: 9 MG/DL — SIGNIFICANT CHANGE UP (ref 8.4–10.5)
CAST: 0 /LPF — SIGNIFICANT CHANGE UP (ref 0–4)
CHLORIDE SERPL-SCNC: 109 MMOL/L — HIGH (ref 96–108)
CO2 SERPL-SCNC: 21 MMOL/L — LOW (ref 22–29)
COLOR SPEC: YELLOW — SIGNIFICANT CHANGE UP
CREAT SERPL-MCNC: 0.63 MG/DL — SIGNIFICANT CHANGE UP (ref 0.5–1.3)
DIFF PNL FLD: ABNORMAL
EGFR: 114 ML/MIN/1.73M2 — SIGNIFICANT CHANGE UP
EGFR: 114 ML/MIN/1.73M2 — SIGNIFICANT CHANGE UP
EOSINOPHIL # BLD AUTO: 0.06 K/UL — SIGNIFICANT CHANGE UP (ref 0–0.5)
EOSINOPHIL NFR BLD AUTO: 0.6 % — SIGNIFICANT CHANGE UP (ref 0–6)
GLUCOSE SERPL-MCNC: 152 MG/DL — HIGH (ref 70–99)
GLUCOSE UR QL: NEGATIVE MG/DL — SIGNIFICANT CHANGE UP
HCG SERPL-ACNC: <4 MIU/ML — SIGNIFICANT CHANGE UP
HCT VFR BLD CALC: 32.8 % — LOW (ref 34.5–45)
HGB BLD-MCNC: 10 G/DL — LOW (ref 11.5–15.5)
IMM GRANULOCYTES # BLD AUTO: 0.06 K/UL — SIGNIFICANT CHANGE UP (ref 0–0.07)
IMM GRANULOCYTES NFR BLD AUTO: 0.6 % — SIGNIFICANT CHANGE UP (ref 0–0.9)
INR BLD: 3.46 RATIO — HIGH (ref 0.85–1.16)
KETONES UR-MCNC: NEGATIVE MG/DL — SIGNIFICANT CHANGE UP
LEUKOCYTE ESTERASE UR-ACNC: NEGATIVE — SIGNIFICANT CHANGE UP
LIDOCAIN IGE QN: 49 U/L — SIGNIFICANT CHANGE UP (ref 22–51)
LYMPHOCYTES # BLD AUTO: 1.44 K/UL — SIGNIFICANT CHANGE UP (ref 1–3.3)
LYMPHOCYTES NFR BLD AUTO: 13.3 % — SIGNIFICANT CHANGE UP (ref 13–44)
MAGNESIUM SERPL-MCNC: 1.5 MG/DL — LOW (ref 1.6–2.6)
MCHC RBC-ENTMCNC: 24.3 PG — LOW (ref 27–34)
MCHC RBC-ENTMCNC: 30.5 G/DL — LOW (ref 32–36)
MCV RBC AUTO: 79.6 FL — LOW (ref 80–100)
MONOCYTES # BLD AUTO: 0.72 K/UL — SIGNIFICANT CHANGE UP (ref 0–0.9)
MONOCYTES NFR BLD AUTO: 6.7 % — SIGNIFICANT CHANGE UP (ref 2–14)
NEUTROPHILS # BLD AUTO: 8.5 K/UL — HIGH (ref 1.8–7.4)
NEUTROPHILS NFR BLD AUTO: 78.6 % — HIGH (ref 43–77)
NITRITE UR-MCNC: NEGATIVE — SIGNIFICANT CHANGE UP
NRBC # BLD AUTO: 0 K/UL — SIGNIFICANT CHANGE UP (ref 0–0)
NRBC # FLD: 0 K/UL — SIGNIFICANT CHANGE UP (ref 0–0)
NRBC BLD AUTO-RTO: 0 /100 WBCS — SIGNIFICANT CHANGE UP (ref 0–0)
NT-PROBNP SERPL-SCNC: 188 PG/ML — SIGNIFICANT CHANGE UP (ref 0–300)
PH UR: 7.5 — SIGNIFICANT CHANGE UP (ref 5–8)
PLATELET # BLD AUTO: 329 K/UL — SIGNIFICANT CHANGE UP (ref 150–400)
PMV BLD: 10 FL — SIGNIFICANT CHANGE UP (ref 7–13)
POTASSIUM SERPL-MCNC: 4.5 MMOL/L — SIGNIFICANT CHANGE UP (ref 3.5–5.3)
POTASSIUM SERPL-SCNC: 4.5 MMOL/L — SIGNIFICANT CHANGE UP (ref 3.5–5.3)
PROT SERPL-MCNC: 6.1 G/DL — LOW (ref 6.6–8.7)
RBC # BLD: 4.12 M/UL — SIGNIFICANT CHANGE UP (ref 3.8–5.2)
RBC # FLD: 18 % — HIGH (ref 10.3–14.5)
RBC CASTS # UR COMP ASSIST: 6 /HPF — HIGH (ref 0–4)
SODIUM SERPL-SCNC: 142 MMOL/L — SIGNIFICANT CHANGE UP (ref 135–145)
SP GR SPEC: >1.03 — HIGH (ref 1–1.03)
SQUAMOUS # UR AUTO: 5 /HPF — SIGNIFICANT CHANGE UP (ref 0–5)
TROPONIN T, HIGH SENSITIVITY RESULT: 11 NG/L — SIGNIFICANT CHANGE UP (ref 0–51)
UROBILINOGEN FLD QL: 0.2 MG/DL — SIGNIFICANT CHANGE UP (ref 0.2–1)
WBC # BLD: 10.8 K/UL — HIGH (ref 3.8–10.5)
WBC # FLD AUTO: 10.8 K/UL — HIGH (ref 3.8–10.5)

## 2025-03-25 PROCEDURE — 85610 PROTHROMBIN TIME: CPT

## 2025-03-25 PROCEDURE — 93010 ELECTROCARDIOGRAM REPORT: CPT

## 2025-03-25 PROCEDURE — 83880 ASSAY OF NATRIURETIC PEPTIDE: CPT

## 2025-03-25 PROCEDURE — 81001 URINALYSIS AUTO W/SCOPE: CPT

## 2025-03-25 PROCEDURE — 96374 THER/PROPH/DIAG INJ IV PUSH: CPT | Mod: XU

## 2025-03-25 PROCEDURE — 71275 CT ANGIOGRAPHY CHEST: CPT | Mod: 26

## 2025-03-25 PROCEDURE — 93005 ELECTROCARDIOGRAM TRACING: CPT

## 2025-03-25 PROCEDURE — 85730 THROMBOPLASTIN TIME PARTIAL: CPT

## 2025-03-25 PROCEDURE — 83690 ASSAY OF LIPASE: CPT

## 2025-03-25 PROCEDURE — 74174 CTA ABD&PLVS W/CONTRAST: CPT | Mod: MC

## 2025-03-25 PROCEDURE — 86901 BLOOD TYPING SEROLOGIC RH(D): CPT

## 2025-03-25 PROCEDURE — 83735 ASSAY OF MAGNESIUM: CPT

## 2025-03-25 PROCEDURE — 74174 CTA ABD&PLVS W/CONTRAST: CPT | Mod: 26

## 2025-03-25 PROCEDURE — 85025 COMPLETE CBC W/AUTO DIFF WBC: CPT

## 2025-03-25 PROCEDURE — 70450 CT HEAD/BRAIN W/O DYE: CPT | Mod: 26

## 2025-03-25 PROCEDURE — 99282 EMERGENCY DEPT VISIT SF MDM: CPT

## 2025-03-25 PROCEDURE — 70450 CT HEAD/BRAIN W/O DYE: CPT | Mod: MC

## 2025-03-25 PROCEDURE — 71275 CT ANGIOGRAPHY CHEST: CPT | Mod: MC

## 2025-03-25 PROCEDURE — 84702 CHORIONIC GONADOTROPIN TEST: CPT

## 2025-03-25 PROCEDURE — 86900 BLOOD TYPING SEROLOGIC ABO: CPT

## 2025-03-25 PROCEDURE — 99285 EMERGENCY DEPT VISIT HI MDM: CPT

## 2025-03-25 PROCEDURE — 84484 ASSAY OF TROPONIN QUANT: CPT

## 2025-03-25 PROCEDURE — 86850 RBC ANTIBODY SCREEN: CPT

## 2025-03-25 PROCEDURE — 36415 COLL VENOUS BLD VENIPUNCTURE: CPT

## 2025-03-25 PROCEDURE — 99285 EMERGENCY DEPT VISIT HI MDM: CPT | Mod: 25

## 2025-03-25 PROCEDURE — 80053 COMPREHEN METABOLIC PANEL: CPT

## 2025-03-25 PROCEDURE — 71045 X-RAY EXAM CHEST 1 VIEW: CPT

## 2025-03-25 PROCEDURE — 71045 X-RAY EXAM CHEST 1 VIEW: CPT | Mod: 26

## 2025-03-25 RX ORDER — ACETAMINOPHEN 500 MG/5ML
1000 LIQUID (ML) ORAL ONCE
Refills: 0 | Status: COMPLETED | OUTPATIENT
Start: 2025-03-25 | End: 2025-03-25

## 2025-03-25 RX ADMIN — Medication 400 MILLIGRAM(S): at 15:29

## 2025-03-25 NOTE — CONSULT NOTE ADULT - CONSULT REQUESTED BY NAME
Orders faxed to [facility] as requested by Dr. Thompson  
R middle finger PIP injury-- Please fax a prescription for finger Xray to [facility] in [place]- he can go to outpatient registration for this.  Thanks  
ED

## 2025-03-25 NOTE — ED PROVIDER NOTE - OBJECTIVE STATEMENT
40 F PMHx Marfan's syndrome (FBN1 gene mutation), open heart valve replacements (aortic root dilation with mechanical AVR and mitral valve replacement) no coumadin, HTN, DM, right carotid dissection w/o repair, asthma, abnormal uterine bleeding on progesterone, spinal fusion, MDD, chronic pain, recent admission to [facility] for intermittent abdominal cramping was found to have type B aortic dissection which was determined to be stable/no intervention performed BIBEMS from home to [facility] ED for lower abdominal pain radiating to her back since picking up  her dog 4 days ago. Also, takes coumadin but the patient said that her "INR is all over the place", most recent INR was 4. In ED patient was still complaining of the abdominal pain radiating to the back, vitals WNL.

## 2025-03-25 NOTE — ED PROVIDER NOTE - CLINICAL SUMMARY MEDICAL DECISION MAKING FREE TEXT BOX
40 F PMHx Marfan's syndrome (FBN1 gene mutation), open heart valve replacements (aortic root dilation with mechanical AVR and mitral valve replacement) no coumadin, HTN, DM, right carotid dissection w/o repair, asthma, abnormal uterine bleeding on progesterone, spinal fusion, MDD, chronic pain, recent admission to [facility] for intermittent abdominal cramping was found to have type B aortic dissection which was determined to be stable/no intervention performed BIBEMS from home to [facility] ED for lower abdominal pain radiating to her back since picking up  her dog 4 days ago. Labs and CTA ordered. 40 F PMHx Marfan's syndrome (FBN1 gene mutation), open heart valve replacements (aortic root dilation with mechanical AVR and mitral valve replacement) no coumadin, HTN, DM, right carotid dissection w/o repair, asthma, abnormal uterine bleeding on progesterone, spinal fusion, MDD, chronic pain, recent admission to [facility] for intermittent abdominal cramping was found to have type B aortic dissection which was determined to be stable/no intervention performed BIBEMS from home to [facility] ED for lower abdominal pain radiating to her back since picking up  her dog 4 days ago. Labs and CTA ordered.    priority ct with no acute changes. dissection repair stable. patient reports improvement in symptoms with tylenol. otherwise labs wnl. will dc home with f/up.

## 2025-03-25 NOTE — ED PROVIDER NOTE - PATIENT PORTAL LINK FT
You can access the FollowMyHealth Patient Portal offered by [facility] by registering at the following website: [email or web address]. By joining [employer]’s FollowMyHealth portal, you will also be able to view your health information using other applications (apps) compatible with our system.

## 2025-03-25 NOTE — ED PROVIDER NOTE - PHYSICAL EXAMINATION
VITALS:   T(C): 36.9 (03-25-25 @ 13:25), Max: 36.9 (03-25-25 @ 13:25)  HR: 96 (03-25-25 @ 13:25) (96 - 96)  BP: 115/75 (03-25-25 @ 13:25) (115/75 - 115/75)  RR: 16 (03-25-25 @ 13:25) (16 - 16)  SpO2: 97% (03-25-25 @ 13:25) (97% - 97%)    GENERAL: NAD, lying in bed comfortably, marfanoid habitus  HEAD:  Atraumatic, normocephalic  EYES: EOMI, PERRLA, conjunctiva and sclera clear  ENT: Moist mucous membranes  NECK: Supple, no JVD  HEART: Regular rate and rhythm, AVR/MR, rubs, or gallops  LUNGS: Unlabored respirations.  Clear to auscultation bilaterally, no crackles, wheezing, or rhonchi  ABDOMEN: Soft, nontender, nondistended, +BS  EXTREMITIES: 2+ peripheral pulses bilaterally. No clubbing, cyanosis, or edema  NERVOUS SYSTEM:  A&Ox3, no focal deficits   SKIN: No rashes or lesions

## 2025-03-25 NOTE — ED ADULT TRIAGE NOTE - CHIEF COMPLAINT QUOTE
Patient BIBEMS c/o lower abdominal pain that radiates to her back since picking up her dog 4 days ago. Hx of type B dissection in December, takes Coumadin, states her INR is also high and was advised to come to the ED.

## 2025-03-25 NOTE — ED PROVIDER NOTE - CARE PROVIDER_API CALL
Brittany Reza, Gómez Gamble  Thoracic and Cardiac Surgery  [address] [place] [id number]  Phone: [phone]  Fax: [phone]  Follow Up Time: 1-3 Days

## 2025-03-25 NOTE — CONSULT NOTE ADULT - ASSESSMENT
40 F PMHx Marfan's syndrome (FBN1 gene mutation), open heart valve replacements (aortic root dilation with mechanical AVR and mitral valve replacement) on coumadin, HTN, DM, right carotid dissection w/o repair, asthma, abnormal uterine bleeding on progesterone, spinal fusion, MDD, chronic pain, recent admission to [facility] for intermittent abdominal cramping was found to have type B aortic dissection which was determined to be stable/no intervention performed. BIBEMS from home to [facility] ED for lower abdominal pain radiating to her back since picking up  her dog 4 days ago. Also, takes coumadin but the patient said that her "INR is all over the place", most recent INR was 4. In ED patient was still complaining of the abdominal pain radiating to the back, vitals WNL. CTS consulted in setting of abdominal pain with known stable Type B dissection

## 2025-03-25 NOTE — CONSULT NOTE ADULT - SUBJECTIVE AND OBJECTIVE BOX
40 F PMHx Marfan's syndrome (FBN1 gene mutation), open heart valve replacements (aortic root dilation with mechanical AVR and mitral valve replacement) on coumadin, HTN, DM, right carotid dissection w/o repair, asthma, abnormal uterine bleeding on progesterone, spinal fusion, MDD, chronic pain, recent admission to [facility] for intermittent abdominal cramping was found to have type B aortic dissection which was determined to be stable/no intervention performed. BIBEMS from home to [facility] ED for lower abdominal pain radiating to her back since picking up  her dog 4 days ago. Also, takes coumadin but the patient said that her "INR is all over the place", most recent INR was 4. In ED patient was still complaining of the abdominal pain radiating to the back, vitals WNL. CTS consulted in setting of abdominal pain with known stable Type B dissection    Subjective - patient seen and evaluated bedside. Complaining of abdominal pain radiating to back.  Sitting comfortably in bed. Denies CP, SOB, HA, dizziness, n/v/d    Review of Systems: negative x 10 systems except as noted above        PAST MEDICAL & SURGICAL HISTORY:  Marfan syndrome      Fibromyalgia      Depression      Anxiety      Diabetes      Dilated aortic root      Asthma      Abnormal uterine bleeding (AUB)      Dissection of right carotid artery      Descending thoracic aortic dissection      H/O aortic valve replacement  2013      H/O spinal fusion  2005      H/O artificial lens replacement      H/O mitral valve replacement with mechanical valve            MEDICATIONS  (PRN):    Height (cm): 188 (03-25 @ 13:25)  Weight (kg): 81.6 (03-25 @ 13:25)  BMI (kg/m2): 23.1 (03-25 @ 13:25)  BSA (m2): 2.08 (03-25 @ 13:25)  Daily Height in cm: 187.96 (25 Mar 2025 13:25)    Daily                               10.0   10.80 )-----------( 329      ( 25 Mar 2025 14:25 )             32.8   03-25    142  |  109[H]  |  12.3  ----------------------------<  152[H]  4.5   |  21.0[L]  |  0.63    Ca    9.0      25 Mar 2025 14:25  Mg     1.5     03-25    TPro  6.1[L]  /  Alb  3.4  /  TBili  0.5  /  DBili  x   /  AST  15  /  ALT  5   /  AlkPhos  67  03-25      PT/INR - ( 25 Mar 2025 14:25 )   PT: 39.6 sec;   INR: 3.46 ratio         PTT - ( 25 Mar 2025 14:25 )  PTT:56.4 sec      Objective:  T(C): 36.6 (03-25-25 @ 15:24), Max: 36.9 (03-25-25 @ 13:25)  HR: 85 (03-25-25 @ 15:24) (85 - 96)  BP: 138/72 (03-25-25 @ 15:24) (115/75 - 138/72)  RR: 15 (03-25-25 @ 15:24) (15 - 16)  SpO2: 100% (03-25-25 @ 15:24) (97% - 100%)  Wt(kg): --CAPILLARY BLOOD GLUCOSE      I&O's Summary      Physical Exam  General: NAD  Neuro: A+O x 3, non-focal, speech clear and intact  Psych: Appropriate affect  HEENT:  NCAT, No conjuctival edema or icterus, no thrush.  Pulm: CTA, equal bilaterally  CV: RRR, irregularly irregular, +S1S2  Abd: soft, NT, ND, +BS  Ext: +DP Pulses b/l, no edema  Skin: Warm, dry, intact  Inc: MSI C/D/I/stable w/ dressing, LE vein harvest site C/D/I with Ace wrap  Chest tubes:        Imaging:      < from: CT Angio Chest Aorta w/wo IV Cont (03.25.25 @ 15:14) >      PROCEDURE:  CT Angiography of the Chest, Abdomen and Pelvis.  Precontrast imaging was performed through the chest followed by arterial   phase imaging of the chest, abdomen and pelvis.  Sagittal and coronal reformats were performed as well as 3D (MIP)   reconstructions.    FINDINGS:  CHEST:  LUNGS AND LARGE AIRWAYS: Patent central airways. No pulmonary nodules.   Stable calcified granulomata in the left lower lobe posterolaterally.  PLEURA: No pleural effusion. No pneumothorax or pneumomediastinum.  VESSELS: Status post aVR, MVR, and ascending thoracic graft repair. Again   is noted a fenestrated dissection flap of the ascending thoracic aorta   originating at the origin ofthe left subclavian artery extending to the   infrarenal abdominal aorta. Again is noted aneurysmal dilatation of the   proximal descending thoracic aorta to 4.3 cm. There is no evidence of   filling defect in the first, second, or third order branches of the   pulmonary arteries. The pulmonary trunk and main pulmonary arteries are   unremarkable.  HEART: Heart size is normal. No pericardial effusion.  MEDIASTINUM AND IRWIN: No lymphadenopathy.  CHEST WALL AND LOWER NECK: Within normal limits.    ABDOMEN AND PELVIS:  LIVER: Within normal limits.  BILE DUCTS: Normal caliber.  GALLBLADDER: Within normal limits.  SPLEEN: Within normal limits.  PANCREAS: Within normal limits.  ADRENALS: Within normal limits.  KIDNEYS/URETERS: Mild bilateral renal cortical irregularity, compatible   with scarring. 2 mm nonobstructing stone in the lower pole of the right   kidney. Otherwise, within normal limits.    BLADDER: Within normal limits.  REPRODUCTIVE ORGANS: Subserosal pedunculated fibroids. The adnexaare   unremarkable by CT criteria.    BOWEL: No bowel obstruction. Appendix is normal. There is no mesenteric   adenopathy.  PERITONEUM/RETROPERITONEUM: Within normal limits.  VESSELS: The celiac artery, SMA, and renal arteries are fed by the the   dominant false lumen.  LYMPH NODES: No lymphadenopathy.  ABDOMINAL WALL: Within normal limits.  BONES: Status post extensive transpedicular screw and david fixation of the   proximal and mid thoracic spine and distal thoracic and proximal lumbar   spine.Degenerative disc disease and spondylosis of the lower lumbar   spine. Tarlov cysts of the sacrum.    IMPRESSION: Status post Bentall repair with stable residual dissection of   the descending thoracic aorta and abdominal aorta, as described.    < end of copied text >             40 F PMHx Marfan's syndrome (FBN1 gene mutation), open heart valve replacements (aortic root dilation with mechanical AVR and mitral valve replacement) on coumadin, HTN, DM, right carotid dissection w/o repair, asthma, abnormal uterine bleeding on progesterone, spinal fusion, MDD, chronic pain, recent admission to [facility] for intermittent abdominal cramping was found to have type B aortic dissection which was determined to be stable/no intervention performed. BIBEMS from home to [facility] ED for lower abdominal pain radiating to her back since picking up  her dog 4 days ago. Also, takes coumadin but the patient said that her "INR is all over the place", most recent INR was 4. In ED patient was still complaining of the abdominal pain radiating to the back, vitals WNL. CTS consulted in setting of abdominal pain with known stable Type B dissection    Subjective - patient seen and evaluated bedside. Complaining of abdominal pain radiating to back.  Sitting comfortably in bed. Denies CP, SOB, HA, dizziness, n/v/d    Review of Systems: negative x 10 systems except as noted above        PAST MEDICAL & SURGICAL HISTORY:  Marfan syndrome      Fibromyalgia      Depression      Anxiety      Diabetes      Dilated aortic root      Asthma      Abnormal uterine bleeding (AUB)      Dissection of right carotid artery      Descending thoracic aortic dissection      H/O aortic valve replacement  2013      H/O spinal fusion  2005      H/O artificial lens replacement      H/O mitral valve replacement with mechanical valve            MEDICATIONS  (PRN):    Height (cm): 188 (03-25 @ 13:25)  Weight (kg): 81.6 (03-25 @ 13:25)  BMI (kg/m2): 23.1 (03-25 @ 13:25)  BSA (m2): 2.08 (03-25 @ 13:25)  Daily Height in cm: 187.96 (25 Mar 2025 13:25)    Daily                               10.0   10.80 )-----------( 329      ( 25 Mar 2025 14:25 )             32.8   03-25    142  |  109[H]  |  12.3  ----------------------------<  152[H]  4.5   |  21.0[L]  |  0.63    Ca    9.0      25 Mar 2025 14:25  Mg     1.5     03-25    TPro  6.1[L]  /  Alb  3.4  /  TBili  0.5  /  DBili  x   /  AST  15  /  ALT  5   /  AlkPhos  67  03-25      PT/INR - ( 25 Mar 2025 14:25 )   PT: 39.6 sec;   INR: 3.46 ratio         PTT - ( 25 Mar 2025 14:25 )  PTT:56.4 sec      Objective:  T(C): 36.6 (03-25-25 @ 15:24), Max: 36.9 (03-25-25 @ 13:25)  HR: 85 (03-25-25 @ 15:24) (85 - 96)  BP: 138/72 (03-25-25 @ 15:24) (115/75 - 138/72)  RR: 15 (03-25-25 @ 15:24) (15 - 16)  SpO2: 100% (03-25-25 @ 15:24) (97% - 100%)  Wt(kg): --CAPILLARY BLOOD GLUCOSE      I&O's Summary      Physical Exam  General: NAD  Neuro: A+O x 3, non-focal, speech clear and intact  Psych: Appropriate affect  Pulm: CTA, equal bilaterally  CV: RRR, +S1S2 + mechanical murmur  Abd: soft, ND, + RLQ mild tenderness  Ext: +DP Pulses b/l, no edema  Skin: Warm, dry, intact          Imaging:      < from: CT Angio Chest Aorta w/wo IV Cont (03.25.25 @ 15:14) >      PROCEDURE:  CT Angiography of the Chest, Abdomen and Pelvis.  Precontrast imaging was performed through the chest followed by arterial   phase imaging of the chest, abdomen and pelvis.  Sagittal and coronal reformats were performed as well as 3D (MIP)   reconstructions.    FINDINGS:  CHEST:  LUNGS AND LARGE AIRWAYS: Patent central airways. No pulmonary nodules.   Stable calcified granulomata in the left lower lobe posterolaterally.  PLEURA: No pleural effusion. No pneumothorax or pneumomediastinum.  VESSELS: Status post aVR, MVR, and ascending thoracic graft repair. Again   is noted a fenestrated dissection flap of the ascending thoracic aorta   originating at the origin ofthe left subclavian artery extending to the   infrarenal abdominal aorta. Again is noted aneurysmal dilatation of the   proximal descending thoracic aorta to 4.3 cm. There is no evidence of   filling defect in the first, second, or third order branches of the   pulmonary arteries. The pulmonary trunk and main pulmonary arteries are   unremarkable.  HEART: Heart size is normal. No pericardial effusion.  MEDIASTINUM AND IRWIN: No lymphadenopathy.  CHEST WALL AND LOWER NECK: Within normal limits.    ABDOMEN AND PELVIS:  LIVER: Within normal limits.  BILE DUCTS: Normal caliber.  GALLBLADDER: Within normal limits.  SPLEEN: Within normal limits.  PANCREAS: Within normal limits.  ADRENALS: Within normal limits.  KIDNEYS/URETERS: Mild bilateral renal cortical irregularity, compatible   with scarring. 2 mm nonobstructing stone in the lower pole of the right   kidney. Otherwise, within normal limits.    BLADDER: Within normal limits.  REPRODUCTIVE ORGANS: Subserosal pedunculated fibroids. The adnexaare   unremarkable by CT criteria.    BOWEL: No bowel obstruction. Appendix is normal. There is no mesenteric   adenopathy.  PERITONEUM/RETROPERITONEUM: Within normal limits.  VESSELS: The celiac artery, SMA, and renal arteries are fed by the the   dominant false lumen.  LYMPH NODES: No lymphadenopathy.  ABDOMINAL WALL: Within normal limits.  BONES: Status post extensive transpedicular screw and david fixation of the   proximal and mid thoracic spine and distal thoracic and proximal lumbar   spine.Degenerative disc disease and spondylosis of the lower lumbar   spine. Tarlov cysts of the sacrum.    IMPRESSION: Status post Bentall repair with stable residual dissection of   the descending thoracic aorta and abdominal aorta, as described.    < end of copied text >

## 2025-03-25 NOTE — ED ADULT NURSE NOTE - OBJECTIVE STATEMENT
Pt c/o lower abdominal pain that wraps around back x 4 days, started after lifting 45 lb dog at home, recent dissection repair in December, states she no longer has a weight limit restriction, AOx3, resp even and unlabored, also c/o headache, takes methadone and oxy at home, took ibuprofen for abd pain with slight relief

## 2025-03-25 NOTE — ED PROVIDER NOTE - SKIN NEGATIVE STATEMENT, MLM
no abrasions, no jaundice, no lesions, no pruritis, and no rashes. Hydroquinone Counseling:  Patient advised that medication may result in skin irritation, lightening (hypopigmentation), dryness, and burning.  In the event of skin irritation, the patient was advised to reduce the amount of the drug applied or use it less frequently.  Rarely, spots that are treated with hydroquinone can become darker (pseudoochronosis).  Should this occur, patient instructed to stop medication and call the office. The patient verbalized understanding of the proper use and possible adverse effects of hydroquinone.  All of the patient's questions and concerns were addressed.

## 2025-03-25 NOTE — CONSULT NOTE ADULT - PROBLEM SELECTOR RECOMMENDATION 9
CTA aorta performed  Radiology read as above  Stable findings  No intervention indicated from CTS perspective  Consider workup for other causes of abdominal pain  D/w Dr. Soto.

## 2025-03-25 NOTE — ED PROVIDER NOTE - ATTENDING CONTRIBUTION TO CARE
40 yoF; with PMH significant Marfan syndrome, s/p AVR mechanical, s/p MVR mechanical (on Coumadin), HTN, HLD, DM, right carotid dissection without repair, asthma, recent type B dissection (medical management); now presenting with abdominal pain–lower abdominal pain, pulsating, radiating to back, associated with mild lightheadedness.  Denies nausea or vomiting.  Reports baseline tingling to left arm and left leg which has been baseline for approximately 3 months since her last dissection denies any focal weakness.  General:     NAD  Head:     NC/AT, EOMI, oral mucosa moist  Neck:     trachea midline  Lungs:     CTA b/l, no w/r/r  CVS:     S1S2, RRR, no m/g/r  Abd:     +BS, s/nt/nd, no organomegaly  Ext:    2+ radial and pedal pulses, no c/c/e  Neuro: AAOx3, no sensory/motor deficits  A/P: 40 yoF; with PMH significant Marfan syndrome, s/p AVR mechanical, s/p MVR mechanical (on Coumadin), HTN, HLD, DM, right carotid dissection without repair, asthma, recent type B dissection (medical management); now presenting with abdominal pain radiating to back  -labs, ct aorta study called overhead as priority, cardiac monitor

## 2025-03-25 NOTE — ED PROVIDER NOTE - NSFOLLOWUPINSTRUCTIONS_ED_ALL_ED_FT
You are advised to please follow up with your primary care doctor within the next 24 hours and return to the Emergency Department for worsening symptoms or any other concerns.  Your doctor may call [phone] to follow up on the specific results of the tests performed today in the emergency department.    Abdominal Pain    Many things can cause abdominal pain. Many times, abdominal pain is not caused by a disease and will improve without treatment. Your health care provider will do a physical exam to determine if there is a dangerous cause of your pain; blood tests and imaging may help determine the cause of your pain. However, in many cases, no cause may be found and you may need further testing as an outpatient. Monitor your abdominal pain for any changes.     SEEK IMMEDIATE MEDICAL CARE IF YOU HAVE ANY OF THE FOLLOWING SYMPTOMS: worsening abdominal pain, uncontrollable vomiting, profuse diarrhea, inability to have bowel movements or pass gas, black or bloody stools, fever accompanying chest pain or back pain, or fainting. These symptoms may represent a serious problem that is an emergency. Do not wait to see if the symptoms will go away. Get medical help right away. Call 911 and do not drive yourself to the hospital.

## 2025-03-27 DIAGNOSIS — Z98.1 ARTHRODESIS STATUS: ICD-10-CM

## 2025-03-27 DIAGNOSIS — Q87.40 MARFAN SYNDROME, UNSPECIFIED: ICD-10-CM

## 2025-03-27 DIAGNOSIS — I71.012 DISSECTION OF DESCENDING THORACIC AORTA: ICD-10-CM

## 2025-03-27 DIAGNOSIS — F32.9 MAJOR DEPRESSIVE DISORDER, SINGLE EPISODE, UNSPECIFIED: ICD-10-CM

## 2025-03-27 DIAGNOSIS — Z86.79 PERSONAL HISTORY OF OTHER DISEASES OF THE CIRCULATORY SYSTEM: ICD-10-CM

## 2025-03-27 DIAGNOSIS — E11.9 TYPE 2 DIABETES MELLITUS WITHOUT COMPLICATIONS: ICD-10-CM

## 2025-03-27 DIAGNOSIS — R10.30 LOWER ABDOMINAL PAIN, UNSPECIFIED: ICD-10-CM

## 2025-03-27 DIAGNOSIS — Z87.42 PERSONAL HISTORY OF OTHER DISEASES OF THE FEMALE GENITAL TRACT: ICD-10-CM

## 2025-03-27 DIAGNOSIS — J45.909 UNSPECIFIED ASTHMA, UNCOMPLICATED: ICD-10-CM

## 2025-03-27 DIAGNOSIS — I10 ESSENTIAL (PRIMARY) HYPERTENSION: ICD-10-CM

## 2025-05-14 ENCOUNTER — APPOINTMENT (OUTPATIENT)
Dept: CARDIOTHORACIC SURGERY | Facility: CLINIC | Age: 40
End: 2025-05-14
Payer: MEDICAID

## 2025-05-14 VITALS
HEART RATE: 99 BPM | DIASTOLIC BLOOD PRESSURE: 70 MMHG | TEMPERATURE: 98 F | SYSTOLIC BLOOD PRESSURE: 102 MMHG | WEIGHT: 162 LBS | OXYGEN SATURATION: 95 % | HEIGHT: 72 IN | RESPIRATION RATE: 16 BRPM | BODY MASS INDEX: 21.94 KG/M2

## 2025-05-14 DIAGNOSIS — I71.012 DISSECTION OF DESCENDING THORACIC AORTA: ICD-10-CM

## 2025-05-14 DIAGNOSIS — I71.9 AORTIC ANEURYSM OF UNSPECIFIED SITE, W/OUT RUPTURE: ICD-10-CM

## 2025-05-14 DIAGNOSIS — E11.9 TYPE 2 DIABETES MELLITUS W/OUT COMPLICATIONS: ICD-10-CM

## 2025-05-14 DIAGNOSIS — E78.5 HYPERLIPIDEMIA, UNSPECIFIED: ICD-10-CM

## 2025-05-14 DIAGNOSIS — I10 ESSENTIAL (PRIMARY) HYPERTENSION: ICD-10-CM

## 2025-05-14 DIAGNOSIS — E03.9 HYPOTHYROIDISM, UNSPECIFIED: ICD-10-CM

## 2025-05-14 DIAGNOSIS — J84.9 INTERSTITIAL PULMONARY DISEASE, UNSPECIFIED: ICD-10-CM

## 2025-05-14 PROCEDURE — 99214 OFFICE O/P EST MOD 30 MIN: CPT

## 2025-06-03 ENCOUNTER — APPOINTMENT (OUTPATIENT)
Dept: FAMILY MEDICINE | Facility: CLINIC | Age: 40
End: 2025-06-03
Payer: MEDICAID

## 2025-06-03 VITALS
SYSTOLIC BLOOD PRESSURE: 102 MMHG | HEART RATE: 92 BPM | BODY MASS INDEX: 22.75 KG/M2 | DIASTOLIC BLOOD PRESSURE: 72 MMHG | WEIGHT: 168 LBS | OXYGEN SATURATION: 98 % | HEIGHT: 72 IN

## 2025-06-03 DIAGNOSIS — R79.1 ABNORMAL COAGULATION PROFILE: ICD-10-CM

## 2025-06-03 DIAGNOSIS — E78.5 HYPERLIPIDEMIA, UNSPECIFIED: ICD-10-CM

## 2025-06-03 DIAGNOSIS — Q87.40 MARFAN'S SYNDROME, UNSPECIFIED: ICD-10-CM

## 2025-06-03 DIAGNOSIS — E11.9 TYPE 2 DIABETES MELLITUS W/OUT COMPLICATIONS: ICD-10-CM

## 2025-06-03 DIAGNOSIS — R45.89 OTHER SYMPTOMS AND SIGNS INVOLVING EMOTIONAL STATE: ICD-10-CM

## 2025-06-03 DIAGNOSIS — I38 ENDOCARDITIS, VALVE UNSPECIFIED: ICD-10-CM

## 2025-06-03 DIAGNOSIS — I71.20 THORACIC AORTIC ANEURYSM, WITHOUT RUPTURE, UNSPECIFIED: ICD-10-CM

## 2025-06-03 DIAGNOSIS — E53.8 DEFICIENCY OF OTHER SPECIFIED B GROUP VITAMINS: ICD-10-CM

## 2025-06-03 PROCEDURE — 36415 COLL VENOUS BLD VENIPUNCTURE: CPT

## 2025-06-03 PROCEDURE — 99215 OFFICE O/P EST HI 40 MIN: CPT

## 2025-06-04 LAB
ALBUMIN SERPL ELPH-MCNC: 4.2 G/DL
ALP BLD-CCNC: 86 U/L
ALT SERPL-CCNC: 7 U/L
ANION GAP SERPL CALC-SCNC: 15 MMOL/L
AST SERPL-CCNC: 18 U/L
BASOPHILS # BLD AUTO: 0.05 K/UL
BASOPHILS NFR BLD AUTO: 0.3 %
BILIRUB SERPL-MCNC: 0.3 MG/DL
BUN SERPL-MCNC: 24 MG/DL
CALCIUM SERPL-MCNC: 9.4 MG/DL
CHLORIDE SERPL-SCNC: 108 MMOL/L
CHOLEST SERPL-MCNC: 170 MG/DL
CK SERPL-CCNC: 71 U/L
CO2 SERPL-SCNC: 18 MMOL/L
CREAT SERPL-MCNC: 0.68 MG/DL
EGFRCR SERPLBLD CKD-EPI 2021: 111 ML/MIN/1.73M2
EOSINOPHIL # BLD AUTO: 0.15 K/UL
EOSINOPHIL NFR BLD AUTO: 1 %
ESTIMATED AVERAGE GLUCOSE: 174 MG/DL
GGT SERPL-CCNC: 24 U/L
GLUCOSE SERPL-MCNC: 165 MG/DL
HBA1C MFR BLD HPLC: 7.7 %
HCT VFR BLD CALC: 35.1 %
HDLC SERPL-MCNC: 32 MG/DL
HGB BLD-MCNC: 10.6 G/DL
IMM GRANULOCYTES NFR BLD AUTO: 0.8 %
LDLC SERPL-MCNC: 113 MG/DL
LYMPHOCYTES # BLD AUTO: 1.88 K/UL
LYMPHOCYTES NFR BLD AUTO: 13.1 %
MAN DIFF?: NORMAL
MCHC RBC-ENTMCNC: 24.5 PG
MCHC RBC-ENTMCNC: 30.2 G/DL
MCV RBC AUTO: 81.1 FL
MONOCYTES # BLD AUTO: 0.91 K/UL
MONOCYTES NFR BLD AUTO: 6.3 %
NEUTROPHILS # BLD AUTO: 11.25 K/UL
NEUTROPHILS NFR BLD AUTO: 78.5 %
NONHDLC SERPL-MCNC: 139 MG/DL
PLATELET # BLD AUTO: 473 K/UL
POTASSIUM SERPL-SCNC: 4.5 MMOL/L
PROT SERPL-MCNC: 6.8 G/DL
RBC # BLD: 4.33 M/UL
RBC # FLD: 18.8 %
SODIUM SERPL-SCNC: 140 MMOL/L
T3FREE SERPL-MCNC: 2.59 PG/ML
T4 FREE SERPL-MCNC: 1.4 NG/DL
TRIGL SERPL-MCNC: 141 MG/DL
TSH SERPL-ACNC: 1.31 UIU/ML
URATE SERPL-MCNC: 5.5 MG/DL
VIT B12 SERPL-MCNC: 173 PG/ML
WBC # FLD AUTO: 14.36 K/UL

## 2025-06-06 ENCOUNTER — APPOINTMENT (OUTPATIENT)
Dept: ULTRASOUND IMAGING | Facility: CLINIC | Age: 40
End: 2025-06-06
Payer: MEDICAID

## 2025-06-06 ENCOUNTER — OUTPATIENT (OUTPATIENT)
Dept: OUTPATIENT SERVICES | Facility: HOSPITAL | Age: 40
LOS: 1 days | End: 2025-06-06
Payer: MEDICAID

## 2025-06-06 DIAGNOSIS — Z96.1 PRESENCE OF INTRAOCULAR LENS: Chronic | ICD-10-CM

## 2025-06-06 DIAGNOSIS — I71.20 THORACIC AORTIC ANEURYSM, WITHOUT RUPTURE, UNSPECIFIED: ICD-10-CM

## 2025-06-06 DIAGNOSIS — Z00.8 ENCOUNTER FOR OTHER GENERAL EXAMINATION: ICD-10-CM

## 2025-06-06 DIAGNOSIS — Z98.1 ARTHRODESIS STATUS: Chronic | ICD-10-CM

## 2025-06-06 DIAGNOSIS — Z95.2 PRESENCE OF PROSTHETIC HEART VALVE: Chronic | ICD-10-CM

## 2025-06-06 DIAGNOSIS — Z95.4 PRESENCE OF OTHER HEART-VALVE REPLACEMENT: Chronic | ICD-10-CM

## 2025-06-06 PROCEDURE — 93975 VASCULAR STUDY: CPT | Mod: 26

## 2025-06-06 PROCEDURE — 93975 VASCULAR STUDY: CPT

## 2025-06-09 ENCOUNTER — NON-APPOINTMENT (OUTPATIENT)
Age: 40
End: 2025-06-09

## 2025-06-16 NOTE — ED ADULT TRIAGE NOTE - MEANS OF ARRIVAL
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 SO states that the last time he had tumor lysis syndrome after an infusion he got agitated, SOB (denies at this time), tachycardic, fevers, low O2/dusky/mottling. Port remains accessed. Pt has lymphoma and parkinsons. Pt denies SOB at this time, states it is intermittent. During triage pt is becoming increasingly agitated and moving around, c/o feeling very hot (gown removed and EDT getting ice packs for pt), temp is 100.1 in triage. Spo2 90-91% on RA, pt placed on 2L that he had been on and spo2 increased to 93%+.   Pt was medicated with solumedrol in CC per SO, unknown what other medications may have been given.       Hoang Mei is a 62 year old presenting to the emergency department from the [facility] for evaluation to rule out tumor lysis syndrome.  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  He received IV Benadryl, Pepcid, Solu-Medrol and morphine with out any initial improvement in medications were redosed with IV fluids.  It was recommended that the patient be transferred to the ER as he was going to need labs repeated in several hours to rule out tumor lysis syndrome.  Patient was asymptomatic prior to the therapy treatment and so the thought was that this was not an infectious cause but more of an infusion therapy reaction.    I have reviewed Hoang Mei's previous office visit note from today.  Note Review Summary: Recommendations for laboratory  recheck were listed to rule out tumor lysis syndrome.    Past/Family/Social History     Allergies   Allergen Reactions    Metoclopramide Other (See Comments)     Agitation stated wife       Current Facility-Administered Medications   Medication    carbidopa-levodopa (SINEMET CR)  MG per CR tablet 2 tablet     Current Outpatient Medications   Medication Sig    traMADol (ULTRAM) 50 MG tablet Take 1 tablet by mouth every 6 hours as needed for pain. Can increase to 2 tablets per dose only if needed.    venetoclax (VENCLEXTA) 100 MG tablet Week 5 and onward - Take 2 tablets by mouth with breakfast    Zanubrutinib 80 MG Cap Take 1 capsule by mouth in the morning and 1 capsule in the evening.    carbidopa-levodopa (SINEMET)  MG per tablet Take 2 tablets by mouth every 3 hours for a total of 14 tablets per day.    ALPRAZolam (XANAX) 0.25 MG tablet Take 2 tablets by mouth nightly as needed for Sleep.    Magnesium Citrate 200 MG Tab Take 375 mg by mouth in the morning and 375 mg in the evening.    droxidopa (NORTHERA) 100 MG capsule Take 1 capsule by mouth in the morning and 1 capsule in the evening.    sucralfate (CARAFATE) 1 GM/10ML suspension Take 5 to 10 mLs by mouth 4 times daily to coat mouth    midodrine (PROAMATINE) 5 MG tablet Take 1 tablet by mouth 2 times daily.    fluconazole (DIFLUCAN) 200 MG tablet Take 2 tablets by mouth daily. Decrease atorvastatin while on this medication.    carbidopa-levodopa (SINEMET CR)  MG per CR tablet Take 2 tablets by mouth nightly.    allopurinol (ZYLOPRIM) 100 MG tablet Take 1 tablet by mouth daily.    ezetimibe (ZETIA) 10 MG tablet Take 1 tablet by mouth daily.    custom magic mouthwash oral suspension Swish and swallow 5-10 mLs 4 times daily (before meals and nightly). (Patient not taking: Reported on 6/16/2025)    sulfamethoxazole-trimethoprim (BACTRIM DS) 800-160 MG per tablet Take 1 tablet by mouth 3 days a week.    acyclovir (ZOVIRAX) 400 MG tablet Take 1  tablet by mouth in the morning and 1 tablet in the evening.    loperamide (IMODIUM) 2 MG capsule Take 2 capsules by mouth at first loose stool then take 1 capsule with each loose stool until diarrhea stops. Do not exceed 8 capsules in 24 hours.    docusate sodium (COLACE) 100 MG capsule Take 100 mg by mouth daily. Wife reports he is taking one tablet daily.    ondansetron (ZOFRAN ODT) 8 MG disintegrating tablet Place 1 tablet onto the tongue every 8 hours as needed for Nausea. (Patient not taking: Reported on 6/16/2025)    acetaminophen (TYLENOL) 500 MG tablet Take 2 tablets by mouth as needed for Pain.    omeprazole (PrilOSEC) 20 MG capsule Take 1 capsule by mouth daily.     Facility-Administered Medications Ordered in Other Encounters   Medication    hydrOXYzine (ATARAX) tablet 10 mg       Past Medical History:   Diagnosis Date    Hyperlipoproteinemia     Hypotension     Old MI (myocardial infarction)     Parkinson's disease (CMD)     Sleep apnea     wears CPAP    Varicose veins of both lower extremities        Past Surgical History:   Procedure Laterality Date    Coronary artery bypass graft  2015    Repair umbilical vick,<4y/o,reduc         Family History   Problem Relation Age of Onset    Hypertension Mother     Parkinsonism Father     Heart disease Sister         stent placement    Myocardial Infarction Sister     Diabetes Sister     Cancer, Prostate Neg Hx     Cancer, Breast Neg Hx     Cancer, Ovarian Neg Hx     Cancer, Colon Neg Hx        Social History     Tobacco Use    Smoking status: Never    Smokeless tobacco: Never   Vaping Use    Vaping status: never used   Substance Use Topics    Alcohol use: Not Currently    Drug use: Never          Review of Systems   Review of Symptoms     Review of Systems   Constitutional:  Positive for chills and fever.   HENT:  Negative for congestion, ear pain and sore throat.    Eyes:  Negative for pain and redness.   Respiratory:  Negative for cough and shortness of breath.     Cardiovascular:  Negative for chest pain, palpitations and leg swelling.   Gastrointestinal:  Negative for abdominal pain, constipation, diarrhea, nausea and vomiting.   Genitourinary:  Negative for difficulty urinating, dysuria, flank pain, frequency, hematuria and urgency.   Musculoskeletal:  Negative for back pain and neck pain.   Skin:  Negative for color change, pallor and rash.   Neurological:  Positive for weakness. Negative for dizziness, light-headedness, numbness and headaches.   Hematological:  Negative for adenopathy. Does not bruise/bleed easily.   Psychiatric/Behavioral:  Negative for confusion and decreased concentration. The patient is not nervous/anxious.           Physical Exam   Physical Exam     ED Triage Vitals [06/16/25 1718]   ED Triage Vitals Group      Temp 100.1 °F (37.8 °C)      Heart Rate (!) 114      Resp 20      /72      SpO2 91 %      EtCO2 mmHg       Height 6' (1.829 m)      Weight 164 lb 14.5 oz (74.8 kg)      Weight Scale Used Standing scale      BMI (Calculated) 22.36      IBW/kg (Calculated) 77.6       Physical Exam  Vitals and nursing note reviewed.   Constitutional:       General: He is not in acute distress.     Appearance: He is not ill-appearing, toxic-appearing or diaphoretic.      Comments: Parkinsonian features with resting tremor.   HENT:      Head: Normocephalic.      Right Ear: External ear normal.      Left Ear: External ear normal.      Nose: Nose normal.      Mouth/Throat:      Mouth: Mucous membranes are moist.   Eyes:      Extraocular Movements: Extraocular movements intact.      Conjunctiva/sclera: Conjunctivae normal.   Cardiovascular:      Rate and Rhythm: Regular rhythm. Tachycardia present.      Pulses: Normal pulses.      Heart sounds: Normal heart sounds.   Pulmonary:      Effort: Pulmonary effort is normal. No respiratory distress.      Breath sounds: Normal breath sounds.   Abdominal:      General: Bowel sounds are normal.      Palpations: Abdomen  is soft.      Tenderness: There is no abdominal tenderness.   Musculoskeletal:         General: No swelling or tenderness. Normal range of motion.      Cervical back: Normal range of motion.   Skin:     General: Skin is warm and dry.      Findings: No erythema or rash.   Neurological:      General: No focal deficit present.      Mental Status: He is alert and oriented to person, place, and time. Mental status is at baseline.            Procedures   ED Procedures     Procedures     Lab Results   ED Lab   Results for orders placed or performed during the hospital encounter of 06/16/25   NT proBNP    Specimen: Blood, Venous   Result Value Ref Range    NT-proBNP 244 (H) <=125 pg/mL   TROPONIN I, HIGH SENSITIVITY    Specimen: Blood, Venous   Result Value Ref Range    Troponin I, High Sensitivity 11 <77 ng/L   Procalcitonin    Specimen: Blood, Venous   Result Value Ref Range    Procalcitonin 4.65 (H) <0.10 ng/mL   Comprehensive Metabolic Panel    Specimen: Blood, Venous   Result Value Ref Range    Fasting Status      Sodium 139 135 - 145 mmol/L    Potassium 4.8 3.4 - 5.1 mmol/L    Chloride 101 97 - 110 mmol/L    Carbon Dioxide 26 21 - 32 mmol/L    Anion Gap 17 7 - 19 mmol/L    Glucose 171 (H) 70 - 99 mg/dL    BUN 29 (H) 6 - 20 mg/dL    Creatinine 0.72 0.67 - 1.17 mg/dL    Glomerular Filtration Rate >90 >=60    BUN/Cr 40 (H) 7 - 25    Calcium 8.4 8.4 - 10.2 mg/dL    Bilirubin, Total 0.4 0.2 - 1.0 mg/dL    GOT/AST 45 (H) <=37 Units/L    GPT/ALT 10 <64 Units/L    Alkaline Phosphatase 93 45 - 117 Units/L    Albumin 3.3 (L) 3.4 - 5.0 g/dL    Protein, Total 5.9 (L) 6.4 - 8.2 g/dL    Globulin 2.6 2.0 - 4.0 g/dL    A/G Ratio 1.3 1.0 - 2.4   Uric Acid    Specimen: Blood, Venous   Result Value Ref Range    Uric Acid 9.0 (H) 3.5 - 7.2 mg/dL   Lactate Dehydrogenase    Specimen: Blood, Venous   Result Value Ref Range    LD, Total 403 (H) 86 - 234 Units/L   Phosphorus    Specimen: Blood, Venous   Result Value Ref Range    Phosphorus  4.4 2.4 - 4.7 mg/dL   CBC with Automated Differential (performable only)    Specimen: Blood, Venous   Result Value Ref Range    WBC 3.6 (L) 4.2 - 11.0 K/mcL    RBC 2.54 (L) 4.50 - 5.90 mil/mcL    HGB 9.2 (L) 13.0 - 17.0 g/dL    HCT 28.7 (L) 39.0 - 51.0 %    .0 (H) 78.0 - 100.0 fl    MCH 36.2 (H) 26.0 - 34.0 pg    MCHC 32.1 32.0 - 36.5 g/dL    RDW-CV 13.9 11.0 - 15.0 %    RDW-SD 57.7 (H) 39.0 - 50.0 fL    PLT 15 (LL) 140 - 450 K/mcL    NRBC 0 <=0 /100 WBC    Neutrophil, Percent 92 %    Lymphocytes, Percent 1 %    Mono, Percent 5 %    Eosinophils, Percent 0 %    Basophils, Percent 0 %    Immature Granulocytes 2 %    Absolute Neutrophils 3.3 1.8 - 7.7 K/mcL    Absolute Lymphocytes 0.0 (L) 1.0 - 4.0 K/mcL    Absolute Monocytes 0.2 (L) 0.3 - 0.9 K/mcL    Absolute Eosinophils  0.0 0.0 - 0.5 K/mcL    Absolute Basophils 0.0 0.0 - 0.3 K/mcL    Absolute Immature Granulocytes 0.1 0.0 - 0.2 K/mcL   Lactic Acid, Venous    Specimen: Blood, Venous   Result Value Ref Range    Lactate, Venous 4.4 (HH) 0.0 - 2.0 mmol/L   Blood Culture    Specimen: Blood, Blue Port   Result Value Ref Range    Culture, Blood or Bone Marrow No Growth <24 hours    LACTIC ACID VENOUS WITH REFLEX - POINT OF CARE    Specimen: Blood, Venous   Result Value Ref Range    LACTIC ACID, VENOUS - POINT OF CARE 3.8 (HH) <2.0 mmol/L      Labs were performed with remarkable findings of the uric acid increasing however the phosphorus, calcium and potassium did not rise.  To diagnose tumor lysis syndrome to therefore need to be elevated.  Lactic acid was performed and blood cultures also performed as I was concerned for potential infectious etiology and lactic acid was elevated and remained elevated despite IV fluid administration.  No other source for fever other than the infusion therapy medication was found.    EKG     EKG Interpretation  Performed at 5:19 PM  Read at 5:21 PM  Rate: 117  Rhythm: sinus tachycardia with possible left atrial enlargement, no  significant change from May 19, 2025  Abnormality: yes    EKG independently interpreted by the emergency department physician    Radiology Results   ED Radiology Results     Imaging Results              XR CHEST AP OR PA (Final result)  Result time 06/16/25 18:01:14      Final result                   Impression:    IMPRESSION:  Worsening interstitial and airspace opacity bilaterally. Findings may  reflect atypical infection or pulmonary edema. Based on nodular  interstitial thickening from the 1/7/2025 CT, malignant infiltrates are not  entirely excluded.      Electronically Signed by: Mavis Gee MD  Signed on: 6/16/2025 6:01 PM  Created on Workstation ID: [id number]  Signed on Workstation ID: [id number]               Narrative:    EXAM: XR CHEST AP OR PA    INDICATION: SOB.    COMPARISON: 5/19/2025.    FINDINGS:    Right-sided Port-A-Cath is unchanged. Status post sternotomy.    The heart is normal in size. The pulmonary vasculature is obscured.    Increasing nodular and groundglass opacity throughout the lungs. This is  densest in the left midlung. No significant effusion.                                    NT proBNP is not significantly elevated and so the chest x-ray findings are likely due to malignant infiltrates.       ED Medications   ED Medications     ED Medication Orders (From admission, onward)      Ordered Start     Status Ordering Provider    06/16/25 2007 06/16/25 2008  allopurinol (ZYLOPRIM) tablet 100 mg  ONCE         Last MAR action: Given HLAVA, JAZMINE A                 ED Course     Vitals:    06/16/25 1812 06/16/25 1827 06/16/25 1856 06/16/25 1930   BP: 116/66 128/70 132/72 137/78   BP Location:       Patient Position:       Pulse: (!) 106 (!) 102 (!) 102 (!) 100   Resp: 16 16 16    Temp:       TempSrc:       SpO2: 97% 96% 96% 98%   Weight:       Height:                Radiology Review: I have independently interpreted the Chest X-Ray and have found No acute or active disease.  I am awaiting  on the final radiology read.          Consults                I have discussed the case with oncology Dr English. We discussed the pertinent history, physical, diagnostic studies and the ED management of the patient.  The plan is he did not recommend IV antibiotic therapy at this point as the patient is feeling improved with the measures provided.  He wanted to make sure the patient received allopurinol and 100 mg dose was given.  The patient has a follow-up tomorrow in the infusion center and can have labs rechecked and be reassessed.    Medical Decision Making                               MDM done in ED Course          Disposition       Clinical Impression and Diagnosis  8:22 PM       ED Diagnosis       Diagnosis Comment Associated Orders       Final diagnosis    Chemotherapy adverse reaction, initial encounter -- --            Follow Up:    Follow-up in the [facility] as scheduled tomorrow where labs will be repeated.              Summary of your Discharge Medications      You have not been prescribed any medications.         Pt is discharged to home/self care in stable condition.              Discharge after Treatment 6/16/2025  8:11 PM  There is no comment                   Deyvi Fields MD  06/16/25 2022     ambulatory

## 2025-06-19 ENCOUNTER — APPOINTMENT (OUTPATIENT)
Dept: FAMILY MEDICINE | Facility: CLINIC | Age: 40
End: 2025-06-19

## 2025-06-26 ENCOUNTER — TRANSCRIPTION ENCOUNTER (OUTPATIENT)
Age: 40
End: 2025-06-26

## 2025-06-26 ENCOUNTER — APPOINTMENT (OUTPATIENT)
Dept: FAMILY MEDICINE | Facility: CLINIC | Age: 40
End: 2025-06-26
Payer: MEDICAID

## 2025-06-26 ENCOUNTER — MED ADMIN CHARGE (OUTPATIENT)
Age: 40
End: 2025-06-26

## 2025-06-26 PROCEDURE — 36415 COLL VENOUS BLD VENIPUNCTURE: CPT

## 2025-06-26 PROCEDURE — 96372 THER/PROPH/DIAG INJ SC/IM: CPT

## 2025-06-26 RX ORDER — CYANOCOBALAMIN 1000 UG/ML
1000 INJECTION, SOLUTION INTRAMUSCULAR; SUBCUTANEOUS
Qty: 0 | Refills: 0 | Status: COMPLETED | OUTPATIENT
Start: 2025-06-24

## 2025-06-27 LAB
ALBUMIN SERPL ELPH-MCNC: 4 G/DL
ALP BLD-CCNC: 83 U/L
ALT SERPL-CCNC: 9 U/L
ANION GAP SERPL CALC-SCNC: 15 MMOL/L
AST SERPL-CCNC: 18 U/L
BASOPHILS # BLD AUTO: 0.04 K/UL
BASOPHILS NFR BLD AUTO: 0.3 %
BILIRUB SERPL-MCNC: 0.5 MG/DL
BUN SERPL-MCNC: 16 MG/DL
CALCIUM SERPL-MCNC: 9.4 MG/DL
CHLORIDE SERPL-SCNC: 107 MMOL/L
CO2 SERPL-SCNC: 18 MMOL/L
CREAT SERPL-MCNC: 0.59 MG/DL
EGFRCR SERPLBLD CKD-EPI 2021: 115 ML/MIN/1.73M2
EOSINOPHIL # BLD AUTO: 0.13 K/UL
EOSINOPHIL NFR BLD AUTO: 1.1 %
GLUCOSE SERPL-MCNC: 137 MG/DL
HCT VFR BLD CALC: 35.4 %
HGB BLD-MCNC: 10.4 G/DL
IMM GRANULOCYTES NFR BLD AUTO: 0.6 %
LYMPHOCYTES # BLD AUTO: 1.62 K/UL
LYMPHOCYTES NFR BLD AUTO: 13.3 %
MAN DIFF?: NORMAL
MCHC RBC-ENTMCNC: 23.9 PG
MCHC RBC-ENTMCNC: 29.4 G/DL
MCV RBC AUTO: 81.4 FL
MONOCYTES # BLD AUTO: 0.84 K/UL
MONOCYTES NFR BLD AUTO: 6.9 %
NEUTROPHILS # BLD AUTO: 9.48 K/UL
NEUTROPHILS NFR BLD AUTO: 77.8 %
PLATELET # BLD AUTO: 409 K/UL
POTASSIUM SERPL-SCNC: 4.4 MMOL/L
PROT SERPL-MCNC: 6.8 G/DL
RBC # BLD: 4.35 M/UL
RBC # FLD: 18.4 %
SODIUM SERPL-SCNC: 139 MMOL/L
WBC # FLD AUTO: 12.18 K/UL

## 2025-07-03 ENCOUNTER — APPOINTMENT (OUTPATIENT)
Dept: VASCULAR SURGERY | Facility: CLINIC | Age: 40
End: 2025-07-03
Payer: MEDICAID

## 2025-07-03 VITALS
WEIGHT: 170 LBS | HEART RATE: 116 BPM | SYSTOLIC BLOOD PRESSURE: 112 MMHG | HEIGHT: 72 IN | BODY MASS INDEX: 23.03 KG/M2 | DIASTOLIC BLOOD PRESSURE: 78 MMHG

## 2025-07-03 PROCEDURE — 99214 OFFICE O/P EST MOD 30 MIN: CPT

## 2025-07-07 ENCOUNTER — TRANSCRIPTION ENCOUNTER (OUTPATIENT)
Age: 40
End: 2025-07-07

## 2025-07-09 PROBLEM — I71.00 DISSECTION OF DESCENDING AORTA: Status: ACTIVE | Noted: 2025-02-21

## 2025-07-10 ENCOUNTER — NON-APPOINTMENT (OUTPATIENT)
Age: 40
End: 2025-07-10

## 2025-07-10 ENCOUNTER — APPOINTMENT (OUTPATIENT)
Dept: FAMILY MEDICINE | Facility: CLINIC | Age: 40
End: 2025-07-10
Payer: MEDICAID

## 2025-07-10 PROCEDURE — 96372 THER/PROPH/DIAG INJ SC/IM: CPT

## 2025-07-10 RX ORDER — CYANOCOBALAMIN 1000 UG/ML
1000 INJECTION, SOLUTION INTRAMUSCULAR; SUBCUTANEOUS
Qty: 0 | Refills: 0 | Status: COMPLETED | OUTPATIENT
Start: 2025-07-10

## 2025-07-17 ENCOUNTER — APPOINTMENT (OUTPATIENT)
Dept: FAMILY MEDICINE | Facility: CLINIC | Age: 40
End: 2025-07-17
Payer: MEDICAID

## 2025-07-17 VITALS
SYSTOLIC BLOOD PRESSURE: 110 MMHG | HEART RATE: 89 BPM | WEIGHT: 173 LBS | OXYGEN SATURATION: 98 % | DIASTOLIC BLOOD PRESSURE: 74 MMHG | BODY MASS INDEX: 23.43 KG/M2 | HEIGHT: 72 IN

## 2025-07-17 PROBLEM — K55.1 SUPERIOR MESENTERIC ARTERY STENOSIS: Status: ACTIVE | Noted: 2025-07-17

## 2025-07-17 PROBLEM — K59.00 CONSTIPATION: Status: ACTIVE | Noted: 2025-07-17

## 2025-07-17 PROCEDURE — 99214 OFFICE O/P EST MOD 30 MIN: CPT | Mod: 25

## 2025-07-17 PROCEDURE — 96372 THER/PROPH/DIAG INJ SC/IM: CPT

## 2025-07-17 RX ORDER — CYANOCOBALAMIN 1000 UG/ML
1000 INJECTION, SOLUTION INTRAMUSCULAR; SUBCUTANEOUS
Qty: 0 | Refills: 0 | Status: COMPLETED | OUTPATIENT
Start: 2025-07-17

## 2025-07-17 RX ADMIN — CYANOCOBALAMIN 1 MCG/ML: 1000 INJECTION, SOLUTION INTRAMUSCULAR; SUBCUTANEOUS at 00:00

## 2025-07-20 ENCOUNTER — OUTPATIENT (OUTPATIENT)
Dept: OUTPATIENT SERVICES | Facility: HOSPITAL | Age: 40
LOS: 1 days | End: 2025-07-20
Payer: MEDICAID

## 2025-07-20 DIAGNOSIS — Z96.1 PRESENCE OF INTRAOCULAR LENS: Chronic | ICD-10-CM

## 2025-07-20 DIAGNOSIS — I71.9 AORTIC ANEURYSM OF UNSPECIFIED SITE, WITHOUT RUPTURE: ICD-10-CM

## 2025-07-20 DIAGNOSIS — Z95.4 PRESENCE OF OTHER HEART-VALVE REPLACEMENT: Chronic | ICD-10-CM

## 2025-07-20 DIAGNOSIS — Z98.1 ARTHRODESIS STATUS: Chronic | ICD-10-CM

## 2025-07-20 DIAGNOSIS — Z95.2 PRESENCE OF PROSTHETIC HEART VALVE: Chronic | ICD-10-CM

## 2025-07-20 PROCEDURE — 71275 CT ANGIOGRAPHY CHEST: CPT

## 2025-07-20 PROCEDURE — 74174 CTA ABD&PLVS W/CONTRAST: CPT

## 2025-07-24 ENCOUNTER — APPOINTMENT (OUTPATIENT)
Dept: FAMILY MEDICINE | Facility: CLINIC | Age: 40
End: 2025-07-24
Payer: MEDICAID

## 2025-07-24 ENCOUNTER — MED ADMIN CHARGE (OUTPATIENT)
Age: 40
End: 2025-07-24

## 2025-07-24 PROCEDURE — 96372 THER/PROPH/DIAG INJ SC/IM: CPT

## 2025-07-24 RX ORDER — CYANOCOBALAMIN 1000 UG/ML
1000 INJECTION, SOLUTION INTRAMUSCULAR; SUBCUTANEOUS
Qty: 0 | Refills: 0 | Status: COMPLETED | OUTPATIENT
Start: 2025-07-24

## 2025-08-01 ENCOUNTER — APPOINTMENT (OUTPATIENT)
Dept: FAMILY MEDICINE | Facility: CLINIC | Age: 40
End: 2025-08-01
Payer: MEDICAID

## 2025-08-01 ENCOUNTER — APPOINTMENT (OUTPATIENT)
Dept: FAMILY MEDICINE | Facility: CLINIC | Age: 40
End: 2025-08-01

## 2025-08-01 PROCEDURE — 36415 COLL VENOUS BLD VENIPUNCTURE: CPT

## 2025-08-01 PROCEDURE — 96372 THER/PROPH/DIAG INJ SC/IM: CPT

## 2025-08-01 RX ORDER — CYANOCOBALAMIN 1000 UG/ML
1000 INJECTION, SOLUTION INTRAMUSCULAR; SUBCUTANEOUS
Qty: 0 | Refills: 0 | Status: COMPLETED | OUTPATIENT
Start: 2025-08-01

## 2025-08-04 ENCOUNTER — APPOINTMENT (OUTPATIENT)
Dept: FAMILY MEDICINE | Facility: CLINIC | Age: 40
End: 2025-08-04
Payer: MEDICAID

## 2025-08-04 VITALS
HEART RATE: 102 BPM | SYSTOLIC BLOOD PRESSURE: 110 MMHG | OXYGEN SATURATION: 98 % | BODY MASS INDEX: 23.43 KG/M2 | WEIGHT: 173 LBS | HEIGHT: 72 IN | DIASTOLIC BLOOD PRESSURE: 60 MMHG

## 2025-08-04 DIAGNOSIS — R20.2 PARESTHESIA OF SKIN: ICD-10-CM

## 2025-08-04 DIAGNOSIS — I38 ENDOCARDITIS, VALVE UNSPECIFIED: ICD-10-CM

## 2025-08-04 DIAGNOSIS — R10.9 UNSPECIFIED ABDOMINAL PAIN: ICD-10-CM

## 2025-08-04 DIAGNOSIS — R79.1 ABNORMAL COAGULATION PROFILE: ICD-10-CM

## 2025-08-04 DIAGNOSIS — M79.2 NEURALGIA AND NEURITIS, UNSPECIFIED: ICD-10-CM

## 2025-08-04 PROCEDURE — 99214 OFFICE O/P EST MOD 30 MIN: CPT

## 2025-08-04 RX ORDER — DICLOFENAC SODIUM 10 MG/G
1 GEL TOPICAL
Qty: 1 | Refills: 0 | Status: ACTIVE | COMMUNITY
Start: 2025-08-04 | End: 1900-01-01

## 2025-08-05 LAB — VIT B12 SERPL-MCNC: 439 PG/ML

## 2025-08-27 ENCOUNTER — RX RENEWAL (OUTPATIENT)
Age: 40
End: 2025-08-27

## 2025-08-28 ENCOUNTER — APPOINTMENT (OUTPATIENT)
Dept: FAMILY MEDICINE | Facility: CLINIC | Age: 40
End: 2025-08-28

## 2025-09-03 ENCOUNTER — NON-APPOINTMENT (OUTPATIENT)
Age: 40
End: 2025-09-03

## 2025-09-04 DIAGNOSIS — Z79.2 LONG TERM (CURRENT) USE OF ANTIBIOTICS: ICD-10-CM

## 2025-09-04 RX ORDER — AMOXICILLIN 500 MG/1
500 CAPSULE ORAL
Qty: 4 | Refills: 1 | Status: ACTIVE | COMMUNITY
Start: 2025-09-04 | End: 1900-01-01

## 2025-09-05 ENCOUNTER — APPOINTMENT (OUTPATIENT)
Dept: CARDIOTHORACIC SURGERY | Facility: CLINIC | Age: 40
End: 2025-09-05
Payer: MEDICAID

## 2025-09-05 ENCOUNTER — NON-APPOINTMENT (OUTPATIENT)
Age: 40
End: 2025-09-05

## 2025-09-05 VITALS
HEIGHT: 72 IN | DIASTOLIC BLOOD PRESSURE: 87 MMHG | WEIGHT: 165 LBS | RESPIRATION RATE: 18 BRPM | BODY MASS INDEX: 22.35 KG/M2 | OXYGEN SATURATION: 97 % | HEART RATE: 68 BPM | SYSTOLIC BLOOD PRESSURE: 146 MMHG

## 2025-09-05 DIAGNOSIS — Q87.40 MARFAN'S SYNDROME, UNSPECIFIED: ICD-10-CM

## 2025-09-05 DIAGNOSIS — I71.9 AORTIC ANEURYSM OF UNSPECIFIED SITE, W/OUT RUPTURE: ICD-10-CM

## 2025-09-05 PROCEDURE — 99213 OFFICE O/P EST LOW 20 MIN: CPT

## 2025-09-11 ENCOUNTER — APPOINTMENT (OUTPATIENT)
Dept: FAMILY MEDICINE | Facility: CLINIC | Age: 40
End: 2025-09-11
Payer: MEDICAID

## 2025-09-11 ENCOUNTER — NON-APPOINTMENT (OUTPATIENT)
Age: 40
End: 2025-09-11

## 2025-09-11 PROCEDURE — 96372 THER/PROPH/DIAG INJ SC/IM: CPT

## 2025-09-18 ENCOUNTER — APPOINTMENT (OUTPATIENT)
Dept: FAMILY MEDICINE | Facility: CLINIC | Age: 40
End: 2025-09-18
Payer: MEDICAID

## 2025-09-18 PROCEDURE — 96372 THER/PROPH/DIAG INJ SC/IM: CPT

## 2025-09-18 RX ORDER — CYANOCOBALAMIN 1000 UG/ML
1000 INJECTION, SOLUTION INTRAMUSCULAR; SUBCUTANEOUS
Qty: 0 | Refills: 0 | Status: COMPLETED | OUTPATIENT
Start: 2025-09-18

## 2025-09-19 ENCOUNTER — APPOINTMENT (OUTPATIENT)
Dept: CARDIOTHORACIC SURGERY | Facility: CLINIC | Age: 40
End: 2025-09-19
Payer: MEDICAID

## 2025-09-19 DIAGNOSIS — I71.9 AORTIC ANEURYSM OF UNSPECIFIED SITE, W/OUT RUPTURE: ICD-10-CM

## 2025-09-19 DIAGNOSIS — I71.00 DISSECTION OF UNSPECIFIED SITE OF AORTA: ICD-10-CM

## 2025-09-19 DIAGNOSIS — I71.012 DISSECTION OF DESCENDING THORACIC AORTA: ICD-10-CM

## 2025-09-19 PROCEDURE — 99213 OFFICE O/P EST LOW 20 MIN: CPT | Mod: 93
